# Patient Record
Sex: MALE | Race: OTHER | NOT HISPANIC OR LATINO | URBAN - METROPOLITAN AREA
[De-identification: names, ages, dates, MRNs, and addresses within clinical notes are randomized per-mention and may not be internally consistent; named-entity substitution may affect disease eponyms.]

---

## 2017-06-26 ENCOUNTER — EMERGENCY (EMERGENCY)
Facility: HOSPITAL | Age: 47
LOS: 1 days | Discharge: HOME | End: 2017-06-26
Attending: EMERGENCY MEDICINE
Payer: MEDICAID

## 2017-06-26 VITALS
HEART RATE: 68 BPM | DIASTOLIC BLOOD PRESSURE: 76 MMHG | RESPIRATION RATE: 18 BRPM | TEMPERATURE: 98 F | OXYGEN SATURATION: 100 % | SYSTOLIC BLOOD PRESSURE: 135 MMHG

## 2017-06-26 VITALS
RESPIRATION RATE: 20 BRPM | OXYGEN SATURATION: 100 % | HEIGHT: 66 IN | HEART RATE: 77 BPM | SYSTOLIC BLOOD PRESSURE: 136 MMHG | WEIGHT: 164.91 LBS | TEMPERATURE: 98 F | DIASTOLIC BLOOD PRESSURE: 73 MMHG

## 2017-06-26 DIAGNOSIS — F41.9 ANXIETY DISORDER, UNSPECIFIED: ICD-10-CM

## 2017-06-26 DIAGNOSIS — R19.7 DIARRHEA, UNSPECIFIED: ICD-10-CM

## 2017-06-26 DIAGNOSIS — E78.5 HYPERLIPIDEMIA, UNSPECIFIED: ICD-10-CM

## 2017-06-26 DIAGNOSIS — G20 PARKINSON'S DISEASE: ICD-10-CM

## 2017-06-26 LAB
ALBUMIN SERPL ELPH-MCNC: 3.9 G/DL — SIGNIFICANT CHANGE UP (ref 3.5–5)
ALP SERPL-CCNC: 78 U/L — SIGNIFICANT CHANGE UP (ref 40–120)
ALT FLD-CCNC: 12 U/L DA — SIGNIFICANT CHANGE UP (ref 10–60)
ANION GAP SERPL CALC-SCNC: 4 MMOL/L — LOW (ref 5–17)
AST SERPL-CCNC: 24 U/L — SIGNIFICANT CHANGE UP (ref 10–40)
BASOPHILS # BLD AUTO: 0.1 K/UL — SIGNIFICANT CHANGE UP (ref 0–0.2)
BASOPHILS NFR BLD AUTO: 1.5 % — SIGNIFICANT CHANGE UP (ref 0–2)
BILIRUB SERPL-MCNC: 0.3 MG/DL — SIGNIFICANT CHANGE UP (ref 0.2–1.2)
BUN SERPL-MCNC: 20 MG/DL — HIGH (ref 7–18)
CALCIUM SERPL-MCNC: 8.6 MG/DL — SIGNIFICANT CHANGE UP (ref 8.4–10.5)
CHLORIDE SERPL-SCNC: 108 MMOL/L — SIGNIFICANT CHANGE UP (ref 96–108)
CO2 SERPL-SCNC: 27 MMOL/L — SIGNIFICANT CHANGE UP (ref 22–31)
CREAT SERPL-MCNC: 1.07 MG/DL — SIGNIFICANT CHANGE UP (ref 0.5–1.3)
EOSINOPHIL # BLD AUTO: 0.1 K/UL — SIGNIFICANT CHANGE UP (ref 0–0.5)
EOSINOPHIL NFR BLD AUTO: 1.3 % — SIGNIFICANT CHANGE UP (ref 0–6)
GLUCOSE SERPL-MCNC: 100 MG/DL — HIGH (ref 70–99)
HCT VFR BLD CALC: 43.5 % — SIGNIFICANT CHANGE UP (ref 39–50)
HGB BLD-MCNC: 14.6 G/DL — SIGNIFICANT CHANGE UP (ref 13–17)
LACTATE SERPL-SCNC: 1.5 MMOL/L — SIGNIFICANT CHANGE UP (ref 0.7–2)
LIDOCAIN IGE QN: 236 U/L — SIGNIFICANT CHANGE UP (ref 73–393)
LYMPHOCYTES # BLD AUTO: 1.1 K/UL — SIGNIFICANT CHANGE UP (ref 1–3.3)
LYMPHOCYTES # BLD AUTO: 22.8 % — SIGNIFICANT CHANGE UP (ref 13–44)
MCHC RBC-ENTMCNC: 29.2 PG — SIGNIFICANT CHANGE UP (ref 27–34)
MCHC RBC-ENTMCNC: 33.4 GM/DL — SIGNIFICANT CHANGE UP (ref 32–36)
MCV RBC AUTO: 87.2 FL — SIGNIFICANT CHANGE UP (ref 80–100)
MONOCYTES # BLD AUTO: 0.4 K/UL — SIGNIFICANT CHANGE UP (ref 0–0.9)
MONOCYTES NFR BLD AUTO: 9 % — SIGNIFICANT CHANGE UP (ref 2–14)
NEUTROPHILS # BLD AUTO: 3.2 K/UL — SIGNIFICANT CHANGE UP (ref 1.8–7.4)
NEUTROPHILS NFR BLD AUTO: 65.4 % — SIGNIFICANT CHANGE UP (ref 43–77)
PLATELET # BLD AUTO: 203 K/UL — SIGNIFICANT CHANGE UP (ref 150–400)
POTASSIUM SERPL-MCNC: 4.4 MMOL/L — SIGNIFICANT CHANGE UP (ref 3.5–5.3)
POTASSIUM SERPL-SCNC: 4.4 MMOL/L — SIGNIFICANT CHANGE UP (ref 3.5–5.3)
PROT SERPL-MCNC: 7.4 G/DL — SIGNIFICANT CHANGE UP (ref 6–8.3)
RBC # BLD: 5 M/UL — SIGNIFICANT CHANGE UP (ref 4.2–5.8)
RBC # FLD: 11.4 % — SIGNIFICANT CHANGE UP (ref 10.3–14.5)
SODIUM SERPL-SCNC: 139 MMOL/L — SIGNIFICANT CHANGE UP (ref 135–145)
WBC # BLD: 4.9 K/UL — SIGNIFICANT CHANGE UP (ref 3.8–10.5)
WBC # FLD AUTO: 4.9 K/UL — SIGNIFICANT CHANGE UP (ref 3.8–10.5)

## 2017-06-26 PROCEDURE — 85027 COMPLETE CBC AUTOMATED: CPT

## 2017-06-26 PROCEDURE — 96375 TX/PRO/DX INJ NEW DRUG ADDON: CPT

## 2017-06-26 PROCEDURE — 83690 ASSAY OF LIPASE: CPT

## 2017-06-26 PROCEDURE — 36415 COLL VENOUS BLD VENIPUNCTURE: CPT

## 2017-06-26 PROCEDURE — 80053 COMPREHEN METABOLIC PANEL: CPT

## 2017-06-26 PROCEDURE — 99284 EMERGENCY DEPT VISIT MOD MDM: CPT | Mod: 25

## 2017-06-26 PROCEDURE — 99284 EMERGENCY DEPT VISIT MOD MDM: CPT

## 2017-06-26 PROCEDURE — 96374 THER/PROPH/DIAG INJ IV PUSH: CPT

## 2017-06-26 PROCEDURE — 83605 ASSAY OF LACTIC ACID: CPT

## 2017-06-26 RX ORDER — ONDANSETRON 8 MG/1
4 TABLET, FILM COATED ORAL ONCE
Qty: 0 | Refills: 0 | Status: COMPLETED | OUTPATIENT
Start: 2017-06-26 | End: 2017-06-26

## 2017-06-26 RX ORDER — FAMOTIDINE 10 MG/ML
20 INJECTION INTRAVENOUS ONCE
Qty: 0 | Refills: 0 | Status: COMPLETED | OUTPATIENT
Start: 2017-06-26 | End: 2017-06-26

## 2017-06-26 RX ORDER — SODIUM CHLORIDE 9 MG/ML
2000 INJECTION INTRAMUSCULAR; INTRAVENOUS; SUBCUTANEOUS ONCE
Qty: 0 | Refills: 0 | Status: COMPLETED | OUTPATIENT
Start: 2017-06-26 | End: 2017-06-26

## 2017-06-26 RX ADMIN — ONDANSETRON 4 MILLIGRAM(S): 8 TABLET, FILM COATED ORAL at 14:10

## 2017-06-26 RX ADMIN — SODIUM CHLORIDE 1000 MILLILITER(S): 9 INJECTION INTRAMUSCULAR; INTRAVENOUS; SUBCUTANEOUS at 14:10

## 2017-06-26 RX ADMIN — FAMOTIDINE 20 MILLIGRAM(S): 10 INJECTION INTRAVENOUS at 14:11

## 2017-06-26 NOTE — ED ADULT NURSE NOTE - OBJECTIVE STATEMENT
as per patient he ate bagel AND butter from [employer] this am and started abdominal pain and nausea vomiting and diarrhea

## 2017-07-20 ENCOUNTER — EMERGENCY (EMERGENCY)
Facility: HOSPITAL | Age: 47
LOS: 1 days | Discharge: HOME | End: 2017-07-20
Attending: EMERGENCY MEDICINE
Payer: MEDICAID

## 2017-07-20 VITALS
OXYGEN SATURATION: 98 % | HEART RATE: 78 BPM | TEMPERATURE: 98 F | SYSTOLIC BLOOD PRESSURE: 138 MMHG | DIASTOLIC BLOOD PRESSURE: 88 MMHG | WEIGHT: 160.06 LBS | RESPIRATION RATE: 18 BRPM | HEIGHT: 66 IN

## 2017-07-20 DIAGNOSIS — H57.12 OCULAR PAIN, LEFT EYE: ICD-10-CM

## 2017-07-20 DIAGNOSIS — E78.00 PURE HYPERCHOLESTEROLEMIA, UNSPECIFIED: ICD-10-CM

## 2017-07-20 PROCEDURE — 99283 EMERGENCY DEPT VISIT LOW MDM: CPT

## 2017-07-20 PROCEDURE — 99284 EMERGENCY DEPT VISIT MOD MDM: CPT | Mod: 25

## 2017-07-20 RX ORDER — IBUPROFEN 200 MG
600 TABLET ORAL ONCE
Qty: 0 | Refills: 0 | Status: COMPLETED | OUTPATIENT
Start: 2017-07-20 | End: 2017-07-20

## 2017-07-20 RX ORDER — KETOROLAC TROMETHAMINE 0.5 %
1 DROPS OPHTHALMIC (EYE) ONCE
Qty: 0 | Refills: 0 | Status: COMPLETED | OUTPATIENT
Start: 2017-07-20 | End: 2017-07-20

## 2017-07-20 RX ADMIN — Medication 1 DROP(S): at 02:19

## 2017-07-20 RX ADMIN — Medication 1 DROP(S): at 02:21

## 2017-07-20 RX ADMIN — Medication 600 MILLIGRAM(S): at 02:18

## 2017-07-20 NOTE — ED ADULT NURSE NOTE - OBJECTIVE STATEMENT
pt. is aox3 came to er c/o left eye pain swelling after putting his finger hin his eye accidentally. left eye noted with slight swelling. pt also stated that the eye itched. pt was seen by attending. medication ordered

## 2017-07-20 NOTE — ED PROVIDER NOTE - MEDICAL DECISION MAKING DETAILS
45 y/o 47 y/o with left eyelid discomfort after touching spicy food and touching eye. No foreign bodies. Unchanged vision. Discussed anticipatory guidance. prn visine. discussed anticipatory guidance.

## 2017-07-20 NOTE — ED PROVIDER NOTE - EYES, MLM
Clear bilaterally, pupils equal, round and reactive to light. Clear bilaterally, pupils equal, round and reactive to light. right eye abducted (chronic)

## 2017-07-20 NOTE — ED PROVIDER NOTE - OBJECTIVE STATEMENT
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 No blurry vision. Discomfort mostly resolved. No fever, chills or headache.

## 2017-09-24 NOTE — ED ADULT NURSE NOTE - BREATHING, MLM
Problem: Infection  Goal: Will remain free from infection  Outcome: PROGRESSING AS EXPECTED  Pt monitored for signs and symptoms of infection. Vitals and labs assessed and monitored for signs of infection. Proper hand hygiene performed prior to entering and exiting pt's room. Pt educated on proper hand hygiene. Pt receiving IV abx for infection.     Problem: Pain Management  Goal: Pain level will decrease to patient's comfort goal  Outcome: PROGRESSING AS EXPECTED  Pt assessed for pain throughout shift. Pt has no complaints of pain without movement. Pt does not like to move much due to pain/discomfort.        Spontaneous, unlabored and symmetrical

## 2017-09-28 ENCOUNTER — EMERGENCY (EMERGENCY)
Facility: HOSPITAL | Age: 47
LOS: 1 days | Discharge: HOME | End: 2017-09-28
Attending: EMERGENCY MEDICINE
Payer: MEDICAID

## 2017-09-28 VITALS — DIASTOLIC BLOOD PRESSURE: 94 MMHG | SYSTOLIC BLOOD PRESSURE: 144 MMHG

## 2017-09-28 VITALS
TEMPERATURE: 99 F | DIASTOLIC BLOOD PRESSURE: 84 MMHG | HEIGHT: 65 IN | WEIGHT: 164.91 LBS | OXYGEN SATURATION: 98 % | HEART RATE: 92 BPM | SYSTOLIC BLOOD PRESSURE: 145 MMHG | RESPIRATION RATE: 20 BRPM

## 2017-09-28 DIAGNOSIS — F41.9 ANXIETY DISORDER, UNSPECIFIED: ICD-10-CM

## 2017-09-28 DIAGNOSIS — Z91.013 ALLERGY TO SEAFOOD: ICD-10-CM

## 2017-09-28 DIAGNOSIS — G20 PARKINSON'S DISEASE: ICD-10-CM

## 2017-09-28 DIAGNOSIS — M54.2 CERVICALGIA: ICD-10-CM

## 2017-09-28 DIAGNOSIS — E78.00 PURE HYPERCHOLESTEROLEMIA, UNSPECIFIED: ICD-10-CM

## 2017-09-28 LAB
ANION GAP SERPL CALC-SCNC: 6 MMOL/L — SIGNIFICANT CHANGE UP (ref 5–17)
BASOPHILS # BLD AUTO: 0.1 K/UL — SIGNIFICANT CHANGE UP (ref 0–0.2)
BASOPHILS NFR BLD AUTO: 1.6 % — SIGNIFICANT CHANGE UP (ref 0–2)
BUN SERPL-MCNC: 15 MG/DL — SIGNIFICANT CHANGE UP (ref 7–18)
CALCIUM SERPL-MCNC: 9.1 MG/DL — SIGNIFICANT CHANGE UP (ref 8.4–10.5)
CHLORIDE SERPL-SCNC: 109 MMOL/L — HIGH (ref 96–108)
CO2 SERPL-SCNC: 25 MMOL/L — SIGNIFICANT CHANGE UP (ref 22–31)
CREAT SERPL-MCNC: 0.94 MG/DL — SIGNIFICANT CHANGE UP (ref 0.5–1.3)
EOSINOPHIL # BLD AUTO: 0.1 K/UL — SIGNIFICANT CHANGE UP (ref 0–0.5)
EOSINOPHIL NFR BLD AUTO: 1 % — SIGNIFICANT CHANGE UP (ref 0–6)
GLUCOSE SERPL-MCNC: 97 MG/DL — SIGNIFICANT CHANGE UP (ref 70–99)
HCT VFR BLD CALC: 43.8 % — SIGNIFICANT CHANGE UP (ref 39–50)
HGB BLD-MCNC: 15.1 G/DL — SIGNIFICANT CHANGE UP (ref 13–17)
LYMPHOCYTES # BLD AUTO: 1.3 K/UL — SIGNIFICANT CHANGE UP (ref 1–3.3)
LYMPHOCYTES # BLD AUTO: 20.8 % — SIGNIFICANT CHANGE UP (ref 13–44)
MCHC RBC-ENTMCNC: 29 PG — SIGNIFICANT CHANGE UP (ref 27–34)
MCHC RBC-ENTMCNC: 34.6 GM/DL — SIGNIFICANT CHANGE UP (ref 32–36)
MCV RBC AUTO: 84 FL — SIGNIFICANT CHANGE UP (ref 80–100)
MONOCYTES # BLD AUTO: 0.5 K/UL — SIGNIFICANT CHANGE UP (ref 0–0.9)
MONOCYTES NFR BLD AUTO: 7.5 % — SIGNIFICANT CHANGE UP (ref 2–14)
NEUTROPHILS # BLD AUTO: 4.2 K/UL — SIGNIFICANT CHANGE UP (ref 1.8–7.4)
NEUTROPHILS NFR BLD AUTO: 69 % — SIGNIFICANT CHANGE UP (ref 43–77)
PLATELET # BLD AUTO: 218 K/UL — SIGNIFICANT CHANGE UP (ref 150–400)
POTASSIUM SERPL-MCNC: 3.8 MMOL/L — SIGNIFICANT CHANGE UP (ref 3.5–5.3)
POTASSIUM SERPL-SCNC: 3.8 MMOL/L — SIGNIFICANT CHANGE UP (ref 3.5–5.3)
RBC # BLD: 5.22 M/UL — SIGNIFICANT CHANGE UP (ref 4.2–5.8)
RBC # FLD: 10.9 % — SIGNIFICANT CHANGE UP (ref 10.3–14.5)
SODIUM SERPL-SCNC: 140 MMOL/L — SIGNIFICANT CHANGE UP (ref 135–145)
WBC # BLD: 6 K/UL — SIGNIFICANT CHANGE UP (ref 3.8–10.5)
WBC # FLD AUTO: 6 K/UL — SIGNIFICANT CHANGE UP (ref 3.8–10.5)

## 2017-09-28 PROCEDURE — 85027 COMPLETE CBC AUTOMATED: CPT

## 2017-09-28 PROCEDURE — 80048 BASIC METABOLIC PNL TOTAL CA: CPT

## 2017-09-28 PROCEDURE — 70450 CT HEAD/BRAIN W/O DYE: CPT | Mod: 26

## 2017-09-28 PROCEDURE — 99284 EMERGENCY DEPT VISIT MOD MDM: CPT | Mod: 25

## 2017-09-28 PROCEDURE — 70450 CT HEAD/BRAIN W/O DYE: CPT

## 2017-09-28 RX ORDER — DIAZEPAM 5 MG
2 TABLET ORAL ONCE
Qty: 0 | Refills: 0 | Status: DISCONTINUED | OUTPATIENT
Start: 2017-09-28 | End: 2017-09-28

## 2017-09-28 RX ADMIN — Medication 2 MILLIGRAM(S): at 06:06

## 2017-09-28 NOTE — ED PROVIDER NOTE - CRANIAL NERVE AND PUPILLARY EXAM
cough reflex intact/corneal reflex intact/peripheral vision intact/central and peripheral vision intact/central vision intact/extra-ocular movements intact/cranial nerves 2-12 intact/gag reflex intact/tongue is midline

## 2017-09-28 NOTE — ED PROVIDER NOTE - EYES, MLM
Clear bilaterally, pupils equal, round and reactive to light. Clear bilaterally, pupils equal, round and reactive to light. strabismus is present.

## 2017-09-28 NOTE — ED PROVIDER NOTE - OBJECTIVE STATEMENT
46 y/o M pt with PMHx of Anxiety, Parkinson's Disease (on Carbidopa and Levodopa; compliant with medication), and Hypercholesterolemia (on Simvastatin) and no significant PSHx presents to ED c/o intermittent episodes of neck stiffness with associated pain as well as inability to focus/concentrate since 12:00pm yesterday. Pt reports he has generally not been feeling well since yesterday. Pt denies fever, chills, nausea, vomiting, HA, or any other complaints. Pt also denies having felt similar sx's in the past. NKDA.

## 2017-09-28 NOTE — ED PROVIDER NOTE - MEDICAL DECISION MAKING DETAILS
46 y/o M pt with Hx of Parkinson's disease presents with neck stiffness, neck pain, and inability to concentrate/focus. Will CT Head, send labs, and reassess.

## 2017-11-01 ENCOUNTER — EMERGENCY (EMERGENCY)
Facility: HOSPITAL | Age: 47
LOS: 1 days | Discharge: HOME | End: 2017-11-01
Attending: EMERGENCY MEDICINE
Payer: MEDICAID

## 2017-11-01 VITALS
HEIGHT: 66 IN | RESPIRATION RATE: 20 BRPM | DIASTOLIC BLOOD PRESSURE: 76 MMHG | HEART RATE: 88 BPM | OXYGEN SATURATION: 100 % | TEMPERATURE: 98 F | WEIGHT: 164.02 LBS | SYSTOLIC BLOOD PRESSURE: 127 MMHG

## 2017-11-01 PROCEDURE — 99285 EMERGENCY DEPT VISIT HI MDM: CPT | Mod: 25

## 2017-11-01 NOTE — ED ADULT TRIAGE NOTE - CHIEF COMPLAINT QUOTE
Pt c/o pain and rash to left lower leg, noticed a couple of weeks ago, noticed before he went on vacation.

## 2017-11-02 VITALS
DIASTOLIC BLOOD PRESSURE: 84 MMHG | OXYGEN SATURATION: 98 % | TEMPERATURE: 98 F | SYSTOLIC BLOOD PRESSURE: 139 MMHG | RESPIRATION RATE: 18 BRPM | HEART RATE: 70 BPM

## 2017-11-02 PROCEDURE — 99283 EMERGENCY DEPT VISIT LOW MDM: CPT | Mod: 25

## 2017-11-02 PROCEDURE — 73590 X-RAY EXAM OF LOWER LEG: CPT | Mod: 26,LT

## 2017-11-02 PROCEDURE — 73590 X-RAY EXAM OF LOWER LEG: CPT

## 2017-11-02 RX ORDER — MUPIROCIN 20 MG/G
1 OINTMENT TOPICAL
Qty: 1 | Refills: 0 | OUTPATIENT
Start: 2017-11-02 | End: 2017-11-09

## 2017-11-02 RX ADMIN — Medication 300 MILLIGRAM(S): at 02:35

## 2017-11-02 NOTE — ED PROVIDER NOTE - PHYSICAL EXAMINATION
+pruritic eczematous patch (2cm to left ant tib area. +mild erythema surrounding.  no bony deformities, no leg length discrepancy, femoral and pedal pulses intact, cap refill  < 2 secs.

## 2017-11-02 NOTE — ED PROVIDER NOTE - OBJECTIVE STATEMENT
Pt with pruritic lesion to left lower extremity x 1 month. Pt frequently scratching area and noted redness and swelling to area x 3 days.

## 2017-11-02 NOTE — ED PROVIDER NOTE - MEDICAL DECISION MAKING DETAILS
46 yo m hx of [facility]. pt with puruitc rash to left tib area x 1 month. Pt frequwenstly scratching area and noted  redness and swelling to area, no blweeding, no dischaerge.   +puritic eczematous patch (2cm to left ant tib area. +mild erythema surrounding. 46 yo m hx of [facility]. pt with pruritic rash to left tib area x 1 month. Pt frequently scratching area and noted  redness and swelling to area, no bleeding, no discharge.   Pt is well appearing walking with normal gait, stable for discharge and follow up with medical doctor. Pt educated on care and need for follow up. Discussed anticipatory guidance and return precautions. Questions answered. I had a detailed discussion with the patient and/or guardian regarding the historical points, exam findings, and any diagnostic results supporting the discharge diagnosis. Rx Clinda, Bactroban, Probiotics.   D/C instructions provided via care notes

## 2017-11-02 NOTE — ED ADULT NURSE NOTE - OBJECTIVE STATEMENT
Pt co rash on left shin, noticed it  a couple of weeks ago before he went on vacation. no sob, difficulty breathing, skin intact.

## 2018-01-24 ENCOUNTER — INPATIENT (INPATIENT)
Facility: HOSPITAL | Age: 48
LOS: 1 days | Discharge: ANOTHER HOSPITAL | DRG: 282 | End: 2018-01-26
Attending: INTERNAL MEDICINE | Admitting: INTERNAL MEDICINE
Payer: MEDICAID

## 2018-01-24 VITALS
TEMPERATURE: 98 F | DIASTOLIC BLOOD PRESSURE: 84 MMHG | RESPIRATION RATE: 18 BRPM | WEIGHT: 160.06 LBS | HEIGHT: 66 IN | SYSTOLIC BLOOD PRESSURE: 151 MMHG | OXYGEN SATURATION: 98 % | HEART RATE: 113 BPM

## 2018-01-24 DIAGNOSIS — G20 PARKINSON'S DISEASE: ICD-10-CM

## 2018-01-24 DIAGNOSIS — R74.8 ABNORMAL LEVELS OF OTHER SERUM ENZYMES: ICD-10-CM

## 2018-01-24 DIAGNOSIS — F41.9 ANXIETY DISORDER, UNSPECIFIED: ICD-10-CM

## 2018-01-24 DIAGNOSIS — Z29.9 ENCOUNTER FOR PROPHYLACTIC MEASURES, UNSPECIFIED: ICD-10-CM

## 2018-01-24 DIAGNOSIS — E78.5 HYPERLIPIDEMIA, UNSPECIFIED: ICD-10-CM

## 2018-01-24 LAB
ALBUMIN SERPL ELPH-MCNC: 4 G/DL — SIGNIFICANT CHANGE UP (ref 3.5–5)
ALP SERPL-CCNC: 74 U/L — SIGNIFICANT CHANGE UP (ref 40–120)
ALT FLD-CCNC: 20 U/L DA — SIGNIFICANT CHANGE UP (ref 10–60)
ANION GAP SERPL CALC-SCNC: 8 MMOL/L — SIGNIFICANT CHANGE UP (ref 5–17)
AST SERPL-CCNC: 18 U/L — SIGNIFICANT CHANGE UP (ref 10–40)
BASOPHILS # BLD AUTO: 0.1 K/UL — SIGNIFICANT CHANGE UP (ref 0–0.2)
BASOPHILS NFR BLD AUTO: 1.1 % — SIGNIFICANT CHANGE UP (ref 0–2)
BILIRUB SERPL-MCNC: 0.4 MG/DL — SIGNIFICANT CHANGE UP (ref 0.2–1.2)
BUN SERPL-MCNC: 16 MG/DL — SIGNIFICANT CHANGE UP (ref 7–18)
CALCIUM SERPL-MCNC: 9.5 MG/DL — SIGNIFICANT CHANGE UP (ref 8.4–10.5)
CHLORIDE SERPL-SCNC: 106 MMOL/L — SIGNIFICANT CHANGE UP (ref 96–108)
CK MB BLD-MCNC: 1.5 % — SIGNIFICANT CHANGE UP (ref 0–3.5)
CK MB BLD-MCNC: 1.7 % — SIGNIFICANT CHANGE UP (ref 0–3.5)
CK MB CFR SERPL CALC: 1.8 NG/ML — SIGNIFICANT CHANGE UP (ref 0–3.6)
CK MB CFR SERPL CALC: 2.1 NG/ML — SIGNIFICANT CHANGE UP (ref 0–3.6)
CK SERPL-CCNC: 122 U/L — SIGNIFICANT CHANGE UP (ref 35–232)
CK SERPL-CCNC: 123 U/L — SIGNIFICANT CHANGE UP (ref 35–232)
CO2 SERPL-SCNC: 25 MMOL/L — SIGNIFICANT CHANGE UP (ref 22–31)
CREAT SERPL-MCNC: 0.94 MG/DL — SIGNIFICANT CHANGE UP (ref 0.5–1.3)
D DIMER BLD IA.RAPID-MCNC: <150 NG/ML DDU — SIGNIFICANT CHANGE UP
EOSINOPHIL # BLD AUTO: 0 K/UL — SIGNIFICANT CHANGE UP (ref 0–0.5)
EOSINOPHIL NFR BLD AUTO: 0.6 % — SIGNIFICANT CHANGE UP (ref 0–6)
GLUCOSE SERPL-MCNC: 94 MG/DL — SIGNIFICANT CHANGE UP (ref 70–99)
HCT VFR BLD CALC: 41.7 % — SIGNIFICANT CHANGE UP (ref 39–50)
HGB BLD-MCNC: 14 G/DL — SIGNIFICANT CHANGE UP (ref 13–17)
LYMPHOCYTES # BLD AUTO: 0.8 K/UL — LOW (ref 1–3.3)
LYMPHOCYTES # BLD AUTO: 18.2 % — SIGNIFICANT CHANGE UP (ref 13–44)
MCHC RBC-ENTMCNC: 29.2 PG — SIGNIFICANT CHANGE UP (ref 27–34)
MCHC RBC-ENTMCNC: 33.6 GM/DL — SIGNIFICANT CHANGE UP (ref 32–36)
MCV RBC AUTO: 87 FL — SIGNIFICANT CHANGE UP (ref 80–100)
MONOCYTES # BLD AUTO: 0.3 K/UL — SIGNIFICANT CHANGE UP (ref 0–0.9)
MONOCYTES NFR BLD AUTO: 7.4 % — SIGNIFICANT CHANGE UP (ref 2–14)
NEUTROPHILS # BLD AUTO: 3.3 K/UL — SIGNIFICANT CHANGE UP (ref 1.8–7.4)
NEUTROPHILS NFR BLD AUTO: 72.7 % — SIGNIFICANT CHANGE UP (ref 43–77)
PLATELET # BLD AUTO: 217 K/UL — SIGNIFICANT CHANGE UP (ref 150–400)
POTASSIUM SERPL-MCNC: 3.8 MMOL/L — SIGNIFICANT CHANGE UP (ref 3.5–5.3)
POTASSIUM SERPL-SCNC: 3.8 MMOL/L — SIGNIFICANT CHANGE UP (ref 3.5–5.3)
PROT SERPL-MCNC: 7.3 G/DL — SIGNIFICANT CHANGE UP (ref 6–8.3)
RBC # BLD: 4.8 M/UL — SIGNIFICANT CHANGE UP (ref 4.2–5.8)
RBC # FLD: 11.5 % — SIGNIFICANT CHANGE UP (ref 10.3–14.5)
SODIUM SERPL-SCNC: 139 MMOL/L — SIGNIFICANT CHANGE UP (ref 135–145)
TROPONIN I SERPL-MCNC: 0.05 NG/ML — HIGH (ref 0–0.04)
TROPONIN I SERPL-MCNC: 0.06 NG/ML — HIGH (ref 0–0.04)
TROPONIN I SERPL-MCNC: 0.06 NG/ML — HIGH (ref 0–0.04)
WBC # BLD: 4.6 K/UL — SIGNIFICANT CHANGE UP (ref 3.8–10.5)
WBC # FLD AUTO: 4.6 K/UL — SIGNIFICANT CHANGE UP (ref 3.8–10.5)

## 2018-01-24 PROCEDURE — 93010 ELECTROCARDIOGRAM REPORT: CPT

## 2018-01-24 PROCEDURE — 99285 EMERGENCY DEPT VISIT HI MDM: CPT | Mod: 25

## 2018-01-24 PROCEDURE — 99223 1ST HOSP IP/OBS HIGH 75: CPT

## 2018-01-24 RX ORDER — ACETAMINOPHEN 500 MG
650 TABLET ORAL EVERY 6 HOURS
Qty: 0 | Refills: 0 | Status: DISCONTINUED | OUTPATIENT
Start: 2018-01-24 | End: 2018-01-26

## 2018-01-24 RX ORDER — HEPARIN SODIUM 5000 [USP'U]/ML
5000 INJECTION INTRAVENOUS; SUBCUTANEOUS EVERY 8 HOURS
Qty: 0 | Refills: 0 | Status: DISCONTINUED | OUTPATIENT
Start: 2018-01-24 | End: 2018-01-26

## 2018-01-24 RX ORDER — HYDROCHLOROTHIAZIDE 25 MG
12.5 TABLET ORAL DAILY
Qty: 0 | Refills: 0 | Status: DISCONTINUED | OUTPATIENT
Start: 2018-01-24 | End: 2018-01-25

## 2018-01-24 RX ORDER — CYCLOBENZAPRINE HYDROCHLORIDE 10 MG/1
5 TABLET, FILM COATED ORAL THREE TIMES A DAY
Qty: 0 | Refills: 0 | Status: DISCONTINUED | OUTPATIENT
Start: 2018-01-24 | End: 2018-01-24

## 2018-01-24 RX ORDER — CARBIDOPA AND LEVODOPA 25; 100 MG/1; MG/1
1 TABLET ORAL
Qty: 0 | Refills: 0 | Status: DISCONTINUED | OUTPATIENT
Start: 2018-01-24 | End: 2018-01-24

## 2018-01-24 RX ORDER — SIMVASTATIN 20 MG/1
40 TABLET, FILM COATED ORAL AT BEDTIME
Qty: 0 | Refills: 0 | Status: DISCONTINUED | OUTPATIENT
Start: 2018-01-24 | End: 2018-01-26

## 2018-01-24 RX ORDER — BUPROPION HYDROCHLORIDE 150 MG/1
150 TABLET, EXTENDED RELEASE ORAL DAILY
Qty: 0 | Refills: 0 | Status: DISCONTINUED | OUTPATIENT
Start: 2018-01-24 | End: 2018-01-26

## 2018-01-24 RX ORDER — METOPROLOL TARTRATE 50 MG
12.5 TABLET ORAL
Qty: 0 | Refills: 0 | Status: DISCONTINUED | OUTPATIENT
Start: 2018-01-24 | End: 2018-01-26

## 2018-01-24 RX ORDER — ASPIRIN/CALCIUM CARB/MAGNESIUM 324 MG
81 TABLET ORAL DAILY
Qty: 0 | Refills: 0 | Status: DISCONTINUED | OUTPATIENT
Start: 2018-01-25 | End: 2018-01-26

## 2018-01-24 RX ORDER — CARBIDOPA AND LEVODOPA 25; 100 MG/1; MG/1
1 TABLET ORAL
Qty: 0 | Refills: 0 | Status: DISCONTINUED | OUTPATIENT
Start: 2018-01-24 | End: 2018-01-26

## 2018-01-24 RX ORDER — ASPIRIN/CALCIUM CARB/MAGNESIUM 324 MG
325 TABLET ORAL ONCE
Qty: 0 | Refills: 0 | Status: COMPLETED | OUTPATIENT
Start: 2018-01-24 | End: 2018-01-24

## 2018-01-24 RX ORDER — CARBIDOPA AND LEVODOPA 25; 100 MG/1; MG/1
1.5 TABLET ORAL
Qty: 0 | Refills: 0 | Status: DISCONTINUED | OUTPATIENT
Start: 2018-01-24 | End: 2018-01-26

## 2018-01-24 RX ORDER — SODIUM CHLORIDE 9 MG/ML
1000 INJECTION INTRAMUSCULAR; INTRAVENOUS; SUBCUTANEOUS ONCE
Qty: 0 | Refills: 0 | Status: COMPLETED | OUTPATIENT
Start: 2018-01-24 | End: 2018-01-24

## 2018-01-24 RX ORDER — ASPIRIN/CALCIUM CARB/MAGNESIUM 324 MG
81 TABLET ORAL DAILY
Qty: 0 | Refills: 0 | Status: DISCONTINUED | OUTPATIENT
Start: 2018-01-24 | End: 2018-01-24

## 2018-01-24 RX ADMIN — CARBIDOPA AND LEVODOPA 1 TABLET(S): 25; 100 TABLET ORAL at 17:44

## 2018-01-24 RX ADMIN — SIMVASTATIN 40 MILLIGRAM(S): 20 TABLET, FILM COATED ORAL at 22:16

## 2018-01-24 RX ADMIN — SODIUM CHLORIDE 2000 MILLILITER(S): 9 INJECTION INTRAMUSCULAR; INTRAVENOUS; SUBCUTANEOUS at 11:15

## 2018-01-24 RX ADMIN — Medication 650 MILLIGRAM(S): at 23:07

## 2018-01-24 RX ADMIN — HEPARIN SODIUM 5000 UNIT(S): 5000 INJECTION INTRAVENOUS; SUBCUTANEOUS at 22:15

## 2018-01-24 RX ADMIN — BUPROPION HYDROCHLORIDE 150 MILLIGRAM(S): 150 TABLET, EXTENDED RELEASE ORAL at 17:44

## 2018-01-24 RX ADMIN — Medication 325 MILLIGRAM(S): at 11:16

## 2018-01-24 RX ADMIN — CARBIDOPA AND LEVODOPA 1.5 TABLET(S): 25; 100 TABLET ORAL at 23:06

## 2018-01-24 RX ADMIN — Medication 12.5 MILLIGRAM(S): at 18:00

## 2018-01-24 NOTE — H&P ADULT - NSHPPHYSICALEXAM_GEN_ALL_CORE
Vital Signs Last 24 Hrs  T(C): 36.7 (24 Jan 2018 06:57), Max: 36.7 (24 Jan 2018 06:57)  T(F): 98 (24 Jan 2018 06:57), Max: 98 (24 Jan 2018 06:57)  HR: 113 (24 Jan 2018 06:57) (113 - 113)  BP: 151/84 (24 Jan 2018 06:57) (151/84 - 151/84)  BP(mean): --  RR: 18 (24 Jan 2018 06:57) (18 - 18)  SpO2: 98% (24 Jan 2018 06:57) (98% - 98%)    GENERAL: NAD  HEAD:  Atraumatic, Normocephalic  EYES: EOMI, PERRLA, conjunctiva and sclera clear, squint in both eyes  ENMT: Moist mucous membranes  NECK: Supple  NERVOUS SYSTEM:  Alert & Oriented X3  CHEST/LUNG: Clear to auscultation bilaterally; No rales, rhonchi, wheezing, or rubs  HEART: Regular rate and rhythm; No murmurs, rubs, or gallops  ABDOMEN: Soft, Nontender, Nondistended; Bowel sounds present  EXTREMITIES:  2+ Peripheral Pulses, b/l LE edema

## 2018-01-24 NOTE — H&P ADULT - HISTORY OF PRESENT ILLNESS
46 yo M with pmhx of Parkinson's disease, and HLD came in with c/o neck stiffness that started this morning. He also c/o worsening parkison's disease. He said he has worsening tremors on his R extremity compared to left. Also c/o worsening rigidity. He said he often has neck stiffness and pain. It was difficult to move his neck this morning, but now it has slightly improved. The dosage of antiparkinson medication was last increased 1 year ago. He is able to do his daily activities. He denies chest pain, SOB, cough, nausea, vomiting, diarrhea, or constipation. No other complaints at this time.    Allergies: Seafood  SH: Lives with his sister. Denies smoking, alcohol, or illicit drug use. Walks with cane.

## 2018-01-24 NOTE — ED ADULT TRIAGE NOTE - CHIEF COMPLAINT QUOTE
neck pain started at 3pm,also pain and shaking on both arms started at 3am,h/o parkinsons neck pain started at 3am,also pain and shaking on both arms started at 3am,h/o parkinsons

## 2018-01-24 NOTE — PROGRESS NOTE ADULT - SUBJECTIVE AND OBJECTIVE BOX
Admission note   Cheif complaint   HPI   PMH   Medx   Tirso   SH   ROS   VS   PE   Labs   Current medx:   Imaging   Imp/plan Admission note   Chief complaint  worsening of rigidity of neck muscles  tremor L side of the body  elevated tropinin   HPI   48 yo M with pmhx of Parkinson's disease, and HLD came in with c/o neck stiffness that started this morning. He also c/o worsening parkison's disease. He said he has worsening tremors on his R extremity compared to left. Also c/o worsening rigidity. He said he often has neck stiffness and pain. It was difficult to move his neck this morning, but now it has slightly improved. The dosage of antiparkinson medication was last increased 1 year ago. He is able to do his daily activities. He denies chest pain, SOB, cough, nausea, vomiting, diarrhea, or constipation. No other complaints at this time.    PMH   Parkinson's disease  Hypercholesterolemia  Anxiety disorder  Medx   · 	buPROPion 150 mg/12 hours oral tablet, extended release: 1 tab(s) orally 2 times a day  · 	simvastatin 40 mg oral tablet: 1 tab(s) orally once a day (at bedtime)  · 	carbidopa-levodopa 25 mg-250 mg oral tablet:   in AM  · 	carbidopa-levodopa 25 mg-100 mg oral tablet: 1 tab(s) orally 3 times a day        Allergies: Seafood  SH: Lives with his sister. Denies smoking, alcohol, or illicit drug use. Walks with cane.     Tobacco Screening:  · Core Measure Site	Yes	  · Has the patient used tobacco in the past 30 days?	No	      ROS   REVIEW OF SYSTEMS:  	CONSTITUTIONAL: Fatigue. No fever, or weight loss  	EYES: No eye pain, visual disturbances, or discharge  	ENMT:  No difficulty hearing, tinnitus, vertigo; No sinus or throat pain  	NECK: Neck pain and stiffness  	RESPIRATORY: No cough, wheezing, chills or hemoptysis; No shortness of breath  	CARDIOVASCULAR: episode of chest pain last night, palpitations, dizziness, or leg swelling  	GASTROINTESTINAL: No abdominal or epigastric pain. No nausea, vomiting, or hematemesis; No diarrhea or constipation. No melena or hematochezia.  	GENITOURINARY: No dysuria, frequency, hematuria, or incontinence  	NEUROLOGICAL: Tremors. No headaches, or memory loss  MUSCULOSKELETAL: No joint pain or swelling  VS  Vital Signs Last 24 Hrs  T(C): 36.7 (24 Jan 2018 20:01), Max: 36.7 (24 Jan 2018 06:57)  T(F): 98.1 (24 Jan 2018 20:01), Max: 98.1 (24 Jan 2018 20:01)  HR: 78 (24 Jan 2018 20:01) (78 - 113)  BP: 141/92 (24 Jan 2018 20:01) (141/92 - 151/84)  BP(mean): --  RR: 17 (24 Jan 2018 20:01) (17 - 18)  SpO2: 99% (24 Jan 2018 20:01) (98% - 99%)   PE   Card: Murmur not appreciated.  Extra heart sounds-none. S1 normal, S2 (physiologic split).  PMI normal.  VenoVasc: JVP upper normal, legs with with edema.  Leg distal pulses normal, capillary perfusion normal.  Resp: Breathing unlabored.  Auscultation air entry normal. Inspiration normal. Expiration normal. Chest wall- nontender; normal shape.     UResp: Sinuses nontender; pharynx normal.  Nutrition: Nourishment normal; no significant obesity; average height.   Oral: Mouth w/o new lesions.   Abd-Pelv: Liver size normal. Abdomen nontender. Abdomen palpation without masses.  Bowel sounds normal. Pelvis within normal.    MentalH-Cogni: Calm. A & O x3.    Neur-Xiang-Sens: NL coordination.  tremors noted.  Motor exam nonfocal.  Muscle mass normal. increased muscle rigidity  OphthOtol: Orbits normal, EOMI, external ears normal. deviation of R eye to the R - chronic  Verteb-Skel: Kyphosis non-significant.  No CVA tenderness. Joints normal passive/active ROM & appear w/o acute pathology.  Derm: Face without lesions. Skin w/o new lesions.   HN-Subcutan: Conjunctiva & lips normal pink.  Neck without abnormality. Lymphadenopathy not appreciated.    Gen Appearance: No apparent health distress.  Chronic, well appearance (appropriate for stated age; average neatness).    Access via body: IV site clean    Labs   Labs, Radiology, Cardiology, and Other Results: 14.0   	4.6   )-----------( 217      ( 24 Jan 2018 09:12 )  	           41.7     	01-24    	139  |  106  |  16  	----------------------------<  94  	3.8   |  25  |  0.94    	Ca    9.5      24 Jan 2018 09:12    	TPro  7.3  /  Alb  4.0  /  TBili  0.4  /  DBili  x   /  AST  18  /  ALT  20  /  AlkPhos  74  01-24    	Troponin I, Serum (01.24.18 @ 09:12)    Troponin I, Serum: 0.048         Imp/plan  episode of chest pain with elevated troponin - r/o ACS  telemonitoring  serial cardiac enzymes and EKG, cardiology consult with Dr Tran, ASA  Echo  consider stress test  cont statins  cont B bl    Progression of Parkinson's disease  neurology consult with Dr Ovalle  cont present treatment    Mild elevation of BP - start HCTZ 12.5 mg daily,  cont B bl   f/u with BUN/Cr/K    B/l LE edema  Doppler to r/o DVT  Echo to check LVEF    OOB  PT evaluation and treatment      GI and DVT prophylaxis

## 2018-01-24 NOTE — H&P ADULT - PROBLEM SELECTOR PLAN 1
On carbidopa-levodopa-enta 25/100 [id number]  On carbidopa-levodopa  7am  - increased stiffness and pain likely from worsening parkinsons disease  - Will consult Dr. Ovalle- neurology to assess  - Will start flexeril prn

## 2018-01-24 NOTE — ED PROVIDER NOTE - PHYSICAL EXAMINATION
Afebrile, hemodynamically stable  NAD, well appearing  Head NCAT  Congenital esotropia, anicteric  MMM  No JVD  Tachy, reg rhythm, nml S1/S2, no m/r/g  Lungs CTAB, no w/r/r  Abd soft, NT, ND, nml BS, no rebound or guarding  AAO, CN's 3-12 otherwise grossly intact  MEDEIROS spontaneously, no leg cyanosis or edema, 2+ b/l radial pulses  Skin warm, dry

## 2018-01-24 NOTE — H&P ADULT - NSHPREVIEWOFSYSTEMS_GEN_ALL_CORE
REVIEW OF SYSTEMS:  CONSTITUTIONAL: Fatigue. No fever, or weight loss  EYES: No eye pain, visual disturbances, or discharge  ENMT:  No difficulty hearing, tinnitus, vertigo; No sinus or throat pain  NECK: Neck pain and stiffness  RESPIRATORY: No cough, wheezing, chills or hemoptysis; No shortness of breath  CARDIOVASCULAR: No chest pain, palpitations, dizziness, or leg swelling  GASTROINTESTINAL: No abdominal or epigastric pain. No nausea, vomiting, or hematemesis; No diarrhea or constipation. No melena or hematochezia.  GENITOURINARY: No dysuria, frequency, hematuria, or incontinence  NEUROLOGICAL: Tremors. No headaches, or memory loss  MUSCULOSKELETAL: No joint pain or swelling

## 2018-01-24 NOTE — H&P ADULT - NSHPLABSRESULTS_GEN_ALL_CORE
14.0   4.6   )-----------( 217      ( 24 Jan 2018 09:12 )             41.7     01-24    139  |  106  |  16  ----------------------------<  94  3.8   |  25  |  0.94    Ca    9.5      24 Jan 2018 09:12    TPro  7.3  /  Alb  4.0  /  TBili  0.4  /  DBili  x   /  AST  18  /  ALT  20  /  AlkPhos  74  01-24    Troponin I, Serum (01.24.18 @ 09:12)    Troponin I, Serum: 0.048

## 2018-01-24 NOTE — ED PROVIDER NOTE - MEDICAL DECISION MAKING DETAILS
Whole body stiffening. All symptoms he confirms are the usual Parkinson's except on the c/l part of body, also total body including chest burning. Noted non-SAW ST-T changes on ECG and mild trop elevation. Patient denies CP at this time. Admitted to internal medicine for further monitoring, w/u, and care for concern for ACS.

## 2018-01-24 NOTE — CONSULT NOTE ADULT - SUBJECTIVE AND OBJECTIVE BOX
History of Present Illness:    HPI:  46 yo M with pmhx of Parkinson's disease, and HLD came in with c/o neck stiffness that started this morning. He also c/o worsening parkison's disease. He said he has worsening tremors on his R extremity compared to left. Also c/o worsening rigidity. He said he often has neck stiffness and pain. It was difficult to move his neck this morning, but now it has slightly improved. The dosage of antiparkinson medication was last increased 1 year ago. He is able to do his daily activities. He denies chest pain, SOB, cough, nausea, vomiting, diarrhea, or constipation. No other complaints at this time.    Later in ED: Pt has been diagnosed with Parkinson's disease for last four years. He sees his neurologist regularly. Today he came in with symptoms more stiffness neck and body rigidity. He also reported left hand shaking. He denied any fall or hallucinations. He is sinemet 25/250 in the morning and then 25/100 three times a day. He denied missing the dose of it. He denied any side effects from his medications.    Allergies    No Known Drug Allergies  Seafood (Blisters)    Intolerances    PAST MEDICAL & SURGICAL HISTORY:  Parkinsons disease  Anxiety  Right lazy eye(baseline)  Hypercholesterolemia  No significant past surgical history    Social History: [ ] Tobacco use, [ ] Alcohol use.  none    MEDICATIONS  (STANDING):  buPROPion XL . 150 milliGRAM(s) Oral daily  carbidopa/levodopa  25/100 1 Tablet(s) Oral <User Schedule>  carbidopa/levodopa  25/250 1 Tablet(s) Oral <User Schedule>  metoprolol     tartrate 12.5 milliGRAM(s) Oral two times a day  simvastatin 40 milliGRAM(s) Oral at bedtime    Family:  FAMILY HISTORY:    Review of Systems:  General: [ ] None, [ ] chills,[ ] fatigue,[ ] fevers  Skin: [ ] None, [ ] rash present  HEENT: [ ] None, [ ] head injury,[ ] blurred vision, [ ] double vision, [ ] eye pain, [ ] visual loss, [ ] hearing loss, [ ] deafness, [ ] ear pain, [ ] ringing in the ears, [ ] vertigo, [ ] sinus pain, [ ] voice changes  Neck: [ ] None, [ ] Neck stiffness  Respiratory: [ ]  None, [ ] cough, [ ] difficulty breathing  Cardiovascular: [ ] None,  [ ] calf cramps, [ ] chest pain, [ ] leg pain, [ ] swelling, [ ] rapid heart rate, [ ] shortness of breath  Gastrointestinal: [ ] None, [ ] abdominal pain, [ ] nausea, [ ] vomiting  Musculoskeletal: [ ] None, [ ] back pain, [ ] joint pain, [ ] joint stiffness, [ ] leg cramps, [ ] muscle atrophy, [ ] muscle cramps, [ ] muscle weakness, [ ] swelling of extremities  Neurological: [ ] None,  [ ] Dizziness, [ ] decreased memory, [ ] fainting, [ ] focal neurological symptoms, [ ] headaches, [ ] incontinence of stool, [ ] incontinence of urine, [ ] loss of consciousness, [ ] numbness, [ ] seizures, [ ] spinning sensation, [ ] stroke, [ ] trouble walking, [ ] unsteadiness, [ ] visual changes,  [ ] weakness, [x ] tremors, [ x] rigidity, [x ] slowness  Psychiatric: [ ] None,  [ ] depression, [ ] anxiety, [ ] hallucinations, [ ] inability to concentrate,[ ] mood changes, [ ] panic attacks  Hematology: [ ] None, [ ] blood clots, [ ] spontaneous bleeding    [x ]  None except marked above      Vital Signs:    T(C): 36.7 (01-24-18 @ 06:57), Max: 36.7 (01-24-18 @ 06:57)  HR: 113 (01-24-18 @ 06:57) (113 - 113)  BP: 151/84 (01-24-18 @ 06:57) (151/84 - 151/84)  RR: 18 (01-24-18 @ 06:57) (18 - 18)  SpO2: 98% (01-24-18 @ 06:57) (98% - 98%)    Physical Exam:  General:  General Appearance - Well groomed, Not sickly   Build and nutrition - Well nourished and Well developed  Posture - stoop posture    Head and Neck:  Head - normocephalic, atraumatic with no lesions or palpable masses    Chest and Lung exam:  Quiet, even and easy respiratory effort with no use of accessory muscles and on ausculation, normal breath sounds, no adventitious sounds and normal vocal resonance    Cardiovascular:  Auscultation: Normal heart sounds  Murmurs & Other heart sounds: Auscultation of the heart reveals- no murmurs  Carotid arteris: No Carotid bruits    Abdomen:  Palpation/Percussion: Non Tender, No Rebound tenderness, No hepatosplenomegaly, and No palpable abdominal masses    Peripheral Vascular:  Lower extremity: Inspection - Bilateral - No varicose veins  Palpation: Tenderness - bilateral - Non tender  Dorsalis pedis pulse - bilateral - normal  Edema - bilateral - no edema    Neurologic Exam:  Mental Status -  Alert, Awake  Fund of Knowledge – Normal  Affect- appropriate  Recent Memory – Normal  Remote Memory – Normal  Attention Span – Normal  Concentration –  Normal  Cognitive function – Normal  Speech – Normal  Thought content/perception – Normal    Cranial Nerves:  II Optic: Visual acuity – Bilateral - Normal ; Visual fields – normal ; Fundi – Bilateral – no optic atrophy or Papilledema  III Oculomotor – right eye-lazy eye since birth  IV Trochlear – Bilateral – Normal  V Trigeminal: Ophthalmic – Bilateral – Normal;  Maxillary – Bilateral- Normal; Mandibular – Bilateral - Normal  VI Abducens – bilateral normal  VII Facial: Normal bilaterally  VIII Acoustic - Bilateral – hearing normal and (hearing tested by finger rub)  IX Glossopharyngeal / X Vagus: Uvula – Normal  XI Accessory: Normal Shoulder Shrug  XII Hypoglossal – Bilaterally Normal  Eye Movements: Gaze – Bilateral - Normal    Nystagmus – Bilateral – None  Motor:  Bulk and Contour: Normal  Tone: Normal  Strength:                                                                Delt              Bicep           Tricep                                 Upper Extremities:          Right              5/5               5/5               5/5                5/5                                                          Left                5/5               5/5               5/5                5/5                                                                                 HF                 KE                KF                 DF                     Lower Extremities:           Right             5/5               5/5              5/5                  5/5                                                          Left               5/5               5/5              5/5                  5/5  General Assessment of Reflexes: Right Wrist - 2+ ;  Left Wrist - 2+ ; Right Elbow - 2+ ;  Left Elbow - 2+ ;  Right Knee - 2+ ;  Left Knee - 2+ ;   Right Ankle – 2+ ; Left Ankle – 2+  Plantar Reflexes(Babinski) (L4-S2) – Bilateral – Flexion  Sensory:  Light Touch: Intact – Globally  Pain: Intact – Globally  Temperature: Intact – Globally  Coordination – No Impairment of heel-to-shin, Impairment of finger-to-nose or Impairment of rapid alternating movement  Gait – Normal tandem walking, Normal heel walking and Normal toes walking  Romberg’s sign: - Normal    Cogwheel Rigidity - 2+  Resting tremors - 1+ left arm  Bradykinesia - 2-  Retropulsion - absent

## 2018-01-24 NOTE — H&P ADULT - PROBLEM SELECTOR PLAN 2
No chest pain, or SOB  EKG0- TWI in V4, V5  T1- 0.048  - Will start ASA, small dose BB, and statin  - Trend CE  - admit to tele  - Dr Ayala- Cardiology

## 2018-01-24 NOTE — H&P ADULT - ASSESSMENT
48 yo M with pmhx of Parkinson's disease, and HLD came in with c/o neck stiffness that started this morning. He also c/o worsening parkison's disease. He said he has worsening tremors on his R extremity compared to left. Also c/o worsening rigidity. He said he often has neck stiffness and pain. It was difficult to move his neck this morning, but now it has slightly improved. The dosage of antiparkinson medication was last increased 1 year ago. He is able to do his daily activities. He denies chest pain, SOB, cough, nausea, vomiting, diarrhea, or constipation. No other complaints at this time.

## 2018-01-24 NOTE — ED PROVIDER NOTE - CARE PLAN
Principal Discharge DX:	Troponin level elevated  Secondary Diagnosis:	ECG abnormal  Secondary Diagnosis:	Rigidity

## 2018-01-24 NOTE — ED ADULT NURSE NOTE - OBJECTIVE STATEMENT
Pt states has a Hx of Parkinson Disease, takes meds every day, woke up last night with neck pain and shaking to both arms, no other complaints

## 2018-01-24 NOTE — CONSULT NOTE ADULT - PROBLEM SELECTOR RECOMMENDATION 9
Keep Sinemet 25/250 in am  then increase sinemet 25/100 to   1 1/2 tab(three times) at 11 am, 3 pm and 7pm  Pt eval  May d/c home and follow up with own neurologist

## 2018-01-24 NOTE — ED PROVIDER NOTE - OBJECTIVE STATEMENT
47yoM with Parkinson's dz p/w body stiffness. States he usually has R-sided shaking and stiffness with his Parkinson's, and recently has been having L sided shaking and stiffness as well. Worsened onset at 3AM. Also feels episodes of his entire body, including chest, burning. States these symptoms all resolve and then return intermittently, and are all c/w his Parkinson's symptoms but on the c/l part of the body. Denies specific CP or SOB, fever, leg pain or swelling.  Meds: Sinemet, buprioprion, statin

## 2018-01-24 NOTE — ED ADULT NURSE NOTE - CHPI ED SYMPTOMS NEG
no loss of consciousness/no blurred vision/no fever/no confusion/no change in level of consciousness

## 2018-01-24 NOTE — CONSULT NOTE ADULT - ATTENDING COMMENTS
47-year-old gentleman presented with worsening symptoms of his Parkinson's disease. He is c/o neck stiffness and whole body stiffness, slowness and left arm shaking.  So, he has progressive Parkinson's disease and now it has got worse. His PD is only 20% under control with his current medications.  He needs very slow titration up in order to achieve more improvement. He should talk to his neurologist if he can prescribe him Rytary which is the brand new medications with longer duration of action.

## 2018-01-25 ENCOUNTER — TRANSCRIPTION ENCOUNTER (OUTPATIENT)
Age: 48
End: 2018-01-25

## 2018-01-25 DIAGNOSIS — I10 ESSENTIAL (PRIMARY) HYPERTENSION: ICD-10-CM

## 2018-01-25 LAB
ANION GAP SERPL CALC-SCNC: 6 MMOL/L — SIGNIFICANT CHANGE UP (ref 5–17)
BASOPHILS # BLD AUTO: 0 K/UL — SIGNIFICANT CHANGE UP (ref 0–0.2)
BASOPHILS NFR BLD AUTO: 1.1 % — SIGNIFICANT CHANGE UP (ref 0–2)
BUN SERPL-MCNC: 11 MG/DL — SIGNIFICANT CHANGE UP (ref 7–18)
CALCIUM SERPL-MCNC: 8.7 MG/DL — SIGNIFICANT CHANGE UP (ref 8.4–10.5)
CHLORIDE SERPL-SCNC: 107 MMOL/L — SIGNIFICANT CHANGE UP (ref 96–108)
CHOLEST SERPL-MCNC: 178 MG/DL — SIGNIFICANT CHANGE UP (ref 10–199)
CK MB BLD-MCNC: 1.7 % — SIGNIFICANT CHANGE UP (ref 0–3.5)
CK MB CFR SERPL CALC: 2.1 NG/ML — SIGNIFICANT CHANGE UP (ref 0–3.6)
CK SERPL-CCNC: 125 U/L — SIGNIFICANT CHANGE UP (ref 35–232)
CO2 SERPL-SCNC: 27 MMOL/L — SIGNIFICANT CHANGE UP (ref 22–31)
CREAT SERPL-MCNC: 0.95 MG/DL — SIGNIFICANT CHANGE UP (ref 0.5–1.3)
EOSINOPHIL # BLD AUTO: 0.1 K/UL — SIGNIFICANT CHANGE UP (ref 0–0.5)
EOSINOPHIL NFR BLD AUTO: 1.3 % — SIGNIFICANT CHANGE UP (ref 0–6)
GLUCOSE SERPL-MCNC: 89 MG/DL — SIGNIFICANT CHANGE UP (ref 70–99)
HCT VFR BLD CALC: 41.3 % — SIGNIFICANT CHANGE UP (ref 39–50)
HDLC SERPL-MCNC: 75 MG/DL — SIGNIFICANT CHANGE UP (ref 40–125)
HGB BLD-MCNC: 13.8 G/DL — SIGNIFICANT CHANGE UP (ref 13–17)
LIPID PNL WITH DIRECT LDL SERPL: 93 MG/DL — SIGNIFICANT CHANGE UP
LYMPHOCYTES # BLD AUTO: 1.1 K/UL — SIGNIFICANT CHANGE UP (ref 1–3.3)
LYMPHOCYTES # BLD AUTO: 26.5 % — SIGNIFICANT CHANGE UP (ref 13–44)
MCHC RBC-ENTMCNC: 29.3 PG — SIGNIFICANT CHANGE UP (ref 27–34)
MCHC RBC-ENTMCNC: 33.4 GM/DL — SIGNIFICANT CHANGE UP (ref 32–36)
MCV RBC AUTO: 87.6 FL — SIGNIFICANT CHANGE UP (ref 80–100)
MONOCYTES # BLD AUTO: 0.5 K/UL — SIGNIFICANT CHANGE UP (ref 0–0.9)
MONOCYTES NFR BLD AUTO: 11.9 % — SIGNIFICANT CHANGE UP (ref 2–14)
NEUTROPHILS # BLD AUTO: 2.5 K/UL — SIGNIFICANT CHANGE UP (ref 1.8–7.4)
NEUTROPHILS NFR BLD AUTO: 59.2 % — SIGNIFICANT CHANGE UP (ref 43–77)
PLATELET # BLD AUTO: 179 K/UL — SIGNIFICANT CHANGE UP (ref 150–400)
POTASSIUM SERPL-MCNC: 3.9 MMOL/L — SIGNIFICANT CHANGE UP (ref 3.5–5.3)
POTASSIUM SERPL-SCNC: 3.9 MMOL/L — SIGNIFICANT CHANGE UP (ref 3.5–5.3)
RBC # BLD: 4.71 M/UL — SIGNIFICANT CHANGE UP (ref 4.2–5.8)
RBC # FLD: 11.3 % — SIGNIFICANT CHANGE UP (ref 10.3–14.5)
SODIUM SERPL-SCNC: 140 MMOL/L — SIGNIFICANT CHANGE UP (ref 135–145)
TOTAL CHOLESTEROL/HDL RATIO MEASUREMENT: 2.4 RATIO — LOW (ref 3.4–9.6)
TRIGL SERPL-MCNC: 51 MG/DL — SIGNIFICANT CHANGE UP (ref 10–149)
TROPONIN I SERPL-MCNC: 0.07 NG/ML — HIGH (ref 0–0.04)
TSH SERPL-MCNC: 0.36 UU/ML — SIGNIFICANT CHANGE UP (ref 0.34–4.82)
WBC # BLD: 4.2 K/UL — SIGNIFICANT CHANGE UP (ref 3.8–10.5)
WBC # FLD AUTO: 4.2 K/UL — SIGNIFICANT CHANGE UP (ref 3.8–10.5)

## 2018-01-25 PROCEDURE — 93306 TTE W/DOPPLER COMPLETE: CPT | Mod: 26

## 2018-01-25 PROCEDURE — 93970 EXTREMITY STUDY: CPT | Mod: 26

## 2018-01-25 RX ORDER — CARBIDOPA AND LEVODOPA 25; 100 MG/1; MG/1
1.5 TABLET ORAL
Qty: 135 | Refills: 0 | OUTPATIENT
Start: 2018-01-25 | End: 2018-02-23

## 2018-01-25 RX ORDER — ASPIRIN/CALCIUM CARB/MAGNESIUM 324 MG
1 TABLET ORAL
Qty: 30 | Refills: 0 | OUTPATIENT
Start: 2018-01-25 | End: 2018-02-23

## 2018-01-25 RX ORDER — LISINOPRIL 2.5 MG/1
1 TABLET ORAL
Qty: 30 | Refills: 0 | OUTPATIENT
Start: 2018-01-25 | End: 2018-02-23

## 2018-01-25 RX ORDER — METOPROLOL TARTRATE 50 MG
0.5 TABLET ORAL
Qty: 30 | Refills: 0 | OUTPATIENT
Start: 2018-01-25 | End: 2018-02-23

## 2018-01-25 RX ORDER — LISINOPRIL 2.5 MG/1
2.5 TABLET ORAL DAILY
Qty: 0 | Refills: 0 | Status: DISCONTINUED | OUTPATIENT
Start: 2018-01-25 | End: 2018-01-26

## 2018-01-25 RX ORDER — CARBIDOPA AND LEVODOPA 25; 100 MG/1; MG/1
1 TABLET ORAL
Qty: 0 | Refills: 0 | COMMUNITY
Start: 2018-01-25 | End: 2018-02-23

## 2018-01-25 RX ADMIN — CARBIDOPA AND LEVODOPA 1 TABLET(S): 25; 100 TABLET ORAL at 05:59

## 2018-01-25 RX ADMIN — SIMVASTATIN 40 MILLIGRAM(S): 20 TABLET, FILM COATED ORAL at 22:48

## 2018-01-25 RX ADMIN — BUPROPION HYDROCHLORIDE 150 MILLIGRAM(S): 150 TABLET, EXTENDED RELEASE ORAL at 14:01

## 2018-01-25 RX ADMIN — HEPARIN SODIUM 5000 UNIT(S): 5000 INJECTION INTRAVENOUS; SUBCUTANEOUS at 14:05

## 2018-01-25 RX ADMIN — Medication 650 MILLIGRAM(S): at 00:46

## 2018-01-25 RX ADMIN — Medication 81 MILLIGRAM(S): at 12:55

## 2018-01-25 RX ADMIN — CARBIDOPA AND LEVODOPA 1.5 TABLET(S): 25; 100 TABLET ORAL at 14:02

## 2018-01-25 RX ADMIN — HEPARIN SODIUM 5000 UNIT(S): 5000 INJECTION INTRAVENOUS; SUBCUTANEOUS at 22:48

## 2018-01-25 RX ADMIN — CARBIDOPA AND LEVODOPA 1.5 TABLET(S): 25; 100 TABLET ORAL at 18:03

## 2018-01-25 RX ADMIN — Medication 12.5 MILLIGRAM(S): at 18:03

## 2018-01-25 RX ADMIN — HEPARIN SODIUM 5000 UNIT(S): 5000 INJECTION INTRAVENOUS; SUBCUTANEOUS at 05:59

## 2018-01-25 NOTE — PROGRESS NOTE ADULT - SUBJECTIVE AND OBJECTIVE BOX
note will follow.... Patient was examined at bedside  No complaints  no chest pain or SOB    Vital Signs Last 24 Hrs  T(C): 36.6 (25 Jan 2018 15:55), Max: 37.1 (25 Jan 2018 11:07)  T(F): 97.8 (25 Jan 2018 15:55), Max: 98.7 (25 Jan 2018 11:07)  HR: 87 (25 Jan 2018 15:55) (70 - 95)  BP: 123/66 (25 Jan 2018 15:55) (100/44 - 146/88)  BP(mean): --  RR: 18 (25 Jan 2018 15:55) (17 - 18)  SpO2: 99% (25 Jan 2018 15:55) (99% - 100%)    Gen Appearance: No apparent health distress.    Card: S1  S2   VenoVasc: JVP upper normal, legs with now with trace edema.    Resp: Breathing unlabored.  Auscultation air entry normal.   Abd-Pelv: Liver size normal. Abdomen nontender. Abdomen palpation without masses.  Bowel sounds normal.     Mental status: Calm. A & O x3.    Neur-Xiang-Sens:  Motor exam nonfocal.     Conjunctiva & lips normal pink.    IV site clean                          13.8   4.2   )-----------( 179      ( 25 Jan 2018 06:33 )             41.3   01-25    140  |  107  |  11  ----------------------------<  89  3.9   |  27  |  0.95    Ca    8.7      25 Jan 2018 06:33    TPro  7.3  /  Alb  4.0  /  TBili  0.4  /  DBili  x   /  AST  18  /  ALT  20  /  AlkPhos  74  01-24          < from: US Duplex Venous Lower Ext Complete, Bilateral (01.25.18 @ 12:34) >    EXAM:  US DPLX LWR EXT VEINS COMPL BI                            PROCEDURE DATE:  01/25/2018          INTERPRETATION:  EXAM: US DPLX LWR EXT VEINS COMPL BI   INDICATION: Lower extremities edema.  COMPARISON: None.    TECHNIQUE: Multiple static images from duplex sonography of the deep   veins of the bilateral lower extremities utilizing color and spectral   Doppler interrogation, with and without compression.    FINDINGS:  The bilateral common femoral, superficial femoral, popliteal and   visualized calf veins are normally compressible and demonstrate normal   spontaneous and phasic flow and/or augmentation. There is no evidence of   deep vein thrombosis.        IMPRESSION:  No evidence of femoropopliteal deep vein thrombosis in either lower   extremity.                TARA HYDE M.D., ATTENDING RADIOLOGIST  This document has been electronically signed. Jan 25 2018 12:52PM    < end of copied text >         Imp/plan    episode of chest pain with elevated troponin - CAD. Abnormal stress test, possible MI  case d/w Dr Tran over the phone  pt will be transferred to [facility] lab for cardiac cath  telemonitoring  cont ASA, B bl and statins, LDL goal below 70  Echo - showed low EF.       Progression of Parkinson's disease  neurology consult with Dr Ovalle is appreciated, cont Sinemet increased dose      HTN - improved BP, cont present treatment with B bl and HCTZ, monitor BP    B/l LE edema - improved with diuresis, monitor BUN/Cr  Doppler is negative  DVT      OOB  PT evaluation and treatment      GI and DVT prophylaxis Patient was examined at bedside  No complaints  no chest pain or SOB at present  Pt is poor historian, but admits chest pains of unknown duration that he thought was gas pain    Vital Signs Last 24 Hrs  T(C): 36.6 (2018 15:55), Max: 37.1 (2018 11:07)  T(F): 97.8 (2018 15:55), Max: 98.7 (2018 11:07)  HR: 87 (2018 15:55) (70 - 95)  BP: 123/66 (2018 15:55) (100/44 - 146/88)  BP(mean): --  RR: 18 (2018 15:55) (17 - 18)  SpO2: 99% (2018 15:55) (99% - 100%)    Gen Appearance: No apparent health distress.    Card: S1  S2   VenoVasc: JVP upper normal, legs with now with trace edema.    Resp: Breathing unlabored.  Auscultation air entry normal.   Abd-Pelv: Liver size normal. Abdomen nontender. Abdomen palpation without masses.  Bowel sounds normal.     Mental status: Calm. A & O x3.    Neur-Xiang-Sens:  Motor exam nonfocal.     Conjunctiva & lips normal pink.    IV site clean                          13.8   4.2   )-----------( 179      ( 2018 06:33 )             41.3       140  |  107  |  11  ----------------------------<  89  3.9   |  27  |  0.95    Ca    8.7      2018 06:33    TPro  7.3  /  Alb  4.0  /  TBili  0.4  /  DBili  x   /  AST  18  /  ALT  20  /  AlkPhos  74            < from: US Duplex Venous Lower Ext Complete, Bilateral (18 @ 12:34) >    EXAM:  US DPLX LWR EXT VEINS COMPL BI                            PROCEDURE DATE:  2018          INTERPRETATION:  EXAM: US DPLX LWR EXT VEINS COMPL BI   INDICATION: Lower extremities edema.  COMPARISON: None.    TECHNIQUE: Multiple static images from duplex sonography of the deep   veins of the bilateral lower extremities utilizing color and spectral   Doppler interrogation, with and without compression.    FINDINGS:  The bilateral common femoral, superficial femoral, popliteal and   visualized calf veins are normally compressible and demonstrate normal   spontaneous and phasic flow and/or augmentation. There is no evidence of   deep vein thrombosis.        IMPRESSION:  No evidence of femoropopliteal deep vein thrombosis in either lower   extremity.                TARA HYDE M.D., ATTENDING RADIOLOGIST  This document has been electronically signed. 2018 12:52PM    < end of copied text >  PATIENT: ISAIAH FRAGOSO  : 1970   AGE: 47 (M)   MR#: [id number]  STUDY DATE: 2018  LOCATION: [facility]. PHYSICIAN(S):  LIYAH RAMÍREZ MD  FELLOW:  ------------------------------------------------------------------------    TYPE OF TEST: Rest/Stress Pharmacologic  INDICATION: Other chest pain (R07.89)  ------------------------------------------------------------------------  HISTORY:  CARDIAC HISTORY: Pt is a 47 yr old male with parkinson's  RISK FACTORS: Elevated Cholesterol, Hypertension  MEDICATIONS:  asa,heparin,wellbutrin,sinemet,hctz,lopressor,zocor  ------------------------------------------------------------------------    BASELINE ELECTROCARDIOGRAM:  Rhythm: Normal Sinus Rhythm with Q wave anterior and  inferior leads    ------------------------------------------------------------------------    HEMODYNAMIC PARAMETERS:                                      HR      BP  Baseline  Pre-Injection             72  111/75  00:30     Inject Regadenoson       112  113/69  02:00     Post Injection           110  120/67  01:00     Post Injection             0  01:00     Recovery                 102  124/79  02:00     Recovery                  90  125/70  03:00     Recovery                  90  113/64  04:00     Recovery                  86  108/71  ------------------------------------------------------------------------    Agent: Regadenoson 0.4 mg/5 ml NS. injected over 10 sec.  Aminophylline 75 mg injected over 30 secs.  HR: Baseline HR: 72 bpm   Peak HR: 112 bpm (65% of MPHR)  MPHR: 173 bpm   85% of MPHR: 147 bpm  BP: Baseline BP: 111/75 mmHg   Peak BP: 120/67 mmHg   Peak  RPP: 69540 (Rate Pressure Product)  HR Isotope Injected: 72 bpm (Tc 99m Tetrofosmin)  112 bpm (Tc 99m Tetrofosmin)  Last Caffeine intake: 12 hrs  Terminated: Completion of protocol  ------------------------------------------------------------------------    SYMPTOMS/FINDINGS:  Symptoms: Flushed  Chest pain: No chest pain with administration of  Regadenoson  ------------------------------------------------------------------------    ECG ABNORMALITIES DURING/AFTER STRESS:   Abnormalities: None  ------------------------------------------------------------------------    NEW ARRHYTHMIAS DEVELOPED DURING/AFTER STRESS:  None  ------------------------------------------------------------------------    STRESS TEST IMPRESSIONS:  Negative ECG evidence of ischemia after IV of Lexiscan.  ------------------------------------------------------------------------    PROCEDURE:  9.5 mCi of Tc 99m Tetrofosmin were injected intravenously  at rest. Approximately 45 minutes later, tomographic  images were obtained in a 180 degree arc from right  anterior oblique to left anterior oblique with 64 stops.  At a separate time, 29.7 mCi of Tc 99m Tetrofosmin were  injected intravenously during stress protocol.  Approximately 45 minute(s) later, tomographic images were  obtained in a 180 degree arc from right anterior oblique  to left anterior oblique with 64 stops. The tomographic  slices were reconstructed in 3 orthogonal planes (short  axis, horizontal long axis and vertical long axis).  Interpretation was performed both by visual and  quantitative analysis.    ------------------------------------------------------------------------    NUCLEAR FINDINGS:  Review of raw data shows: The study is of good technical  quality.  The left ventricle was mildly dilated LV at baseline.  There is a medium sized, severe defect in anterior wall  that is reversible consistent with ischemia.There is a  small, severe defect in apical wall that is fixed  consistent with myocardial infarction.There is a small,  mild to moderate defect in basal inferior wall that is  reversible consistent with  ischemia.  ------------------------------------------------------------------------      GATED ANALYSIS:  Dilated LV with global hypokinesis and akinetic apex,  EF=30%.  ------------------------------------------------------------------------    IMPRESSIONS:Abnormal Study  * Negative ECG evidence of ischemia after IV of Lexiscan.  * Review of raw data shows: The study is of good technical  quality.  * The left ventricle was mildly dilated LV at baseline.  There is a medium sized, severe defect in anterior wall  that is reversible consistent with ischemia.There is a  small, severe defect in apical wall that is fixed  consistent with myocardial infarction.There is a small,  mild to moderate defect in basal inferior wall that is  reversible consistent with  ischemia.  * Dilated LV with global hypokinesis and akinetic apex,  EF=30%.    ------------------------------------------------------------------------      ------------------------------------------------------------------------    Confirmed on  2018 - 13:11:08 at [place] by  Gladis Ayala MD         Imp/plan    ACS. Apical wall  MI. CAD. Low EF.  Abnormal stress test  case d/w Dr Tran over the phone  pt will be transferred to [facility] lab for cardiac cath  case d/w the medical resident on duty  case d/w pt sister over the phone. She  is a HCP of the pt ( sister is out of state at this time)  telemonitoring  cont ASA, B bl and statins, LDL goal below 70  Echo - showed low EF.       Progression of Parkinson's disease  neurology consult with Dr Ovalle is appreciated, cont Sinemet increased dose      HTN - improved BP, cont present treatment with B bl and HCTZ, monitor BP    B/l LE edema - improved with diuresis, monitor BUN/Cr  Doppler is negative  DVT      OOB  PT evaluation and treatment      GI and DVT prophylaxis

## 2018-01-25 NOTE — DISCHARGE NOTE ADULT - MEDICATION SUMMARY - MEDICATIONS TO TAKE
I will START or STAY ON the medications listed below when I get home from the hospital:    carbidopa-levodopa 25 mg-250 mg oral tablet  --   in AM  -- Indication: For Parkinson disease    aspirin 81 mg oral tablet, chewable  -- 1 tab(s) by mouth once a day  -- Indication: For Troponin level elevated    lisinopril 2.5 mg oral tablet  -- 1 tab(s) by mouth once a day  -- Indication: For HTN (hypertension)    simvastatin 40 mg oral tablet  -- 1 tab(s) by mouth once a day (at bedtime)  -- Indication: For HLD    carbidopa-levodopa 25 mg-100 mg oral tablet  -- at 11am , 3pm , 7pm, three times a day  -- Indication: For Parkinson disease    metoprolol tartrate 25 mg oral tablet  -- 0.5 tab(s) by mouth 2 times a day   -- It is very important that you take or use this exactly as directed.  Do not skip doses or discontinue unless directed by your doctor.  May cause drowsiness.  Alcohol may intensify this effect.  Use care when operating dangerous machinery.  Some non-prescription drugs may aggravate your condition.  Read all labels carefully.  If a warning appears, check with your doctor before taking.  Take with food or milk.  This drug may impair the ability to drive or operate machinery.  Use care until you become familiar with its effects.    -- Indication: For HTN (hypertension)    buPROPion 150 mg/12 hours oral tablet, extended release  -- 1 tab(s) by mouth 2 times a day  -- Indication: For depression

## 2018-01-25 NOTE — DISCHARGE NOTE ADULT - PATIENT PORTAL LINK FT
“You can access the [facility] Patient Portal, offered by [facility], by registering with the following website: [email or web address]”

## 2018-01-25 NOTE — DISCHARGE NOTE ADULT - PLAN OF CARE
prevent reoccur Patient will be transferred to [facility] on 01/26/18 for cardiac cath. Please continue aspirin , lopressor and statin. please continue sinemet 25/250 in am and increase sinemet 25/100 to 1.5 tab at 11am , 3pm, and 7 pm. Please follow up with own neurologist within one week. please continue with home meds for HLD. please continue with hydrochlorothiazide and metoprolol as above.

## 2018-01-25 NOTE — PROGRESS NOTE ADULT - SUBJECTIVE AND OBJECTIVE BOX
PGY1 Note discussed with supervising resident and primary attending.    Patient is a 47y old  Male who presents with a chief complaint of Neck stiffness (24 Jan 2018 13:07)      INTERVAL HPI/OVERNIGHT EVENTS:    MEDICATIONS  (STANDING):  aspirin  chewable 81 milliGRAM(s) Oral daily  buPROPion XL . 150 milliGRAM(s) Oral daily  carbidopa/levodopa  25/100 1.5 Tablet(s) Oral <User Schedule>  carbidopa/levodopa  25/250 1 Tablet(s) Oral <User Schedule>  heparin  Injectable 5000 Unit(s) SubCutaneous every 8 hours  hydrochlorothiazide 12.5 milliGRAM(s) Oral daily  metoprolol     tartrate 12.5 milliGRAM(s) Oral two times a day  simvastatin 40 milliGRAM(s) Oral at bedtime    MEDICATIONS  (PRN):  acetaminophen   Tablet. 650 milliGRAM(s) Oral every 6 hours PRN Mild Pain (1 - 3)      Allergies    No Known Drug Allergies  Seafood (Blisters)    Intolerances        REVIEW OF SYSTEMS:  CONSTITUTIONAL: No fever, weight loss, or fatigue  RESPIRATORY: No cough, wheezing, chills or hemoptysis; No shortness of breath  CARDIOVASCULAR: No chest pain, palpitations, dizziness, or leg swelling  GASTROINTESTINAL: No abdominal or epigastric pain. No nausea, vomiting, or hematemesis; No diarrhea or constipation. No melena or hematochezia.  NEUROLOGICAL: No headaches, memory loss, loss of strength, numbness, or tremors  SKIN: No itching, burning, rashes, or lesions     Vital Signs Last 24 Hrs  T(C): 37.1 (25 Jan 2018 11:07), Max: 37.1 (25 Jan 2018 11:07)  T(F): 98.7 (25 Jan 2018 11:07), Max: 98.7 (25 Jan 2018 11:07)  HR: 91 (25 Jan 2018 11:07) (70 - 95)  BP: 117/65 (25 Jan 2018 11:07) (100/44 - 146/88)  BP(mean): --  RR: 18 (25 Jan 2018 11:07) (17 - 18)  SpO2: 100% (25 Jan 2018 11:07) (99% - 100%)    PHYSICAL EXAM:  GENERAL: NAD, well-groomed, well-developed  HEAD:  Atraumatic, Normocephalic  EYES: EOMI, PERRLA, conjunctiva and sclera clear  NECK: Supple, No JVD, Normal thyroid  CHEST/LUNG: Clear to percussion bilaterally; No rales, rhonchi, wheezing, or rubs  HEART: Regular rate and rhythm; No murmurs, rubs, or gallops  ABDOMEN: Soft, Nontender, Nondistended; Bowel sounds present  NERVOUS SYSTEM:  Alert & Oriented X3, Good concentration; Motor Strength 5/5 B/L   EXTREMITIES:  2+ Peripheral Pulses, No clubbing, cyanosis, or edema  SKIN;    LABS:                        13.8   4.2   )-----------( 179      ( 25 Jan 2018 06:33 )             41.3     01-25    140  |  107  |  11  ----------------------------<  89  3.9   |  27  |  0.95    Ca    8.7      25 Jan 2018 06:33    TPro  7.3  /  Alb  4.0  /  TBili  0.4  /  DBili  x   /  AST  18  /  ALT  20  /  AlkPhos  74  01-24        CAPILLARY BLOOD GLUCOSE          RADIOLOGY & ADDITIONAL TESTS:    Imaging Personally Reviewed:  [ ] YES  [ ] NO    Consultant(s) Notes Reviewed:  [ ] YES  [ ] NO PGY1 Note discussed with supervising resident and primary attending.    Patient is a 47y old  Male who presents with a chief complaint of Neck stiffness (24 Jan 2018 13:07)      INTERVAL HPI/OVERNIGHT EVENTS: no new overnight events.    MEDICATIONS  (STANDING):  aspirin  chewable 81 milliGRAM(s) Oral daily  buPROPion XL . 150 milliGRAM(s) Oral daily  carbidopa/levodopa  25/100 1.5 Tablet(s) Oral <User Schedule>  carbidopa/levodopa  25/250 1 Tablet(s) Oral <User Schedule>  heparin  Injectable 5000 Unit(s) SubCutaneous every 8 hours  hydrochlorothiazide 12.5 milliGRAM(s) Oral daily  metoprolol     tartrate 12.5 milliGRAM(s) Oral two times a day  simvastatin 40 milliGRAM(s) Oral at bedtime    MEDICATIONS  (PRN):  acetaminophen   Tablet. 650 milliGRAM(s) Oral every 6 hours PRN Mild Pain (1 - 3)      Allergies    No Known Drug Allergies  Seafood (Blisters)    Intolerances      Vital Signs Last 24 Hrs  T(C): 37.1 (25 Jan 2018 11:07), Max: 37.1 (25 Jan 2018 11:07)  T(F): 98.7 (25 Jan 2018 11:07), Max: 98.7 (25 Jan 2018 11:07)  HR: 91 (25 Jan 2018 11:07) (70 - 95)  BP: 117/65 (25 Jan 2018 11:07) (100/44 - 146/88)  BP(mean): --  RR: 18 (25 Jan 2018 11:07) (17 - 18)  SpO2: 100% (25 Jan 2018 11:07) (99% - 100%)    PHYSICAL EXAM:  GENERAL: NAD, well-groomed  CHEST/LUNG: Clear to percussion bilaterally; No rales, rhonchi, wheezing, or rubs  HEART: Regular rate and rhythm; No murmurs, rubs, or gallops  ABDOMEN: Soft, Nontender, Nondistended; Bowel sounds present  NERVOUS SYSTEM:  Alert & Oriented X3, Good concentration; Motor Strength 5/5 B/L   EXTREMITIES:  2+ Peripheral Pulses, B/l LE edema    LABS:                        13.8   4.2   )-----------( 179      ( 25 Jan 2018 06:33 )             41.3     01-25    140  |  107  |  11  ----------------------------<  89  3.9   |  27  |  0.95    Ca    8.7      25 Jan 2018 06:33    TPro  7.3  /  Alb  4.0  /  TBili  0.4  /  DBili  x   /  AST  18  /  ALT  20  /  AlkPhos  74  01-24        CAPILLARY BLOOD GLUCOSE          RADIOLOGY & ADDITIONAL TESTS:    Imaging Personally Reviewed:  [ x] YES  [ ] NO    Consultant(s) Notes Reviewed:  [x ] YES  [ ] NO

## 2018-01-25 NOTE — CONSULT NOTE ADULT - ASSESSMENT
47 yr old Male with pmhx of Parkinson's disease, HTN, and HLD came in with c/o neck stiffness that started this morning,abnormal EKG and borderline troponins.  1.Tele monitoring.  2.Echocardiogram.  3.Stress test-R/O ischemia.  4.HTN-cont bp medication.  5.Lipid D/O-statin.  6.Parkinson's-medication as per neurology.  7.GI and DVT prophylaxis.

## 2018-01-25 NOTE — CONSULT NOTE ADULT - SUBJECTIVE AND OBJECTIVE BOX
CHIEF COMPLAINT:Patient is a 47y old  Male who presents with a chief complaint of Neck stiffness.      HPI:47 yr old Male with pmhx of Parkinson's disease, HTN, and HLD came in with c/o neck stiffness that started this morning. He also c/o worsening parkison's disease. He said he has worsening tremors on his R extremity compared to left. Also c/o worsening rigidity. He said he often has neck stiffness and pain. It was difficult to move his neck this morning, but now it has slightly improved. The dosage of antiparkinson medication was last increased 1 year ago. He is able to do his daily activities. He denies chest pain, SOB, cough, nausea, vomiting, diarrhea, or constipation. No other complaints at this time.        PAST MEDICAL & SURGICAL HISTORY:  Parkinsons disease  Anxiety  Hypercholesterolemia  HTN      MEDICATIONS  (STANDING):  aspirin  chewable 81 milliGRAM(s) Oral daily  buPROPion XL . 150 milliGRAM(s) Oral daily  carbidopa/levodopa  25/100 1.5 Tablet(s) Oral <User Schedule>  carbidopa/levodopa  25/250 1 Tablet(s) Oral <User Schedule>  heparin  Injectable 5000 Unit(s) SubCutaneous every 8 hours  hydrochlorothiazide 12.5 milliGRAM(s) Oral daily  metoprolol     tartrate 12.5 milliGRAM(s) Oral two times a day  simvastatin 40 milliGRAM(s) Oral at bedtime    MEDICATIONS  (PRN):  acetaminophen   Tablet. 650 milliGRAM(s) Oral every 6 hours PRN Mild Pain (1 - 3)      FAMILY HISTORY:No hx of CAD      SOCIAL HISTORY:    [X ] Non-smoker    [X ] Alcohol-social    Allergies    No Known Drug Allergies  Seafood (Blisters)    Intolerances    	    REVIEW OF SYSTEMS:  CONSTITUTIONAL: No fever, weight loss, or fatigue  EYES: No eye pain, visual disturbances, or discharge  ENT:  No difficulty hearing, tinnitus, vertigo; No sinus or throat pain  NECK: No pain or stiffness  RESPIRATORY: No cough, wheezing, chills or hemoptysis; No Shortness of Breath  CARDIOVASCULAR: No chest pain, palpitations, passing out, dizziness, or leg swelling  GASTROINTESTINAL: No abdominal or epigastric pain. No nausea, vomiting, or hematemesis; No diarrhea or constipation. No melena or hematochezia.  GENITOURINARY: No dysuria, frequency, hematuria, or incontinence  NEUROLOGICAL: No headaches, memory loss, loss of strength, numbness, or tremors  SKIN: No itching, burning, rashes, or lesions   LYMPH Nodes: No enlarged glands  ENDOCRINE: No heat or cold intolerance; No hair loss  MUSCULOSKELETAL: No joint pain or swelling; No muscle, back, or extremity pain  PSYCHIATRIC: No depression, anxiety, mood swings, or difficulty sleeping  HEME/LYMPH: No easy bruising, or bleeding gums  ALLERGY AND IMMUNOLOGIC: No hives or eczema	      PHYSICAL EXAM:  T(C): 36.5 (01-25-18 @ 07:27), Max: 36.7 (01-24-18 @ 20:01)  HR: 72 (01-25-18 @ 07:27) (70 - 95)  BP: 100/44 (01-25-18 @ 07:27) (100/44 - 146/88)  RR: 18 (01-25-18 @ 07:27) (17 - 18)  SpO2: 100% (01-25-18 @ 07:27) (99% - 100%)    I&O's Summary    24 Jan 2018 07:01  -  25 Jan 2018 07:00  --------------------------------------------------------  IN: 0 mL / OUT: 200 mL / NET: -200 mL        Appearance: Normal	  HEENT:   Normal oral mucosa, PERRL, EOMI	  Lymphatic: No lymphadenopathy  Cardiovascular: Normal S1 S2, No JVD, No murmurs, No edema  Respiratory: Lungs clear to auscultation	  Psychiatry: A & O x 3, Mood & affect appropriate  Gastrointestinal:  Soft, Non-tender, + BS	  Skin: No rashes, No ecchymoses, No cyanosis	  Neurologic: Non-focal  Extremities: Normal range of motion, No clubbing, cyanosis or edema  Vascular: Peripheral pulses palpable 2+ bilaterally    	    ECG:  	NSR,LVH q anterior and inferior leads.    	  LABS:	 	    CARDIAC MARKERS:  CARDIAC MARKERS ( 25 Jan 2018 06:33 )  0.067 ng/mL / x     / 125 U/L / x     / 2.1 ng/mL  CARDIAC MARKERS ( 24 Jan 2018 22:12 )  0.064 ng/mL / x     / 122 U/L / x     / 2.1 ng/mL  CARDIAC MARKERS ( 24 Jan 2018 14:54 )  0.057 ng/mL / x     / 123 U/L / x     / 1.8 ng/mL  CARDIAC MARKERS ( 24 Jan 2018 09:12 )  0.048 ng/mL / x     / x     / x     / x                                  13.8   4.2   )-----------( 179      ( 25 Jan 2018 06:33 )             41.3     01-25    140  |  107  |  11  ----------------------------<  89  3.9   |  27  |  0.95    Ca    8.7      25 Jan 2018 06:33    TPro  7.3  /  Alb  4.0  /  TBili  0.4  /  DBili  x   /  AST  18  /  ALT  20  /  AlkPhos  74  01-24      Lipid Profile: Cholesterol 178  LDL 93  HDL 75  TG 51      TSH: Thyroid Stimulating Hormone, Serum: 0.36 uU/mL (01-25 @ 06:33)

## 2018-01-25 NOTE — DISCHARGE NOTE ADULT - HOSPITAL COURSE
46 yo M with pmhx of Parkinson's disease, and HLD came in with c/o neck stiffness that started this morning. He also c/o worsening parkison's disease. He said he has worsening tremors on his R extremity compared to left. Also c/o worsening rigidity. He said he often has neck stiffness and pain. pt had elevated troponin, pt was admitted for r/o ACS  and worsening Parkinson disease, increased carbidopa-levodopa-enta 25/100 [id number] to 1.5 tabs ,  on carbidopa-levodopa  7am, pt will follow up with own neurologist as per Dr. Ovalle. For NSTEMI , EKG shows TWI in V4, V5, Troponin elevated  0.048-->0.05-->0.06, given ASA, small dose BB, and statin,  stress test abnormal, severe defect in anterior wall that is reversible consistent with ischemia,  US venous duplex for LE edema negative for DVT. Pt will be transferred to [facility] for cardiac cath as per cardiologist Dr Ayala.  For hyperlipidemia,  c/w simvastatin 40 qday.  For HTN (hypertension). c/w  HCTZ 12.5 mg daily and metoprolol 12.5mg bid. Discussed transfer plan with Dr. Jaime and she agreed.

## 2018-01-25 NOTE — PROGRESS NOTE ADULT - PROBLEM SELECTOR PLAN 2
No chest pain, or SOB  EKG0- TWI in V4, V5  T1- 0.048-->0.05-->0.06  c/w  ASA, small dose BB, and statin  f/u stress test report  f/u Echo  f/u US venous duplex for LE edema  c/w tele  -Dr Ayala- Cardiology

## 2018-01-25 NOTE — DISCHARGE NOTE ADULT - CARE PLAN
Principal Discharge DX:	NSTEMI (non-ST elevated myocardial infarction)  Secondary Diagnosis:	Parkinson disease  Secondary Diagnosis:	Hyperlipidemia  Secondary Diagnosis:	HTN (hypertension) Principal Discharge DX:	NSTEMI (non-ST elevated myocardial infarction)  Goal:	prevent reoccur  Assessment and plan of treatment:	Patient will be transferred to [facility] on 01/26/18 for cardiac cath. Please continue aspirin , lopressor and statin.  Secondary Diagnosis:	Parkinson disease  Assessment and plan of treatment:	please continue sinemet 25/250 in am and increase sinemet 25/100 to 1.5 tab at 11am , 3pm, and 7 pm. Please follow up with own neurologist within one week.  Secondary Diagnosis:	Hyperlipidemia  Assessment and plan of treatment:	please continue with home meds for HLD.  Secondary Diagnosis:	HTN (hypertension)  Assessment and plan of treatment:	please continue with hydrochlorothiazide and metoprolol as above.

## 2018-01-25 NOTE — PROGRESS NOTE ADULT - PROBLEM SELECTOR PLAN 1
increased carbidopa-levodopa-enta 25/100 [id number] to 1.5 tabs   c/w On carbidopa-levodopa  7am  May d/c home and follow up with own neurologist as per Dr. Ovalle  f/u PT eval  consult Dr. Ovalle

## 2018-01-26 ENCOUNTER — INPATIENT (INPATIENT)
Facility: HOSPITAL | Age: 48
LOS: 2 days | Discharge: HOME | DRG: 287 | End: 2018-01-29
Attending: INTERNAL MEDICINE | Admitting: INTERNAL MEDICINE
Payer: MEDICAID

## 2018-01-26 VITALS
TEMPERATURE: 98 F | HEART RATE: 100 BPM | HEIGHT: 66 IN | DIASTOLIC BLOOD PRESSURE: 94 MMHG | SYSTOLIC BLOOD PRESSURE: 148 MMHG | WEIGHT: 166.01 LBS | RESPIRATION RATE: 18 BRPM | OXYGEN SATURATION: 100 %

## 2018-01-26 VITALS
TEMPERATURE: 98 F | HEART RATE: 88 BPM | DIASTOLIC BLOOD PRESSURE: 84 MMHG | RESPIRATION RATE: 18 BRPM | SYSTOLIC BLOOD PRESSURE: 136 MMHG | OXYGEN SATURATION: 99 %

## 2018-01-26 DIAGNOSIS — I25.10 ATHEROSCLEROTIC HEART DISEASE OF NATIVE CORONARY ARTERY WITHOUT ANGINA PECTORIS: ICD-10-CM

## 2018-01-26 DIAGNOSIS — R94.39 ABNORMAL RESULT OF OTHER CARDIOVASCULAR FUNCTION STUDY: ICD-10-CM

## 2018-01-26 LAB
ANION GAP SERPL CALC-SCNC: 9 MMOL/L — SIGNIFICANT CHANGE UP (ref 5–17)
BLD GP AB SCN SERPL QL: NEGATIVE — SIGNIFICANT CHANGE UP
BUN SERPL-MCNC: 10 MG/DL — SIGNIFICANT CHANGE UP (ref 7–18)
CALCIUM SERPL-MCNC: 8.6 MG/DL — SIGNIFICANT CHANGE UP (ref 8.4–10.5)
CHLORIDE SERPL-SCNC: 106 MMOL/L — SIGNIFICANT CHANGE UP (ref 96–108)
CO2 SERPL-SCNC: 25 MMOL/L — SIGNIFICANT CHANGE UP (ref 22–31)
CREAT SERPL-MCNC: 0.92 MG/DL — SIGNIFICANT CHANGE UP (ref 0.5–1.3)
GLUCOSE SERPL-MCNC: 85 MG/DL — SIGNIFICANT CHANGE UP (ref 70–99)
HBA1C BLD-MCNC: 5.6 % — SIGNIFICANT CHANGE UP (ref 4–5.6)
HCT VFR BLD CALC: 39.6 % — SIGNIFICANT CHANGE UP (ref 39–50)
HGB BLD-MCNC: 13.4 G/DL — SIGNIFICANT CHANGE UP (ref 13–17)
MCHC RBC-ENTMCNC: 29.3 PG — SIGNIFICANT CHANGE UP (ref 27–34)
MCHC RBC-ENTMCNC: 33.7 GM/DL — SIGNIFICANT CHANGE UP (ref 32–36)
MCV RBC AUTO: 86.8 FL — SIGNIFICANT CHANGE UP (ref 80–100)
NT-PROBNP SERPL-SCNC: 975 PG/ML — HIGH (ref 0–300)
PLATELET # BLD AUTO: 187 K/UL — SIGNIFICANT CHANGE UP (ref 150–400)
POTASSIUM SERPL-MCNC: 3.6 MMOL/L — SIGNIFICANT CHANGE UP (ref 3.5–5.3)
POTASSIUM SERPL-SCNC: 3.6 MMOL/L — SIGNIFICANT CHANGE UP (ref 3.5–5.3)
RBC # BLD: 4.56 M/UL — SIGNIFICANT CHANGE UP (ref 4.2–5.8)
RBC # FLD: 11.1 % — SIGNIFICANT CHANGE UP (ref 10.3–14.5)
RH IG SCN BLD-IMP: POSITIVE — SIGNIFICANT CHANGE UP
SODIUM SERPL-SCNC: 140 MMOL/L — SIGNIFICANT CHANGE UP (ref 135–145)
WBC # BLD: 5.4 K/UL — SIGNIFICANT CHANGE UP (ref 3.8–10.5)
WBC # FLD AUTO: 5.4 K/UL — SIGNIFICANT CHANGE UP (ref 3.8–10.5)

## 2018-01-26 PROCEDURE — 78452 HT MUSCLE IMAGE SPECT MULT: CPT

## 2018-01-26 PROCEDURE — 84484 ASSAY OF TROPONIN QUANT: CPT

## 2018-01-26 PROCEDURE — 93306 TTE W/DOPPLER COMPLETE: CPT

## 2018-01-26 PROCEDURE — 80061 LIPID PANEL: CPT

## 2018-01-26 PROCEDURE — 82550 ASSAY OF CK (CPK): CPT

## 2018-01-26 PROCEDURE — 84443 ASSAY THYROID STIM HORMONE: CPT

## 2018-01-26 PROCEDURE — 80053 COMPREHEN METABOLIC PANEL: CPT

## 2018-01-26 PROCEDURE — 93010 ELECTROCARDIOGRAM REPORT: CPT

## 2018-01-26 PROCEDURE — 93005 ELECTROCARDIOGRAM TRACING: CPT

## 2018-01-26 PROCEDURE — A9502: CPT

## 2018-01-26 PROCEDURE — 93970 EXTREMITY STUDY: CPT

## 2018-01-26 PROCEDURE — 80048 BASIC METABOLIC PNL TOTAL CA: CPT

## 2018-01-26 PROCEDURE — 82553 CREATINE MB FRACTION: CPT

## 2018-01-26 PROCEDURE — 93017 CV STRESS TEST TRACING ONLY: CPT

## 2018-01-26 PROCEDURE — 85027 COMPLETE CBC AUTOMATED: CPT

## 2018-01-26 PROCEDURE — 99285 EMERGENCY DEPT VISIT HI MDM: CPT | Mod: 25

## 2018-01-26 PROCEDURE — 85379 FIBRIN DEGRADATION QUANT: CPT

## 2018-01-26 RX ORDER — BUPROPION HYDROCHLORIDE 150 MG/1
150 TABLET, EXTENDED RELEASE ORAL DAILY
Qty: 0 | Refills: 0 | Status: DISCONTINUED | OUTPATIENT
Start: 2018-01-26 | End: 2018-01-29

## 2018-01-26 RX ORDER — ASPIRIN/CALCIUM CARB/MAGNESIUM 324 MG
81 TABLET ORAL DAILY
Qty: 0 | Refills: 0 | Status: DISCONTINUED | OUTPATIENT
Start: 2018-01-27 | End: 2018-01-29

## 2018-01-26 RX ORDER — METOPROLOL TARTRATE 50 MG
25 TABLET ORAL
Qty: 0 | Refills: 0 | Status: DISCONTINUED | OUTPATIENT
Start: 2018-01-26 | End: 2018-01-26

## 2018-01-26 RX ORDER — HYDROCORTISONE 20 MG
100 TABLET ORAL ONCE
Qty: 0 | Refills: 0 | Status: COMPLETED | OUTPATIENT
Start: 2018-01-26 | End: 2018-01-26

## 2018-01-26 RX ORDER — LISINOPRIL 2.5 MG/1
5 TABLET ORAL DAILY
Qty: 0 | Refills: 0 | Status: DISCONTINUED | OUTPATIENT
Start: 2018-01-26 | End: 2018-01-26

## 2018-01-26 RX ORDER — METOPROLOL TARTRATE 50 MG
12.5 TABLET ORAL
Qty: 0 | Refills: 0 | Status: DISCONTINUED | OUTPATIENT
Start: 2018-01-26 | End: 2018-01-29

## 2018-01-26 RX ORDER — CARBIDOPA, LEVODOPA, AND ENTACAPONE 50; 200; 200 MG/1; MG/1; MG/1
1 TABLET, FILM COATED ORAL THREE TIMES A DAY
Qty: 0 | Refills: 0 | Status: DISCONTINUED | OUTPATIENT
Start: 2018-01-26 | End: 2018-01-29

## 2018-01-26 RX ORDER — SIMVASTATIN 20 MG/1
40 TABLET, FILM COATED ORAL AT BEDTIME
Qty: 0 | Refills: 0 | Status: DISCONTINUED | OUTPATIENT
Start: 2018-01-26 | End: 2018-01-29

## 2018-01-26 RX ORDER — DIPHENHYDRAMINE HCL 50 MG
50 CAPSULE ORAL ONCE
Qty: 0 | Refills: 0 | Status: COMPLETED | OUTPATIENT
Start: 2018-01-26 | End: 2018-01-26

## 2018-01-26 RX ADMIN — LISINOPRIL 2.5 MILLIGRAM(S): 2.5 TABLET ORAL at 06:48

## 2018-01-26 RX ADMIN — HEPARIN SODIUM 5000 UNIT(S): 5000 INJECTION INTRAVENOUS; SUBCUTANEOUS at 06:47

## 2018-01-26 RX ADMIN — Medication 12.5 MILLIGRAM(S): at 17:20

## 2018-01-26 RX ADMIN — CARBIDOPA AND LEVODOPA 1 TABLET(S): 25; 100 TABLET ORAL at 07:14

## 2018-01-26 RX ADMIN — Medication 50 MILLIGRAM(S): at 14:10

## 2018-01-26 RX ADMIN — BUPROPION HYDROCHLORIDE 150 MILLIGRAM(S): 150 TABLET, EXTENDED RELEASE ORAL at 22:12

## 2018-01-26 RX ADMIN — CARBIDOPA, LEVODOPA, AND ENTACAPONE 1 TABLET(S): 50; 200; 200 TABLET, FILM COATED ORAL at 22:12

## 2018-01-26 RX ADMIN — Medication 100 MILLIGRAM(S): at 14:10

## 2018-01-26 RX ADMIN — Medication 12.5 MILLIGRAM(S): at 06:48

## 2018-01-26 RX ADMIN — SIMVASTATIN 40 MILLIGRAM(S): 20 TABLET, FILM COATED ORAL at 22:11

## 2018-01-26 NOTE — H&P CARDIOLOGY - NS MD HP PULSE RADIAL
left normal/right normal/Sandip test negative in right wrist, good collateral flow to the right hand

## 2018-01-26 NOTE — PROGRESS NOTE ADULT - ASSESSMENT
47 yr old Male with pmhx of Parkinson's disease, HTN, and HLD came in with c/o neck stiffness that started this morning,abnormal EKG and borderline troponins,now wirh NST,severe LV dysfunction and positive stress test..  1.Tele monitoring.  2.Stress test-+ ischemia, EF 30%,pt agree for cardiac cath.  3.D/C HCTZ  4.HTN-increase lopressor 25mg bid and lisinopril 5mg qd  5.Lipid D/O-statin.  6.Parkinson's-medication as per neurology.  7.GI and DVT prophylaxis.
48 yo M with pmhx of Parkinson's disease, and HLD came in with c/o neck stiffness that started this morning. He also c/o worsening parkison's disease. He said he has worsening tremors on his R extremity compared to left. Also c/o worsening rigidity. He said he often has neck stiffness and pain. pt had elevated troponin, pt was admitted for r/o ACS  and worsening Parkinson disease .

## 2018-01-26 NOTE — PHYSICAL THERAPY INITIAL EVALUATION ADULT - PERTINENT HX OF CURRENT PROBLEM, REHAB EVAL
Pt admitted to hospital with neck stiffness PMH HLD and PD Pt admitted to hospital with neck stiffness. PMH of  HLD and PD

## 2018-01-26 NOTE — CONSULT NOTE ADULT - SUBJECTIVE AND OBJECTIVE BOX
History of Present Illness:  47y Male old male with PMHx: of Parkinson's disease and HLD with presented to [facility] on 1/24/18 with c/o neck stiffness and reporting associated symptoms of right foot swelling and chest pain with resolution of symptoms on its own.  EKG revealed q waves in the anterior and inferior leads with + troponin (0.057>0.064>0.067).  On 1/25 patient underwent nuclear stress test finding revealed left ventricle was mildly dilated LV at baseline. There is a medium sized, severe defect in anterior wall that is reversible consistent with ischemia. There is a small, severe defect in apical wall that is fixed consistent with myocardial infarction. There is a small, mild to moderate defect in basal inferior wall that is reversible consistent with ischemia. Dilated LV with global hypokinesis and akinetic apex, EF=30%. Patient transferred to [facility] for a Cardiac cath finding revealed: Multivessel CAD. EF estimated was 35 %. CORONARY VESSELS: The coronary circulation is co-dominant.  LM: Normal. LAD:There was a 100 % stenosis. There is some distal filling by collaterals. Circumflex: Angiography showed minor luminal irregularities with no flow limiting lesions.  RCA: Angiography showed diffuse moderate to severe atherosclerosis. RPL1: There was a 100 % stenosis. CTS consul called to evaluate patient for cardiac surgery,     Past Medical History  Parkinson disease  Anxiety  Hypercholesterolemia      Past Surgical History  No significant past surgical history      MEDICATIONS  (STANDING):  buPROPion XL . 150 milliGRAM(s) Oral daily  carbidopa/levodopa/entacapone 25/100/200 1 Tablet(s) Oral three times a day  metoprolol tartrate 12.5 milliGRAM(s) Oral two times a day  simvastatin 40 milliGRAM(s) Oral at bedtime    MEDICATIONS  (PRN):    Antiplatelet therapy:                           Last dose/amt:    Allergies: No Known Drug Allergies  Seafood (Blisters)      SOCIAL HISTORY:  Smoker: [ ] Yes  [ ] No        PACK YEARS:                         WHEN QUIT?  ETOH use: [ ] Yes  [ ] No              FREQUENCY / QUANTITY:  Ilicit Drug use:  [ ] Yes  [ ] No  Occupation:  Live with:  Assist device use:    Relevant Family History  FAMILY HISTORY:      Review of Systems  GENERAL:  Fevers[] chills[] sweats[] fatigue[] weight loss[] weight gain []                                        NEURO:  parathesias[] seizures []  syncope []  confusion []                                                                                  EYES: glasses[]  blurry vision[]  discharge[] pain[] glaucoma []                                                                            ENMT:  difficulty hearing []  vertigo[]  dysphagia[] epistaxis[] recent dental work []                                      CV:  chest pain[] palpitations[] CHATTERJEE [] diaphoresis [] edema[]                                                                                             RESPIRATORY:  wheezing[] SOB[] cough [] sputum[] hemoptysis[]                                                                    GI:  nausea[]  vomiting []  diarrhea[] constipation [] melena []                                                                        : hematuria[ ]  dysuria[ ] urgency[] incontinence[]                                                                                              MUSCULOSKELETAL  arthritis[ ]  joint swelling [ ] muscle weakness [ ]                                                                  SKIN/BREAST:  rash[ ] itching [ ]  hair loss[ ] masses[ ]                                                                                                PSYCH:  dementia [ ] depression [ ] anxiety[ ]                                                                                                                  HEME/LYMPH:  bruises easily[ ] enlarged lymph nodes[ ] tender lymph nodes[ ]                                                 ENDOCRINE:  cold intolerance[ ] heat intolerance[ ] polydipsia[ ]                                                                              PHYSICAL EXAM  Vital Signs Last 24 Hrs  T(C): 36.9 (26 Jan 2018 12:14), Max: 36.9 (26 Jan 2018 11:30)  T(F): 98.4 (26 Jan 2018 12:14), Max: 98.4 (26 Jan 2018 11:30)  HR: 85 (26 Jan 2018 16:30) (68 - 106)  BP: 119/76 (26 Jan 2018 16:30) (119/76 - 148/94)  BP(mean): 112 (26 Jan 2018 12:14) (112 - 112)  RR: 18 (26 Jan 2018 16:30) (16 - 18)  SpO2: 96% (26 Jan 2018 16:30) (96% - 100%)    General: Well nourished, well developed, no acute distress.                                                         Neuro: Normal exam oriented to person/place & time with no focal motor or sensory  deficits.                    Eyes: Normal exam of conjunctiva & lids, pupils equally reactive.   ENT: Normal exam of nasal/oral mucosa with absence of cyanosis.   Neck: Normal exam of jugular veins, trachea & thyroid.   Chest: Normal lung exam with good air movement absence of wheezes, rales, or rhonchi:                                                                          CV:  Auscultation: normal [ ] S3[ ] S4[ ] Irregular [ ] Rub[ ] Clicks[ ]  Murmurs none:[ ]systolic [ ]  diastolic [ ] holosystolic [ ]  Carotids: No Bruits[ ] Other____________ Abdominal Aorta: normal [ ] nonpalpable[ ]                                                                         GI: Normal exam of abdomen, liver & spleen with no noted masses or tenderness.  (+) BS X 4 Quadrants, Nontender / Non Distended.                                                                                             Extremities: Normal no evidence of cyanosis or deformity Edema: none[ ]trace[ ]1+[ ]2+[ ]3+[ ]4+[ ]  Lower Extremity Pulses: Right[ ] Left[ ]Varicosities[ ]  SKIN : Normal exam to inspection & palpation.                                                           LABS:                        13.4   5.4   )-----------( 187      ( 26 Jan 2018 06:11 )             39.6     01-26    140  |  106  |  10  ----------------------------<  85  3.6   |  25  |  0.92    Ca    8.6      26 Jan 2018 06:11          CARDIAC MARKERS ( 25 Jan 2018 06:33 )  0.067 ng/mL / x     / 125 U/L / x     / 2.1 ng/mL  CARDIAC MARKERS ( 24 Jan 2018 22:12 )  0.064 ng/mL / x     / 122 U/L / x     / 2.1 ng/mL      Cardiac Cath: VENTRICLES: EF estimated was 35 %. CORONARY VESSELS: The coronary circulation is co-dominant.  LM: Normal.  LAD: There was a 100 % stenosis. There is some distal filling by collaterals. Circumflex: Angiography showed minor luminal irregularities with no flow limiting lesions. RCA: Angiography showed diffuse moderate to severe atherosclerosis.  RPL1: There was a 100 % stenosis.      TTE / DREA: Normal mitral valve. Trace mitral regurgitation. Probably trileaflet aortic valve is not well seen.  No aortic valve regurgitation seen. Normal aortic root. Normal left atrium. Moderate left ventricular enlargement. Fibrous and muscular LV bands are noted passing the LV cavity in transverse direction. This is a normal variant.  Moderate segmental left ventricular systolic dysfunction (EF 37% by biplane). Apical hypokinesis and mid anteroseptal and inferoseptal wall hypokinesis. Grade II diastolic dysfunction. Normal right atrium. Normal right ventricular size and systolic function (TAPSE 1.7 cm). RA Pressure is 8 mm Hg. RV systolic pressure is normal at  21 mm Hg. Normal tricuspid valve. Trace tricuspid regurgitation. Pulmonic valve not well seen. No pulmonic insufficiency is noted. No pericardial effusion. History of Present Illness:  47y Male old male with PMHx: of Parkinson's disease and HLD with presented to [facility] on 1/24/18 with c/o cierra pain and neck stiffness and reporting associated symptoms of right foot swelling.  EKG revealed q waves in the anterior and inferior leads with + troponin (0.057>0.064>0.067).  On 1/25 patient underwent nuclear stress test finding revealed left ventricle was mildly dilated LV at baseline. There is a medium sized, severe defect in anterior wall that is reversible consistent with ischemia. There is a small, severe defect in apical wall that is fixed consistent with myocardial infarction. There is a small, mild to moderate defect in basal inferior wall that is reversible consistent with ischemia. Dilated LV with global hypokinesis and akinetic apex, EF=30%. Patient transferred to [facility] for a Cardiac cath finding revealed: Multivessel CAD. EF estimated was 35 %. CORONARY VESSELS: The coronary circulation is co-dominant.  LM: Normal. LAD:There was a 100 % stenosis. There is some distal filling by collaterals. Circumflex: Angiography showed minor luminal irregularities with no flow limiting lesions.  RCA: Angiography showed diffuse moderate to severe atherosclerosis. RPL1: There was a 100 % stenosis. CTS consul called to evaluate patient for cardiac surgery,     Past Medical History  Parkinson disease  Anxiety  Hypercholesterolemia      Past Surgical History  No significant past surgical history      MEDICATIONS  (STANDING):  buPROPion XL . 150 milliGRAM(s) Oral daily  carbidopa/levodopa/entacapone 25/100/200 1 Tablet(s) Oral three times a day  metoprolol tartrate 12.5 milliGRAM(s) Oral two times a day  simvastatin 40 milliGRAM(s) Oral at bedtime    MEDICATIONS  (PRN):    Antiplatelet therapy:                           Last dose/amt:    Allergies: No Known Drug Allergies  Seafood (Blisters)      SOCIAL HISTORY:  Smoker: [X ] Yes  [ ] No        PACK YEARS:  15 Pk / years                       WHEN QUIT? 7 years ago   ETOH use: [ ] Yes  [ x] No              FREQUENCY / QUANTITY:  Ilicit Drug use:  [ ] Yes  [X] No  Occupation: Disabled   Live with: Sister /   from spouse   Assist device use:    Relevant Family History  FAMILY HISTORY: Mother / Father and Brother CAD / MI       Review of Systems  GENERAL:  Fevers[] chills[] sweats[] fatigue[] weight loss[] weight gain []                                        NEURO:  parathesias[] seizures []  syncope []  confusion []                                                                                  EYES: glasses[]  blurry vision[]  discharge[] pain[] glaucoma []                                                                            ENMT:  difficulty hearing []  vertigo[]  dysphagia[] epistaxis[] recent dental work []                                      CV:  chest pain[X] palpitations[X] CHATTERJEE [] diaphoresis [] edema[X]                                                                                             RESPIRATORY:  wheezing[] SOB[] cough [] sputum[] hemoptysis[]                                                                    GI:  nausea[]  vomiting []  diarrhea[] constipation [] melena []                                                                        : hematuria[ ]  dysuria[ ] urgency[] incontinence[]                                                                                              MUSCULOSKELETAL  arthritis[ ]  joint swelling [ ] muscle weakness [ ]                                                                  SKIN/BREAST:  rash[ ] itching [ ]  hair loss[ ] masses[ ]                                                                                                PSYCH:  dementia [ ] depression [ ] anxiety[ ]                                                                                                                  HEME/LYMPH:  bruises easily[ ] enlarged lymph nodes [ ] tender lymph nodes[ ]                                                 ENDOCRINE:  cold intolerance[ ] heat intolerance[ ] polydipsia[ ]                                                                              PHYSICAL EXAM  Vital Signs Last 24 Hrs  T(C): 36.9 (26 Jan 2018 12:14), Max: 36.9 (26 Jan 2018 11:30)  T(F): 98.4 (26 Jan 2018 12:14), Max: 98.4 (26 Jan 2018 11:30)  HR: 85 (26 Jan 2018 16:30) (68 - 106)  BP: 119/76 (26 Jan 2018 16:30) (119/76 - 148/94)  BP(mean): 112 (26 Jan 2018 12:14) (112 - 112)  RR: 18 (26 Jan 2018 16:30) (16 - 18)  SpO2: 96% (26 Jan 2018 16:30) (96% - 100%)    General: Well nourished, well developed, no acute distress.                                                         Neuro: Normal exam oriented to person/place & time with no focal motor or sensory  deficits.                    Eyes: Normal exam of conjunctiva & lids, pupils equally reactive.   ENT: Normal exam of nasal/oral mucosa with absence of cyanosis; Upper ands lower partials    Neck: Normal exam of jugular veins, trachea & thyroid.   Chest: Normal lung exam with good air movement absence of wheezes, rales, or rhonchi:                                                                          CV:  Auscultation: normal [X ] S3[ ] S4[ ] Irregular [ ] Rub[ ] Clicks[ ]  Murmurs none:[ ]systolic [ ]  diastolic [ ] holosystolic [ ]  Carotids: No Bruits[X ] Other____________ Abdominal Aorta: normal [x] nonpalpable[x ]                                                                         GI: Normal exam of abdomen, liver & spleen with no noted masses or tenderness.  (+) BS X 4 Quadrants, Nontender / Non Distended.                                                                                             Extremities: Normal no evidence of cyanosis or deformity Edema: none[X ]trace[ ]1+[ ]2+[ ]3+[ ]4+[ ]  Lower Extremity Pulses: Right[+1 ] Left[+1 ]Varicosities[- ]  SKIN : Normal exam to inspection & palpation.                                                           LABS:                        13.4   5.4   )-----------( 187      ( 26 Jan 2018 06:11 )             39.6     01-26    140  |  106  |  10  ----------------------------<  85  3.6   |  25  |  0.92    Ca    8.6      26 Jan 2018 06:11          CARDIAC MARKERS ( 25 Jan 2018 06:33 )  0.067 ng/mL / x     / 125 U/L / x     / 2.1 ng/mL  CARDIAC MARKERS ( 24 Jan 2018 22:12 )  0.064 ng/mL / x     / 122 U/L / x     / 2.1 ng/mL      Cardiac Cath: VENTRICLES: EF estimated was 35 %. CORONARY VESSELS: The coronary circulation is co-dominant.  LM: Normal.  LAD: There was a 100 % stenosis. There is some distal filling by collaterals. Circumflex: Angiography showed minor luminal irregularities with no flow limiting lesions. RCA: Angiography showed diffuse moderate to severe atherosclerosis.  RPL1: There was a 100 % stenosis.      TTE / DREA: Normal mitral valve. Trace mitral regurgitation. Probably trileaflet aortic valve is not well seen.  No aortic valve regurgitation seen. Normal aortic root. Normal left atrium. Moderate left ventricular enlargement. Fibrous and muscular LV bands are noted passing the LV cavity in transverse direction. This is a normal variant.  Moderate segmental left ventricular systolic dysfunction (EF 37% by biplane). Apical hypokinesis and mid anteroseptal and inferoseptal wall hypokinesis. Grade II diastolic dysfunction. Normal right atrium. Normal right ventricular size and systolic function (TAPSE 1.7 cm). RA Pressure is 8 mm Hg. RV systolic pressure is normal at  21 mm Hg. Normal tricuspid valve. Trace tricuspid regurgitation. Pulmonic valve not well seen. No pulmonic insufficiency is noted. No pericardial effusion.

## 2018-01-26 NOTE — PROGRESS NOTE ADULT - SUBJECTIVE AND OBJECTIVE BOX
CHIEF COMPLAINT:Patient is a 47y old  Male who presents with a chief complaint of Neck stiffness .Pt appears comfortable.    	  REVIEW OF SYSTEMS:  CONSTITUTIONAL: No fever, weight loss, or fatigue  EYES: No eye pain, visual disturbances, or discharge  ENT:  No difficulty hearing, tinnitus, vertigo; No sinus or throat pain  NECK: No pain or stiffness  RESPIRATORY: No cough, wheezing, chills or hemoptysis; No Shortness of Breath  CARDIOVASCULAR: No chest pain, palpitations, passing out, dizziness, or leg swelling  GASTROINTESTINAL: No abdominal or epigastric pain. No nausea, vomiting, or hematemesis; No diarrhea or constipation. No melena or hematochezia.  GENITOURINARY: No dysuria, frequency, hematuria, or incontinence  NEUROLOGICAL: No headaches, memory loss, loss of strength, numbness, or tremors  SKIN: No itching, burning, rashes, or lesions   LYMPH Nodes: No enlarged glands  ENDOCRINE: No heat or cold intolerance; No hair loss  MUSCULOSKELETAL: No joint pain or swelling; No muscle, back, or extremity pain  PSYCHIATRIC: No depression, anxiety, mood swings, or difficulty sleeping  HEME/LYMPH: No easy bruising, or bleeding gums  ALLERGY AND IMMUNOLOGIC: No hives or eczema	      PHYSICAL EXAM:  T(C): 36 (01-26-18 @ 04:46), Max: 37.1 (01-25-18 @ 11:07)  HR: 87 (01-26-18 @ 04:46) (68 - 91)  BP: 130/82 (01-26-18 @ 04:46) (117/65 - 132/88)  RR: 16 (01-26-18 @ 04:46) (16 - 18)  SpO2: 100% (01-26-18 @ 04:46) (99% - 100%)      25 Jan 2018 07:01  -  26 Jan 2018 07:00  --------------------------------------------------------  IN: 430 mL / OUT: 0 mL / NET: 430 mL        Appearance: Normal	  HEENT:   Normal oral mucosa, PERRL, EOMI	  Lymphatic: No lymphadenopathy  Cardiovascular: Normal S1 S2, No JVD, No murmurs, No edema  Respiratory: Lungs clear to auscultation	  Psychiatry: A & O x 3, Mood & affect appropriate  Gastrointestinal:  Soft, Non-tender, + BS	  Skin: No rashes, No ecchymoses, No cyanosis	  Neurologic: Non-focal  Extremities: Normal range of motion, No clubbing, cyanosis or edema  Vascular: Peripheral pulses palpable 2+ bilaterally    MEDICATIONS  (STANDING):  aspirin  chewable 81 milliGRAM(s) Oral daily  buPROPion XL . 150 milliGRAM(s) Oral daily  carbidopa/levodopa  25/100 1.5 Tablet(s) Oral <User Schedule>  carbidopa/levodopa  25/250 1 Tablet(s) Oral <User Schedule>  heparin  Injectable 5000 Unit(s) SubCutaneous every 8 hours  lisinopril 2.5 milliGRAM(s) Oral daily  metoprolol     tartrate 12.5 milliGRAM(s) Oral two times a day  simvastatin 40 milliGRAM(s) Oral at bedtime      TELEMETRY: 	 NSR,NSVT,pvc's     	  LABS:	 	    CARDIAC MARKERS:  CARDIAC MARKERS ( 25 Jan 2018 06:33 )  0.067 ng/mL / x     / 125 U/L / x     / 2.1 ng/mL  CARDIAC MARKERS ( 24 Jan 2018 22:12 )  0.064 ng/mL / x     / 122 U/L / x     / 2.1 ng/mL  CARDIAC MARKERS ( 24 Jan 2018 14:54 )  0.057 ng/mL / x     / 123 U/L / x     / 1.8 ng/mL  CARDIAC MARKERS ( 24 Jan 2018 09:12 )  0.048 ng/mL / x     / x     / x     / x                            13.4   5.4   )-----------( 187      ( 26 Jan 2018 06:11 )             39.6     01-26    140  |  106  |  10  ----------------------------<  85  3.6   |  25  |  0.92    Ca    8.6      26 Jan 2018 06:11    TPro  7.3  /  Alb  4.0  /  TBili  0.4  /  DBili  x   /  AST  18  /  ALT  20  /  AlkPhos  74  01-24      Lipid Profile: Cholesterol 178  LDL 93  HDL 75  TG 51      TSH: Thyroid Stimulating Hormone, Serum: 0.36 uU/mL (01-25 @ 06:33)      	  IMPRESSIONS:Abnormal Study  * Negative ECG evidence of ischemia after IV of Lexiscan.  * Review of raw data shows: The study is of good technical  quality.  * The left ventricle was mildly dilated LV at baseline.  There is a medium sized, severe defect in anterior wall  that is reversible consistent with ischemia.There is a  small, severe defect in apical wall that is fixed  consistent with myocardial infarction.There is a small,  mild to moderate defect in basal inferior wall that is  reversible consistent with  ischemia.  * Dilated LV with global hypokinesis and akinetic apex,  EF=30%.

## 2018-01-26 NOTE — H&P CARDIOLOGY - HISTORY OF PRESENT ILLNESS
47 year old [place]  male  pmhx of Parkinson's disease and HLD presented to [facility] on 1/24/18  neck stiffness. The patient states he woke up at night with dizziness and neck stiffness. He also had associated right foot swelling.  He also c/o worsening parkison's disease. He said he has worsening tremors on his R extremity compared to left. Also c/o worsening rigidity. He said he often has neck stiffness and pain. He further complains of intermittent chest pain that has been present for the last 2 months. He states he has not followed up on the chest pain because it resolved on its own. On EKG it is revealed that he has   q waves in anterior and inferior leads. He further had slight leak in his troponin which increased 0.057>0.064>0.067. The patient had a positive nuclear stress at [facility] which resulted as follows:  < from: Nuclear Stress Test-Pharmacologic (01.25.18 @ 03:11) >   Negative ECG evidence of ischemia after IV of Lexiscan.  * Review of raw data shows: The study is of good technical  quality.  * The left ventricle was mildly dilated LV at baseline.  There is a medium sized, severe defect in anterior wall  that is reversible consistent with ischemia.There is a  small, severe defect in apical wall that is fixed  consistent with myocardial infarction.There is a small,  mild to moderate defect in basal inferior wall that is  reversible consistent with  ischemia.  * Dilated LV with global hypokinesis and akinetic apex,  EF=30%.    < end of copied text >    Allergies: Seafood  SH: Lives with his sister. Denies smoking, alcohol, or illicit drug use. Walks with cane. 47 year old [facility]  male  pmhx of Parkinson's disease and HLD presented to [facility] on 1/24/18  neck stiffness. The patient states he woke up at night with dizziness and neck stiffness. He also had associated right foot swelling.  He also c/o worsening parkison's disease. He said he has worsening tremors on his R extremity compared to left. Also c/o worsening rigidity. He said he often has neck stiffness and pain. He further complains of intermittent chest pain that has been present for the last 2 months. He states he has not followed up on the chest pain because it resolved on its own. On EKG it is revealed that he has   q waves in anterior and inferior leads. He further had slight leak in his troponin which increased 0.057>0.064>0.067. The patient had a positive nuclear stress at [facility] which resulted as follows:  < from: Nuclear Stress Test-Pharmacologic (01.25.18 @ 03:11) >   Negative ECG evidence of ischemia after IV of Lexiscan.  * Review of raw data shows: The study is of good technical  quality.  * The left ventricle was mildly dilated LV at baseline.  There is a medium sized, severe defect in anterior wall  that is reversible consistent with ischemia.There is a  small, severe defect in apical wall that is fixed  consistent with myocardial infarction.There is a small,  mild to moderate defect in basal inferior wall that is  reversible consistent with  ischemia.  * Dilated LV with global hypokinesis and akinetic apex,  EF=30%.    < end of copied text >    Allergies: Seafood  SH: Lives with his sister. Denies smoking, alcohol, or illicit drug use. Walks with cane.

## 2018-01-26 NOTE — CONSULT NOTE ADULT - PROBLEM SELECTOR RECOMMENDATION 9
Preop Cardiac Surgery work up   PFT's   Start statin   Continue Lopressor 12.5 mg PO BID   Continue ASA 81 mg PO daily   D/C ACE / ARBs to optimize renal function   Activity as tolerated   Carotid duplex study   MRSA /MSSA nasal swab   CXR   Plan for cardiac surgery with Dr. Thompson next Tuesday 1/30

## 2018-01-26 NOTE — PHYSICAL THERAPY INITIAL EVALUATION ADULT - LIVES WITH, PROFILE
other relative/sister; Pt lives in apartment with 4 SAW with R HR, sister is home at night other relative/sister; Pt lives in apartment with 4 stairs with R HR, sister is home at night

## 2018-01-27 LAB
ANION GAP SERPL CALC-SCNC: 14 MMOL/L — SIGNIFICANT CHANGE UP (ref 5–17)
APPEARANCE UR: CLEAR — SIGNIFICANT CHANGE UP
BILIRUB UR-MCNC: NEGATIVE — SIGNIFICANT CHANGE UP
BUN SERPL-MCNC: 14 MG/DL — SIGNIFICANT CHANGE UP (ref 7–23)
CALCIUM SERPL-MCNC: 9.5 MG/DL — SIGNIFICANT CHANGE UP (ref 8.4–10.5)
CHLORIDE SERPL-SCNC: 100 MMOL/L — SIGNIFICANT CHANGE UP (ref 96–108)
CO2 SERPL-SCNC: 24 MMOL/L — SIGNIFICANT CHANGE UP (ref 22–31)
COLOR SPEC: YELLOW — SIGNIFICANT CHANGE UP
CREAT SERPL-MCNC: 1.16 MG/DL — SIGNIFICANT CHANGE UP (ref 0.5–1.3)
DIFF PNL FLD: NEGATIVE — SIGNIFICANT CHANGE UP
GLUCOSE SERPL-MCNC: 88 MG/DL — SIGNIFICANT CHANGE UP (ref 70–99)
GLUCOSE UR QL: NEGATIVE — SIGNIFICANT CHANGE UP
HCT VFR BLD CALC: 42.5 % — SIGNIFICANT CHANGE UP (ref 39–50)
HGB BLD-MCNC: 15.3 G/DL — SIGNIFICANT CHANGE UP (ref 13–17)
KETONES UR-MCNC: ABNORMAL
LEUKOCYTE ESTERASE UR-ACNC: NEGATIVE — SIGNIFICANT CHANGE UP
MCHC RBC-ENTMCNC: 31.1 PG — SIGNIFICANT CHANGE UP (ref 27–34)
MCHC RBC-ENTMCNC: 35.9 GM/DL — SIGNIFICANT CHANGE UP (ref 32–36)
MCV RBC AUTO: 86.5 FL — SIGNIFICANT CHANGE UP (ref 80–100)
NITRITE UR-MCNC: NEGATIVE — SIGNIFICANT CHANGE UP
PH UR: 6 — SIGNIFICANT CHANGE UP (ref 5–8)
PLATELET # BLD AUTO: 198 K/UL — SIGNIFICANT CHANGE UP (ref 150–400)
POTASSIUM SERPL-MCNC: 3.9 MMOL/L — SIGNIFICANT CHANGE UP (ref 3.5–5.3)
POTASSIUM SERPL-SCNC: 3.9 MMOL/L — SIGNIFICANT CHANGE UP (ref 3.5–5.3)
PROT UR-MCNC: 30 MG/DL
RBC # BLD: 4.92 M/UL — SIGNIFICANT CHANGE UP (ref 4.2–5.8)
RBC # FLD: 11 % — SIGNIFICANT CHANGE UP (ref 10.3–14.5)
SODIUM SERPL-SCNC: 138 MMOL/L — SIGNIFICANT CHANGE UP (ref 135–145)
SP GR SPEC: >1.03 — HIGH (ref 1.01–1.02)
T3 SERPL-MCNC: 118 NG/DL — SIGNIFICANT CHANGE UP (ref 80–200)
T4 AB SER-ACNC: 8 UG/DL — SIGNIFICANT CHANGE UP (ref 4.6–12)
TSH SERPL-MCNC: 0.35 UIU/ML — SIGNIFICANT CHANGE UP (ref 0.27–4.2)
UROBILINOGEN FLD QL: NEGATIVE — SIGNIFICANT CHANGE UP
WBC # BLD: 8.1 K/UL — SIGNIFICANT CHANGE UP (ref 3.8–10.5)
WBC # FLD AUTO: 8.1 K/UL — SIGNIFICANT CHANGE UP (ref 3.8–10.5)

## 2018-01-27 PROCEDURE — 93880 EXTRACRANIAL BILAT STUDY: CPT | Mod: 26

## 2018-01-27 RX ADMIN — CARBIDOPA, LEVODOPA, AND ENTACAPONE 1 TABLET(S): 50; 200; 200 TABLET, FILM COATED ORAL at 05:40

## 2018-01-27 RX ADMIN — Medication 12.5 MILLIGRAM(S): at 17:58

## 2018-01-27 RX ADMIN — Medication 81 MILLIGRAM(S): at 14:31

## 2018-01-27 RX ADMIN — CARBIDOPA, LEVODOPA, AND ENTACAPONE 1 TABLET(S): 50; 200; 200 TABLET, FILM COATED ORAL at 14:31

## 2018-01-27 RX ADMIN — Medication 12.5 MILLIGRAM(S): at 05:43

## 2018-01-27 RX ADMIN — CARBIDOPA, LEVODOPA, AND ENTACAPONE 1 TABLET(S): 50; 200; 200 TABLET, FILM COATED ORAL at 22:31

## 2018-01-27 RX ADMIN — BUPROPION HYDROCHLORIDE 150 MILLIGRAM(S): 150 TABLET, EXTENDED RELEASE ORAL at 14:31

## 2018-01-27 RX ADMIN — SIMVASTATIN 40 MILLIGRAM(S): 20 TABLET, FILM COATED ORAL at 22:31

## 2018-01-28 LAB
ANION GAP SERPL CALC-SCNC: 14 MMOL/L — SIGNIFICANT CHANGE UP (ref 5–17)
BLD GP AB SCN SERPL QL: NEGATIVE — SIGNIFICANT CHANGE UP
BUN SERPL-MCNC: 19 MG/DL — SIGNIFICANT CHANGE UP (ref 7–23)
CALCIUM SERPL-MCNC: 9.4 MG/DL — SIGNIFICANT CHANGE UP (ref 8.4–10.5)
CHLORIDE SERPL-SCNC: 104 MMOL/L — SIGNIFICANT CHANGE UP (ref 96–108)
CO2 SERPL-SCNC: 23 MMOL/L — SIGNIFICANT CHANGE UP (ref 22–31)
CREAT SERPL-MCNC: 1.1 MG/DL — SIGNIFICANT CHANGE UP (ref 0.5–1.3)
GLUCOSE SERPL-MCNC: 82 MG/DL — SIGNIFICANT CHANGE UP (ref 70–99)
HCT VFR BLD CALC: 39.3 % — SIGNIFICANT CHANGE UP (ref 39–50)
HGB BLD-MCNC: 14.1 G/DL — SIGNIFICANT CHANGE UP (ref 13–17)
MCHC RBC-ENTMCNC: 30.9 PG — SIGNIFICANT CHANGE UP (ref 27–34)
MCHC RBC-ENTMCNC: 35.8 GM/DL — SIGNIFICANT CHANGE UP (ref 32–36)
MCV RBC AUTO: 86.2 FL — SIGNIFICANT CHANGE UP (ref 80–100)
MRSA PCR RESULT.: SIGNIFICANT CHANGE UP
PLATELET # BLD AUTO: 201 K/UL — SIGNIFICANT CHANGE UP (ref 150–400)
POTASSIUM SERPL-MCNC: 3.8 MMOL/L — SIGNIFICANT CHANGE UP (ref 3.5–5.3)
POTASSIUM SERPL-SCNC: 3.8 MMOL/L — SIGNIFICANT CHANGE UP (ref 3.5–5.3)
RBC # BLD: 4.56 M/UL — SIGNIFICANT CHANGE UP (ref 4.2–5.8)
RBC # FLD: 11.1 % — SIGNIFICANT CHANGE UP (ref 10.3–14.5)
RH IG SCN BLD-IMP: POSITIVE — SIGNIFICANT CHANGE UP
S AUREUS DNA NOSE QL NAA+PROBE: SIGNIFICANT CHANGE UP
SODIUM SERPL-SCNC: 141 MMOL/L — SIGNIFICANT CHANGE UP (ref 135–145)
WBC # BLD: 5.8 K/UL — SIGNIFICANT CHANGE UP (ref 3.8–10.5)
WBC # FLD AUTO: 5.8 K/UL — SIGNIFICANT CHANGE UP (ref 3.8–10.5)

## 2018-01-28 RX ORDER — HEPARIN SODIUM 5000 [USP'U]/ML
5000 INJECTION INTRAVENOUS; SUBCUTANEOUS EVERY 12 HOURS
Qty: 0 | Refills: 0 | Status: DISCONTINUED | OUTPATIENT
Start: 2018-01-28 | End: 2018-01-29

## 2018-01-28 RX ADMIN — CARBIDOPA, LEVODOPA, AND ENTACAPONE 1 TABLET(S): 50; 200; 200 TABLET, FILM COATED ORAL at 05:37

## 2018-01-28 RX ADMIN — CARBIDOPA, LEVODOPA, AND ENTACAPONE 1 TABLET(S): 50; 200; 200 TABLET, FILM COATED ORAL at 20:56

## 2018-01-28 RX ADMIN — BUPROPION HYDROCHLORIDE 150 MILLIGRAM(S): 150 TABLET, EXTENDED RELEASE ORAL at 12:14

## 2018-01-28 RX ADMIN — Medication 12.5 MILLIGRAM(S): at 05:37

## 2018-01-28 RX ADMIN — HEPARIN SODIUM 5000 UNIT(S): 5000 INJECTION INTRAVENOUS; SUBCUTANEOUS at 17:15

## 2018-01-28 RX ADMIN — SIMVASTATIN 40 MILLIGRAM(S): 20 TABLET, FILM COATED ORAL at 20:56

## 2018-01-28 RX ADMIN — CARBIDOPA, LEVODOPA, AND ENTACAPONE 1 TABLET(S): 50; 200; 200 TABLET, FILM COATED ORAL at 14:37

## 2018-01-28 RX ADMIN — Medication 12.5 MILLIGRAM(S): at 17:14

## 2018-01-28 RX ADMIN — Medication 81 MILLIGRAM(S): at 12:14

## 2018-01-29 ENCOUNTER — TRANSCRIPTION ENCOUNTER (OUTPATIENT)
Age: 48
End: 2018-01-29

## 2018-01-29 VITALS
HEART RATE: 88 BPM | DIASTOLIC BLOOD PRESSURE: 81 MMHG | OXYGEN SATURATION: 98 % | TEMPERATURE: 98 F | RESPIRATION RATE: 18 BRPM | SYSTOLIC BLOOD PRESSURE: 128 MMHG

## 2018-01-29 LAB
BUN SERPL-MCNC: 16 MG/DL — SIGNIFICANT CHANGE UP (ref 7–23)
CALCIUM SERPL-MCNC: 9.5 MG/DL — SIGNIFICANT CHANGE UP (ref 8.4–10.5)
CHLORIDE SERPL-SCNC: 102 MMOL/L — SIGNIFICANT CHANGE UP (ref 96–108)
CO2 SERPL-SCNC: 25 MMOL/L — SIGNIFICANT CHANGE UP (ref 22–31)
CREAT SERPL-MCNC: 1.03 MG/DL — SIGNIFICANT CHANGE UP (ref 0.5–1.3)
GLUCOSE SERPL-MCNC: 89 MG/DL — SIGNIFICANT CHANGE UP (ref 70–99)
HCT VFR BLD CALC: 38.3 % — LOW (ref 39–50)
HGB BLD-MCNC: 14 G/DL — SIGNIFICANT CHANGE UP (ref 13–17)
MAGNESIUM SERPL-MCNC: 1.9 MG/DL — SIGNIFICANT CHANGE UP (ref 1.6–2.6)
MCHC RBC-ENTMCNC: 31.4 PG — SIGNIFICANT CHANGE UP (ref 27–34)
MCHC RBC-ENTMCNC: 36.5 GM/DL — HIGH (ref 32–36)
MCV RBC AUTO: 86.1 FL — SIGNIFICANT CHANGE UP (ref 80–100)
PLATELET # BLD AUTO: 203 K/UL — SIGNIFICANT CHANGE UP (ref 150–400)
POTASSIUM SERPL-MCNC: 4.3 MMOL/L — SIGNIFICANT CHANGE UP (ref 3.5–5.3)
POTASSIUM SERPL-SCNC: 4.3 MMOL/L — SIGNIFICANT CHANGE UP (ref 3.5–5.3)
RBC # BLD: 4.45 M/UL — SIGNIFICANT CHANGE UP (ref 4.2–5.8)
RBC # FLD: 11 % — SIGNIFICANT CHANGE UP (ref 10.3–14.5)
SODIUM SERPL-SCNC: 139 MMOL/L — SIGNIFICANT CHANGE UP (ref 135–145)
WBC # BLD: 6.6 K/UL — SIGNIFICANT CHANGE UP (ref 3.8–10.5)
WBC # FLD AUTO: 6.6 K/UL — SIGNIFICANT CHANGE UP (ref 3.8–10.5)

## 2018-01-29 PROCEDURE — 86901 BLOOD TYPING SEROLOGIC RH(D): CPT

## 2018-01-29 PROCEDURE — C1894: CPT

## 2018-01-29 PROCEDURE — 84436 ASSAY OF TOTAL THYROXINE: CPT

## 2018-01-29 PROCEDURE — 83036 HEMOGLOBIN GLYCOSYLATED A1C: CPT

## 2018-01-29 PROCEDURE — 83880 ASSAY OF NATRIURETIC PEPTIDE: CPT

## 2018-01-29 PROCEDURE — 81001 URINALYSIS AUTO W/SCOPE: CPT

## 2018-01-29 PROCEDURE — 80048 BASIC METABOLIC PNL TOTAL CA: CPT

## 2018-01-29 PROCEDURE — 93458 L HRT ARTERY/VENTRICLE ANGIO: CPT

## 2018-01-29 PROCEDURE — C1887: CPT

## 2018-01-29 PROCEDURE — 84480 ASSAY TRIIODOTHYRONINE (T3): CPT

## 2018-01-29 PROCEDURE — 93005 ELECTROCARDIOGRAM TRACING: CPT

## 2018-01-29 PROCEDURE — 85027 COMPLETE CBC AUTOMATED: CPT

## 2018-01-29 PROCEDURE — C1769: CPT

## 2018-01-29 PROCEDURE — 94010 BREATHING CAPACITY TEST: CPT

## 2018-01-29 PROCEDURE — 94010 BREATHING CAPACITY TEST: CPT | Mod: 26

## 2018-01-29 PROCEDURE — 86850 RBC ANTIBODY SCREEN: CPT

## 2018-01-29 PROCEDURE — 87641 MR-STAPH DNA AMP PROBE: CPT

## 2018-01-29 PROCEDURE — 86900 BLOOD TYPING SEROLOGIC ABO: CPT

## 2018-01-29 PROCEDURE — 83735 ASSAY OF MAGNESIUM: CPT

## 2018-01-29 PROCEDURE — 84443 ASSAY THYROID STIM HORMONE: CPT

## 2018-01-29 PROCEDURE — 93880 EXTRACRANIAL BILAT STUDY: CPT

## 2018-01-29 PROCEDURE — 87640 STAPH A DNA AMP PROBE: CPT

## 2018-01-29 RX ORDER — MAGNESIUM SULFATE 500 MG/ML
1 VIAL (ML) INJECTION ONCE
Qty: 0 | Refills: 0 | Status: COMPLETED | OUTPATIENT
Start: 2018-01-29 | End: 2018-01-29

## 2018-01-29 RX ORDER — LISINOPRIL 2.5 MG/1
1 TABLET ORAL
Qty: 30 | Refills: 0 | OUTPATIENT
Start: 2018-01-29 | End: 2018-02-27

## 2018-01-29 RX ORDER — LISINOPRIL 2.5 MG/1
1 TABLET ORAL
Qty: 0 | Refills: 0 | COMMUNITY
Start: 2018-01-29

## 2018-01-29 RX ORDER — METOPROLOL TARTRATE 50 MG
1 TABLET ORAL
Qty: 60 | Refills: 0 | OUTPATIENT
Start: 2018-01-29 | End: 2018-02-27

## 2018-01-29 RX ORDER — LISINOPRIL 2.5 MG/1
2.5 TABLET ORAL DAILY
Qty: 0 | Refills: 0 | Status: DISCONTINUED | OUTPATIENT
Start: 2018-01-29 | End: 2018-01-29

## 2018-01-29 RX ORDER — ASPIRIN/CALCIUM CARB/MAGNESIUM 324 MG
1 TABLET ORAL
Qty: 0 | Refills: 0 | COMMUNITY
Start: 2018-01-29

## 2018-01-29 RX ADMIN — BUPROPION HYDROCHLORIDE 150 MILLIGRAM(S): 150 TABLET, EXTENDED RELEASE ORAL at 16:23

## 2018-01-29 RX ADMIN — LISINOPRIL 2.5 MILLIGRAM(S): 2.5 TABLET ORAL at 17:34

## 2018-01-29 RX ADMIN — Medication 81 MILLIGRAM(S): at 14:19

## 2018-01-29 RX ADMIN — CARBIDOPA, LEVODOPA, AND ENTACAPONE 1 TABLET(S): 50; 200; 200 TABLET, FILM COATED ORAL at 14:19

## 2018-01-29 RX ADMIN — Medication 100 GRAM(S): at 03:37

## 2018-01-29 RX ADMIN — Medication 12.5 MILLIGRAM(S): at 06:36

## 2018-01-29 RX ADMIN — Medication 12.5 MILLIGRAM(S): at 17:34

## 2018-01-29 RX ADMIN — CARBIDOPA, LEVODOPA, AND ENTACAPONE 1 TABLET(S): 50; 200; 200 TABLET, FILM COATED ORAL at 06:36

## 2018-01-29 RX ADMIN — HEPARIN SODIUM 5000 UNIT(S): 5000 INJECTION INTRAVENOUS; SUBCUTANEOUS at 17:34

## 2018-01-29 RX ADMIN — HEPARIN SODIUM 5000 UNIT(S): 5000 INJECTION INTRAVENOUS; SUBCUTANEOUS at 06:36

## 2018-01-29 NOTE — DISCHARGE NOTE ADULT - ADDITIONAL INSTRUCTIONS
Follow up with Dr. Ayala within 1 week after your discharge from hospital.   Call to schedule appaointments.

## 2018-01-29 NOTE — DISCHARGE NOTE ADULT - PLAN OF CARE
Decrease in chest pain and optimal functioning. Coronary artery disease is a condition where the arteries the supply the heart muscle get clogged with fatty deposits & puts you at risk for a heart attack  Call your doctor if you have any new pain, pressure, or discomfort in the center of your chest, pain, tingling or discomfort in arms, back, neck, jaw, or stomach, shortness of breath, nausea, vomiting, burping or heartburn, sweating, cold and clammy skin, racing or abnormal heartbeat for more than 10 minutes or if they keep coming & going.  Call 911 and do not tr to get to hospital by care  You can help yourself with lefestyle changes (quitting smoking if you smoke), eat lots of fruits & vegetables & low fat dairy products, not a lot of meat & fatty foods, walk or some form of physical activity most days of the week, lose weight if you are overweight  Take your cardiac medication as prescribed to lower cholesterol, to lower blood pressure, aspirin to prevent blood clots, and diabetes control  Make sure to keep appointments with doctor for cardiac follow up care Take your medication as prescribed.   Follow up with your medical doctor for routine blood  work monitoring, and to establish long term treatment goals.

## 2018-01-29 NOTE — DISCHARGE NOTE ADULT - PATIENT PORTAL LINK FT
“You can access the FollowHealth Patient Portal, offered by [facility], by registering with the following website: [email or web address]”

## 2018-01-29 NOTE — DISCHARGE NOTE ADULT - REASON FOR ADMISSION
Intermittent chest pain, Dizziness and neck stiffness, right foot swelling, worsening tremors and rigidity.

## 2018-01-29 NOTE — DISCHARGE NOTE ADULT - MEDICATION SUMMARY - MEDICATIONS TO TAKE
I will START or STAY ON the medications listed below when I get home from the hospital:    aspirin 81 mg oral delayed release tablet  -- 1 tab(s) by mouth once a day  -- Indication: For CAD (coronary artery disease)    lisinopril 2.5 mg oral tablet  -- 1 tab(s) by mouth once a day  -- Indication: For CAD (coronary artery disease)    simvastatin 40 mg oral tablet  -- 1 tab(s) by mouth once a day (at bedtime)  home/hospital  -- Indication: For Cholesterol    carbidopa-levodopa 25 mg-100 mg oral tablet  -- at 11am , 3pm , 7pm, three times a day  hospital  -- Indication: For Parkinsons Disease    Stalevo 100 oral tablet  -- home  -- Indication: For Parkinsons Disease    metoprolol tartrate 25 mg oral tablet  -- 1 tab(s) by mouth 2 times a day  hospital   -- It is very important that you take or use this exactly as directed.  Do not skip doses or discontinue unless directed by your doctor.  May cause drowsiness.  Alcohol may intensify this effect.  Use care when operating dangerous machinery.  Some non-prescription drugs may aggravate your condition.  Read all labels carefully.  If a warning appears, check with your doctor before taking.  Take with food or milk.  This drug may impair the ability to drive or operate machinery.  Use care until you become familiar with its effects.    -- Indication: For CAD (coronary artery disease)    buPROPion 150 mg/12 hours oral tablet, extended release  -- 1 tab(s) by mouth 2 times a day  home/hospital  -- Indication: For Parkinsons Disease

## 2018-01-29 NOTE — DISCHARGE NOTE ADULT - MEDICATION SUMMARY - MEDICATIONS TO CHANGE
I will SWITCH the dose or number of times a day I take the medications listed below when I get home from the hospital:    metoprolol tartrate 25 mg oral tablet  -- 0.5 tab(s) by mouth 2 times a day   hospital  -- It is very important that you take or use this exactly as directed.  Do not skip doses or discontinue unless directed by your doctor.  May cause drowsiness.  Alcohol may intensify this effect.  Use care when operating dangerous machinery.  Some non-prescription drugs may aggravate your condition.  Read all labels carefully.  If a warning appears, check with your doctor before taking.  Take with food or milk.  This drug may impair the ability to drive or operate machinery.  Use care until you become familiar with its effects.

## 2018-01-29 NOTE — DISCHARGE NOTE ADULT - CARE PROVIDER_API CALL
Gladis Ayala), Cardiology; Internal Medicine  [address] [place] [id number]  Phone: [phone]  Fax: [phone]

## 2018-01-29 NOTE — DISCHARGE NOTE ADULT - CARE PLAN
Principal Discharge DX:	CAD (coronary artery disease)  Goal:	Decrease in chest pain and optimal functioning.  Assessment and plan of treatment:	Coronary artery disease is a condition where the arteries the supply the heart muscle get clogged with fatty deposits & puts you at risk for a heart attack  Call your doctor if you have any new pain, pressure, or discomfort in the center of your chest, pain, tingling or discomfort in arms, back, neck, jaw, or stomach, shortness of breath, nausea, vomiting, burping or heartburn, sweating, cold and clammy skin, racing or abnormal heartbeat for more than 10 minutes or if they keep coming & going.  Call 911 and do not tr to get to hospital by care  You can help yourself with lefestyle changes (quitting smoking if you smoke), eat lots of fruits & vegetables & low fat dairy products, not a lot of meat & fatty foods, walk or some form of physical activity most days of the week, lose weight if you are overweight  Take your cardiac medication as prescribed to lower cholesterol, to lower blood pressure, aspirin to prevent blood clots, and diabetes control  Make sure to keep appointments with doctor for cardiac follow up care  Secondary Diagnosis:	Parkinsons disease  Assessment and plan of treatment:	Take your medication as prescribed.   Follow up with your medical doctor for routine blood  work monitoring, and to establish long term treatment goals.  Secondary Diagnosis:	Hypercholesterolemia  Assessment and plan of treatment:	Take your medication as prescribed.   Follow up with your medical doctor for routine blood  work monitoring, and to establish long term treatment goals.

## 2018-01-29 NOTE — PROGRESS NOTE ADULT - SUBJECTIVE AND OBJECTIVE BOX
- Patient seen and examined.  - In summary, patient is a 47y year old man who presented with CP.   - Today, patient is without complaints.         *****MEDICATIONS:    MEDICATIONS  (STANDING):  aspirin enteric coated 81 milliGRAM(s) Oral daily  buPROPion XL . 150 milliGRAM(s) Oral daily  carbidopa/levodopa/entacapone 25/100/200 1 Tablet(s) Oral three times a day  heparin  Injectable 5000 Unit(s) SubCutaneous every 12 hours  metoprolol     tartrate 12.5 milliGRAM(s) Oral two times a day  simvastatin 40 milliGRAM(s) Oral at bedtime    MEDICATIONS  (PRN):             ***** REVIEW OF SYSTEM:  GEN: no fever, no chills, no pain  RESP: no SOB, no cough, no sputum  CVS: no chest pain, no palpitations, no edema  GI: no abdominal pain, no nausea, no vomiting, no constipation, no diarrhea  : no dysuria, no frequency  NEURO: no headache, no dizziness  PSYCH: no depression, not anxious  Derm : no itching, no rash         ***** VITAL SIGNS:    T(F): 98 (18 @ 04:10), Max: 98 (18 @ 14:29)  HR: 74 (18 @ 04:10) (71 - 83)  BP: 111/71 (18 @ 04:10) (111/71 - 143/86)  RR: 18 (18 @ 04:10) (18 - 18)  SpO2: 96% (18 @ 04:10) (96% - 97%)  Wt(kg): --  ,   I&O's Summary    2018 07:  -  2018 07:00  --------------------------------------------------------  IN: 220 mL / OUT: 350 mL / NET: -130 mL             *****PHYSICAL EXAM:  GEN: A&O X 3 , NAD , comfortable  HEENT: NCAT, EOMI, MMM, no icterus  NECK: Supple, No JVD  CVS: S1S2 , regular , No M/R/G appreciated  PULM: CTA B/L,  no W/R/R appreciated  ABD.: soft. non tender, non distended,  bowel sounds present  Extrem: intact pulses , no edema noted  Derm: No rash or ecchymosis noted  PSYCH: normal mood, no depression, not anxious         *****LAB AND IMAGIN.0   6.6   )-----------( 203      ( 2018 01:33 )             38.3                   139  |  102  |  16  ----------------------------<  89  4.3   |  25  |  1.03    Ca    9.5      2018 01:33  Mg     1.9         [All pertinent recent Imaging/Reports reviewed]         *****A S S E S S M E N T   A N D   P L A N :    47M with Parkinson's disease, HTN, and HLD with 3 vessel CAD and severe LV dysfunction.  cath results discussed with CTS, unclear if LAD target is sufficient for grafting  planned to repeat echo; will consider MR-viability  results of tests will be reviewed with CTS  very limited options for PCI  cont lopressor  ACE will be needed long term but hold for now for possible cabg  statin.  cont Parkinson's meds  cont tele  remains pain free, if no plan for cabg would likely DC with med tx for CAD    __________________________  ZOE Escamilla D.O.
- Patient seen and examined.  - In summary, patient is a 47y year old man who presented with CP.   - Today, patient is without complaints.         *****MEDICATIONS:    MEDICATIONS  (STANDING):  aspirin enteric coated 81 milliGRAM(s) Oral daily  buPROPion XL . 150 milliGRAM(s) Oral daily  carbidopa/levodopa/entacapone 25/100/200 1 Tablet(s) Oral three times a day  heparin  Injectable 5000 Unit(s) SubCutaneous every 12 hours  metoprolol     tartrate 12.5 milliGRAM(s) Oral two times a day  simvastatin 40 milliGRAM(s) Oral at bedtime    MEDICATIONS  (PRN):             ***** REVIEW OF SYSTEM:  GEN: no fever, no chills, no pain  RESP: no SOB, no cough, no sputum  CVS: no chest pain, no palpitations, no edema  GI: no abdominal pain, no nausea, no vomiting, no constipation, no diarrhea  : no dysuria, no frequency  NEURO: no headache, no dizziness  PSYCH: no depression, not anxious  Derm : no itching, no rash         ***** VITAL SIGNS:    T(F): 98 (18 @ 04:35), Max: 98.6 (18 @ 20:44)  HR: 76 (18 @ 04:35) (75 - 82)  BP: 126/85 (18 @ 04:35) (126/85 - 137/86)  RR: 17 (18 @ 04:35) (17 - 18)  SpO2: 97% (18 @ 04:35) (97% - 100%)  Wt(kg): --  ,   I&O's Summary    2018 07:01  -  2018 07:00  --------------------------------------------------------  IN: 580 mL / OUT: 1000 mL / NET: -420 mL                 *****PHYSICAL EXAM:  GEN: A&O X 3 , NAD , comfortable  HEENT: NCAT, EOMI, MMM, no icterus  NECK: Supple, No JVD  CVS: S1S2 , regular , No M/R/G appreciated  PULM: CTA B/L,  no W/R/R appreciated  ABD.: soft. non tender, non distended,  bowel sounds present  Extrem: intact pulses , no edema noted  Derm: No rash or ecchymosis noted  PSYCH: normal mood, no depression, not anxious         *****LAB AND IMAGIN.1   5.8   )-----------( 201      ( 2018 06:37 )             39.3               -    141  |  104  |  19  ----------------------------<  82  3.8   |  23  |  1.10    Ca    9.4      2018 06:37                         Urinalysis Basic - ( 2018 04:56 )    Color: Yellow / Appearance: Clear / SG: >1.030 / pH: x  Gluc: x / Ketone: Moderate  / Bili: Negative / Urobili: Negative   Blood: x / Protein: 30 mg/dL / Nitrite: Negative   Leuk Esterase: Negative / RBC: 0-2 /HPF / WBC 0-2 /HPF   Sq Epi: x / Non Sq Epi: Occasional /HPF / Bacteria: x        [All pertinent recent Imaging/Reports reviewed]         *****A S S E S S M E N T   A N D   P L A N :  47M with Parkinson's disease, HTN, and HLD with 3 vessel CAD and severe LV dysfunction.  cath results discussed with CTS, unclear if LAD target is sufficient for grafting  planned to repeat echo; will consider MR-viability  results of tests will be reviewed with CTS  very limited options for PCI  cont lopressor  ACE will be needed long term but hold for now for possible cabg  statin.  cont Parkinson's meds  cont tele      __________________________  ZOE Escamilla D.O.
- Patient seen and examined.  - In summary, patient is a 47y year old man who presented with CP.   - Today, patient is without complaints.         *****MEDICATIONS:    MEDICATIONS  (STANDING):  aspirin enteric coated 81 milliGRAM(s) Oral daily  buPROPion XL . 150 milliGRAM(s) Oral daily  carbidopa/levodopa/entacapone 25/100/200 1 Tablet(s) Oral three times a day  metoprolol     tartrate 12.5 milliGRAM(s) Oral two times a day  simvastatin 40 milliGRAM(s) Oral at bedtime    MEDICATIONS  (PRN):           ***** REVIEW OF SYSTEM:  GEN: no fever, no chills, no pain  RESP: no SOB, no cough, no sputum  CVS: no chest pain, no palpitations, no edema  GI: no abdominal pain, no nausea, no vomiting, no constipation, no diarrhea  : no dysuria, no frequency  NEURO: no headache, no dizziness  PSYCH: no depression, not anxious  Derm : no itching, no rash         ***** VITAL SIGNS:  T(F): 98 (01-27-18 @ 05:00), Max: 98.4 (01-26-18 @ 11:30)  HR: 74 (01-27-18 @ 05:00) (74 - 106)  BP: 115/79 (01-27-18 @ 05:00) (114/72 - 148/94)  RR: 18 (01-27-18 @ 05:00) (18 - 18)  SpO2: 100% (01-27-18 @ 05:00) (96% - 100%)  Wt(kg): --  ,   I&O's Summary    26 Jan 2018 07:01  -  27 Jan 2018 07:00  --------------------------------------------------------  IN: 290 mL / OUT: 600 mL / NET: -310 mL             *****PHYSICAL EXAM:  GEN: A&O X 3 , NAD , comfortable  HEENT: NCAT, EOMI, MMM, no icterus  NECK: Supple, No JVD  CVS: S1S2 , regular , No M/R/G appreciated  PULM: CTA B/L,  no W/R/R appreciated  ABD.: soft. non tender, non distended,  bowel sounds present  Extrem: intact pulses , no edema noted  Derm: No rash or ecchymosis noted  PSYCH: normal mood, no depression, not anxious         *****LAB AND IMAGING:                        15.3   8.1   )-----------( 198      ( 27 Jan 2018 04:56 )             42.5               01-27    138  |  100  |  14  ----------------------------<  88  3.9   |  24  |  1.16    Ca    9.5      27 Jan 2018 04:56                         Urinalysis Basic - ( 27 Jan 2018 04:56 )    Color: Yellow / Appearance: Clear / SG: >1.030 / pH: x  Gluc: x / Ketone: Moderate  / Bili: Negative / Urobili: Negative   Blood: x / Protein: 30 mg/dL / Nitrite: Negative   Leuk Esterase: Negative / RBC: 0-2 /HPF / WBC 0-2 /HPF   Sq Epi: x / Non Sq Epi: Occasional /HPF / Bacteria: x      [All pertinent recent Imaging/Reports reviewed]         *****A S S E S S M E N T   A N D   P L A N :  47M with Parkinson's disease, HTN, and HLD with 3 vessel CAD and severe LV dysfunction.  cath discussed with CTS, unclear if LAD target is sufficient to put pt through CABG  planned to repeat echo, possible MR-viability  very limited options for PCI  cont lopressor  ACE will be needed long term but hold for now for possible cabg  statin.  cont Parkinson's meds  tele      __________________________  ZOE Escamilla D.O.
Cath films reviewed by multiple CT surgeons.  Assessment is that targets are not sufficient for cabg.  Risk of surgery likely to outweigh benefits.  Films reviewed and vessels are not amenable for PCI.  Pt with no CP since transfer to [facility].  LVEF 35-37% on echo, cath.  Only remaining option is for medical management.  Pt can be DC home to f/u Dr Ayala next week.

## 2018-01-29 NOTE — DISCHARGE NOTE ADULT - HOSPITAL COURSE
47 year old  male with  pmhx of Parkinson's disease and HLD who presented with Chest Pain. Pt was evaluated for surgical intervention.   Assessment is that targets are not sufficient for cabg.  Risk of surgery likely to outweigh benefits. Vessels are not amenable for PCI. Pt with no CP since admission  to [facility].  LVEF 35-37% on echo. Only remaining option is for medical management.  Pt therefore  be DC home for medial management. Pt instructed  to f/u Dr Ayala early next week. 47 year old  male with  pmhx of Parkinson's disease and HLD who presented with Chest Pain. Pt was evaluated for surgical intervention.   Assessment is that targets are not sufficient for cabg.  Risk of surgery likely to outweigh benefits. Vessels are not amenable for PCI. Pt with no CP since admission  to [facility].  LVEF 35-37% on echo/cath	. Only remaining option is for medical management.  Pt therefore  be DC home for medial management. Pt instructed  to f/u Dr Ayala early next week.

## 2018-01-30 ENCOUNTER — INPATIENT (INPATIENT)
Facility: HOSPITAL | Age: 48
LOS: 0 days | Discharge: LEFT BEFORE DISCHARGE | DRG: 281 | End: 2018-01-30
Attending: INTERNAL MEDICINE | Admitting: INTERNAL MEDICINE
Payer: MEDICAID

## 2018-01-30 VITALS
SYSTOLIC BLOOD PRESSURE: 136 MMHG | RESPIRATION RATE: 15 BRPM | OXYGEN SATURATION: 99 % | WEIGHT: 160.06 LBS | HEIGHT: 66 IN | DIASTOLIC BLOOD PRESSURE: 92 MMHG | HEART RATE: 85 BPM | TEMPERATURE: 98 F

## 2018-01-30 DIAGNOSIS — I24.9 ACUTE ISCHEMIC HEART DISEASE, UNSPECIFIED: ICD-10-CM

## 2018-01-30 DIAGNOSIS — E78.00 PURE HYPERCHOLESTEROLEMIA, UNSPECIFIED: ICD-10-CM

## 2018-01-30 DIAGNOSIS — G20 PARKINSON'S DISEASE: ICD-10-CM

## 2018-01-30 DIAGNOSIS — Z29.9 ENCOUNTER FOR PROPHYLACTIC MEASURES, UNSPECIFIED: ICD-10-CM

## 2018-01-30 LAB
ALBUMIN SERPL ELPH-MCNC: 4 G/DL — SIGNIFICANT CHANGE UP (ref 3.5–5)
ALP SERPL-CCNC: 69 U/L — SIGNIFICANT CHANGE UP (ref 40–120)
ALT FLD-CCNC: 72 U/L DA — HIGH (ref 10–60)
ANION GAP SERPL CALC-SCNC: 6 MMOL/L — SIGNIFICANT CHANGE UP (ref 5–17)
ANION GAP SERPL CALC-SCNC: 6 MMOL/L — SIGNIFICANT CHANGE UP (ref 5–17)
APTT BLD: 31.1 SEC — SIGNIFICANT CHANGE UP (ref 27.5–37.4)
AST SERPL-CCNC: 61 U/L — HIGH (ref 10–40)
BASOPHILS # BLD AUTO: 0.1 K/UL — SIGNIFICANT CHANGE UP (ref 0–0.2)
BASOPHILS NFR BLD AUTO: 1.7 % — SIGNIFICANT CHANGE UP (ref 0–2)
BILIRUB SERPL-MCNC: 0.6 MG/DL — SIGNIFICANT CHANGE UP (ref 0.2–1.2)
BUN SERPL-MCNC: 13 MG/DL — SIGNIFICANT CHANGE UP (ref 7–18)
BUN SERPL-MCNC: 14 MG/DL — SIGNIFICANT CHANGE UP (ref 7–18)
CALCIUM SERPL-MCNC: 8.7 MG/DL — SIGNIFICANT CHANGE UP (ref 8.4–10.5)
CALCIUM SERPL-MCNC: 9 MG/DL — SIGNIFICANT CHANGE UP (ref 8.4–10.5)
CHLORIDE SERPL-SCNC: 102 MMOL/L — SIGNIFICANT CHANGE UP (ref 96–108)
CHLORIDE SERPL-SCNC: 103 MMOL/L — SIGNIFICANT CHANGE UP (ref 96–108)
CK MB BLD-MCNC: 2.3 % — SIGNIFICANT CHANGE UP (ref 0–3.5)
CK MB BLD-MCNC: 2.3 % — SIGNIFICANT CHANGE UP (ref 0–3.5)
CK MB CFR SERPL CALC: 1.6 NG/ML — SIGNIFICANT CHANGE UP (ref 0–3.6)
CK MB CFR SERPL CALC: 1.9 NG/ML — SIGNIFICANT CHANGE UP (ref 0–3.6)
CK SERPL-CCNC: 143 U/L — SIGNIFICANT CHANGE UP (ref 35–232)
CK SERPL-CCNC: 70 U/L — SIGNIFICANT CHANGE UP (ref 35–232)
CK SERPL-CCNC: 81 U/L — SIGNIFICANT CHANGE UP (ref 35–232)
CO2 SERPL-SCNC: 25 MMOL/L — SIGNIFICANT CHANGE UP (ref 22–31)
CO2 SERPL-SCNC: 30 MMOL/L — SIGNIFICANT CHANGE UP (ref 22–31)
CREAT SERPL-MCNC: 1.01 MG/DL — SIGNIFICANT CHANGE UP (ref 0.5–1.3)
CREAT SERPL-MCNC: 1.05 MG/DL — SIGNIFICANT CHANGE UP (ref 0.5–1.3)
EOSINOPHIL # BLD AUTO: 0.1 K/UL — SIGNIFICANT CHANGE UP (ref 0–0.5)
EOSINOPHIL NFR BLD AUTO: 1.7 % — SIGNIFICANT CHANGE UP (ref 0–6)
GLUCOSE SERPL-MCNC: 104 MG/DL — HIGH (ref 70–99)
GLUCOSE SERPL-MCNC: 88 MG/DL — SIGNIFICANT CHANGE UP (ref 70–99)
HCT VFR BLD CALC: 41.7 % — SIGNIFICANT CHANGE UP (ref 39–50)
HGB BLD-MCNC: 14 G/DL — SIGNIFICANT CHANGE UP (ref 13–17)
INR BLD: 1.11 RATIO — SIGNIFICANT CHANGE UP (ref 0.88–1.16)
LYMPHOCYTES # BLD AUTO: 1.1 K/UL — SIGNIFICANT CHANGE UP (ref 1–3.3)
LYMPHOCYTES # BLD AUTO: 24.6 % — SIGNIFICANT CHANGE UP (ref 13–44)
MAGNESIUM SERPL-MCNC: 2.2 MG/DL — SIGNIFICANT CHANGE UP (ref 1.6–2.6)
MCHC RBC-ENTMCNC: 29.3 PG — SIGNIFICANT CHANGE UP (ref 27–34)
MCHC RBC-ENTMCNC: 33.6 GM/DL — SIGNIFICANT CHANGE UP (ref 32–36)
MCV RBC AUTO: 87.2 FL — SIGNIFICANT CHANGE UP (ref 80–100)
MONOCYTES # BLD AUTO: 0.5 K/UL — SIGNIFICANT CHANGE UP (ref 0–0.9)
MONOCYTES NFR BLD AUTO: 11.1 % — SIGNIFICANT CHANGE UP (ref 2–14)
NEUTROPHILS # BLD AUTO: 2.7 K/UL — SIGNIFICANT CHANGE UP (ref 1.8–7.4)
NEUTROPHILS NFR BLD AUTO: 60.9 % — SIGNIFICANT CHANGE UP (ref 43–77)
NT-PROBNP SERPL-SCNC: 654 PG/ML — HIGH (ref 0–125)
PHOSPHATE SERPL-MCNC: 3.3 MG/DL — SIGNIFICANT CHANGE UP (ref 2.5–4.5)
PLATELET # BLD AUTO: 208 K/UL — SIGNIFICANT CHANGE UP (ref 150–400)
POTASSIUM SERPL-MCNC: 4.1 MMOL/L — SIGNIFICANT CHANGE UP (ref 3.5–5.3)
POTASSIUM SERPL-MCNC: 5.8 MMOL/L — HIGH (ref 3.5–5.3)
POTASSIUM SERPL-SCNC: 4.1 MMOL/L — SIGNIFICANT CHANGE UP (ref 3.5–5.3)
POTASSIUM SERPL-SCNC: 5.8 MMOL/L — HIGH (ref 3.5–5.3)
PROT SERPL-MCNC: 7.8 G/DL — SIGNIFICANT CHANGE UP (ref 6–8.3)
PROTHROM AB SERPL-ACNC: 12.1 SEC — SIGNIFICANT CHANGE UP (ref 9.8–12.7)
RBC # BLD: 4.78 M/UL — SIGNIFICANT CHANGE UP (ref 4.2–5.8)
RBC # FLD: 11.4 % — SIGNIFICANT CHANGE UP (ref 10.3–14.5)
SODIUM SERPL-SCNC: 134 MMOL/L — LOW (ref 135–145)
SODIUM SERPL-SCNC: 138 MMOL/L — SIGNIFICANT CHANGE UP (ref 135–145)
TROPONIN I SERPL-MCNC: 0.07 NG/ML — HIGH (ref 0–0.04)
TSH SERPL-MCNC: 0.64 UU/ML — SIGNIFICANT CHANGE UP (ref 0.34–4.82)
WBC # BLD: 4.5 K/UL — SIGNIFICANT CHANGE UP (ref 3.8–10.5)
WBC # FLD AUTO: 4.5 K/UL — SIGNIFICANT CHANGE UP (ref 3.8–10.5)

## 2018-01-30 PROCEDURE — 99285 EMERGENCY DEPT VISIT HI MDM: CPT | Mod: 25

## 2018-01-30 PROCEDURE — 71045 X-RAY EXAM CHEST 1 VIEW: CPT | Mod: 26

## 2018-01-30 RX ORDER — SIMVASTATIN 20 MG/1
40 TABLET, FILM COATED ORAL AT BEDTIME
Qty: 0 | Refills: 0 | Status: DISCONTINUED | OUTPATIENT
Start: 2018-01-30 | End: 2018-01-30

## 2018-01-30 RX ORDER — CARBIDOPA, LEVODOPA, AND ENTACAPONE 50; 200; 200 MG/1; MG/1; MG/1
1 TABLET, FILM COATED ORAL
Qty: 0 | Refills: 0 | Status: DISCONTINUED | OUTPATIENT
Start: 2018-01-30 | End: 2018-01-30

## 2018-01-30 RX ORDER — ASPIRIN/CALCIUM CARB/MAGNESIUM 324 MG
81 TABLET ORAL DAILY
Qty: 0 | Refills: 0 | Status: DISCONTINUED | OUTPATIENT
Start: 2018-01-31 | End: 2018-01-30

## 2018-01-30 RX ORDER — ACETAMINOPHEN 500 MG
650 TABLET ORAL ONCE
Qty: 0 | Refills: 0 | Status: COMPLETED | OUTPATIENT
Start: 2018-01-30 | End: 2018-01-30

## 2018-01-30 RX ORDER — CARBIDOPA, LEVODOPA, AND ENTACAPONE 50; 200; 200 MG/1; MG/1; MG/1
0 TABLET, FILM COATED ORAL
Qty: 0 | Refills: 0 | COMMUNITY

## 2018-01-30 RX ORDER — BUPROPION HYDROCHLORIDE 150 MG/1
150 TABLET, EXTENDED RELEASE ORAL DAILY
Qty: 0 | Refills: 0 | Status: DISCONTINUED | OUTPATIENT
Start: 2018-01-30 | End: 2018-01-30

## 2018-01-30 RX ORDER — ASPIRIN/CALCIUM CARB/MAGNESIUM 324 MG
81 TABLET ORAL ONCE
Qty: 0 | Refills: 0 | Status: COMPLETED | OUTPATIENT
Start: 2018-01-30 | End: 2018-01-30

## 2018-01-30 RX ORDER — CARBIDOPA AND LEVODOPA 25; 100 MG/1; MG/1
1 TABLET ORAL
Qty: 0 | Refills: 0 | Status: DISCONTINUED | OUTPATIENT
Start: 2018-01-30 | End: 2018-01-30

## 2018-01-30 RX ORDER — METOPROLOL TARTRATE 50 MG
25 TABLET ORAL
Qty: 0 | Refills: 0 | Status: DISCONTINUED | OUTPATIENT
Start: 2018-01-30 | End: 2018-01-30

## 2018-01-30 RX ORDER — CARBIDOPA AND LEVODOPA 25; 100 MG/1; MG/1
1 TABLET ORAL DAILY
Qty: 0 | Refills: 0 | Status: DISCONTINUED | OUTPATIENT
Start: 2018-01-30 | End: 2018-01-30

## 2018-01-30 RX ORDER — LISINOPRIL 2.5 MG/1
2.5 TABLET ORAL DAILY
Qty: 0 | Refills: 0 | Status: DISCONTINUED | OUTPATIENT
Start: 2018-01-30 | End: 2018-01-30

## 2018-01-30 RX ADMIN — CARBIDOPA AND LEVODOPA 1 TABLET(S): 25; 100 TABLET ORAL at 15:26

## 2018-01-30 RX ADMIN — CARBIDOPA, LEVODOPA, AND ENTACAPONE 1 TABLET(S): 50; 200; 200 TABLET, FILM COATED ORAL at 22:38

## 2018-01-30 RX ADMIN — Medication 650 MILLIGRAM(S): at 06:46

## 2018-01-30 RX ADMIN — Medication 650 MILLIGRAM(S): at 05:39

## 2018-01-30 RX ADMIN — Medication 25 MILLIGRAM(S): at 22:37

## 2018-01-30 RX ADMIN — Medication 81 MILLIGRAM(S): at 15:30

## 2018-01-30 RX ADMIN — CARBIDOPA, LEVODOPA, AND ENTACAPONE 1 TABLET(S): 50; 200; 200 TABLET, FILM COATED ORAL at 15:26

## 2018-01-30 RX ADMIN — SIMVASTATIN 40 MILLIGRAM(S): 20 TABLET, FILM COATED ORAL at 22:40

## 2018-01-30 NOTE — ED ADULT NURSE NOTE - ED STAT RN HANDOFF DETAILS
Patient remains hemodynamically stable and in no acute distress, upgraded to TELE after 0700 am. Endorsed to CHRISTIAN Coreas. Patient remains hemodynamically stable and in no acute distress, upgraded to TELE after 0700 am. Endorsed to CHRISTIAN Coreas.  Patient remains in ER awaiting for a Tele bed, asymptomatic , verbalizes no complaints and in no acute distress. Endorsed to CHRISTIAN Urias.

## 2018-01-30 NOTE — H&P ADULT - HISTORY OF PRESENT ILLNESS
47 years old male from home lives with sister, walks with cane, with PMH of Parkinson's disease, HLD, HFrEF(EF 35-37%), and CAD(s/p cardiac cath in [place] 4 days ago, LAD and % stenosis, no stent placed) came in with c/o left sided chest pain radiating to the back of the neck started this morning. Pt was discharged from [facility] yesterday, and according to cardio Dr. Escamilla's note there patient is not a candidate of CABG, risk of surgery likely to outweigh benefits and coronary vessels are not amenable for PCI. Pt was discharged with aspirin, betablocker, and lisinopril with outpt f/u with Dr. Ayala. However the same nature of pain recurred this AM so he decided to come to ED. Pain is on and off, and last CP lasted about 1.5hrs.    In ED, VS stable, EKG NSR no significant change from last one, troponins x2 similar to the baseline(0.067, 0.071). Currently pt is asymptomatic. Cardio Dr. Sharma was consulted and will discuss with Dr. Escamilla in [place] for further management. Admitted to telemetry for NSTEMI requiring monitoring.

## 2018-01-30 NOTE — CONSULT NOTE ADULT - SUBJECTIVE AND OBJECTIVE BOX
CHIEF COMPLAINT:Patient is a 47y old  Male who presents with a chief complaint of chest pain    HPI:      PAST MEDICAL & SURGICAL HISTORY:  Parkinsons disease  Anxiety  Hypercholesterolemia  No significant past surgical history      MEDICATIONS  (STANDING):  aspirin  chewable 81 milliGRAM(s) Oral once  buPROPion XL . 150 milliGRAM(s) Oral daily  carbidopa/levodopa  25/100 1 Tablet(s) Oral daily  carbidopa/levodopa/entacapone 25/100/200 1 Tablet(s) Oral <User Schedule>  lisinopril 2.5 milliGRAM(s) Oral daily  metoprolol     tartrate 25 milliGRAM(s) Oral two times a day  simvastatin 40 milliGRAM(s) Oral at bedtime    MEDICATIONS  (PRN):      FAMILY HISTORY:      SOCIAL HISTORY:    [ ] Non-smoker  [ ] Smoker  [ ] Alcohol    Allergies    No Known Drug Allergies  Seafood (Blisters)    Intolerances    	    REVIEW OF SYSTEMS:  CONSTITUTIONAL: No fever, weight loss, or fatigue  EYES: No eye pain, visual disturbances, or discharge  ENT:  No difficulty hearing, tinnitus, vertigo; No sinus or throat pain  NECK: No pain or stiffness  RESPIRATORY: No cough, wheezing, chills or hemoptysis; No Shortness of Breath  CARDIOVASCULAR: No chest pain, palpitations, passing out, dizziness, or leg swelling  GASTROINTESTINAL: No abdominal or epigastric pain. No nausea, vomiting, or hematemesis; No diarrhea or constipation. No melena or hematochezia.  GENITOURINARY: No dysuria, frequency, hematuria, or incontinence  NEUROLOGICAL: No headaches, memory loss, loss of strength, numbness, or tremors  SKIN: No itching, burning, rashes, or lesions   LYMPH Nodes: No enlarged glands  ENDOCRINE: No heat or cold intolerance; No hair loss  MUSCULOSKELETAL: No joint pain or swelling; No muscle, back, or extremity pain  PSYCHIATRIC: No depression, anxiety, mood swings, or difficulty sleeping  HEME/LYMPH: No easy bruising, or bleeding gums  ALLERGY AND IMMUNOLOGIC: No hives or eczema	    [ ] All others negative	  [ ] Unable to obtain    PHYSICAL EXAM:  T(C): 36.7 (01-30-18 @ 11:12), Max: 36.9 (01-30-18 @ 06:12)  HR: 68 (01-30-18 @ 11:12) (68 - 88)  BP: 101/68 (01-30-18 @ 11:12) (101/68 - 139/90)  RR: 16 (01-30-18 @ 11:12) (15 - 18)  SpO2: 98% (01-30-18 @ 11:12) (98% - 99%)  Wt(kg): --  I&O's Summary      Appearance: Normal	  HEENT:   Normal oral mucosa, PERRL, EOMI	  Lymphatic: No lymphadenopathy  Cardiovascular: Normal S1 S2, No JVD, No murmurs, No edema  Respiratory: Lungs clear to auscultation	  Psychiatry: A & O x 3, Mood & affect appropriate  Gastrointestinal:  Soft, Non-tender, + BS	  Skin: No rashes, No ecchymoses, No cyanosis	  Neurologic: Non-focal  Extremities: Normal range of motion, No clubbing, cyanosis or edema  Vascular: Peripheral pulses palpable 2+ bilaterally    TELEMETRY: 	    ECG:  	  RADIOLOGY:  OTHER: 	  	  LABS:	 	    CARDIAC MARKERS:  CARDIAC MARKERS ( 30 Jan 2018 05:49 )  0.067 ng/mL / x     / 143 U/L / x     / x                                  14.0   4.5   )-----------( 208      ( 30 Jan 2018 05:49 )             41.7     01-30    134<L>  |  103  |  14  ----------------------------<  88  5.8<H>   |  25  |  1.01    Ca    9.0      30 Jan 2018 05:49  Mg     1.9     01-29    TPro  7.8  /  Alb  4.0  /  TBili  0.6  /  DBili  x   /  AST  61<H>  /  ALT  72<H>  /  AlkPhos  69  01-30    proBNP:   Lipid Profile:   HgA1c:   TSH:   PT/INR - ( 30 Jan 2018 05:49 )   PT: 12.1 sec;   INR: 1.11 ratio         PTT - ( 30 Jan 2018 05:49 )  PTT:31.1 sec    PREVIOUS DIAGNOSTIC TESTING:      < from: Transthoracic Echocardiogram (01.25.18 @ 07:25) >  1. Normal mitral valve. Trace mitral regurgitation.  2. Probably trileaflet aortic valve is not well seen.  No  aortic valve regurgitation seen.  3. Normal aortic root.  4. Normal left atrium.  5. Moderate left ventricular enlargement. Fibrous and  muscular LV bands are noted passing the LV cavity in  transverse direction. This is a normal variant.  6. Moderate segmental left ventricular systolic dysfunction  (EF 37% by biplane). Apical hypokinesis and mid  anteroseptal and inferoseptal wall hypokinesis  7. Grade II diastolic dysfunction.  8. Normal right atrium.  9. Normal rightventricular size and systolic function  (TAPSE 1.7 cm).  10. RA Pressure is 8 mm Hg.  11. RV systolic pressure is normal at  21 mm Hg.  12. Normal tricuspid valve. Trace tricuspid regurgitation.  13. Pulmonic valve not well seen. No pulmonic insufficiency  is noted.  14. No pericardial effusion.    < from: Nuclear Stress Test-Pharmacologic (01.25.18 @ 03:11) >  IMPRESSIONS:Abnormal Study  * Negative ECG evidence of ischemia after IV of Lexiscan.  * Review of raw data shows: The study is of good technical  quality.  * The left ventricle was mildly dilated LV at baseline.  There is a medium sized, severe defect in anterior wall  that is reversible consistent with ischemia.There is a  small, severe defect in apical wall that is fixed  consistent with myocardial infarction.There is a small,  mild to moderate defect in basal inferior wall that is  reversible consistent with  ischemia.  * Dilated LV with global hypokinesis and akinetic apex,  EF=30%.      < from: Cardiac Cath Lab - Adult (01.26.18 @ 14:51) >  LM:   --  LM: Normal.  LAD:   --  LAD:There was a 100 % stenosis. There is some distal filling by  collaterals.  CX:   --  Circumflex: Angiography showed minor luminal irregularities with  no flow limiting lesions.  RCA:   --  RCA: Angiography showed diffuse moderate to severe  atherosclerosis.  --  RPL1: There was a 100 % stenosis.  COMPLICATIONS: There were no complications.  DIAGNOSTIC IMPRESSIONS: There is significant double vessel coronary artery  disease.  DIAGNOSTIC RECOMMENDATIONS: Consultation with a cardiac surgeon will be  obtained for surgical opinion.    < end of copied text > CHIEF COMPLAINT:Patient is a 47y old  Male who presents with a chief complaint of chest pain    HPI:pt with known hx of cad s/p cath with 2 vessels disease, now with recurrent chest pain      PAST MEDICAL & SURGICAL HISTORY:  Parkinsons disease  Anxiety  Hypercholesterolemia  No significant past surgical history      MEDICATIONS  (STANDING):  aspirin  chewable 81 milliGRAM(s) Oral once  buPROPion XL . 150 milliGRAM(s) Oral daily  carbidopa/levodopa  25/100 1 Tablet(s) Oral daily  carbidopa/levodopa/entacapone 25/100/200 1 Tablet(s) Oral <User Schedule>  lisinopril 2.5 milliGRAM(s) Oral daily  metoprolol     tartrate 25 milliGRAM(s) Oral two times a day  simvastatin 40 milliGRAM(s) Oral at bedtime    MEDICATIONS  (PRN):      FAMILY HISTORY:      SOCIAL HISTORY:    [ ] Non-smoker  [ ] Smoker  [ ] Alcohol    Allergies    No Known Drug Allergies  Seafood (Blisters)    Intolerances    	    REVIEW OF SYSTEMS:  CONSTITUTIONAL: No fever, weight loss, or fatigue  EYES: No eye pain, visual disturbances, or discharge  ENT:  No difficulty hearing, tinnitus, vertigo; No sinus or throat pain  NECK: No pain or stiffness  RESPIRATORY: No cough, wheezing, chills or hemoptysis; No Shortness of Breath  CARDIOVASCULAR: No chest pain, palpitations, passing out, dizziness, or leg swelling  GASTROINTESTINAL: No abdominal or epigastric pain. No nausea, vomiting, or hematemesis; No diarrhea or constipation. No melena or hematochezia.  GENITOURINARY: No dysuria, frequency, hematuria, or incontinence  NEUROLOGICAL: No headaches, memory loss, loss of strength, numbness, or tremors  SKIN: No itching, burning, rashes, or lesions   LYMPH Nodes: No enlarged glands  ENDOCRINE: No heat or cold intolerance; No hair loss  MUSCULOSKELETAL: No joint pain or swelling; No muscle, back, or extremity pain  PSYCHIATRIC: No depression, anxiety, mood swings, or difficulty sleeping  HEME/LYMPH: No easy bruising, or bleeding gums  ALLERGY AND IMMUNOLOGIC: No hives or eczema	    [ ] All others negative	  [ ] Unable to obtain    PHYSICAL EXAM:  T(C): 36.7 (01-30-18 @ 11:12), Max: 36.9 (01-30-18 @ 06:12)  HR: 68 (01-30-18 @ 11:12) (68 - 88)  BP: 101/68 (01-30-18 @ 11:12) (101/68 - 139/90)  RR: 16 (01-30-18 @ 11:12) (15 - 18)  SpO2: 98% (01-30-18 @ 11:12) (98% - 99%)  Wt(kg): --  I&O's Summary      Appearance: Normal	  HEENT:   Normal oral mucosa, PERRL, EOMI	  Lymphatic: No lymphadenopathy  Cardiovascular: Normal S1 S2, No JVD, No murmurs, No edema  Respiratory: Lungs clear to auscultation	  Psychiatry: A & O x 3, Mood & affect appropriate  Gastrointestinal:  Soft, Non-tender, + BS	  Skin: No rashes, No ecchymoses, No cyanosis	  Neurologic: Non-focal  Extremities: Normal range of motion, No clubbing, cyanosis or edema  Vascular: Peripheral pulses palpable 2+ bilaterally    TELEMETRY: 	    ECG:  	  RADIOLOGY:  OTHER: 	  	  LABS:	 	    CARDIAC MARKERS:  CARDIAC MARKERS ( 30 Jan 2018 05:49 )  0.067 ng/mL / x     / 143 U/L / x     / x                                  14.0   4.5   )-----------( 208      ( 30 Jan 2018 05:49 )             41.7     01-30    134<L>  |  103  |  14  ----------------------------<  88  5.8<H>   |  25  |  1.01    Ca    9.0      30 Jan 2018 05:49  Mg     1.9     01-29    TPro  7.8  /  Alb  4.0  /  TBili  0.6  /  DBili  x   /  AST  61<H>  /  ALT  72<H>  /  AlkPhos  69  01-30    proBNP:   Lipid Profile:   HgA1c:   TSH:   PT/INR - ( 30 Jan 2018 05:49 )   PT: 12.1 sec;   INR: 1.11 ratio         PTT - ( 30 Jan 2018 05:49 )  PTT:31.1 sec    PREVIOUS DIAGNOSTIC TESTING:      < from: Transthoracic Echocardiogram (01.25.18 @ 07:25) >  1. Normal mitral valve. Trace mitral regurgitation.  2. Probably trileaflet aortic valve is not well seen.  No  aortic valve regurgitation seen.  3. Normal aortic root.  4. Normal left atrium.  5. Moderate left ventricular enlargement. Fibrous and  muscular LV bands are noted passing the LV cavity in  transverse direction. This is a normal variant.  6. Moderate segmental left ventricular systolic dysfunction  (EF 37% by biplane). Apical hypokinesis and mid  anteroseptal and inferoseptal wall hypokinesis  7. Grade II diastolic dysfunction.  8. Normal right atrium.  9. Normal rightventricular size and systolic function  (TAPSE 1.7 cm).  10. RA Pressure is 8 mm Hg.  11. RV systolic pressure is normal at  21 mm Hg.  12. Normal tricuspid valve. Trace tricuspid regurgitation.  13. Pulmonic valve not well seen. No pulmonic insufficiency  is noted.  14. No pericardial effusion.    < from: Nuclear Stress Test-Pharmacologic (01.25.18 @ 03:11) >  IMPRESSIONS:Abnormal Study  * Negative ECG evidence of ischemia after IV of Lexiscan.  * Review of raw data shows: The study is of good technical  quality.  * The left ventricle was mildly dilated LV at baseline.  There is a medium sized, severe defect in anterior wall  that is reversible consistent with ischemia.There is a  small, severe defect in apical wall that is fixed  consistent with myocardial infarction.There is a small,  mild to moderate defect in basal inferior wall that is  reversible consistent with  ischemia.  * Dilated LV with global hypokinesis and akinetic apex,  EF=30%.      < from: Cardiac Cath Lab - Adult (01.26.18 @ 14:51) >  LM:   --  LM: Normal.  LAD:   --  LAD:There was a 100 % stenosis. There is some distal filling by  collaterals.  CX:   --  Circumflex: Angiography showed minor luminal irregularities with  no flow limiting lesions.  RCA:   --  RCA: Angiography showed diffuse moderate to severe  atherosclerosis.  --  RPL1: There was a 100 % stenosis.  COMPLICATIONS: There were no complications.  DIAGNOSTIC IMPRESSIONS: There is significant double vessel coronary artery  disease.  DIAGNOSTIC RECOMMENDATIONS: Consultation with a cardiac surgeon will be  obtained for surgical opinion.    < end of copied text >

## 2018-01-30 NOTE — ED PROVIDER NOTE - CHPI ED SYMPTOMS NEG
no fever, no chills, no shortness of breath, no cough, no chest pain, no palpitations,  no nausea, no vomiting, no diarrhea, no abd pain , no numbness, no tingling, no weakness

## 2018-01-30 NOTE — ED ADULT NURSE NOTE - ED STAT RN HANDOFF DETAILS 2
report given to CHRISTIAN Fernandez in holding area in stable condition for continuation of care. pt a&ox3, on tele box H. 20G right hand. admitted for ACS.

## 2018-01-30 NOTE — PROGRESS NOTE ADULT - SUBJECTIVE AND OBJECTIVE BOX
Admission note   Cheif complaint  neck pain   HPI    Pt was discharged from  [facility] where he underwent cardiac cath for evaluation of ACS/ MI on Jan 26 2018  Cardiac cath showed stenosis of  % as well as one of the  branches of  % stenosis with  some collaterals were present. Stent was not placed at the time of evaluation  since vessels was not amenable. Pt was then seen by CT surgeon who suggested conservative management because risk of surgery  outweighed benefit. Pt came to ER [facility] today AM with c/o posterior neck pain and b/l posterior shoulders pain. Pain comes suddenly and goes and lasts for 30 min at a time. No chest pain or SOB. Pt describes this pain to be the same as at the day of admission to [facility] when he was diagnosed with MI and sent to [facility]. Pt is a poor historian. Case was discussed with the sister over the phone who confirmed this history.   PMH   Parkinsons disease  Anxiety  Hypercholesterolemia  No significant past surgical history  Medx   aspirin  chewable 81 milliGRAM(s) Oral once  buPROPion XL . 150 milliGRAM(s) Oral daily  carbidopa/levodopa  25/100 1 Tablet(s) Oral daily  carbidopa/levodopa/entacapone 25/100/200 1 Tablet(s) Oral <User Schedule>  lisinopril 2.5 milliGRAM(s) Oral daily  metoprolol     tartrate 25 milliGRAM(s) Oral two times a day  simvastatin 40 milliGRAM(s) Oral at bedtime  Stalevo 100 mg    Katjarcheriees  NKDA     SH   single  lives with his sister  on SSI    ROS   REVIEW OF SYSTEMS:  CONSTITUTIONAL: No fever, weight loss, or fatigue  EYES: No eye pain, visual disturbances, or discharge  ENT:  No difficulty hearing, tinnitus, vertigo; No sinus or throat pain  NECK: No pain or stiffness  RESPIRATORY: No cough, wheezing, chills or hemoptysis; No Shortness of Breath  CARDIOVASCULAR: No chest pain, palpitations, passing out, dizziness, or leg swelling  GASTROINTESTINAL: No abdominal or epigastric pain. No nausea, vomiting, or hematemesis; No diarrhea or constipation. No melena or hematochezia.  GENITOURINARY: No dysuria, frequency, hematuria, or incontinence  NEUROLOGICAL: No headaches, memory loss, loss of strength, numbness, or tremors  SKIN: No itching, burning, rashes, or lesions   LYMPH Nodes: No enlarged glands  ENDOCRINE: No heat or cold intolerance; No hair loss  MUSCULOSKELETAL: No joint pain or swelling; No muscle, back, or extremity pain  PSYCHIATRIC: No depression, anxiety, mood swings, or difficulty sleeping  HEME/LYMPH: No easy bruising, or bleeding gums  ALLERGY AND IMMUNOLOGIC: No hives or eczema	  VS  Vital Signs Last 24 Hrs  T(C): 36.7 (30 Jan 2018 11:12), Max: 36.9 (30 Jan 2018 06:12)  T(F): 98 (30 Jan 2018 11:12), Max: 98.4 (30 Jan 2018 06:12)  HR: 68 (30 Jan 2018 11:12) (68 - 85)  BP: 101/68 (30 Jan 2018 11:12) (101/68 - 139/90)  BP(mean): --  RR: 16 (30 Jan 2018 11:12) (15 - 16)  SpO2: 98% (30 Jan 2018 11:12) (98% - 99%)   PE   Labs   Current medx:   Imaging   Imp/plan Admission note   Cheif complaint  neck pain   HPI    Pt was discharged from  [facility] where he underwent cardiac cath for evaluation of ACS/ MI on Jan 26 2018  Cardiac cath showed stenosis of  % as well as one of the  branches of  % stenosis with  some collaterals were present. Stent was not placed at the time of evaluation  since vessels was not amenable. Pt was then seen by CT surgeon who suggested conservative management because risk of surgery  outweighed benefit. Pt came to ER [facility] today AM with c/o posterior neck pain and b/l posterior shoulders pain. Pain comes suddenly and goes and lasts for 30 min at a time. No chest pain or SOB. Pt describes this pain to be the same as at the day of admission to [facility] when he was diagnosed with MI and sent to [facility]. Pt is a poor historian. Case was discussed with the sister over the phone who confirmed this history.   PMH   Parkinsons disease  Anxiety  Hypercholesterolemia  No significant past surgical history  Medx   aspirin  chewable 81 milliGRAM(s) Oral once  buPROPion XL . 150 milliGRAM(s) Oral daily  carbidopa/levodopa  25/100 1 Tablet(s) Oral daily  carbidopa/levodopa/entacapone 25/100/200 1 Tablet(s) Oral <User Schedule>  lisinopril 2.5 milliGRAM(s) Oral daily  metoprolol     tartrate 25 milliGRAM(s) Oral two times a day  simvastatin 40 milliGRAM(s) Oral at bedtime  Stalevo 100 mg    Katjarcheriees  NKDA     SH   single  lives with his sister  on SSI    ROS   REVIEW OF SYSTEMS:  CONSTITUTIONAL: No fever, weight loss, or fatigue  EYES: No eye pain, visual disturbances, or discharge  ENT:  No difficulty hearing, tinnitus, vertigo; No sinus or throat pain  NECK: No pain or stiffness  RESPIRATORY: No cough, wheezing, chills or hemoptysis; No Shortness of Breath  CARDIOVASCULAR: No chest pain, palpitations, passing out, dizziness, or leg swelling  GASTROINTESTINAL: No abdominal or epigastric pain. No nausea, vomiting, or hematemesis; No diarrhea or constipation. No melena or hematochezia.  GENITOURINARY: No dysuria, frequency, hematuria, or incontinence  NEUROLOGICAL: No headaches, memory loss, loss of strength, numbness, or tremors  SKIN: No itching, burning, rashes, or lesions   LYMPH Nodes: No enlarged glands  ENDOCRINE: No heat or cold intolerance; No hair loss  MUSCULOSKELETAL: No joint pain or swelling; No muscle, back, or extremity pain  PSYCHIATRIC: No depression, anxiety, mood swings, or difficulty sleeping  HEME/LYMPH: No easy bruising, or bleeding gums  ALLERGY AND IMMUNOLOGIC: No hives or eczema	  VS  Vital Signs Last 24 Hrs  T(C): 36.7 (30 Jan 2018 11:12), Max: 36.9 (30 Jan 2018 06:12)  T(F): 98 (30 Jan 2018 11:12), Max: 98.4 (30 Jan 2018 06:12)  HR: 68 (30 Jan 2018 11:12) (68 - 85)  BP: 101/68 (30 Jan 2018 11:12) (101/68 - 139/90)  BP(mean): --  RR: 16 (30 Jan 2018 11:12) (15 - 16)  SpO2: 98% (30 Jan 2018 11:12) (98% - 99%)   PE   Card: Murmur not appreciated.  Extra heart sounds-none. S1 normal, S2 (physiologic split).  PMI normal.  VenoVasc: JVP upper normal, legs with no edema.  Leg distal pulses normal, capillary perfusion normal.  Resp: Breathing unlabored.  Auscultation air entry normal. Inspiration normal. Expiration normal. Chest wall- nontender; normal shape.     UResp: Sinuses nontender; pharynx normal.  Nutrition: Nourishment normal; no significant obesity; average height.   Oral: Mouth w/o new lesions.   Abd-Pelv: Liver size normal. Abdomen nontender. Abdomen palpation without masses.  Bowel sounds normal. Pelvis within normal.    MentalH-Cogni: Calm. A & O x3.    Neur-Xiang-Sens: axial rigidity,   OphthOtol: Orbits normal, EOMI, external ears normal.  Verteb-Skel: Kyphosis non-significant.  No CVA tenderness. Joints normal passive/active ROM & appear w/o acute pathology.  Derm: Face without lesions. Skin w/o new lesions.   HN-Subcutan: Conjunctiva & lips normal pink.  Neck without abnormality. Lymphadenopathy not appreciated.    Gen Appearance: No apparent health distress.  Chronic, well appearance (appropriate for stated age; average neatness).    Access via body: IV site clean    Labs   CARDIAC MARKERS:  CARDIAC MARKERS ( 30 Jan 2018 05:49 )  0.067 ng/mL / x     / 143 U/L / x     / x                                  14.0   4.5   )-----------( 208      ( 30 Jan 2018 05:49 )             41.7     01-30    134<L>  |  103  |  14  ----------------------------<  88  5.8<H>   |  25  |  1.01    Ca    9.0      30 Jan 2018 05:49  Mg     1.9     01-29    TPro  7.8  /  Alb  4.0  /  TBili  0.6  /  DBili  x   /  AST  61<H>  /  ALT  72<H>  /  AlkPhos  69  01-30    proBNP:   Lipid Profile:   HgA1c:   TSH:   PT/INR - ( 30 Jan 2018 05:49 )   PT: 12.1 sec;   INR: 1.11 ratio         PTT - ( 30 Jan 2018 05:49 )  PTT:31.1 sec    PREVIOUS DIAGNOSTIC TESTING:      < from: Transthoracic Echocardiogram (01.25.18 @ 07:25) >  1. Normal mitral valve. Trace mitral regurgitation.  2. Probably trileaflet aortic valve is not well seen.  No  aortic valve regurgitation seen.  3. Normal aortic root.  4. Normal left atrium.  5. Moderate left ventricular enlargement. Fibrous and  muscular LV bands are noted passing the LV cavity in  transverse direction. This is a normal variant.  6. Moderate segmental left ventricular systolic dysfunction  (EF 37% by biplane). Apical hypokinesis and mid  anteroseptal and inferoseptal wall hypokinesis  7. Grade II diastolic dysfunction.  8. Normal right atrium.  9. Normal rightventricular size and systolic function  (TAPSE 1.7 cm).  10. RA Pressure is 8 mm Hg.  11. RV systolic pressure is normal at  21 mm Hg.  12. Normal tricuspid valve. Trace tricuspid regurgitation.  13. Pulmonic valve not well seen. No pulmonic insufficiency  is noted.  14. No pericardial effusion.    < from: Nuclear Stress Test-Pharmacologic (01.25.18 @ 03:11) >  IMPRESSIONS:Abnormal Study  * Negative ECG evidence of ischemia after IV of Lexiscan.  * Review of raw data shows: The study is of good technical  quality.  * The left ventricle was mildly dilated LV at baseline.  There is a medium sized, severe defect in anterior wall  that is reversible consistent with ischemia.There is a  small, severe defect in apical wall that is fixed  consistent with myocardial infarction.There is a small,  mild to moderate defect in basal inferior wall that is  reversible consistent with  ischemia.  * Dilated LV with global hypokinesis and akinetic apex,  EF=30%.      < from: Cardiac Cath Lab - Adult (01.26.18 @ 14:51) >  LM:   --  LM: Normal.  LAD:   --  LAD:There was a 100 % stenosis. There is some distal filling by  collaterals.  CX:   --  Circumflex: Angiography showed minor luminal irregularities with  no flow limiting lesions.  RCA:   --  RCA: Angiography showed diffuse moderate to severe  atherosclerosis.  --  RPL1: There was a 100 % stenosis.  COMPLICATIONS: There were no complications.  DIAGNOSTIC IMPRESSIONS: There is significant double vessel coronary artery  disease.  DIAGNOSTIC RECOMMENDATIONS: Consultation with a cardiac surgeon will be  obtained for surgical opinion.    < end of copied text >      Imp/Plan  CAD - atypical presentation, ongoing neck pain on and off. R/o ongoing ischemia  Cardiology copnsult note reviewed  cont tele  b bl, ASA, ACEi, statins  waiting for cardiology to make decision re further care  GI and DVT prophylaxis    Parkinson's disease - cont present treatment

## 2018-01-30 NOTE — H&P ADULT - PROBLEM SELECTOR PLAN 1
-Known coronary artery disease with 100% stenosis on LAD and RCA, with decreased EF. Pt presented with same complaint, intermittent pressure-like chest pain. Known HF with EF of 35%. Had full evaluation in [facility] and was considered not to be a surgical candidate or PCI. Currently medical management is the only option remaining with symptomatic control.  -Monitor telemetry and trend cardiac enzymes  -Dr. Sharma is following  -Continue home med metoprolol, simvastatin, aspirin, and lisinopril

## 2018-01-30 NOTE — ED PROVIDER NOTE - OBJECTIVE STATEMENT
46 y/o M pt with PMHx of HLD, CAD, presents to ED c/o neck pain x this morning. Pt reports he was recently admitted 6 days ago for heart attack, and discharged this morning from [facility]. Pt visits today for  having similar sxs to his recent heart attack. Pt states he awoke in the middle of the night with neck and upper back pain that has been intermittent since onset. Pt denies fever, chills, shortness of breath, cough, chest pain, palpitations, nausea, vomiting, diarrhea, abd pain, numbness, tingling, weakness, or any other complaints. NKDA.  CURRENT MEDICATIONS:: carbidopa dopa, metoprolol, Simvastatin 48 y/o M pt with PMHx of HLD, CAD, presents to ED c/o neck pain x tonight around 0100AM. Pt reports he was recently admitted 6 days ago for heart attack, and discharged this morning from [facility]. Pt visits today for  having similar sxs to his recent heart attack. Pt states he awoke in the middle of the night with neck and upper back pain that has been intermittent since onset. Pt denies fever, chills, shortness of breath, cough, chest pain, palpitations, nausea, vomiting, diarrhea, abd pain, numbness, tingling, weakness, or any other complaints. NKDA.  CURRENT MEDICATIONS:: carbidopa dopa, metoprolol, Simvastatin

## 2018-01-30 NOTE — ED PROVIDER NOTE - PHYSICAL EXAMINATION
No cervical, thoracic or lumbosacral midline bony deformities,  +rotation and flexion-extension of neck and truncal area intact.

## 2018-01-30 NOTE — ED ADULT NURSE NOTE - OBJECTIVE STATEMENT
Pt was diagnosed as  MI in this hospital few days back. Pt developed neck pain since 1;30 am this morning. Pt has a history of Parkinsonism as per Pt's wife.   Pt not in any distress.

## 2018-01-30 NOTE — ED ADULT NURSE REASSESSMENT NOTE - NS ED NURSE REASSESS COMMENT FT1
pt received from night shift. vss. denies chest pain at this time. vss. attached to cardiac monitor. no acute distress noted.

## 2018-01-30 NOTE — H&P ADULT - NSHPPHYSICALEXAM_GEN_ALL_CORE
PHYSICAL EXAM:  GENERAL: NAD, well-groomed, well-developed  HEAD:  Atraumatic, Normocephalic  EYES: Strabysmus with right eye lateral gaze, PERRLA, conjunctiva and sclera clear  ENMT: No tonsillar erythema, exudates, or enlargement; Moist mucous membranes, Good dentition, No lesions  NECK: Supple, No JVD, Normal thyroid  NERVOUS SYSTEM:  Alert & Oriented X3, Good concentration; Motor Strength 5/5 B/L upper and lower extremities  CHEST/LUNG: Clear to percussion bilaterally; No rales, rhonchi, wheezing, or rubs  HEART: Regular rate and rhythm; systolic murmurs, no rubs, or gallops  ABDOMEN: Soft, Nontender, Nondistended; Bowel sounds present  EXTREMITIES:  2+ Peripheral Pulses bilaterally, No clubbing, cyanosis, or edema  LYMPH: No lymphadenopathy noted  SKIN: No rashes or lesions    T(C): 36.7 (01-30-18 @ 11:12), Max: 36.9 (01-30-18 @ 06:12)  HR: 68 (01-30-18 @ 11:12) (68 - 85)  BP: 101/68 (01-30-18 @ 11:12) (101/68 - 139/90)  RR: 16 (01-30-18 @ 11:12) (15 - 16)  SpO2: 98% (01-30-18 @ 11:12) (98% - 99%)  Wt(kg): --  I&O's Summary

## 2018-01-30 NOTE — H&P ADULT - ASSESSMENT
Patient is a 47y old  Male who presents with a chief complaint of Patient is a 47y old  Male who presents with a chief complaint of chest pain, is admitted to telemetry floor for NSTEMI requiring monitoring.

## 2018-01-30 NOTE — H&P ADULT - NSHPLABSRESULTS_GEN_ALL_CORE
LABS:                        14.0   4.5   )-----------( 208      ( 30 Jan 2018 05:49 )             41.7     01-30    138  |  102  |  13  ----------------------------<  104<H>  4.1   |  30  |  1.05    Ca    8.7      30 Jan 2018 12:43  Phos  3.3     01-30  Mg     2.2     01-30    TPro  7.8  /  Alb  4.0  /  TBili  0.6  /  DBili  x   /  AST  61<H>  /  ALT  72<H>  /  AlkPhos  69  01-30    PT/INR - ( 30 Jan 2018 05:49 )   PT: 12.1 sec;   INR: 1.11 ratio         PTT - ( 30 Jan 2018 05:49 )  PTT:31.1 sec      LIVER FUNCTIONS - ( 30 Jan 2018 05:49 )  Alb: 4.0 g/dL / Pro: 7.8 g/dL / ALK PHOS: 69 U/L / ALT: 72 U/L DA / AST: 61 U/L / GGT: x           CARDIAC MARKERS ( 30 Jan 2018 12:43 )  0.071 ng/mL / x     / 70 U/L / x     / 1.6 ng/mL  CARDIAC MARKERS ( 30 Jan 2018 05:49 )  0.067 ng/mL / x     / 143 U/L / x     / x    < from: Transthoracic Echocardiogram (01.25.18 @ 07:25) >    CONCLUSIONS:  1. Normal mitral valve. Trace mitral regurgitation.  2. Probably trileaflet aortic valve is not well seen.  No  aortic valve regurgitation seen.  3. Normal aortic root.  4. Normal left atrium.  5. Moderate left ventricular enlargement. Fibrous and  muscular LV bands are noted passing the LV cavity in  transverse direction. This is a normal variant.  6. Moderate segmental left ventricular systolic dysfunction  (EF 37% by biplane). Apical hypokinesis and mid  anteroseptal and inferoseptal wall hypokinesis  7. Grade II diastolic dysfunction.  8. Normal right atrium.  9. Normal rightventricular size and systolic function  (TAPSE 1.7 cm).  10. RA Pressure is 8 mm Hg.  11. RV systolic pressure is normal at  21 mm Hg.  12. Normal tricuspid valve. Trace tricuspid regurgitation.  13. Pulmonic valve not well seen. No pulmonic insufficiency  is noted.  14. No pericardial effusion.  *** Compared with echocardiogram report of 11/22/2011, no  significant changes noted.  ------------------------------------------------------------------------  Confirmed on  1/26/2018 - 09:16:45 by Everett Fong MD    < from: Nuclear Stress Test-Pharmacologic (01.25.18 @ 03:11) >    IMPRESSIONS:Abnormal Study  * Negative ECG evidence of ischemia after IV of Lexiscan.  * Review of raw data shows: The study is of good technical  quality.  * The left ventricle was mildly dilated LV at baseline.  There is a medium sized, severe defect in anterior wall  that is reversible consistent with ischemia.There is a  small, severe defect in apical wall that is fixed  consistent with myocardial infarction.There is a small,  mild to moderate defect in basal inferior wall that is  reversible consistent with  ischemia.  * Dilated LV with global hypokinesis and akinetic apex,  EF=30%.  < from: Cardiac Cath Lab - Adult (01.26.18 @ 14:51) >    CONTRAST GIVEN: Omnipaque 73 ml.  VENTRICLES: EF estimated was 35 %.  CORONARY VESSELS: The coronary circulation is co-dominant.  LM:   --  LM: Normal.  LAD:   --  LAD:There was a 100 % stenosis. There is some distal filling by  collaterals.  CX:   --  Circumflex: Angiography showed minor luminal irregularities with  no flow limiting lesions.  RCA:   --  RCA: Angiography showed diffuse moderate to severe  atherosclerosis.  --  RPL1: There was a 100 % stenosis.  COMPLICATIONS: There were no complications.  DIAGNOSTIC IMPRESSIONS: There is significant double vessel coronary artery  disease.  DIAGNOSTIC RECOMMENDATIONS: Consultation with a cardiac surgeon will be  obtained for surgical opinion.  Prepared and signed by  Dhaval Escamilla DO

## 2018-01-31 ENCOUNTER — TRANSCRIPTION ENCOUNTER (OUTPATIENT)
Age: 48
End: 2018-01-31

## 2018-01-31 VITALS
RESPIRATION RATE: 16 BRPM | SYSTOLIC BLOOD PRESSURE: 110 MMHG | DIASTOLIC BLOOD PRESSURE: 59 MMHG | HEART RATE: 70 BPM | OXYGEN SATURATION: 100 % | TEMPERATURE: 98 F

## 2018-01-31 LAB
ANION GAP SERPL CALC-SCNC: 7 MMOL/L — SIGNIFICANT CHANGE UP (ref 5–17)
BASOPHILS # BLD AUTO: 0.1 K/UL — SIGNIFICANT CHANGE UP (ref 0–0.2)
BASOPHILS NFR BLD AUTO: 1 % — SIGNIFICANT CHANGE UP (ref 0–2)
BUN SERPL-MCNC: 14 MG/DL — SIGNIFICANT CHANGE UP (ref 7–18)
CALCIUM SERPL-MCNC: 9.4 MG/DL — SIGNIFICANT CHANGE UP (ref 8.4–10.5)
CHLORIDE SERPL-SCNC: 102 MMOL/L — SIGNIFICANT CHANGE UP (ref 96–108)
CHOLEST SERPL-MCNC: 209 MG/DL — HIGH (ref 10–199)
CO2 SERPL-SCNC: 26 MMOL/L — SIGNIFICANT CHANGE UP (ref 22–31)
CREAT SERPL-MCNC: 0.99 MG/DL — SIGNIFICANT CHANGE UP (ref 0.5–1.3)
EOSINOPHIL # BLD AUTO: 0.1 K/UL — SIGNIFICANT CHANGE UP (ref 0–0.5)
EOSINOPHIL NFR BLD AUTO: 2.2 % — SIGNIFICANT CHANGE UP (ref 0–6)
GLUCOSE SERPL-MCNC: 75 MG/DL — SIGNIFICANT CHANGE UP (ref 70–99)
HCT VFR BLD CALC: 44.8 % — SIGNIFICANT CHANGE UP (ref 39–50)
HDLC SERPL-MCNC: 83 MG/DL — SIGNIFICANT CHANGE UP (ref 40–125)
HGB BLD-MCNC: 14.4 G/DL — SIGNIFICANT CHANGE UP (ref 13–17)
HIV 1+2 AB+HIV1 P24 AG SERPL QL IA: SIGNIFICANT CHANGE UP
LIPID PNL WITH DIRECT LDL SERPL: 112 MG/DL — SIGNIFICANT CHANGE UP
LYMPHOCYTES # BLD AUTO: 1.7 K/UL — SIGNIFICANT CHANGE UP (ref 1–3.3)
LYMPHOCYTES # BLD AUTO: 30.7 % — SIGNIFICANT CHANGE UP (ref 13–44)
MAGNESIUM SERPL-MCNC: 2.2 MG/DL — SIGNIFICANT CHANGE UP (ref 1.6–2.6)
MCHC RBC-ENTMCNC: 27.8 PG — SIGNIFICANT CHANGE UP (ref 27–34)
MCHC RBC-ENTMCNC: 32.2 GM/DL — SIGNIFICANT CHANGE UP (ref 32–36)
MCV RBC AUTO: 86.4 FL — SIGNIFICANT CHANGE UP (ref 80–100)
MONOCYTES # BLD AUTO: 0.6 K/UL — SIGNIFICANT CHANGE UP (ref 0–0.9)
MONOCYTES NFR BLD AUTO: 10.8 % — SIGNIFICANT CHANGE UP (ref 2–14)
NEUTROPHILS # BLD AUTO: 3.1 K/UL — SIGNIFICANT CHANGE UP (ref 1.8–7.4)
NEUTROPHILS NFR BLD AUTO: 55.4 % — SIGNIFICANT CHANGE UP (ref 43–77)
PHOSPHATE SERPL-MCNC: 3 MG/DL — SIGNIFICANT CHANGE UP (ref 2.5–4.5)
PLATELET # BLD AUTO: 226 K/UL — SIGNIFICANT CHANGE UP (ref 150–400)
POTASSIUM SERPL-MCNC: 4.2 MMOL/L — SIGNIFICANT CHANGE UP (ref 3.5–5.3)
POTASSIUM SERPL-SCNC: 4.2 MMOL/L — SIGNIFICANT CHANGE UP (ref 3.5–5.3)
RBC # BLD: 5.19 M/UL — SIGNIFICANT CHANGE UP (ref 4.2–5.8)
RBC # FLD: 11.5 % — SIGNIFICANT CHANGE UP (ref 10.3–14.5)
SODIUM SERPL-SCNC: 135 MMOL/L — SIGNIFICANT CHANGE UP (ref 135–145)
TOTAL CHOLESTEROL/HDL RATIO MEASUREMENT: 2.5 RATIO — LOW (ref 3.4–9.6)
TRIGL SERPL-MCNC: 68 MG/DL — SIGNIFICANT CHANGE UP (ref 10–149)
WBC # BLD: 5.5 K/UL — SIGNIFICANT CHANGE UP (ref 3.8–10.5)
WBC # FLD AUTO: 5.5 K/UL — SIGNIFICANT CHANGE UP (ref 3.8–10.5)

## 2018-01-31 PROCEDURE — 82550 ASSAY OF CK (CPK): CPT

## 2018-01-31 PROCEDURE — 82553 CREATINE MB FRACTION: CPT

## 2018-01-31 PROCEDURE — 80053 COMPREHEN METABOLIC PANEL: CPT

## 2018-01-31 PROCEDURE — 85610 PROTHROMBIN TIME: CPT

## 2018-01-31 PROCEDURE — 83735 ASSAY OF MAGNESIUM: CPT

## 2018-01-31 PROCEDURE — 83880 ASSAY OF NATRIURETIC PEPTIDE: CPT

## 2018-01-31 PROCEDURE — 85027 COMPLETE CBC AUTOMATED: CPT

## 2018-01-31 PROCEDURE — 85730 THROMBOPLASTIN TIME PARTIAL: CPT

## 2018-01-31 PROCEDURE — 84484 ASSAY OF TROPONIN QUANT: CPT

## 2018-01-31 PROCEDURE — 84100 ASSAY OF PHOSPHORUS: CPT

## 2018-01-31 PROCEDURE — 84443 ASSAY THYROID STIM HORMONE: CPT

## 2018-01-31 PROCEDURE — 71045 X-RAY EXAM CHEST 1 VIEW: CPT

## 2018-01-31 PROCEDURE — 80061 LIPID PANEL: CPT

## 2018-01-31 PROCEDURE — 93005 ELECTROCARDIOGRAM TRACING: CPT

## 2018-01-31 PROCEDURE — 87389 HIV-1 AG W/HIV-1&-2 AB AG IA: CPT

## 2018-01-31 PROCEDURE — 80048 BASIC METABOLIC PNL TOTAL CA: CPT

## 2018-01-31 PROCEDURE — 99285 EMERGENCY DEPT VISIT HI MDM: CPT | Mod: 25

## 2018-01-31 RX ORDER — RANOLAZINE 500 MG/1
500 TABLET, FILM COATED, EXTENDED RELEASE ORAL
Qty: 0 | Refills: 0 | Status: DISCONTINUED | OUTPATIENT
Start: 2018-01-31 | End: 2018-01-30

## 2018-01-31 RX ORDER — RANOLAZINE 500 MG/1
1 TABLET, FILM COATED, EXTENDED RELEASE ORAL
Qty: 60 | Refills: 0 | OUTPATIENT
Start: 2018-01-31 | End: 2018-03-01

## 2018-01-31 RX ORDER — ISOSORBIDE MONONITRATE 60 MG/1
30 TABLET, EXTENDED RELEASE ORAL DAILY
Qty: 0 | Refills: 0 | Status: DISCONTINUED | OUTPATIENT
Start: 2018-01-31 | End: 2018-01-30

## 2018-01-31 RX ORDER — METOPROLOL TARTRATE 50 MG
1 TABLET ORAL
Qty: 60 | Refills: 0
Start: 2018-01-31 | End: 2018-03-01

## 2018-01-31 RX ORDER — CLOPIDOGREL BISULFATE 75 MG/1
1 TABLET, FILM COATED ORAL
Qty: 30 | Refills: 0 | OUTPATIENT
Start: 2018-01-31 | End: 2018-03-01

## 2018-01-31 RX ORDER — CLOPIDOGREL BISULFATE 75 MG/1
75 TABLET, FILM COATED ORAL DAILY
Qty: 0 | Refills: 0 | Status: DISCONTINUED | OUTPATIENT
Start: 2018-01-31 | End: 2018-01-30

## 2018-01-31 RX ORDER — SIMVASTATIN 20 MG/1
1 TABLET, FILM COATED ORAL
Qty: 30 | Refills: 0 | OUTPATIENT
Start: 2018-01-31 | End: 2018-03-01

## 2018-01-31 RX ORDER — LISINOPRIL 2.5 MG/1
1 TABLET ORAL
Qty: 30 | Refills: 0 | OUTPATIENT
Start: 2018-01-31 | End: 2018-03-01

## 2018-01-31 RX ORDER — ISOSORBIDE MONONITRATE 60 MG/1
1 TABLET, EXTENDED RELEASE ORAL
Qty: 30 | Refills: 0 | OUTPATIENT
Start: 2018-01-31 | End: 2018-03-01

## 2018-01-31 RX ORDER — ASPIRIN/CALCIUM CARB/MAGNESIUM 324 MG
1 TABLET ORAL
Qty: 30 | Refills: 0
Start: 2018-01-31 | End: 2018-03-01

## 2018-01-31 RX ADMIN — SIMVASTATIN 40 MILLIGRAM(S): 20 TABLET, FILM COATED ORAL at 21:49

## 2018-01-31 RX ADMIN — LISINOPRIL 2.5 MILLIGRAM(S): 2.5 TABLET ORAL at 06:03

## 2018-01-31 RX ADMIN — Medication 25 MILLIGRAM(S): at 06:03

## 2018-01-31 RX ADMIN — CARBIDOPA, LEVODOPA, AND ENTACAPONE 1 TABLET(S): 50; 200; 200 TABLET, FILM COATED ORAL at 18:38

## 2018-01-31 RX ADMIN — CARBIDOPA, LEVODOPA, AND ENTACAPONE 1 TABLET(S): 50; 200; 200 TABLET, FILM COATED ORAL at 15:56

## 2018-01-31 RX ADMIN — Medication 81 MILLIGRAM(S): at 12:47

## 2018-01-31 RX ADMIN — CARBIDOPA AND LEVODOPA 1 TABLET(S): 25; 100 TABLET ORAL at 12:47

## 2018-01-31 RX ADMIN — Medication 25 MILLIGRAM(S): at 17:39

## 2018-01-31 RX ADMIN — BUPROPION HYDROCHLORIDE 150 MILLIGRAM(S): 150 TABLET, EXTENDED RELEASE ORAL at 12:47

## 2018-01-31 RX ADMIN — RANOLAZINE 500 MILLIGRAM(S): 500 TABLET, FILM COATED, EXTENDED RELEASE ORAL at 18:38

## 2018-01-31 RX ADMIN — CARBIDOPA, LEVODOPA, AND ENTACAPONE 1 TABLET(S): 50; 200; 200 TABLET, FILM COATED ORAL at 10:55

## 2018-01-31 NOTE — DISCHARGE NOTE ADULT - PLAN OF CARE
resolution continue with medications as mentioned in the list. patient left AMA. please follow up with primary care doctor after leaving facility. risks explained. continue with medications as mentioned in the note

## 2018-01-31 NOTE — DISCHARGE NOTE ADULT - CARE PLAN
Principal Discharge DX:	Acute coronary syndrome  Goal:	resolution  Assessment and plan of treatment:	continue with medications as mentioned in the list. patient left AMA. please follow up with primary care doctor after leaving facility. risks explained.  Secondary Diagnosis:	Parkinsons disease  Assessment and plan of treatment:	continue with medications as mentioned in the note

## 2018-01-31 NOTE — PROGRESS NOTE ADULT - SUBJECTIVE AND OBJECTIVE BOX
CARDIOLOGY     PROGRESS  NOTE   ________________________________________________    CHIEF COMPLAINT:Patient is a 47y old  Male who presents with a chief complaint of Chest pain onset 1:30 am this morning (30 Jan 2018 14:17)  doing better.  	  REVIEW OF SYSTEMS:  CONSTITUTIONAL: No fever, weight loss, or fatigue  EYES: No eye pain, visual disturbances, or discharge  ENT:  No difficulty hearing, tinnitus, vertigo; No sinus or throat pain  NECK: No pain or stiffness  RESPIRATORY: No cough, wheezing, chills or hemoptysis; No Shortness of Breath  CARDIOVASCULAR: + chest pain, palpitations, passing out, dizziness, or leg swelling  GASTROINTESTINAL: No abdominal or epigastric pain. No nausea, vomiting, or hematemesis; No diarrhea or constipation. No melena or hematochezia.  GENITOURINARY: No dysuria, frequency, hematuria, or incontinence  NEUROLOGICAL: No headaches, memory loss, loss of strength, numbness, or tremors  SKIN: No itching, burning, rashes, or lesions   LYMPH Nodes: No enlarged glands  ENDOCRINE: No heat or cold intolerance; No hair loss  MUSCULOSKELETAL: No joint pain or swelling; No muscle, back, or extremity pain  PSYCHIATRIC: No depression, anxiety, mood swings, or difficulty sleeping  HEME/LYMPH: No easy bruising, or bleeding gums  ALLERGY AND IMMUNOLOGIC: No hives or eczema	    [ ] All others negative	  [ ] Unable to obtain    PHYSICAL EXAM:  T(C): 36.4 (01-31-18 @ 15:31), Max: 36.7 (01-31-18 @ 05:07)  HR: 70 (01-31-18 @ 15:31) (68 - 72)  BP: 123/67 (01-31-18 @ 15:31) (105/71 - 132/81)  RR: 16 (01-31-18 @ 15:31) (16 - 18)  SpO2: 100% (01-31-18 @ 15:31) (99% - 100%)  Wt(kg): --  I&O's Summary      Appearance: Normal	  HEENT:   Normal oral mucosa, PERRL, EOMI	  Lymphatic: No lymphadenopathy  Cardiovascular: Normal S1 S2, No JVD, + murmurs, No edema  Respiratory: Lungs clear to auscultation	  Psychiatry: A & O x 3, Mood & affect appropriate  Gastrointestinal:  Soft, Non-tender, + BS	  Skin: No rashes, No ecchymoses, No cyanosis	  Neurologic: Non-focal  Extremities: Normal range of motion, No clubbing, cyanosis or edema  Vascular: Peripheral pulses palpable 2+ bilaterally    MEDICATIONS  (STANDING):  aspirin  chewable 81 milliGRAM(s) Oral daily  buPROPion XL . 150 milliGRAM(s) Oral daily  carbidopa/levodopa  25/100 1 Tablet(s) Oral daily  carbidopa/levodopa/entacapone 25/100/200 1 Tablet(s) Oral <User Schedule>  lisinopril 2.5 milliGRAM(s) Oral daily  metoprolol     tartrate 25 milliGRAM(s) Oral two times a day  simvastatin 40 milliGRAM(s) Oral at bedtime      TELEMETRY: 	    ECG:  	  RADIOLOGY:  OTHER: 	  	  LABS:	 	    CARDIAC MARKERS:  CARDIAC MARKERS ( 30 Jan 2018 19:32 )  0.070 ng/mL / x     / 81 U/L / x     / 1.9 ng/mL  CARDIAC MARKERS ( 30 Jan 2018 12:43 )  0.071 ng/mL / x     / 70 U/L / x     / 1.6 ng/mL  CARDIAC MARKERS ( 30 Jan 2018 05:49 )  0.067 ng/mL / x     / 143 U/L / x     / x                                    14.4   5.5   )-----------( 226      ( 31 Jan 2018 05:57 )             44.8     01-31    135  |  102  |  14  ----------------------------<  75  4.2   |  26  |  0.99    Ca    9.4      31 Jan 2018 05:57  Phos  3.0     01-31  Mg     2.2     01-31    TPro  7.8  /  Alb  4.0  /  TBili  0.6  /  DBili  x   /  AST  61<H>  /  ALT  72<H>  /  AlkPhos  69  01-30    proBNP: Serum Pro-Brain Natriuretic Peptide: 654 pg/mL (01-30 @ 12:43)  Serum Pro-Brain Natriuretic Peptide: 975 pg/mL (01-26 @ 18:23)    Lipid Profile: Cholesterol 209    HDL 83  TG 68  Cholesterol 178  LDL 93  HDL 75  TG 51    HgA1c: Hemoglobin A1C, Whole Blood: 5.6 % (01-26 @ 21:38)    TSH: Thyroid Stimulating Hormone, Serum: 0.64 uU/mL (01-30 @ 12:43)  Thyroid Stimulating Hormone, Serum: 0.35 uIU/mL (01-26 @ 21:35)  Thyroid Stimulating Hormone, Serum: 0.36 uU/mL (01-25 @ 06:33)    PT/INR - ( 30 Jan 2018 05:49 )   PT: 12.1 sec;   INR: 1.11 ratio         PTT - ( 30 Jan 2018 05:49 )  PTT:31.1 sec      Assessment and plan  ---------------------------  pt with known hx of cad.  cath reviewed and discussed with dr coburn in [place] is not a candidate for revascularization via cabg nor stent  will increase medical therapy

## 2018-01-31 NOTE — PROGRESS NOTE ADULT - SUBJECTIVE AND OBJECTIVE BOX
Vital Signs Last 24 Hrs  T(C): 36.4 (31 Jan 2018 07:47), Max: 36.7 (30 Jan 2018 11:12)  T(F): 97.6 (31 Jan 2018 07:47), Max: 98 (30 Jan 2018 11:12)  HR: 68 (31 Jan 2018 07:47) (68 - 71)  BP: 105/71 (31 Jan 2018 07:47) (101/68 - 132/81)  BP(mean): --  RR: 18 (31 Jan 2018 07:47) (12 - 18)  SpO2: 100% (31 Jan 2018 07:47) (98% - 100%)                          14.4   5.5   )-----------( 226      ( 31 Jan 2018 05:57 )             44.8   01-31    135  |  102  |  14  ----------------------------<  75  4.2   |  26  |  0.99    Ca    9.4      31 Jan 2018 05:57  Phos  3.0     01-31  Mg     2.2     01-31    TPro  7.8  /  Alb  4.0  /  TBili  0.6  /  DBili  x   /  AST  61<H>  /  ALT  72<H>  /  AlkPhos  69  01-30  CARDIAC MARKERS ( 30 Jan 2018 19:32 )  0.070 ng/mL / x     / 81 U/L / x     / 1.9 ng/mL  CARDIAC MARKERS ( 30 Jan 2018 12:43 )  0.071 ng/mL / x     / 70 U/L / x     / 1.6 ng/mL  CARDIAC MARKERS ( 30 Jan 2018 05:49 )  0.067 ng/mL / x     / 143 U/L / x     / x Patient was examined at bedside  No complaints    Vital Signs Last 24 Hrs  T(C): 36.4 (31 Jan 2018 07:47), Max: 36.7 (30 Jan 2018 11:12)  T(F): 97.6 (31 Jan 2018 07:47), Max: 98 (30 Jan 2018 11:12)  HR: 68 (31 Jan 2018 07:47) (68 - 71)  BP: 105/71 (31 Jan 2018 07:47) (101/68 - 132/81)  BP(mean): --  RR: 18 (31 Jan 2018 07:47) (12 - 18)  SpO2: 100% (31 Jan 2018 07:47) (98% - 100%)         Gen Appearance: No apparent health distress.    Card: S1  S2   VenoVasc: JVP upper normal, legs with no edema.    Resp: Breathing unlabored.  Auscultation air entry normal.   Abd-Pelv: Liver size normal. Abdomen nontender. Abdomen palpation without masses.  Bowel sounds normal.     Mental status: Calm.   Neur-Xiang-Sens:  Motor exam nonfocal.     Conjunctiva & lips normal pink.    IV site clean                   14.4   5.5   )-----------( 226      ( 31 Jan 2018 05:57 )             44.8   01-31    135  |  102  |  14  ----------------------------<  75  4.2   |  26  |  0.99    Ca    9.4      31 Jan 2018 05:57  Phos  3.0     01-31  Mg     2.2     01-31    TPro  7.8  /  Alb  4.0  /  TBili  0.6  /  DBili  x   /  AST  61<H>  /  ALT  72<H>  /  AlkPhos  69  01-30  CARDIAC MARKERS ( 30 Jan 2018 19:32 )  0.070 ng/mL / x     / 81 U/L / x     / 1.9 ng/mL  CARDIAC MARKERS ( 30 Jan 2018 12:43 )  0.071 ng/mL / x     / 70 U/L / x     / 1.6 ng/mL  CARDIAC MARKERS ( 30 Jan 2018 05:49 )  0.067 ng/mL / x     / 143 U/L / x     / x        MEDICATIONS  (STANDING):  aspirin  chewable 81 milliGRAM(s) Oral daily  buPROPion XL . 150 milliGRAM(s) Oral daily  carbidopa/levodopa  25/100 1 Tablet(s) Oral daily  carbidopa/levodopa/entacapone 25/100/200 1 Tablet(s) Oral <User Schedule>  clopidogrel Tablet 75 milliGRAM(s) Oral daily  isosorbide   mononitrate ER Tablet (IMDUR) 30 milliGRAM(s) Oral daily  lisinopril 2.5 milliGRAM(s) Oral daily  metoprolol     tartrate 25 milliGRAM(s) Oral two times a day  ranolazine 500 milliGRAM(s) Oral two times a day  simvastatin 40 milliGRAM(s) Oral at bedtime    Imp/Plan  CAD - not a candidate for CABG and stent placement as per cardiology consult  Added Plavix by cardiology, as well as Ranexa, Nitrates  telemonitoring  GI and DVT prophylaxis    Parkinson's disease - cont present treatment  d/c home when cleared by cardiology consult for discharge

## 2018-01-31 NOTE — DISCHARGE NOTE ADULT - HOSPITAL COURSE
47 years old male from home lives with sister, walks with cane, with PMH of Parkinson's disease, HLD, HFrEF(EF 35-37%), and CAD(s/p cardiac cath in [place] 4 days ago, LAD and % stenosis, no stent placed) came in with c/o left sided chest pain radiating to the back of the neck started this morning. Pt was discharged from [facility] yesterday, and according to cardio Dr. Escamilla's note there patient is not a candidate of CABG, risk of surgery likely to outweigh benefits and coronary vessels are not amenable for PCI. Pt was discharged with aspirin, betablocker, and lisinopril with outpt f/u with Dr. Ayala. However the same nature of pain recurred this AM so he decided to come to ED. Pain is on and off, and last CP lasted about 1.5hrs.    In ED, VS stable, EKG NSR no significant change from last one, troponins x2 similar to the baseline(0.067, 0.071). Currently pt is asymptomatic. Cardio Dr. Sharma was consulted and will discuss with Dr. Escamilla in [place] for further management. Admitted to telemetry for NSTEMI requiring monitoring.          Conditional discharge. patient left AMA. risks explained.  attending spoke to family. prescriptions sent electronically.

## 2018-01-31 NOTE — DISCHARGE NOTE ADULT - MEDICATION SUMMARY - MEDICATIONS TO TAKE
I will START or STAY ON the medications listed below when I get home from the hospital:    aspirin 81 mg oral delayed release tablet  -- 1 tab(s) by mouth once a day  -- Indication: For CAD    lisinopril 2.5 mg oral tablet  -- 1 tab(s) by mouth once a day  -- Indication: For CAD    isosorbide mononitrate 30 mg oral tablet, extended release  -- 1 tab(s) by mouth once a day  -- Indication: For CAD    ranolazine 500 mg oral tablet, extended release  -- 1 tab(s) by mouth 2 times a day  -- Indication: For CAD    simvastatin 40 mg oral tablet  -- 1 tab(s) by mouth once a day (at bedtime)  home/hospital  -- Indication: For CAD    Stalevo 100 oral tablet  -- orally 3 times a day  -- Indication: For Parkinsons disease    carbidopa-levodopa 25 mg-100 mg oral tablet  -- 1 tab(s) by mouth once a day 7am  -- Indication: For Parkinsons disease    clopidogrel 75 mg oral tablet  -- 1 tab(s) by mouth once a day  -- Indication: For cad    metoprolol tartrate 25 mg oral tablet  -- 1 tab(s) by mouth 2 times a day  hospital   -- It is very important that you take or use this exactly as directed.  Do not skip doses or discontinue unless directed by your doctor.  May cause drowsiness.  Alcohol may intensify this effect.  Use care when operating dangerous machinery.  Some non-prescription drugs may aggravate your condition.  Read all labels carefully.  If a warning appears, check with your doctor before taking.  Take with food or milk.  This drug may impair the ability to drive or operate machinery.  Use care until you become familiar with its effects.    -- Indication: For CAD    buPROPion 150 mg/12 hours oral tablet, extended release  -- 1 tab(s) by mouth 2 times a day  home/hospital  -- Indication: For ,

## 2018-10-14 ENCOUNTER — EMERGENCY (EMERGENCY)
Facility: HOSPITAL | Age: 48
LOS: 1 days | Discharge: HOME | End: 2018-10-14
Attending: EMERGENCY MEDICINE
Payer: MEDICARE

## 2018-10-14 VITALS — HEIGHT: 66 IN | WEIGHT: 134.92 LBS

## 2018-10-14 VITALS
DIASTOLIC BLOOD PRESSURE: 78 MMHG | HEART RATE: 87 BPM | OXYGEN SATURATION: 100 % | TEMPERATURE: 98 F | RESPIRATION RATE: 20 BRPM | SYSTOLIC BLOOD PRESSURE: 122 MMHG

## 2018-10-14 LAB
ALBUMIN SERPL ELPH-MCNC: 4 G/DL — SIGNIFICANT CHANGE UP (ref 3.5–5)
ALP SERPL-CCNC: 83 U/L — SIGNIFICANT CHANGE UP (ref 40–120)
ALT FLD-CCNC: 14 U/L DA — SIGNIFICANT CHANGE UP (ref 10–60)
ANION GAP SERPL CALC-SCNC: 6 MMOL/L — SIGNIFICANT CHANGE UP (ref 5–17)
APPEARANCE UR: CLEAR — SIGNIFICANT CHANGE UP
APTT BLD: 25.5 SEC — LOW (ref 27.5–37.4)
AST SERPL-CCNC: 19 U/L — SIGNIFICANT CHANGE UP (ref 10–40)
BILIRUB SERPL-MCNC: 0.6 MG/DL — SIGNIFICANT CHANGE UP (ref 0.2–1.2)
BILIRUB UR-MCNC: NEGATIVE — SIGNIFICANT CHANGE UP
BUN SERPL-MCNC: 21 MG/DL — HIGH (ref 7–18)
CALCIUM SERPL-MCNC: 9.2 MG/DL — SIGNIFICANT CHANGE UP (ref 8.4–10.5)
CHLORIDE SERPL-SCNC: 99 MMOL/L — SIGNIFICANT CHANGE UP (ref 96–108)
CO2 SERPL-SCNC: 28 MMOL/L — SIGNIFICANT CHANGE UP (ref 22–31)
COLOR SPEC: YELLOW — SIGNIFICANT CHANGE UP
CREAT SERPL-MCNC: 1.21 MG/DL — SIGNIFICANT CHANGE UP (ref 0.5–1.3)
DIFF PNL FLD: ABNORMAL
GLUCOSE SERPL-MCNC: 94 MG/DL — SIGNIFICANT CHANGE UP (ref 70–99)
GLUCOSE UR QL: NEGATIVE — SIGNIFICANT CHANGE UP
HCT VFR BLD CALC: 40.8 % — SIGNIFICANT CHANGE UP (ref 39–50)
HGB BLD-MCNC: 14.1 G/DL — SIGNIFICANT CHANGE UP (ref 13–17)
INR BLD: 1.1 RATIO — SIGNIFICANT CHANGE UP (ref 0.88–1.16)
KETONES UR-MCNC: NEGATIVE — SIGNIFICANT CHANGE UP
LEUKOCYTE ESTERASE UR-ACNC: NEGATIVE — SIGNIFICANT CHANGE UP
MCHC RBC-ENTMCNC: 29.3 PG — SIGNIFICANT CHANGE UP (ref 27–34)
MCHC RBC-ENTMCNC: 34.5 GM/DL — SIGNIFICANT CHANGE UP (ref 32–36)
MCV RBC AUTO: 84.9 FL — SIGNIFICANT CHANGE UP (ref 80–100)
NITRITE UR-MCNC: NEGATIVE — SIGNIFICANT CHANGE UP
NT-PROBNP SERPL-SCNC: 2030 PG/ML — HIGH (ref 0–125)
PH UR: 6 — SIGNIFICANT CHANGE UP (ref 5–8)
PLATELET # BLD AUTO: 220 K/UL — SIGNIFICANT CHANGE UP (ref 150–400)
POTASSIUM SERPL-MCNC: 4.7 MMOL/L — SIGNIFICANT CHANGE UP (ref 3.5–5.3)
POTASSIUM SERPL-SCNC: 4.7 MMOL/L — SIGNIFICANT CHANGE UP (ref 3.5–5.3)
PROT SERPL-MCNC: 7.7 G/DL — SIGNIFICANT CHANGE UP (ref 6–8.3)
PROT UR-MCNC: NEGATIVE — SIGNIFICANT CHANGE UP
PROTHROM AB SERPL-ACNC: 12 SEC — SIGNIFICANT CHANGE UP (ref 9.8–12.7)
RBC # BLD: 4.8 M/UL — SIGNIFICANT CHANGE UP (ref 4.2–5.8)
RBC # FLD: 10.4 % — SIGNIFICANT CHANGE UP (ref 10.3–14.5)
SODIUM SERPL-SCNC: 133 MMOL/L — LOW (ref 135–145)
SP GR SPEC: 1.01 — SIGNIFICANT CHANGE UP (ref 1.01–1.02)
UROBILINOGEN FLD QL: NEGATIVE — SIGNIFICANT CHANGE UP
WBC # BLD: 6.8 K/UL — SIGNIFICANT CHANGE UP (ref 3.8–10.5)
WBC # FLD AUTO: 6.8 K/UL — SIGNIFICANT CHANGE UP (ref 3.8–10.5)

## 2018-10-14 PROCEDURE — 71046 X-RAY EXAM CHEST 2 VIEWS: CPT | Mod: 26

## 2018-10-14 PROCEDURE — 93010 ELECTROCARDIOGRAM REPORT: CPT

## 2018-10-14 PROCEDURE — 99285 EMERGENCY DEPT VISIT HI MDM: CPT

## 2018-10-14 NOTE — ED ADULT NURSE NOTE - OBJECTIVE STATEMENT
RN NEGRITO SY COVERING NOTES: AOX3 ambulates with a cane complaining of lower leg swelling and sob. Patient hx of parkinsons no chest pains

## 2018-10-14 NOTE — ED PROVIDER NOTE - MEDICAL DECISION MAKING DETAILS
Pt w/ urination retention and subjective CHATTERJEE. Will get Labs, CXR, Elmore placement, and reassess. Pt w/ urination retention and subjective CHATTERJEE. Will get Labs, CXR, Rivas placement, and reassess.  rivas placed, drained initially 900cc of concentrated urine, then additional 900 cc of dilute ("lasix") urine. patient feeling his symptoms completely resolved, no more shortness of breath or abdominal pain. requesting discharge. home with urology followup. labs reveal elevated pro-bnp, admission offered for possible fluid overload (although no respiratory distress, clear xray and no crackles on lung exam) but patient requesting discharge.

## 2018-10-14 NOTE — ED PROVIDER NOTE - OBJECTIVE STATEMENT
49 y/o M w/ hx of CABG (on lasix for CHF) c/o several days of difficulty and burning w/ urination x days. Only able urinate small amts at a time cannot . Also noticed increasing leg swelling and CHATTERJEE. Denies CP and any other complaints. NKDA. 49 y/o M w/ hx of CABG (on lasix for CHF) c/o several days of difficulty and burning w/ urination x days. Only able urinate small amts at a time cannot . ronni (caretaker) reports patient standing for hours in bathroom over past few days attempting to urinate.  Also noticed increasing leg swelling and possibly some CHATTERJEE today. Denies CP and any other complaints. NKDA.

## 2018-10-14 NOTE — ED ADULT NURSE NOTE - NSIMPLEMENTINTERV_GEN_ALL_ED
Implemented All Universal Safety Interventions:  [place] to call system. Call bell, personal items and telephone within reach. Instruct patient to call for assistance. Room bathroom lighting operational. Non-slip footwear when patient is off stretcher. Physically safe environment: no spills, clutter or unnecessary equipment. Stretcher in lowest position, wheels locked, appropriate side rails in place.

## 2018-10-14 NOTE — ED STATDOCS - OBJECTIVE STATEMENT
Telemedicine assessment was conducted (using real time 2 way audio-video technology) by Dr. CARLOTA Santillan located at [address] [place] [address]  ++++++++++++++++++++++++  Pertinent patient history and initial plan: Patient experiencing shortness of breath and swelling in the b/l feet. Patient's sister states patient is also having abdominal pain and burning when urinating. Patient's sister says patient's shortness of breath began today and usually exacerbates when patient stands. Patient states burning urination began yesterday. Patient says he also has pain in the abdomen when he is trying to urinate along with slight back pain. Patient denies fever and chills. Patient has a misaligned c1-c2 and a pinched nerve, patient was supposed to undergo surgery in a couple of weeks which was postponed. Telemedicine assessment was conducted (using real time 2 way audio-video technology) by Dr. CARLOTA Santillan located at [address] [place] [id number]  ++++++++++++++++++++++++  Pertinent patient history and initial plan: Patient experiencing shortness of breath and swelling in the b/l feet. Patient's sister states patient is also having abdominal pain and decreased urination. Patient's sister says patient's shortness of breath began today and usually exacerbates when patient exerts. Patient states difficulty urination began Friday. Patient says he also has pain in the abdomen when he is trying to urinate along with slight back pain. Patient denies fever and chills. Patient has a misaligned c1-c2 and a pinched nerve, patient was supposed to undergo surgery in a couple of weeks which was postponed. Because of the decreased urination, family has been encouraging him to drink more fluids. (this may have exacerbated his fluid overload). Plan for labs, urine, ekg, bladder scan, and cxr. Patient seen by me in intake for initial assessment and ordering. Physician on site to follow results and further evaluate and treat patient.

## 2018-10-14 NOTE — ED PROVIDER NOTE - ENMT, MLM
Patient discharged to rooming in status in stable condition.     Airway patent, Nasal mucosa clear. Mouth with normal mucosa. Throat has no vesicles, no oropharyngeal exudates and uvula is midline.

## 2018-10-15 PROCEDURE — 51702 INSERT TEMP BLADDER CATH: CPT

## 2018-10-15 PROCEDURE — 84484 ASSAY OF TROPONIN QUANT: CPT

## 2018-10-15 PROCEDURE — 83880 ASSAY OF NATRIURETIC PEPTIDE: CPT

## 2018-10-15 PROCEDURE — 93005 ELECTROCARDIOGRAM TRACING: CPT

## 2018-10-15 PROCEDURE — 85027 COMPLETE CBC AUTOMATED: CPT

## 2018-10-15 PROCEDURE — 81001 URINALYSIS AUTO W/SCOPE: CPT

## 2018-10-15 PROCEDURE — 87186 SC STD MICRODIL/AGAR DIL: CPT

## 2018-10-15 PROCEDURE — 87086 URINE CULTURE/COLONY COUNT: CPT

## 2018-10-15 PROCEDURE — 99284 EMERGENCY DEPT VISIT MOD MDM: CPT | Mod: 25

## 2018-10-15 PROCEDURE — 80053 COMPREHEN METABOLIC PANEL: CPT

## 2018-10-15 PROCEDURE — 85730 THROMBOPLASTIN TIME PARTIAL: CPT

## 2018-10-15 PROCEDURE — 71046 X-RAY EXAM CHEST 2 VIEWS: CPT

## 2018-10-15 PROCEDURE — 82550 ASSAY OF CK (CPK): CPT

## 2018-10-15 PROCEDURE — 85610 PROTHROMBIN TIME: CPT

## 2018-10-28 ENCOUNTER — EMERGENCY (EMERGENCY)
Facility: HOSPITAL | Age: 48
LOS: 1 days | Discharge: HOME | End: 2018-10-28
Attending: EMERGENCY MEDICINE
Payer: MEDICARE

## 2018-10-28 VITALS
RESPIRATION RATE: 20 BRPM | OXYGEN SATURATION: 100 % | TEMPERATURE: 98 F | DIASTOLIC BLOOD PRESSURE: 73 MMHG | SYSTOLIC BLOOD PRESSURE: 118 MMHG | HEART RATE: 84 BPM

## 2018-10-28 VITALS — WEIGHT: 130.07 LBS | HEIGHT: 66 IN

## 2018-10-28 LAB
ANION GAP SERPL CALC-SCNC: 4 MMOL/L — LOW (ref 5–17)
APTT BLD: 29.2 SEC — SIGNIFICANT CHANGE UP (ref 27.5–37.4)
BASOPHILS # BLD AUTO: 0.1 K/UL — SIGNIFICANT CHANGE UP (ref 0–0.2)
BASOPHILS NFR BLD AUTO: 0.9 % — SIGNIFICANT CHANGE UP (ref 0–2)
BUN SERPL-MCNC: 20 MG/DL — HIGH (ref 7–18)
CALCIUM SERPL-MCNC: 8.7 MG/DL — SIGNIFICANT CHANGE UP (ref 8.4–10.5)
CHLORIDE SERPL-SCNC: 102 MMOL/L — SIGNIFICANT CHANGE UP (ref 96–108)
CO2 SERPL-SCNC: 30 MMOL/L — SIGNIFICANT CHANGE UP (ref 22–31)
CREAT SERPL-MCNC: 0.99 MG/DL — SIGNIFICANT CHANGE UP (ref 0.5–1.3)
EOSINOPHIL # BLD AUTO: 0.2 K/UL — SIGNIFICANT CHANGE UP (ref 0–0.5)
EOSINOPHIL NFR BLD AUTO: 1.8 % — SIGNIFICANT CHANGE UP (ref 0–6)
GLUCOSE SERPL-MCNC: 80 MG/DL — SIGNIFICANT CHANGE UP (ref 70–99)
HCT VFR BLD CALC: 39.3 % — SIGNIFICANT CHANGE UP (ref 39–50)
HGB BLD-MCNC: 13.8 G/DL — SIGNIFICANT CHANGE UP (ref 13–17)
INR BLD: 1.22 RATIO — HIGH (ref 0.88–1.16)
LYMPHOCYTES # BLD AUTO: 1.1 K/UL — SIGNIFICANT CHANGE UP (ref 1–3.3)
LYMPHOCYTES # BLD AUTO: 13.1 % — SIGNIFICANT CHANGE UP (ref 13–44)
MCHC RBC-ENTMCNC: 30.1 PG — SIGNIFICANT CHANGE UP (ref 27–34)
MCHC RBC-ENTMCNC: 35.2 GM/DL — SIGNIFICANT CHANGE UP (ref 32–36)
MCV RBC AUTO: 85.5 FL — SIGNIFICANT CHANGE UP (ref 80–100)
MONOCYTES # BLD AUTO: 0.5 K/UL — SIGNIFICANT CHANGE UP (ref 0–0.9)
MONOCYTES NFR BLD AUTO: 5.8 % — SIGNIFICANT CHANGE UP (ref 2–14)
NEUTROPHILS # BLD AUTO: 6.8 K/UL — SIGNIFICANT CHANGE UP (ref 1.8–7.4)
NEUTROPHILS NFR BLD AUTO: 78.4 % — HIGH (ref 43–77)
PLATELET # BLD AUTO: 235 K/UL — SIGNIFICANT CHANGE UP (ref 150–400)
POTASSIUM SERPL-MCNC: 4.1 MMOL/L — SIGNIFICANT CHANGE UP (ref 3.5–5.3)
POTASSIUM SERPL-SCNC: 4.1 MMOL/L — SIGNIFICANT CHANGE UP (ref 3.5–5.3)
PROTHROM AB SERPL-ACNC: 13.4 SEC — HIGH (ref 9.8–12.7)
RBC # BLD: 4.6 M/UL — SIGNIFICANT CHANGE UP (ref 4.2–5.8)
RBC # FLD: 10.5 % — SIGNIFICANT CHANGE UP (ref 10.3–14.5)
SODIUM SERPL-SCNC: 136 MMOL/L — SIGNIFICANT CHANGE UP (ref 135–145)
WBC # BLD: 8.7 K/UL — SIGNIFICANT CHANGE UP (ref 3.8–10.5)
WBC # FLD AUTO: 8.7 K/UL — SIGNIFICANT CHANGE UP (ref 3.8–10.5)

## 2018-10-28 PROCEDURE — 99284 EMERGENCY DEPT VISIT MOD MDM: CPT

## 2018-10-28 PROCEDURE — 85610 PROTHROMBIN TIME: CPT

## 2018-10-28 PROCEDURE — 85730 THROMBOPLASTIN TIME PARTIAL: CPT

## 2018-10-28 PROCEDURE — 80048 BASIC METABOLIC PNL TOTAL CA: CPT

## 2018-10-28 PROCEDURE — 85027 COMPLETE CBC AUTOMATED: CPT

## 2018-10-28 NOTE — ED PROVIDER NOTE - OBJECTIVE STATEMENT
49 y/o M pt w/ hx of anxiety, HLD, Parkinson's presents c/o blood in Elmore x this morning, 3 AM. Had Elmore inserted 2 weeks ago in this hospital. Pt is only on aspirin; not on Plavix. Pt states that he followed up w/ uro after Elmore placement who put him on Flomax and decided to keep catheter in until Wednesday. Pt and wife became concerned b/c this morning noted blood in Elmore bag and some clots. States that Elmore is flowing well currently. Denies any other complaints. NKDA. 49 y/o M pt w/ hx of anxiety, HLD, Parkinson's presents c/o blood in Elmore x this morning, 3 AM. Had Elmore inserted 2 weeks ago in this hospital. Pt is only on aspirin; not on Plavix. Pt states that he followed up w/ uro after Elmore placement who put him on Flomax and decided to keep catheter in until Wednesday 10/31. Pt and wife became concerned b/c this morning noted blood in Lemore bag and some clots. States that Elmore is flowing well currently. Denies any other complaints. NKDA.

## 2018-10-28 NOTE — ED ADULT NURSE NOTE - NSIMPLEMENTINTERV_GEN_ALL_ED
Implemented All Universal Safety Interventions:  [place] to call system. Call bell, personal items and telephone within reach. Instruct patient to call for assistance. Room bathroom lighting operational. Non-slip footwear when patient is off stretcher. Physically safe environment: no spills, clutter or unnecessary equipment. Stretcher in lowest position, wheels locked, appropriate side rails in place.

## 2018-10-28 NOTE — ED PROVIDER NOTE - MEDICAL DECISION MAKING DETAILS
Patient with transient hematuria in rivas catheter. Normal renal function, no anemia. Has appt with urology in 3 days. f/u with PMD in 1 week. Return to the ED immediately if getting worse, not improving, or if having any new or troubling symptoms.

## 2018-10-30 ENCOUNTER — EMERGENCY (EMERGENCY)
Facility: HOSPITAL | Age: 48
LOS: 1 days | Discharge: HOME | End: 2018-10-30
Attending: EMERGENCY MEDICINE
Payer: MEDICARE

## 2018-10-30 VITALS
RESPIRATION RATE: 16 BRPM | HEART RATE: 68 BPM | HEIGHT: 66 IN | TEMPERATURE: 98 F | DIASTOLIC BLOOD PRESSURE: 79 MMHG | WEIGHT: 130.07 LBS | SYSTOLIC BLOOD PRESSURE: 122 MMHG | OXYGEN SATURATION: 100 %

## 2018-10-30 PROCEDURE — 99283 EMERGENCY DEPT VISIT LOW MDM: CPT | Mod: 25

## 2018-10-30 PROCEDURE — 99283 EMERGENCY DEPT VISIT LOW MDM: CPT

## 2018-10-30 NOTE — ED ADULT TRIAGE NOTE - CHIEF COMPLAINT QUOTE
c/o hematuria with blood clots,patient has a rivas cath attached to urine bag,was here yesterday for same problem as per sister

## 2018-10-30 NOTE — ED PROVIDER NOTE - PROGRESS NOTE DETAILS
on reeval no recurrence of hematuria, rivas draining clear/yellow urine. Pt stable for discharge to f/u with urologist tomorrow.

## 2018-10-30 NOTE — ED ADULT NURSE NOTE - NSIMPLEMENTINTERV_GEN_ALL_ED
Implemented All Universal Safety Interventions:  [place] to call system. Call bell, personal items and telephone within reach. Instruct patient to call for assistance. Room bathroom lighting operational. Non-slip footwear when patient is off stretcher. Physically safe environment: no spills, clutter or unnecessary equipment. Stretcher in lowest position, wheels locked, appropriate side rails in place.

## 2018-10-30 NOTE — ED PROVIDER NOTE - MEDICAL DECISION MAKING DETAILS
47 y/o M, on aspirin, here w/ intermittent hematuria. This is pt's second visit in two days for these symptoms. Currently Elmore has clear urine in tubing. Will continue to observe for recurrence of hematuria.

## 2018-10-30 NOTE — ED PROVIDER NOTE - OBJECTIVE STATEMENT
49 y/o M w/ PMHx of CAD, CHF, Parkinson's, HLD, presents to ED c/o hematuria. Pt has indwelling Elmore for urinary retention. Pt's wife noted blood clots at 2130 today and gave pt a lot of water, but bleeding did not stop prompting ED evaluation. Now, bleeding has cleared. Pt denies any abd pain, chest pain, other source of bleeding today or any other complaints. Pt was here yesterday w/ same symptoms of intermittent hematuria; he had labs done which were unremarkable. Pt w/ f/u appt tomorrow, 10/31, w/ urology.

## 2018-10-31 ENCOUNTER — EMERGENCY (EMERGENCY)
Facility: HOSPITAL | Age: 48
LOS: 1 days | Discharge: HOME | End: 2018-10-31
Attending: EMERGENCY MEDICINE
Payer: MEDICARE

## 2018-10-31 VITALS
RESPIRATION RATE: 18 BRPM | TEMPERATURE: 98 F | DIASTOLIC BLOOD PRESSURE: 76 MMHG | OXYGEN SATURATION: 98 % | HEART RATE: 74 BPM | SYSTOLIC BLOOD PRESSURE: 117 MMHG

## 2018-10-31 VITALS
TEMPERATURE: 97 F | HEART RATE: 86 BPM | DIASTOLIC BLOOD PRESSURE: 89 MMHG | SYSTOLIC BLOOD PRESSURE: 136 MMHG | RESPIRATION RATE: 18 BRPM | OXYGEN SATURATION: 100 %

## 2018-10-31 PROCEDURE — 99283 EMERGENCY DEPT VISIT LOW MDM: CPT

## 2018-10-31 PROCEDURE — 51702 INSERT TEMP BLADDER CATH: CPT

## 2018-10-31 PROCEDURE — 99283 EMERGENCY DEPT VISIT LOW MDM: CPT | Mod: 25

## 2018-10-31 RX ORDER — ACETAMINOPHEN 500 MG
650 TABLET ORAL ONCE
Qty: 0 | Refills: 0 | Status: DISCONTINUED | OUTPATIENT
Start: 2018-10-31 | End: 2018-11-04

## 2018-10-31 NOTE — ED ADULT NURSE NOTE - NSIMPLEMENTINTERV_GEN_ALL_ED
Implemented All Fall with Harm Risk Interventions:  [place] to call system. Call bell, personal items and telephone within reach. Instruct patient to call for assistance. Room bathroom lighting operational. Non-slip footwear when patient is off stretcher. Physically safe environment: no spills, clutter or unnecessary equipment. Stretcher in lowest position, wheels locked, appropriate side rails in place. Provide visual cue, wrist band, yellow gown, etc. Monitor gait and stability. Monitor for mental status changes and reorient to person, place, and time. Review medications for side effects contributing to fall risk. Reinforce activity limits and safety measures with patient and family. Provide visual clues: red socks.

## 2018-10-31 NOTE — ED PROVIDER NOTE - MEDICAL DECISION MAKING DETAILS
Urinary retention unchanged, Elmore removed today. Appears well hydrated, has had no long amount of retention. Resumption of clear flow with Elmore placement. Well appearing. Discharged with need for uro f/u.

## 2018-10-31 NOTE — ED PROVIDER NOTE - PHYSICAL EXAMINATION
Afebrile, hemodynamically stable, saturating well  NAD, well appearing  Head NCAT  MMM  Breathing comfortably on RA  Abd soft, NT, ND, nml BS, no rebound or guarding  AAO  Skin warm, well perfused

## 2018-10-31 NOTE — ED PROVIDER NOTE - OBJECTIVE STATEMENT
48yoM with h/o Parkinson's, HLD, presents with urinary retention. Had [place] x 2 weeks 2/2 BPH retention, removed today in Dr. Rico's office following which had 200mL UOP in very small intermittent dribbles over 2 hours. Since then x a few hours, has had no UOP and sensation of pressure in abdomen/penis. No other symptoms.

## 2018-12-06 ENCOUNTER — EMERGENCY (EMERGENCY)
Facility: HOSPITAL | Age: 48
LOS: 1 days | Discharge: HOME | End: 2018-12-06
Attending: EMERGENCY MEDICINE
Payer: MEDICARE

## 2018-12-06 VITALS
SYSTOLIC BLOOD PRESSURE: 130 MMHG | RESPIRATION RATE: 19 BRPM | TEMPERATURE: 98 F | WEIGHT: 128.09 LBS | OXYGEN SATURATION: 99 % | HEART RATE: 74 BPM | HEIGHT: 66 IN | DIASTOLIC BLOOD PRESSURE: 85 MMHG

## 2018-12-06 LAB
APPEARANCE UR: CLEAR — SIGNIFICANT CHANGE UP
BACTERIA # UR AUTO: ABNORMAL /HPF
BILIRUB UR-MCNC: NEGATIVE — SIGNIFICANT CHANGE UP
COLOR SPEC: YELLOW — SIGNIFICANT CHANGE UP
DIFF PNL FLD: ABNORMAL
EPI CELLS # UR: SIGNIFICANT CHANGE UP /HPF
GLUCOSE UR QL: NEGATIVE — SIGNIFICANT CHANGE UP
KETONES UR-MCNC: NEGATIVE — SIGNIFICANT CHANGE UP
LEUKOCYTE ESTERASE UR-ACNC: NEGATIVE — SIGNIFICANT CHANGE UP
NITRITE UR-MCNC: NEGATIVE — SIGNIFICANT CHANGE UP
PH UR: 6.5 — SIGNIFICANT CHANGE UP (ref 5–8)
PROT UR-MCNC: NEGATIVE — SIGNIFICANT CHANGE UP
RBC CASTS # UR COMP ASSIST: ABNORMAL /HPF (ref 0–2)
SP GR SPEC: 1.01 — SIGNIFICANT CHANGE UP (ref 1.01–1.02)
UROBILINOGEN FLD QL: NEGATIVE — SIGNIFICANT CHANGE UP
WBC UR QL: SIGNIFICANT CHANGE UP /HPF (ref 0–5)

## 2018-12-06 PROCEDURE — 99284 EMERGENCY DEPT VISIT MOD MDM: CPT | Mod: 25

## 2018-12-06 PROCEDURE — 87086 URINE CULTURE/COLONY COUNT: CPT

## 2018-12-06 PROCEDURE — 99283 EMERGENCY DEPT VISIT LOW MDM: CPT | Mod: 25

## 2018-12-06 PROCEDURE — 51702 INSERT TEMP BLADDER CATH: CPT

## 2018-12-06 PROCEDURE — 81001 URINALYSIS AUTO W/SCOPE: CPT

## 2018-12-06 NOTE — ED PROVIDER NOTE - NS ED ROS FT
CONSTITUTIONAL: no fever, no chills   EYES: no visual changes, no eye pain   ENMT: no nasal congestion, no throat pain  CARDIOVASCULAR: no chest pain, no edema, no palpitations   RESPIRATORY: no shortness of breath, no cough   GASTROINTESTINAL: +abdominal pain, no nausea, no vomiting, no diarrhea, no constipation   GENITOURINARY: no dysuria, no frequency, +incontinence   MUSCULOSKELETAL: no joint pains, no myalgias, no back pain   SKIN: no rashes  NEUROLOGICAL: no weakness, no headache, no dizziness, no slurred speech, no syncope   PSYCHIATRIC: no known mental health illness   HEME/LYMPH: no lymphadenopathy      All other ROS negative except as per HPI

## 2018-12-06 NOTE — ED PROVIDER NOTE - PHYSICAL EXAMINATION
GENERAL: wells appearing, no acute distress   HEAD: atraumatic   EYES: EOMI, pink conjunctiva   ENT: moist oral mucosa   CARDIAC: RRR, no edema, distal pulses present   RESPIRATORY: lungs CTAB, no increased work of breathing   GASTROINTESTINAL: +lower abdominal tenderness, no rebound or guarding, bowel sounds presents  GENITOURINARY: no CVA tenderness   MUSCULOSKELETAL: no deformity   NEUROLOGICAL: AAOx3, spontaneous movement of extremities   SKIN: intact   PSYCHIATRIC: cooperative  HEME LYMPH: no lymphadenopathy

## 2018-12-06 NOTE — ED PROVIDER NOTE - MEDICAL DECISION MAKING DETAILS
49 y/o M with acute urinary retention. Will do bladder scan, place Elmore cath, send UA and likely d/c with urology f/u

## 2018-12-06 NOTE — ED PROVIDER NOTE - OBJECTIVE STATEMENT
47 y/o M pt with PMHx of Parkinson's and BPH is coming in with urinary retention since this afternoon. Pt had a Elmore cath in place for 1 month due to previous episodes of urinary retention. He had the Elmore cath removed at urology office yesterday and is now having associated lower abdominal discomfort. No other acute complaints at this time.

## 2018-12-06 NOTE — ED PROVIDER NOTE - PROGRESS NOTE DETAILS
Bladder scan showed 500+ cc post residual urine. UA - no UTI   Elmore cath placed with adequate drainage. Leg bag given to pt. Will f/u with his own urologist. Discussed indications for patient return to ED. Patient and family understood.

## 2018-12-06 NOTE — ED ADULT NURSE NOTE - NSIMPLEMENTINTERV_GEN_ALL_ED
Implemented All Fall Risk Interventions:  [place] to call system. Call bell, personal items and telephone within reach. Instruct patient to call for assistance. Room bathroom lighting operational. Non-slip footwear when patient is off stretcher. Physically safe environment: no spills, clutter or unnecessary equipment. Stretcher in lowest position, wheels locked, appropriate side rails in place. Provide visual cue, wrist band, yellow gown, etc. Monitor gait and stability. Monitor for mental status changes and reorient to person, place, and time. Review medications for side effects contributing to fall risk. Reinforce activity limits and safety measures with patient and family.

## 2018-12-07 LAB
CULTURE RESULTS: NO GROWTH — SIGNIFICANT CHANGE UP
SPECIMEN SOURCE: SIGNIFICANT CHANGE UP

## 2018-12-14 ENCOUNTER — EMERGENCY (EMERGENCY)
Facility: HOSPITAL | Age: 48
LOS: 1 days | Discharge: HOME | End: 2018-12-14
Attending: STUDENT IN AN ORGANIZED HEALTH CARE EDUCATION/TRAINING PROGRAM
Payer: MEDICARE

## 2018-12-14 VITALS
HEIGHT: 62 IN | RESPIRATION RATE: 16 BRPM | HEART RATE: 85 BPM | DIASTOLIC BLOOD PRESSURE: 72 MMHG | OXYGEN SATURATION: 100 % | WEIGHT: 130.07 LBS | SYSTOLIC BLOOD PRESSURE: 117 MMHG | TEMPERATURE: 98 F

## 2018-12-14 DIAGNOSIS — Z95.1 PRESENCE OF AORTOCORONARY BYPASS GRAFT: Chronic | ICD-10-CM

## 2018-12-14 PROCEDURE — 99284 EMERGENCY DEPT VISIT MOD MDM: CPT

## 2018-12-14 RX ORDER — DIPHENHYDRAMINE HCL 50 MG
25 CAPSULE ORAL ONCE
Qty: 0 | Refills: 0 | Status: COMPLETED | OUTPATIENT
Start: 2018-12-14 | End: 2018-12-14

## 2018-12-14 RX ORDER — FAMOTIDINE 10 MG/ML
40 INJECTION INTRAVENOUS ONCE
Qty: 0 | Refills: 0 | Status: COMPLETED | OUTPATIENT
Start: 2018-12-14 | End: 2018-12-14

## 2018-12-14 RX ORDER — FAMOTIDINE 10 MG/ML
1 INJECTION INTRAVENOUS
Qty: 4 | Refills: 0 | OUTPATIENT
Start: 2018-12-14 | End: 2018-12-17

## 2018-12-14 RX ORDER — DIPHENHYDRAMINE HCL 50 MG
1 CAPSULE ORAL
Qty: 12 | Refills: 0 | OUTPATIENT
Start: 2018-12-14 | End: 2018-12-17

## 2018-12-14 RX ADMIN — Medication 25 MILLIGRAM(S): at 19:03

## 2018-12-14 RX ADMIN — FAMOTIDINE 40 MILLIGRAM(S): 10 INJECTION INTRAVENOUS at 19:03

## 2018-12-14 RX ADMIN — Medication 60 MILLIGRAM(S): at 19:03

## 2018-12-14 NOTE — ED PROVIDER NOTE - PHYSICAL EXAMINATION
airways intact, lungs clear  urticaria noted diffusely to the chest back and b/l arm. no involvement of mouth hands or feet. no sloughing.

## 2018-12-14 NOTE — ED ADULT NURSE NOTE - OBJECTIVE STATEMENT
pt from home c/o of generalized body rash after eatting chicken sandwich yesterday pt is alert awake oriented x3 speaks clear full sentences no acute respiratory distress noted rash noted on chest, bilateral back of arms and legs with itchiness

## 2018-12-14 NOTE — ED PROVIDER NOTE - MEDICAL DECISION MAKING DETAILS
pt presenting with rash x 1 days after eating food. No signs of airway involvement, likely allergic reaction. Will treat symptomatically then reassess.

## 2018-12-14 NOTE — ED PROVIDER NOTE - OBJECTIVE STATEMENT
47 y/o M pt with PMHx of parkinson's disease, BPH, s/p CABG, presents to ED c/o rash to the chest, back arms and legs since yesterday. As per sister, pt ordered a grilled chicken sandwich from [employer] yesterday and 1 hour later he noticed redness on his chest and extremities. Pt had took benadryl this morning but with no relief so pt presented to ED for further evaluation. As per sister, pt is allergic to shellfish.

## 2018-12-14 NOTE — ED PROVIDER NOTE - PROGRESS NOTE DETAILS
rash improved. patient medication prescribe. patient derm f./u info given. patient endorsed to f.u pmd

## 2019-01-09 ENCOUNTER — INPATIENT (INPATIENT)
Facility: HOSPITAL | Age: 49
LOS: 0 days | Discharge: HOME | DRG: 683 | End: 2019-01-10
Attending: INTERNAL MEDICINE | Admitting: INTERNAL MEDICINE
Payer: COMMERCIAL

## 2019-01-09 VITALS
RESPIRATION RATE: 18 BRPM | HEIGHT: 66 IN | DIASTOLIC BLOOD PRESSURE: 82 MMHG | OXYGEN SATURATION: 96 % | WEIGHT: 128.09 LBS | SYSTOLIC BLOOD PRESSURE: 120 MMHG | HEART RATE: 98 BPM | TEMPERATURE: 97 F

## 2019-01-09 DIAGNOSIS — R55 SYNCOPE AND COLLAPSE: ICD-10-CM

## 2019-01-09 DIAGNOSIS — Z95.1 PRESENCE OF AORTOCORONARY BYPASS GRAFT: Chronic | ICD-10-CM

## 2019-01-09 DIAGNOSIS — I50.9 HEART FAILURE, UNSPECIFIED: ICD-10-CM

## 2019-01-09 DIAGNOSIS — G20 PARKINSON'S DISEASE: ICD-10-CM

## 2019-01-09 DIAGNOSIS — Z29.9 ENCOUNTER FOR PROPHYLACTIC MEASURES, UNSPECIFIED: ICD-10-CM

## 2019-01-09 DIAGNOSIS — N40.0 BENIGN PROSTATIC HYPERPLASIA WITHOUT LOWER URINARY TRACT SYMPTOMS: ICD-10-CM

## 2019-01-09 DIAGNOSIS — I25.10 ATHEROSCLEROTIC HEART DISEASE OF NATIVE CORONARY ARTERY WITHOUT ANGINA PECTORIS: ICD-10-CM

## 2019-01-09 LAB
ANION GAP SERPL CALC-SCNC: 6 MMOL/L — SIGNIFICANT CHANGE UP (ref 5–17)
BASOPHILS # BLD AUTO: 0.1 K/UL — SIGNIFICANT CHANGE UP (ref 0–0.2)
BASOPHILS NFR BLD AUTO: 0.9 % — SIGNIFICANT CHANGE UP (ref 0–2)
BUN SERPL-MCNC: 15 MG/DL — SIGNIFICANT CHANGE UP (ref 7–18)
CALCIUM SERPL-MCNC: 8 MG/DL — LOW (ref 8.4–10.5)
CHLORIDE SERPL-SCNC: 104 MMOL/L — SIGNIFICANT CHANGE UP (ref 96–108)
CK MB BLD-MCNC: 0.9 % — SIGNIFICANT CHANGE UP (ref 0–3.5)
CK MB BLD-MCNC: 1.1 % — SIGNIFICANT CHANGE UP (ref 0–3.5)
CK MB CFR SERPL CALC: 1.4 NG/ML — SIGNIFICANT CHANGE UP (ref 0–3.6)
CK MB CFR SERPL CALC: 1.4 NG/ML — SIGNIFICANT CHANGE UP (ref 0–3.6)
CK SERPL-CCNC: 123 U/L — SIGNIFICANT CHANGE UP (ref 35–232)
CK SERPL-CCNC: 160 U/L — SIGNIFICANT CHANGE UP (ref 35–232)
CO2 SERPL-SCNC: 27 MMOL/L — SIGNIFICANT CHANGE UP (ref 22–31)
CREAT SERPL-MCNC: 1.49 MG/DL — HIGH (ref 0.5–1.3)
EOSINOPHIL # BLD AUTO: 0.1 K/UL — SIGNIFICANT CHANGE UP (ref 0–0.5)
EOSINOPHIL NFR BLD AUTO: 0.5 % — SIGNIFICANT CHANGE UP (ref 0–6)
GLUCOSE SERPL-MCNC: 105 MG/DL — HIGH (ref 70–99)
HCT VFR BLD CALC: 35 % — LOW (ref 39–50)
HGB BLD-MCNC: 12 G/DL — LOW (ref 13–17)
LYMPHOCYTES # BLD AUTO: 0.5 K/UL — LOW (ref 1–3.3)
LYMPHOCYTES # BLD AUTO: 4.2 % — LOW (ref 13–44)
MCHC RBC-ENTMCNC: 29.4 PG — SIGNIFICANT CHANGE UP (ref 27–34)
MCHC RBC-ENTMCNC: 34.4 GM/DL — SIGNIFICANT CHANGE UP (ref 32–36)
MCV RBC AUTO: 85.6 FL — SIGNIFICANT CHANGE UP (ref 80–100)
MONOCYTES # BLD AUTO: 0.8 K/UL — SIGNIFICANT CHANGE UP (ref 0–0.9)
MONOCYTES NFR BLD AUTO: 7.3 % — SIGNIFICANT CHANGE UP (ref 2–14)
NEUTROPHILS # BLD AUTO: 9.5 K/UL — HIGH (ref 1.8–7.4)
NEUTROPHILS NFR BLD AUTO: 87 % — HIGH (ref 43–77)
PLATELET # BLD AUTO: 164 K/UL — SIGNIFICANT CHANGE UP (ref 150–400)
POTASSIUM SERPL-MCNC: 4.1 MMOL/L — SIGNIFICANT CHANGE UP (ref 3.5–5.3)
POTASSIUM SERPL-SCNC: 4.1 MMOL/L — SIGNIFICANT CHANGE UP (ref 3.5–5.3)
RBC # BLD: 4.1 M/UL — LOW (ref 4.2–5.8)
RBC # FLD: 11.1 % — SIGNIFICANT CHANGE UP (ref 10.3–14.5)
SODIUM SERPL-SCNC: 137 MMOL/L — SIGNIFICANT CHANGE UP (ref 135–145)
TROPONIN I SERPL-MCNC: 0.03 NG/ML — SIGNIFICANT CHANGE UP (ref 0–0.04)
TROPONIN I SERPL-MCNC: 0.03 NG/ML — SIGNIFICANT CHANGE UP (ref 0–0.04)
WBC # BLD: 10.9 K/UL — HIGH (ref 3.8–10.5)
WBC # FLD AUTO: 10.9 K/UL — HIGH (ref 3.8–10.5)

## 2019-01-09 PROCEDURE — 70450 CT HEAD/BRAIN W/O DYE: CPT | Mod: 26

## 2019-01-09 PROCEDURE — 99285 EMERGENCY DEPT VISIT HI MDM: CPT

## 2019-01-09 RX ORDER — FERROUS GLUCONATE 100 %
0 POWDER (GRAM) MISCELLANEOUS
Qty: 0 | Refills: 0 | COMMUNITY

## 2019-01-09 RX ORDER — ASPIRIN/CALCIUM CARB/MAGNESIUM 324 MG
81 TABLET ORAL DAILY
Qty: 0 | Refills: 0 | Status: DISCONTINUED | OUTPATIENT
Start: 2019-01-09 | End: 2019-01-10

## 2019-01-09 RX ORDER — CLOPIDOGREL BISULFATE 75 MG/1
75 TABLET, FILM COATED ORAL DAILY
Qty: 0 | Refills: 0 | Status: DISCONTINUED | OUTPATIENT
Start: 2019-01-09 | End: 2019-01-09

## 2019-01-09 RX ORDER — HEPARIN SODIUM 5000 [USP'U]/ML
5000 INJECTION INTRAVENOUS; SUBCUTANEOUS EVERY 8 HOURS
Qty: 0 | Refills: 0 | Status: DISCONTINUED | OUTPATIENT
Start: 2019-01-09 | End: 2019-01-10

## 2019-01-09 RX ORDER — BUPROPION HYDROCHLORIDE 150 MG/1
150 TABLET, EXTENDED RELEASE ORAL DAILY
Qty: 0 | Refills: 0 | Status: DISCONTINUED | OUTPATIENT
Start: 2019-01-09 | End: 2019-01-10

## 2019-01-09 RX ORDER — CARBIDOPA, LEVODOPA, AND ENTACAPONE 50; 200; 200 MG/1; MG/1; MG/1
1 TABLET, FILM COATED ORAL
Qty: 0 | Refills: 0 | Status: DISCONTINUED | OUTPATIENT
Start: 2019-01-09 | End: 2019-01-10

## 2019-01-09 RX ORDER — ATORVASTATIN CALCIUM 80 MG/1
40 TABLET, FILM COATED ORAL AT BEDTIME
Qty: 0 | Refills: 0 | Status: DISCONTINUED | OUTPATIENT
Start: 2019-01-09 | End: 2019-01-10

## 2019-01-09 RX ORDER — CARBIDOPA, LEVODOPA, AND ENTACAPONE 50; 200; 200 MG/1; MG/1; MG/1
0 TABLET, FILM COATED ORAL
Qty: 0 | Refills: 0 | COMMUNITY

## 2019-01-09 RX ORDER — DOCUSATE SODIUM 100 MG
100 CAPSULE ORAL THREE TIMES A DAY
Qty: 0 | Refills: 0 | Status: DISCONTINUED | OUTPATIENT
Start: 2019-01-09 | End: 2019-01-10

## 2019-01-09 RX ORDER — METOPROLOL TARTRATE 50 MG
25 TABLET ORAL
Qty: 0 | Refills: 0 | Status: DISCONTINUED | OUTPATIENT
Start: 2019-01-09 | End: 2019-01-10

## 2019-01-09 RX ADMIN — Medication 1 TABLET(S): at 23:20

## 2019-01-09 RX ADMIN — ATORVASTATIN CALCIUM 40 MILLIGRAM(S): 80 TABLET, FILM COATED ORAL at 23:20

## 2019-01-09 RX ADMIN — CARBIDOPA, LEVODOPA, AND ENTACAPONE 1 TABLET(S): 50; 200; 200 TABLET, FILM COATED ORAL at 23:20

## 2019-01-09 RX ADMIN — HEPARIN SODIUM 5000 UNIT(S): 5000 INJECTION INTRAVENOUS; SUBCUTANEOUS at 23:23

## 2019-01-09 RX ADMIN — Medication 100 MILLIGRAM(S): at 23:23

## 2019-01-09 NOTE — H&P ADULT - FAMILY HISTORY
Father  Still living? Unknown  Family history of acute myocardial infarction, Age at diagnosis: Age Unknown     Mother  Still living? Unknown  Family history of acute myocardial infarction, Age at diagnosis: Age Unknown     Sibling  Still living? Unknown  Family history of acute myocardial infarction, Age at diagnosis: Age Unknown

## 2019-01-09 NOTE — ED ADULT NURSE NOTE - CHIEF COMPLAINT QUOTE
biba s/p syncopal episode at home . pt c/o dizziness x 3 days . pt pale looking . sister  reports pt had TURP  3 days ago , rivas in place

## 2019-01-09 NOTE — ED ADULT NURSE NOTE - ED STAT RN HANDOFF DETAILS
pt.remained   stable.denies  pain.transfer  to rm 512 C report given to gato hughes.pt.not   in distress

## 2019-01-09 NOTE — H&P ADULT - PMH
Anxiety    Hypercholesterolemia    Parkinsons disease Anxiety    BPH (benign prostatic hyperplasia)    CAD (coronary artery disease)    CHF (congestive heart failure)    Hypercholesterolemia    Parkinsons disease

## 2019-01-09 NOTE — H&P ADULT - NSHPPHYSICALEXAM_GEN_ALL_CORE
PHYSICAL EXAM:  GENERAL: NAD, well-groomed, well-developed  HEAD:  Atraumatic, Normocephalic  EYES: Strabysmus with right eye lateral gaze, PERRLA, conjunctiva and sclera clear  ENMT: No tonsillar erythema, exudates, or enlargement; Moist mucous membranes, Good dentition, No lesions  NECK: Supple, No JVD, Normal thyroid  NERVOUS SYSTEM:  Alert & Oriented X3, Good concentration; Motor Strength 5/5 B/L upper and lower extremities  CHEST/LUNG: Clear to percussion bilaterally; No rales, rhonchi, wheezing, or rubs  HEART: Regular rate and rhythm; systolic murmurs, no rubs, or gallops  ABDOMEN: Soft, Nontender, Nondistended; Bowel sounds present  EXTREMITIES:  2+ Peripheral Pulses bilaterally, No clubbing, cyanosis, or edema  LYMPH: No lymphadenopathy noted  SKIN: No rashes or lesions    T(C): 36.7 (01-30-18 @ 11:12), Max: 36.9 (01-30-18 @ 06:12)  HR: 68 (01-30-18 @ 11:12) (68 - 85)  BP: 101/68 (01-30-18 @ 11:12) (101/68 - 139/90)  RR: 16 (01-30-18 @ 11:12) (15 - 16)  SpO2: 98% (01-30-18 @ 11:12) (98% - 99%)  Wt(kg): --  I&O's Summary T(C): 37.4 (09 Jan 2019 20:37), Max: 37.4 (09 Jan 2019 20:37)  T(F): 99.3 (09 Jan 2019 20:37), Max: 99.3 (09 Jan 2019 20:37)  HR: 87 (09 Jan 2019 20:37) (81 - 98)  BP: 108/60 (09 Jan 2019 20:37) (93/54 - 152/120)  RR: 20 (09 Jan 2019 20:37) (15 - 20)  SpO2: 100% (09 Jan 2019 20:37) (93% - 100%)

## 2019-01-09 NOTE — ED ADULT NURSE NOTE - DOES PATIENT HAVE ADVANCE DIRECTIVE
[facility] [place]   section Discharge Summary    Date of Admission: 2017  Date of Discharge:  2017      Patient: Chasity Chamorro      MR#:[id number]    Surgeon/OB: Hakeem Retana MD    Discharge Diagnosis:  section at 38w4d, uncomplicated recovery    Procedures:  , Low Transverse     2017    11:30 AM      Anesthesia:  Epidural     Presenting Problem/History of Present Illness  Labor without complication [O80]   Fetal intolerance to labor     Patient Active Problem List   Diagnosis   • Pregnancy   • Status post emergency  section       Hospital Course  Patient is a 22 y.o. female  at 38w4d status post  section with uneventful postoperative recovery.  Patient was advanced to regular diet on postoperative day#1.  On discharge, ambulating, tolerating a regular diet without any difficulties and her incision is dry, clean and intact. The staples were removed and replaced with Steri-Strips.  The patient will discuss birth control at her tube check. She is breast-feeding and this is going well.    Infant:   male  fetus 3005 g (6 lb 10 oz)  with Apgar scores of 8  , 9   at five minutes.    Condition on Discharge:  Stable    Vital Signs  Temp:  [97.7 °F (36.5 °C)-99 °F (37.2 °C)] 98 °F (36.7 °C)  Heart Rate:  [] 65  Resp:  [16-18] 16  BP: ()/(52-56) 110/56    Lab Results   Component Value Date    WBC 14.68 (H) 2017    HGB 6.8 (L) 2017    HCT 21.1 (L) 2017    MCV 75.1 (L) 2017     2017     The patient is A positive, rubella immune, and hepatitis B, C negative    Discharge Disposition  Home or Self Care    Discharge Medications   Chasity Chamorro   Home Medication Instructions RAY:[id number]    Printed on:17 0814   Medication Information                      docusate sodium (COLACE) 100 MG capsule  Take 1 capsule by mouth 2 (Two) Times a Day.              docusate sodium 100 MG capsule  Take 100 mg by mouth 2 (Two) Times a Day As Needed for Constipation.             ferrous sulfate 325 (65 FE) MG tablet  Take 1 tablet by mouth Daily With Breakfast.             fluocinonide-emollient (LIDEX-E) 0.05 % cream  Apply nightly             HYDROcodone-acetaminophen (NORCO) 5-325 MG per tablet  Take 1 tablet by mouth 4 (Four) Times a Day As Needed for Moderate Pain (4-6).             ibuprofen (ADVIL,MOTRIN) 600 MG tablet  Take 1 tablet by mouth Every 6 (Six) Hours As Needed for Mild Pain .             ondansetron ODT (ZOFRAN-ODT) 4 MG disintegrating tablet  Take 1 tablet by mouth Every 8 (Eight) Hours As Needed for Nausea or Vomiting.             oxyCODONE-acetaminophen (PERCOCET) 5-325 MG per tablet  Take 1 tablet by mouth Every 4 (Four) Hours As Needed for Severe Pain  for up to 7 days.             Prenatal Vit-Fe Fumarate-FA (PRENATAL VITAMIN 27-0.8) 27-0.8 MG tablet tablet  Take 1 tablet by mouth Daily.             sertraline (ZOLOFT) 50 MG tablet  Take 1 tablet by mouth Daily.             TERAZOL 7 0.4 % vaginal cream  Insert 1 applicator into the vagina Every Night.             valACYclovir (VALTREX) 500 MG tablet  Take 1 tablet by mouth 2 (Two) Times a Day.             vitamin B-6 (PYRIDOXINE) 25 MG tablet  take 1 tablet by mouth once daily as directed                 Discharge Diet:   Diet Instructions     Advance Diet As Tolerated                     Activity at Discharge:   Activity Instructions     Discharge Activity Restrictions       1) No driving for 2 weeks and no longer taking narcotics.   2) Return to school / work in 6 weeks.  3) May shower   4) Do not lift / push / pull more then 15 lbs.                 Follow-up Appointments  Future Appointments  Date Time Provider Department Center   12/21/2017 3:00 PM Hakeem Retana MD [facility] None     Additional Instructions for the Follow-ups that You Need to Schedule     Discharge Follow-up with Specified  Provider: Dr. Retana; 2 Weeks    As directed    To:  Dr. Retana    Follow Up:  2 Weeks                      Hakeem Retana MD   No

## 2019-01-09 NOTE — ED PROVIDER NOTE - MEDICAL DECISION MAKING DETAILS
Pt with s/p syncope. Order Labs, CT-head. Abd distended, Elmore blocked. Will flush, if unable, will change.

## 2019-01-09 NOTE — H&P ADULT - NSHPSOCIALHISTORY_GEN_ALL_CORE
From home lives with sister, walks with cane; Denies active smoking (prior Hx, 30 pack years, quit 2012), alcohol, or illicit drug use.

## 2019-01-09 NOTE — H&P ADULT - PROBLEM SELECTOR PLAN 1
Likely 2/2 vasovagal versus cardiogenic 2/2 multiple risk factors versus polypharmacy  - Afebrile, no WBC elevation, CE negative x 1, EKG NSR but poor baseline  - Holding Flexeril  Cardiology Dr Ayala; c/w telemetry and f/u TTE

## 2019-01-09 NOTE — H&P ADULT - HISTORY OF PRESENT ILLNESS
48 w/o M w/ PMH Parkinson's disease diagnosed 2010), CAD s/p CABG (2 vessel disease, 2/2018), HFrEF (EF 35-37%), HTN, BPH s/p TURP (1/7/2019, chronic FC x 10/2018) p/w syncopal episode - witnessed by sister at bedside who assists w/ HPI, patient is AAOx3 but poor historian. Overnight patient c/o urinary leaking associated w/ acute on chronic neck pain and stiffness - given Flexeril 5 mg at 2 AM and again at 5 AM - fell asleep at 6 AM. Later at 9:30 AM patient complained of dizziness w/ LOC x 1 minute and postictal confusion x 1 minute associated w/ urination. Pt recently s/p TURP which as 2 night stay at [place] and discharged the day prior - started Bactrim x 1/8 for planned 7 day prophylaxis and has held ASA and Plavix 5 days prior to procedure. In the ED patient seen in NAD - c/o dizziness, vitals WNLs, EKG NSR but poor baseline, and BMP showed mild PENNY. 48 w/o M w/ PMH Parkinson's disease diagnosed 2010), CAD s/p CABG (2 vessel disease, 2/2018), HFrEF (EF 35-37%) s/p AICD, HTN, BPH s/p TURP (1/7/2019, chronic FC x 10/2018) p/w syncopal episode - witnessed by sister at bedside who assists w/ HPI, patient is AAOx3 but poor historian. Overnight patient c/o urinary leaking associated w/ acute on chronic neck pain and stiffness - given Flexeril 5 mg at 2 AM and again at 5 AM - fell asleep at 6 AM. Later at 9:30 AM patient complained of dizziness w/ LOC x 1 minute and postictal confusion x 1 minute associated w/ urination. Pt recently s/p TURP which as 2 night stay at [place] and discharged the day prior - started Bactrim x 1/8 for planned 7 day prophylaxis and has held ASA and Plavix 5 days prior to procedure. In the ED patient seen in NAD - c/o dizziness, vitals WNLs, EKG NSR but poor baseline, and BMP showed mild PENNY.

## 2019-01-09 NOTE — ED ADULT TRIAGE NOTE - CHIEF COMPLAINT QUOTE
biba s/p syncopal episode at home . pt c/o dizziness x 3 days . pt pale looking . wife reports pt had TURP  3 days ago , rivas in place biba s/p syncopal episode at home . pt c/o dizziness x 3 days . pt pale looking . sister  reports pt had TURP  3 days ago , rivas in place

## 2019-01-09 NOTE — H&P ADULT - NEUROLOGICAL DETAILS
alert and oriented x 3/responds to verbal commands/sensation intact/normal strength/responds to pain/deep reflexes intact/cranial nerves intact

## 2019-01-09 NOTE — ED PROVIDER NOTE - OBJECTIVE STATEMENT
47 y/o M with a PMHx of Parkinson's, BPH, HLD and PSHx of CABG presents to ED s/p TURP x 3 days ago. Elmore was placed after procedure. Pt experienced syncopal episode with associated neck pain all night. Family gave 2 doses of Robaxin, after the second dose pt started to feel dizzy and fell to ground. Family endorses a LOC of 1 minute or so. Sister was unable to pick him up, EMS was called. Now in ED, pt feels dizzy and weak. NKDA.

## 2019-01-09 NOTE — H&P ADULT - ASSESSMENT
48 w/o M w/ PMH Parkinson's disease diagnosed 2010), CAD s/p CABG (2 vessel disease, 2/2018), HFrEF (EF 35-37%) s/p AICD, HTN, BPH s/p TURP (1/7/2019, chronic FC x 10/2018) p/w syncopal episode - admitting for further evaluation of syncope

## 2019-01-09 NOTE — PROGRESS NOTE ADULT - SUBJECTIVE AND OBJECTIVE BOX
Admission Note Admission Note           History of Present Illness:  Reason for Admission: Chest pain onset 1:30 am this morning	  History of Present Illness: 	  48 w/o M w/ PMH Parkinson's disease diagnosed 2010), CAD s/p CABG (2 vessel disease, 2/2018), HFrEF (EF 35-37%), HTN, BPH s/p TURP (1/7/2019, chronic FC x 10/2018) p/w syncopal episode - witnessed by sister at bedside who assists w/ HPI, patient is AAOx3 but poor historian. Overnight patient c/o urinary leaking associated w/ acute on chronic neck pain and stiffness - given Flexeril 5 mg at 2 AM and again at 5 AM - fell asleep at 6 AM. Later at 9:30 AM patient complained of dizziness w/ LOC x 1 minute and postictal confusion x 1 minute associated w/ urination. Pt recently s/p TURP which as 2 night stay at [facility] and discharged the day prior - started Bactrim x 1/8 for planned 7 day prophylaxis and has held ASA and Plavix 5 days prior to procedure. In the ED patient seen in NAD - c/o dizziness, vitals WNLs, EKG NSR but poor baseline, and BMP showed mild PENNY.      Review of Systems:  · General	negative	      Allergies and Intolerances:        Allergies:  	No Known Drug Allergies:   	Seafood: Food, Blisters    Home Medications:   * Patient Currently Takes Medications as of 09-Jan-2019 18:30 documented in Structured Notes  · 	metoprolol tartrate 25 mg oral tablet: 1 tab(s) orally 2 times a day  hospital , Last Dose Taken:    · 	aspirin 81 mg oral delayed release tablet: 1 tab(s) orally once a day, Last Dose Taken:    · 	buPROPion 150 mg/12 hours oral tablet, extended release: 1 tab(s) orally 2 times a day  	home/hospital, Last Dose Taken:    · 	Fergon 240 mg (27 mg elemental iron) oral tablet: 1 tab(s) orally once a day, Last Dose Taken:    · 	carbidopa/levodopa/entacapone 25 mg-100 mg-200 mg oral tablet: 1 tab(s) orally 3 times a day (after meals), Last Dose Taken:    · 	ezetimibe 10 mg oral tablet: 1 tab(s) orally once a day, Last Dose Taken:    · 	simvastatin 80 mg oral tablet: 1 tab(s) orally once a day (at bedtime), Last Dose Taken:    · 	Vitamin D3 50,000 intl units oral capsule: 1 cap(s) orally once a month, Last Dose Taken:    · 	docusate sodium 100 mg oral tablet: 1 tab(s) orally 3 times a day, Last Dose Taken:    · 	furosemide 40 mg oral tablet: 1 tab(s) orally once a day, Last Dose Taken:    · 	cyclobenzaprine 5 mg oral tablet: 1 tab(s) orally 3 times a day, As Needed, Last Dose Taken:    · 	Plavix 75 mg oral tablet: 1 tab(s) orally once a day, Last Dose Taken:    · 	Bactrim  mg-160 mg oral tablet: 1 tab(s) orally 2 times a day, Last Dose Taken:      .    Patient History:    Past Medical History:  Anxiety    BPH (benign prostatic hyperplasia)    CAD (coronary artery disease)    CHF (congestive heart failure)    Hypercholesterolemia    Parkinsons disease.     Past Surgical History:  S/P CABG (coronary artery bypass graft).     Family History:  Father  Still living? Unknown  Family history of acute myocardial infarction, Age at diagnosis: Age Unknown     Mother  Still living? Unknown  Family history of acute myocardial infarction, Age at diagnosis: Age Unknown     Sibling  Still living? Unknown  Family history of acute myocardial infarction, Age at diagnosis: Age Unknown.     Social History:  Social History (marital status, living situation, occupation, tobacco use, alcohol and drug use, and sexual history): From home lives with sister, walks with cane; Denies active smoking (prior Hx, 30 pack years, quit 2012), alcohol, or illicit drug use.	     Tobacco Screening:  · Core Measure Site	Yes	  · Has the patient used tobacco in the past 30 days?	No	    Risk Assessment:    Present on Admission:  Deep Venous Thrombosis	no	  Pulmonary Embolus	no	     Heart Failure:  Does this patient have a history of or has been diagnosed with heart failure? yes.     LV Function Assessment (LVS function was evaluated before arrival and/or during hospitalization) yes.     Is the Ejection Fraction >40% ? no.     Ejection Fraction 35 %.     Are there any contraindications to ACEI/ARB therapy? No.  ICU Vital Signs Last 24 Hrs  T(C): 37 (09 Jan 2019 15:36), Max: 37 (09 Jan 2019 15:36)  T(F): 98.6 (09 Jan 2019 15:36), Max: 98.6 (09 Jan 2019 15:36)  HR: 81 (09 Jan 2019 15:36) (81 - 98)  BP: 93/54 (09 Jan 2019 15:37) (93/54 - 152/120)  BP(mean): --  ABP: --  ABP(mean): --  RR: 15 (09 Jan 2019 15:36) (15 - 18)  SpO2: 93% (09 Jan 2019 15:36) (93% - 96%)      HIV Screen (per [facility] Department of Health, HIV screening must be offered to every individual between ages 13 and 64)	Offered and patient accepted	       Physical Exam:  Physical Exam: PHYSICAL EXAM:  GENERAL: NAD, well-groomed, well-developed  HEAD:  Atraumatic, Normocephalic  EYES: Strabysmus with right eye lateral gaze, PERRLA, conjunctiva and sclera clear  ENMT: No tonsillar erythema, exudates, or enlargement; Moist mucous membranes, Good dentition, No lesions  NECK: Supple, No JVD, Normal thyroid  NERVOUS SYSTEM:  Alert & Oriented X3, Good concentration; Motor Strength 5/5 B/L upper and lower extremities  CHEST/LUNG: Clear to percussion bilaterally; No rales, rhonchi, wheezing, or rubs  HEART: Regular rate and rhythm; systolic murmurs, no rubs, or gallops  ABDOMEN: Soft, Nontender, Nondistended; Bowel sounds present  EXTREMITIES:  2+ Peripheral Pulses bilaterally, No clubbing, cyanosis, or edema  LYMPH: No lymphadenopathy noted  SKIN: No rashes or lesions   T(C): 36.7 (01-30-18 @ 11:12), Max: 36.9 (01-30-18 @ 06:12)  HR: 68 (01-30-18 @ 11:12) (68 - 85)  BP: 101/68 (01-30-18 @ 11:12) (101/68 - 139/90)  RR: 16 (01-30-18 @ 11:12) (15 - 16)  SpO2: 98% (01-30-18 @ 11:12) (98% - 99%)  Wt(kg): -- I&O's Summary	                                12.0   10.9  )-----------( 164      ( 09 Jan 2019 12:34 )                 35.0     01-09    137  |  104  |  15  ----------------------------<  105<H>  4.1   |  27  |  1.49<H>    Ca    8.0<L>      09 Jan 2019 12:34            Ass/plan  Syncope is most likely related to orthostatic hypotension post op and / or side effect of muscle relaxant  Considering h/o CAD and h/o neck pain a/w MI will do serial EKG and enzymes  consult with Cardiology Dr Tran - called  Doubt  seizures  restart  ASA tonight   hold Plavix for now  check for orthostatic hypotension    BP is on the low side  hold Metoprolol if BP is below 100      Hb is stable - no post op bleeding  electrolytes are stable - no evidence of dehydration   CHF with low EF - careful with IV fluids, pt is able to eat and to drink, no need in IV hydration at present  Hold Lasix for today    BPH - post TURP, cont Elmore, Elmore care, no bleeding  f/u with urology as outpt    Parkinson's disease - cont present treatment  neuro consult as outpt    Neck pain      GI and DVT prophylaxis

## 2019-01-10 ENCOUNTER — TRANSCRIPTION ENCOUNTER (OUTPATIENT)
Age: 49
End: 2019-01-10

## 2019-01-10 VITALS
DIASTOLIC BLOOD PRESSURE: 56 MMHG | SYSTOLIC BLOOD PRESSURE: 118 MMHG | HEART RATE: 97 BPM | RESPIRATION RATE: 16 BRPM | TEMPERATURE: 98 F | OXYGEN SATURATION: 96 %

## 2019-01-10 LAB
ANION GAP SERPL CALC-SCNC: 8 MMOL/L — SIGNIFICANT CHANGE UP (ref 5–17)
APPEARANCE UR: ABNORMAL
BASOPHILS # BLD AUTO: 0 K/UL — SIGNIFICANT CHANGE UP (ref 0–0.2)
BASOPHILS NFR BLD AUTO: 0.5 % — SIGNIFICANT CHANGE UP (ref 0–2)
BILIRUB UR-MCNC: NEGATIVE — SIGNIFICANT CHANGE UP
BUN SERPL-MCNC: 10 MG/DL — SIGNIFICANT CHANGE UP (ref 7–18)
CALCIUM SERPL-MCNC: 8.1 MG/DL — LOW (ref 8.4–10.5)
CHLORIDE SERPL-SCNC: 106 MMOL/L — SIGNIFICANT CHANGE UP (ref 96–108)
CHOLEST SERPL-MCNC: 138 MG/DL — SIGNIFICANT CHANGE UP (ref 10–199)
CO2 SERPL-SCNC: 27 MMOL/L — SIGNIFICANT CHANGE UP (ref 22–31)
COLOR SPEC: SIGNIFICANT CHANGE UP
CREAT SERPL-MCNC: 1.02 MG/DL — SIGNIFICANT CHANGE UP (ref 0.5–1.3)
DIFF PNL FLD: ABNORMAL
EOSINOPHIL # BLD AUTO: 0.4 K/UL — SIGNIFICANT CHANGE UP (ref 0–0.5)
EOSINOPHIL NFR BLD AUTO: 7.1 % — HIGH (ref 0–6)
FOLATE SERPL-MCNC: 12.9 NG/ML — SIGNIFICANT CHANGE UP
GLUCOSE SERPL-MCNC: 82 MG/DL — SIGNIFICANT CHANGE UP (ref 70–99)
GLUCOSE UR QL: NEGATIVE — SIGNIFICANT CHANGE UP
HBA1C BLD-MCNC: 5.6 % — SIGNIFICANT CHANGE UP (ref 4–5.6)
HCT VFR BLD CALC: 30.2 % — LOW (ref 39–50)
HDLC SERPL-MCNC: 55 MG/DL — SIGNIFICANT CHANGE UP
HGB BLD-MCNC: 9.8 G/DL — LOW (ref 13–17)
KETONES UR-MCNC: NEGATIVE — SIGNIFICANT CHANGE UP
LEUKOCYTE ESTERASE UR-ACNC: NEGATIVE — SIGNIFICANT CHANGE UP
LIPID PNL WITH DIRECT LDL SERPL: 72 MG/DL — SIGNIFICANT CHANGE UP
LYMPHOCYTES # BLD AUTO: 1 K/UL — SIGNIFICANT CHANGE UP (ref 1–3.3)
LYMPHOCYTES # BLD AUTO: 17.9 % — SIGNIFICANT CHANGE UP (ref 13–44)
MAGNESIUM SERPL-MCNC: 2 MG/DL — SIGNIFICANT CHANGE UP (ref 1.6–2.6)
MCHC RBC-ENTMCNC: 28 PG — SIGNIFICANT CHANGE UP (ref 27–34)
MCHC RBC-ENTMCNC: 32.5 GM/DL — SIGNIFICANT CHANGE UP (ref 32–36)
MCV RBC AUTO: 86.4 FL — SIGNIFICANT CHANGE UP (ref 80–100)
MONOCYTES # BLD AUTO: 0.6 K/UL — SIGNIFICANT CHANGE UP (ref 0–0.9)
MONOCYTES NFR BLD AUTO: 11.1 % — SIGNIFICANT CHANGE UP (ref 2–14)
NEUTROPHILS # BLD AUTO: 3.6 K/UL — SIGNIFICANT CHANGE UP (ref 1.8–7.4)
NEUTROPHILS NFR BLD AUTO: 63.4 % — SIGNIFICANT CHANGE UP (ref 43–77)
NITRITE UR-MCNC: NEGATIVE — SIGNIFICANT CHANGE UP
PH UR: 6 — SIGNIFICANT CHANGE UP (ref 5–8)
PHOSPHATE SERPL-MCNC: 2.9 MG/DL — SIGNIFICANT CHANGE UP (ref 2.5–4.5)
PLATELET # BLD AUTO: 139 K/UL — LOW (ref 150–400)
POTASSIUM SERPL-MCNC: 3.5 MMOL/L — SIGNIFICANT CHANGE UP (ref 3.5–5.3)
POTASSIUM SERPL-SCNC: 3.5 MMOL/L — SIGNIFICANT CHANGE UP (ref 3.5–5.3)
PROT UR-MCNC: 15
RBC # BLD: 3.49 M/UL — LOW (ref 4.2–5.8)
RBC # FLD: 11.7 % — SIGNIFICANT CHANGE UP (ref 10.3–14.5)
SODIUM SERPL-SCNC: 141 MMOL/L — SIGNIFICANT CHANGE UP (ref 135–145)
SP GR SPEC: 1 — LOW (ref 1.01–1.02)
TOTAL CHOLESTEROL/HDL RATIO MEASUREMENT: 2.5 RATIO — LOW (ref 3.4–9.6)
TRIGL SERPL-MCNC: 55 MG/DL — SIGNIFICANT CHANGE UP (ref 10–149)
TSH SERPL-MCNC: 1.14 UU/ML — SIGNIFICANT CHANGE UP (ref 0.34–4.82)
UROBILINOGEN FLD QL: NEGATIVE — SIGNIFICANT CHANGE UP
VIT B12 SERPL-MCNC: 377 PG/ML — SIGNIFICANT CHANGE UP (ref 232–1245)
WBC # BLD: 5.7 K/UL — SIGNIFICANT CHANGE UP (ref 3.8–10.5)
WBC # FLD AUTO: 5.7 K/UL — SIGNIFICANT CHANGE UP (ref 3.8–10.5)

## 2019-01-10 PROCEDURE — 93306 TTE W/DOPPLER COMPLETE: CPT | Mod: 26

## 2019-01-10 RX ORDER — FUROSEMIDE 40 MG
1 TABLET ORAL
Qty: 0 | Refills: 0 | COMMUNITY

## 2019-01-10 RX ORDER — CYCLOBENZAPRINE HYDROCHLORIDE 10 MG/1
1 TABLET, FILM COATED ORAL
Qty: 0 | Refills: 0 | COMMUNITY

## 2019-01-10 RX ORDER — POLYETHYLENE GLYCOL 3350 17 G/17G
17 POWDER, FOR SOLUTION ORAL ONCE
Qty: 0 | Refills: 0 | Status: COMPLETED | OUTPATIENT
Start: 2019-01-10 | End: 2019-01-10

## 2019-01-10 RX ORDER — CLOPIDOGREL BISULFATE 75 MG/1
75 TABLET, FILM COATED ORAL DAILY
Qty: 0 | Refills: 0 | Status: DISCONTINUED | OUTPATIENT
Start: 2019-01-10 | End: 2019-01-10

## 2019-01-10 RX ORDER — AZTREONAM 2 G
1 VIAL (EA) INJECTION
Qty: 0 | Refills: 0 | COMMUNITY

## 2019-01-10 RX ORDER — AZTREONAM 2 G
1 VIAL (EA) INJECTION
Qty: 10 | Refills: 0
Start: 2019-01-10 | End: 2019-01-14

## 2019-01-10 RX ADMIN — Medication 1 TABLET(S): at 18:11

## 2019-01-10 RX ADMIN — Medication 100 MILLIGRAM(S): at 06:25

## 2019-01-10 RX ADMIN — CARBIDOPA, LEVODOPA, AND ENTACAPONE 1 TABLET(S): 50; 200; 200 TABLET, FILM COATED ORAL at 13:40

## 2019-01-10 RX ADMIN — POLYETHYLENE GLYCOL 3350 17 GRAM(S): 17 POWDER, FOR SOLUTION ORAL at 18:11

## 2019-01-10 RX ADMIN — Medication 1 TABLET(S): at 06:25

## 2019-01-10 RX ADMIN — Medication 25 MILLIGRAM(S): at 18:11

## 2019-01-10 RX ADMIN — HEPARIN SODIUM 5000 UNIT(S): 5000 INJECTION INTRAVENOUS; SUBCUTANEOUS at 13:40

## 2019-01-10 RX ADMIN — CARBIDOPA, LEVODOPA, AND ENTACAPONE 1 TABLET(S): 50; 200; 200 TABLET, FILM COATED ORAL at 09:13

## 2019-01-10 RX ADMIN — HEPARIN SODIUM 5000 UNIT(S): 5000 INJECTION INTRAVENOUS; SUBCUTANEOUS at 06:24

## 2019-01-10 RX ADMIN — Medication 100 MILLIGRAM(S): at 13:39

## 2019-01-10 RX ADMIN — Medication 81 MILLIGRAM(S): at 13:39

## 2019-01-10 RX ADMIN — BUPROPION HYDROCHLORIDE 150 MILLIGRAM(S): 150 TABLET, EXTENDED RELEASE ORAL at 13:40

## 2019-01-10 NOTE — DISCHARGE NOTE ADULT - CARE PLAN
Principal Discharge DX:	Vasovagal syncope  Goal:	Resolution of symptoms  Secondary Diagnosis:	CHF (congestive heart failure)  Assessment and plan of treatment:	Stop taking lasix for now. Continue with blood pressure medications and Beta Blocker medication as indicated in the medication reconciliation. Maintain healthy diet and exercise and lose weight.  If you eat too much salt or drink too much fluid, your body's water content may increase and make your heart work harder. This can worsen your CHF.  Follow up with your primary care physician in one week after discharge to inform of your hospitalization.  Secondary Diagnosis:	CAD (coronary artery disease)  Assessment and plan of treatment:	Continue with your aspirin, plavix, blood pressure medication and statin as instructed. Maintain a healthy diet and exercise frequently if possible. Lose weight. Follow up with your primary care physician in one week after discharge.  Secondary Diagnosis:	S/P TURP  Assessment and plan of treatment:	You had TURP done on 1/7. You presented with blocked rivas which was flushed and is working well. Please continue using rivas and antibiotic Bactrim for 5 more days. Follow up with our urologist as outpatient.  Secondary Diagnosis:	Hypercholesterolemia  Assessment and plan of treatment:	Continue with cholesterol medications. Maintain a healthy diet that consist of low sugar, low fat, low sodium diet. Exercise frequently if possible.  Follow up with primary care physician in one week after discharge.  Secondary Diagnosis:	Parkinsons disease  Assessment and plan of treatment:	Please continue taking your medication as prescribed. Follow up with your primary care physician in 1 week after discharge.  Secondary Diagnosis:	PENNY (acute kidney injury)  Assessment and plan of treatment:	You presented with elevated kidney test. It resolved but hold lasix for now as it can aggravate it. Principal Discharge DX:	Vasovagal syncope  Goal:	Resolution of symptoms  Assessment and plan of treatment:	You presented with loss of consciousness for a brief period of time. It is due to multifactors including flexeril medication and dehydration. Please avoid taking lot of sedative medication like muscle relaxant. Keep yourself hydrated. Follow up with your primary care physician in 1 week after discharge.  Secondary Diagnosis:	CHF (congestive heart failure)  Assessment and plan of treatment:	Stop taking lasix for now. Continue with blood pressure medications and Beta Blocker medication as indicated in the medication reconciliation. Maintain healthy diet and exercise and lose weight.  If you eat too much salt or drink too much fluid, your body's water content may increase and make your heart work harder. This can worsen your CHF.  Follow up with your primary care physician in one week after discharge to inform of your hospitalization.  Secondary Diagnosis:	CAD (coronary artery disease)  Assessment and plan of treatment:	Continue with your aspirin, plavix, blood pressure medication and statin as instructed. Maintain a healthy diet and exercise frequently if possible. Lose weight. Follow up with your primary care physician in one week after discharge.  Secondary Diagnosis:	S/P TURP  Assessment and plan of treatment:	You had TURP done on 1/7. You presented with blocked rivas which was flushed and is working well. Please continue using rivas and antibiotic Bactrim for 5 more days. Follow up with our urologist as outpatient.  Secondary Diagnosis:	Hypercholesterolemia  Assessment and plan of treatment:	Continue with cholesterol medications. Maintain a healthy diet that consist of low sugar, low fat, low sodium diet. Exercise frequently if possible.  Follow up with primary care physician in one week after discharge.  Secondary Diagnosis:	Parkinsons disease  Assessment and plan of treatment:	Please continue taking your medication as prescribed. Follow up with your primary care physician in 1 week after discharge.  Secondary Diagnosis:	PENNY (acute kidney injury)  Assessment and plan of treatment:	You presented with elevated kidney test. It resolved but hold lasix for now as it can aggravate it.

## 2019-01-10 NOTE — DISCHARGE NOTE ADULT - PLAN OF CARE
Resolution of symptoms Stop taking lasix for now. Continue with blood pressure medications and Beta Blocker medication as indicated in the medication reconciliation. Maintain healthy diet and exercise and lose weight.  If you eat too much salt or drink too much fluid, your body's water content may increase and make your heart work harder. This can worsen your CHF.  Follow up with your primary care physician in one week after discharge to inform of your hospitalization. Continue with your aspirin, plavix, blood pressure medication and statin as instructed. Maintain a healthy diet and exercise frequently if possible. Lose weight. Follow up with your primary care physician in one week after discharge. You had TURP done on 1/7. You presented with blocked rivsa which was flushed and is working well. Please continue using rivas and antibiotic Bactrim for 5 more days. Follow up with our urologist as outpatient. Continue with cholesterol medications. Maintain a healthy diet that consist of low sugar, low fat, low sodium diet. Exercise frequently if possible.  Follow up with primary care physician in one week after discharge. Please continue taking your medication as prescribed. Follow up with your primary care physician in 1 week after discharge. You presented with elevated kidney test. It resolved but hold lasix for now as it can aggravate it. You presented with loss of consciousness for a brief period of time. It is due to multifactors including flexeril medication and dehydration. Please avoid taking lot of sedative medication like muscle relaxant. Keep yourself hydrated. Follow up with your primary care physician in 1 week after discharge.

## 2019-01-10 NOTE — DISCHARGE NOTE ADULT - MEDICATION SUMMARY - MEDICATIONS TO TAKE
I will START or STAY ON the medications listed below when I get home from the hospital:    aspirin 81 mg oral delayed release tablet  -- 1 tab(s) by mouth once a day  -- Indication: For CAD (coronary artery disease)    ezetimibe 10 mg oral tablet  -- 1 tab(s) by mouth once a day  -- Indication: For Hypercholesterolemia    simvastatin 80 mg oral tablet  -- 1 tab(s) by mouth once a day (at bedtime)  -- Indication: For CAD (coronary artery disease)    carbidopa/levodopa/entacapone 25 mg-100 mg-200 mg oral tablet  -- 1 tab(s) by mouth 3 times a day (after meals)  -- Indication: For Parkinsons disease    Plavix 75 mg oral tablet  -- 1 tab(s) by mouth once a day  -- Indication: For CAD (coronary artery disease)    metoprolol tartrate 25 mg oral tablet  -- 1 tab(s) by mouth 2 times a day  hospital   -- It is very important that you take or use this exactly as directed.  Do not skip doses or discontinue unless directed by your doctor.  May cause drowsiness.  Alcohol may intensify this effect.  Use care when operating dangerous machinery.  Some non-prescription drugs may aggravate your condition.  Read all labels carefully.  If a warning appears, check with your doctor before taking.  Take with food or milk.  This drug may impair the ability to drive or operate machinery.  Use care until you become familiar with its effects.    -- Indication: For CHF (congestive heart failure)    Fergon 240 mg (27 mg elemental iron) oral tablet  -- 1 tab(s) by mouth once a day  -- Indication: For Prophylactic measure    docusate sodium 100 mg oral tablet  -- 1 tab(s) by mouth 3 times a day  -- Indication: For Constipation    buPROPion 150 mg/12 hours oral tablet, extended release  -- 1 tab(s) by mouth 2 times a day  home/hospital  -- Indication: For Smoking    Bactrim  mg-160 mg oral tablet  -- 1 tab(s) by mouth 2 times a day  -- Indication: For S/P TURP    Vitamin D3 50,000 intl units oral capsule  -- 1 cap(s) by mouth once a month  -- Indication: For Prophylactic measure

## 2019-01-10 NOTE — DISCHARGE NOTE ADULT - HOSPITAL COURSE
48 w/o M w/ PMH Parkinson's disease diagnosed 2010), CAD s/p CABG (2 vessel disease, 2/2018), HFrEF (EF 35-37%) s/p AICD, HTN, BPH s/p TURP (1/7/2019, chronic FC x 10/2018) p/w syncopal episode - admitting for further evaluation of syncope       Problem/Plan - 1:  ·  Problem: Syncope.  Plan: Likely 2/2 vasovagal versus cardiogenic 2/2 multiple risk factors versus polypharmacy  - Afebrile, no WBC elevation, CE negative x 1, EKG NSR but poor baseline  - Holding Flexeril  Cardiology Dr Ayala; c/w telemetry and f/u TTE.      Problem/Plan - 2:  ·  Problem: CAD (coronary artery disease).  Plan: Holding Plavix as per primary attending  - C/w ASA, Statin, and Lopressor.      Problem/Plan - 3:  ·  Problem: CHF (congestive heart failure).  Plan: Hold Lasix 2/2 mild PENNY; FC obstructed in ED and relieved  - F/u BMP in AM and consider further w/u if no improvement.      Problem/Plan - 4:  ·  Problem: BPH (benign prostatic hyperplasia).  Plan: C/w chronic FC and perioperative Bactrim (completed 1/7 days) for recent TURP  - F/u UA/UCx.      Problem/Plan - 5:  ·  Problem: Parkinsons disease.  Plan: Resumed home medications. 48 w/o M w/ PMH Parkinson's disease diagnosed 2010), CAD s/p CABG (2 vessel disease, 2/2018), HFrEF (EF 35-37%) s/p AICD, HTN, BPH s/p TURP (1/7/2019, chronic FC x 10/2018) p/w syncopal episode. His syncope was due to flexeril and dehydration. ECHO shows Moderate segmental left ventricular systolic dysfunction with EF 35-40%.  He is on ASA, Statin, and Lopressor for CAD (coronary artery disease).     Problem/Plan - 3:  ·  Problem: CHF (congestive heart failure).  Plan: Hold Lasix 2/2 mild PENNY; FC obstructed in ED and relieved  - F/u BMP in AM and consider further w/u if no improvement.      Problem/Plan - 4:  ·  Problem: BPH (benign prostatic hyperplasia).  Plan: C/w chronic FC and perioperative Bactrim (completed 1/7 days) for recent TURP  - F/u UA/UCx.      Problem/Plan - 5:  ·  Problem: Parkinsons disease.  Plan: Resumed home medications. 48 w/o M w/ PMH Parkinson's disease diagnosed 2010), CAD s/p CABG (2 vessel disease, 2/2018), HFrEF (EF 35-37%) s/p AICD, HTN, BPH s/p TURP (1/7/2019, chronic FC x 10/2018) p/w syncopal episode. His syncope was due to flexeril and dehydration. ECHO shows Moderate segmental left ventricular systolic dysfunction with EF 35-40%. He is advised to hold lasix as he developed PENNY on admission and could be potential cause of syncope.  He is on ASA, Statin, and Lopressor for CAD (coronary artery disease).  Parkinsons disease- Resumed home medications.   He was on his home meds for other comorbidities.  Patient is stable for discharge per attending and is advised to follow up with PCP as outpatient  Please refer to patient's complete medical chart with documents for a full hospital course, for this is only a brief summary.

## 2019-01-10 NOTE — DISCHARGE NOTE ADULT - CARE PROVIDER_API CALL
Erin Jaime), Geriatric Medicine; Internal Medicine  [address] [place] [address]  Phone: [phone]  Fax: [phone]

## 2019-01-10 NOTE — PROGRESS NOTE ADULT - SUBJECTIVE AND OBJECTIVE BOX
Progress Note Progress Note  Patient was examined at bedside  No complaints    ICU Vital Signs Last 24 Hrs  T(C): 36.7 (10 Aquiles 2019 14:24), Max: 37.4 (09 Jan 2019 20:37)  T(F): 98 (10 Aquiles 2019 14:24), Max: 99.3 (09 Jan 2019 20:37)  HR: 93 (10 [place]iles 2019 10:44) (77 - 93)  BP: 111/59 (10 [place]iles 2019 10:44) (93/54 - 152/120)  BP(mean): --  ABP: --  ABP(mean): --  RR: 18 (10 [place]iles 2019 14:24) (15 - 20)  SpO2: 100% (10 [place]iles 2019 14:24) (93% - 100%)                          9.8    5.7   )-----------( 139      ( 10 Aquiles 2019 06:20 )             30.2     01-10    141  |  106  |  10  ----------------------------<  82  3.5   |  27  |  1.02    Ca    8.1<L>      10 Aquiles 2019 06:20  Phos  2.9     01-10  Mg     2.0     01-10        Gen Appearance: No apparent health distress.    Card: S1  S2   VenoVasc: JVP upper normal, legs with no edema.    Resp: Breathing unlabored.  Auscultation air entry normal.   Abd-Pelv: Liver size normal. Abdomen nontender. Abdomen palpation without masses.  Bowel sounds normal.     Mental status: Calm.   Neur-Xiang-Sens:  Motor exam nonfocal.     Conjunctiva & lips normal pink.    IV site clean      Ass/plan  Syncope   no evidence of ACS  consult with Cardiology Dr Tran - appreciated  cont ASA  restart  Plavix   check for orthostatic hypotension  d/c pt home  f/u with cardiology in 2 weeks after discharge    Hold LAsix    BPH - post TURP, cont Elmore, Elmore care, no bleeding  f/u with urology as outpt    Parkinson's disease - cont present treatment  neuro consult as outpt    Neck pain      GI and DVT prophylaxis

## 2019-01-10 NOTE — DISCHARGE NOTE ADULT - MEDICATION SUMMARY - MEDICATIONS TO STOP TAKING
I will STOP taking the medications listed below when I get home from the hospital:    cyclobenzaprine 5 mg oral tablet  -- 1 tab(s) by mouth 3 times a day, As Needed

## 2019-01-10 NOTE — CONSULT NOTE ADULT - ASSESSMENT
48 w/o M w/ PMH Parkinson's disease diagnosed 2010), CAD s/p CABG (2 vessel disease, 2/2018), HFrEF (EF 35-37%) s/p AICD, HTN, BPH s/p TURP (1/7/2019, chronic FC x 10/2018) p/w syncopal episode.  1.Check orthostatic BP.  2.CAD-cont asa,resume plavix,b blocker,statin.  3.Agree with stopping lasix.  4.S/P TURP- f/u as outpatient.  5.Outpatient f/u with Dr. Sharma in two weeks.

## 2019-01-10 NOTE — DISCHARGE NOTE ADULT - PATIENT PORTAL LINK FT
You can access the [facility] Portal, offered by [facility], by registering with the following website: [email or web address]

## 2019-01-10 NOTE — DISCHARGE NOTE ADULT - SECONDARY DIAGNOSIS.
CHF (congestive heart failure) CAD (coronary artery disease) S/P TURP Hypercholesterolemia Parkinsons disease PENNY (acute kidney injury)

## 2019-01-10 NOTE — CONSULT NOTE ADULT - SUBJECTIVE AND OBJECTIVE BOX
CHIEF COMPLAINT:Patient is a 48y old  Male who presents with a chief complaint of Chest pain onset 1:30 am this morning.      HPI:  48 w/o M w/ PMH Parkinson's disease diagnosed 2010), CAD s/p CABG (2 vessel disease, 2/2018), HFrEF (EF 35-37%) s/p AICD, HTN, BPH s/p TURP (1/7/2019, chronic FC x 10/2018) p/w syncopal episode - witnessed by sister at bedside who assists w/ HPI, patient is AAOx3 but poor historian. Overnight patient c/o urinary leaking associated w/ acute on chronic neck pain and stiffness - given Flexeril 5 mg at 2 AM and again at 5 AM - fell asleep at 6 AM. Later at 9:30 AM patient complained of dizziness w/ LOC x 1 minute and postictal confusion x 1 minute associated w/ urination. Pt recently s/p TURP which as 2 night stay at [facility] and discharged the day prior - started Bactrim x 1/8 for planned 7 day prophylaxis and has held ASA and Plavix 5 days prior to procedure. In the ED patient seen in NAD - c/o dizziness, vitals WNLs, EKG NSR but poor baseline, and BMP showed mild PENNY. (09 Jan 2019 18:30)      PAST MEDICAL & SURGICAL HISTORY:  CAD (coronary artery disease)  CHF (congestive heart failure)  BPH (benign prostatic hyperplasia)  Parkinsons disease  Anxiety  Hypercholesterolemia  S/P CABG (coronary artery bypass graft)      MEDICATIONS  (STANDING):  aspirin enteric coated 81 milliGRAM(s) Oral daily  atorvastatin 40 milliGRAM(s) Oral at bedtime  buPROPion XL . 150 milliGRAM(s) Oral daily  carbidopa/levodopa/entacapone 25/100/200 1 Tablet(s) Oral <User Schedule>  docusate sodium 100 milliGRAM(s) Oral three times a day  heparin  Injectable 5000 Unit(s) SubCutaneous every 8 hours  metoprolol tartrate 25 milliGRAM(s) Oral two times a day  trimethoprim  160 mG/sulfamethoxazole 800 mG 1 Tablet(s) Oral two times a day    MEDICATIONS  (PRN):      FAMILY HISTORY:  Family history of acute myocardial infarction (Father, Mother, Sibling)      SOCIAL HISTORY:    x Non-smoker    x Alcohol-denies    Allergies    No Known Drug Allergies  Seafood (Blisters)    Intolerances    	    REVIEW OF SYSTEMS:  CONSTITUTIONAL: No fever, weight loss, or fatigue  EYES: No eye pain, visual disturbances, or discharge  ENT:  No difficulty hearing, tinnitus, vertigo; No sinus or throat pain  NECK: No pain or stiffness  RESPIRATORY: No cough, wheezing, chills or hemoptysis; No Shortness of Breath  CARDIOVASCULAR: No chest pain, palpitations,+almost passing out,+ dizziness  GASTROINTESTINAL: No abdominal or epigastric pain. No nausea, vomiting, or hematemesis; No diarrhea or constipation. No melena or hematochezia.  GENITOURINARY: No dysuria, frequency, hematuria, or incontinence  NEUROLOGICAL: No headaches, memory loss, loss of strength, numbness, or tremors  SKIN: No itching, burning, rashes, or lesions   LYMPH Nodes: No enlarged glands  ENDOCRINE: No heat or cold intolerance; No hair loss  MUSCULOSKELETAL: No joint pain or swelling; No muscle, back, or extremity pain  PSYCHIATRIC: No depression, anxiety, mood swings, or difficulty sleeping  HEME/LYMPH: No easy bruising, or bleeding gums  ALLERGY AND IMMUNOLOGIC: No hives or eczema	      PHYSICAL EXAM:  T(C): 36.8 (01-10-19 @ 10:44), Max: 37.4 (01-09-19 @ 20:37)  HR: 93 (01-10-19 @ 10:44) (77 - 93)  BP: 111/59 (01-10-19 @ 10:44) (93/54 - 152/120)  RR: 18 (01-10-19 @ 10:44) (15 - 20)  SpO2: 100% (01-10-19 @ 10:44) (93% - 100%)    I&O's Summary    09 Jan 2019 07:01  -  10 Aquiles 2019 07:00  --------------------------------------------------------  IN: 0 mL / OUT: 700 mL / NET: -700 mL        Appearance: Normal	  HEENT:   Normal oral mucosa, PERRL, EOMI	  Lymphatic: No lymphadenopathy  Cardiovascular: Normal S1 S2, No JVD, No murmurs, No edema  Respiratory: Lungs clear to auscultation	  Psychiatry: A & O x 3, Mood & affect appropriate  Gastrointestinal:  Soft, Non-tender, + BS	  Skin: No rashes, No ecchymoses, No cyanosis	  Neurologic: Non-focal  Extremities: Normal range of motion, No clubbing, cyanosis or edema  Vascular: Peripheral pulses palpable 2+ bilaterally    	  LABS:	 	    CARDIAC MARKERS:  CARDIAC MARKERS ( 09 Jan 2019 18:13 )  0.026 ng/mL / x     / 123 U/L / x     / 1.4 ng/mL  CARDIAC MARKERS ( 09 Jan 2019 12:34 )  0.030 ng/mL / x     / 160 U/L / x     / 1.4 ng/mL                         9.8    5.7   )-----------( 139      ( 10 Aquiles 2019 06:20 )             30.2     01-10    141  |  106  |  10  ----------------------------<  82  3.5   |  27  |  1.02    Ca    8.1<L>      10 Aquiles 2019 06:20  Phos  2.9     01-10  Mg     2.0     01-10        Lipid Profile: Cholesterol 138  LDL 72  HDL 55  TG 55    HgA1c: Hemoglobin A1C, Whole Blood: 5.6 % (01-10 @ 09:51)    TSH: Thyroid Stimulating Hormone, Serum: 1.14 uU/mL (01-10 @ 06:20)        EXAM:  CT BRAIN                            PROCEDURE DATE:  01/09/2019          INTERPRETATION:  CT HEAD WITHOUT CONTRAST    INDICATION: 48 years old. Male. syncope.    COMPARISON: None available.    TECHNIQUE: Noncontrast axial CT head was obtained from the skull base to   vertex.    FINDINGS:  No acute intracranial hemorrhage, mass effect or midline shift.  No CT evidence of acute large territory vascular infarct.  The ventricles and cortical sulci are within normal limits.    Tiny mucus retention cyst/polyp in the right maxillary sinus. The mastoid   air cells are well aerated.  No displaced calvarial fracture.    IMPRESSION:   No acute intracranial hemorrhage or mass effect.

## 2019-05-09 NOTE — ED ADULT NURSE NOTE - NS PRO PASSIVE SMOKE EXP
Result Notes for ECHO (Dobutamine) Pharmacological Stress Test     Notes recorded by DAVID Cooley on 5/9/2019 at 12:17 PM CDT  Normal Results. Contacted pt and informed of normal results above. Pt verbalized understanding.  PP, LPN
No

## 2019-05-28 ENCOUNTER — EMERGENCY (EMERGENCY)
Facility: HOSPITAL | Age: 49
LOS: 1 days | Discharge: HOME | End: 2019-05-28
Attending: EMERGENCY MEDICINE
Payer: MEDICARE

## 2019-05-28 VITALS
SYSTOLIC BLOOD PRESSURE: 119 MMHG | HEIGHT: 66 IN | OXYGEN SATURATION: 95 % | DIASTOLIC BLOOD PRESSURE: 76 MMHG | HEART RATE: 79 BPM | RESPIRATION RATE: 16 BRPM | TEMPERATURE: 98 F | WEIGHT: 145.95 LBS

## 2019-05-28 DIAGNOSIS — Z95.1 PRESENCE OF AORTOCORONARY BYPASS GRAFT: Chronic | ICD-10-CM

## 2019-05-28 PROCEDURE — 99285 EMERGENCY DEPT VISIT HI MDM: CPT

## 2019-05-28 RX ORDER — IOHEXOL 300 MG/ML
30 INJECTION, SOLUTION INTRAVENOUS ONCE
Refills: 0 | Status: COMPLETED | OUTPATIENT
Start: 2019-05-28 | End: 2019-05-29

## 2019-05-28 RX ORDER — MORPHINE SULFATE 50 MG/1
2 CAPSULE, EXTENDED RELEASE ORAL ONCE
Refills: 0 | Status: DISCONTINUED | OUTPATIENT
Start: 2019-05-28 | End: 2019-05-28

## 2019-05-28 RX ORDER — SODIUM CHLORIDE 9 MG/ML
1000 INJECTION INTRAMUSCULAR; INTRAVENOUS; SUBCUTANEOUS ONCE
Refills: 0 | Status: COMPLETED | OUTPATIENT
Start: 2019-05-28 | End: 2019-05-28

## 2019-05-28 NOTE — ED PROVIDER NOTE - PMH
Anxiety    BPH (benign prostatic hyperplasia)    CAD (coronary artery disease)    CHF (congestive heart failure)    Hypercholesterolemia    Parkinsons disease

## 2019-05-28 NOTE — ED PROVIDER NOTE - ABDOMINAL EXAM
Mild tenderness over R inguinal area without significant swelling or masses palpated/soft/nondistended

## 2019-05-28 NOTE — ED PROVIDER NOTE - CLINICAL SUMMARY MEDICAL DECISION MAKING FREE TEXT BOX
49 y/o M pt presents to ED with R inguinal pain and swelling; will obtain labs, UA, and CT to eval for inguinal hernia. Given morphine for pain, will reassess. 49 y/o M pt presents to ED with R inguinal pain and swelling; will obtain labs, UA, and CT to eval for inguinal hernia. Given morphine for pain, will reassess.    labs unremarkable, UA shows small blood and trace LE  CT A/P shows R inguinal hernia with fluid, no evidence of obstruction/incarceration noted.  Discussed above with Dr. Sears-surgical house officer over the phone and reports that w/o obstruction/incarceration recommends patient followup with surgery as outpatient.  Discussed above with patient and wife at bedside. patient well-appearing, stable for discharge.

## 2019-05-28 NOTE — ED PROVIDER NOTE - OBJECTIVE STATEMENT
47 y/o M pt with a PMHx of Parkinson's, CAD s/p CABG 2018, HTN, CHF, BPH, Hypercholesteremia and Anxiety presents to ED c/o a 3 day Hx of intermittent R severe groin pain radiating into his lower back. Pt states that earlier today he noticed a bulge in his R groin which prompted his wife to bring him to the ED for an evaluation. Pt endorses that currently the pain has improved. Pt denies any other acute complaints. NKDA.

## 2019-05-28 NOTE — ED PROVIDER NOTE - CARE PROVIDER_API CALL
Nash Rubi (MD)  Surgery  [address], [place] [id number]  Phone: [phone]  Fax: [phone]  Follow Up Time:

## 2019-05-28 NOTE — ED PROVIDER NOTE - NSFOLLOWUPINSTRUCTIONS_ED_ALL_ED_FT
for pain continue NSAIDs tylneol 650mg or ibuprofen 600mg every 6 hours.  Followup with surgeon above for reevaluation of hernia.    Return to ED if you develop severe inguinal pain, vomiting or fever >100.8F.

## 2019-05-29 VITALS
DIASTOLIC BLOOD PRESSURE: 75 MMHG | HEART RATE: 70 BPM | TEMPERATURE: 98 F | OXYGEN SATURATION: 96 % | RESPIRATION RATE: 18 BRPM | SYSTOLIC BLOOD PRESSURE: 135 MMHG

## 2019-05-29 LAB
ALBUMIN SERPL ELPH-MCNC: 3.8 G/DL — SIGNIFICANT CHANGE UP (ref 3.5–5)
ALP SERPL-CCNC: 108 U/L — SIGNIFICANT CHANGE UP (ref 40–120)
ALT FLD-CCNC: 14 U/L DA — SIGNIFICANT CHANGE UP (ref 10–60)
ANION GAP SERPL CALC-SCNC: 4 MMOL/L — LOW (ref 5–17)
APPEARANCE UR: CLEAR — SIGNIFICANT CHANGE UP
AST SERPL-CCNC: 22 U/L — SIGNIFICANT CHANGE UP (ref 10–40)
BASOPHILS # BLD AUTO: 0.01 K/UL — SIGNIFICANT CHANGE UP (ref 0–0.2)
BASOPHILS NFR BLD AUTO: 0.2 % — SIGNIFICANT CHANGE UP (ref 0–2)
BILIRUB SERPL-MCNC: 0.3 MG/DL — SIGNIFICANT CHANGE UP (ref 0.2–1.2)
BILIRUB UR-MCNC: NEGATIVE — SIGNIFICANT CHANGE UP
BUN SERPL-MCNC: 26 MG/DL — HIGH (ref 7–18)
CALCIUM SERPL-MCNC: 8.7 MG/DL — SIGNIFICANT CHANGE UP (ref 8.4–10.5)
CHLORIDE SERPL-SCNC: 108 MMOL/L — SIGNIFICANT CHANGE UP (ref 96–108)
CO2 SERPL-SCNC: 27 MMOL/L — SIGNIFICANT CHANGE UP (ref 22–31)
COLOR SPEC: YELLOW — SIGNIFICANT CHANGE UP
CREAT SERPL-MCNC: 1.18 MG/DL — SIGNIFICANT CHANGE UP (ref 0.5–1.3)
DIFF PNL FLD: ABNORMAL
EOSINOPHIL # BLD AUTO: 0.09 K/UL — SIGNIFICANT CHANGE UP (ref 0–0.5)
EOSINOPHIL NFR BLD AUTO: 1.7 % — SIGNIFICANT CHANGE UP (ref 0–6)
GLUCOSE SERPL-MCNC: 106 MG/DL — HIGH (ref 70–99)
GLUCOSE UR QL: NEGATIVE — SIGNIFICANT CHANGE UP
HCT VFR BLD CALC: 40.8 % — SIGNIFICANT CHANGE UP (ref 39–50)
HGB BLD-MCNC: 13.4 G/DL — SIGNIFICANT CHANGE UP (ref 13–17)
IMM GRANULOCYTES NFR BLD AUTO: 0.2 % — SIGNIFICANT CHANGE UP (ref 0–1.5)
KETONES UR-MCNC: ABNORMAL
LEUKOCYTE ESTERASE UR-ACNC: ABNORMAL
LIDOCAIN IGE QN: 248 U/L — SIGNIFICANT CHANGE UP (ref 73–393)
LYMPHOCYTES # BLD AUTO: 1.2 K/UL — SIGNIFICANT CHANGE UP (ref 1–3.3)
LYMPHOCYTES # BLD AUTO: 22.5 % — SIGNIFICANT CHANGE UP (ref 13–44)
MCHC RBC-ENTMCNC: 28 PG — SIGNIFICANT CHANGE UP (ref 27–34)
MCHC RBC-ENTMCNC: 32.8 GM/DL — SIGNIFICANT CHANGE UP (ref 32–36)
MCV RBC AUTO: 85.4 FL — SIGNIFICANT CHANGE UP (ref 80–100)
MONOCYTES # BLD AUTO: 0.62 K/UL — SIGNIFICANT CHANGE UP (ref 0–0.9)
MONOCYTES NFR BLD AUTO: 11.6 % — SIGNIFICANT CHANGE UP (ref 2–14)
NEUTROPHILS # BLD AUTO: 3.4 K/UL — SIGNIFICANT CHANGE UP (ref 1.8–7.4)
NEUTROPHILS NFR BLD AUTO: 63.8 % — SIGNIFICANT CHANGE UP (ref 43–77)
NITRITE UR-MCNC: NEGATIVE — SIGNIFICANT CHANGE UP
NRBC # BLD: 0 /100 WBCS — SIGNIFICANT CHANGE UP (ref 0–0)
PH UR: 6 — SIGNIFICANT CHANGE UP (ref 5–8)
PLATELET # BLD AUTO: 181 K/UL — SIGNIFICANT CHANGE UP (ref 150–400)
POTASSIUM SERPL-MCNC: 4.2 MMOL/L — SIGNIFICANT CHANGE UP (ref 3.5–5.3)
POTASSIUM SERPL-SCNC: 4.2 MMOL/L — SIGNIFICANT CHANGE UP (ref 3.5–5.3)
PROT SERPL-MCNC: 7 G/DL — SIGNIFICANT CHANGE UP (ref 6–8.3)
PROT UR-MCNC: 15
RBC # BLD: 4.78 M/UL — SIGNIFICANT CHANGE UP (ref 4.2–5.8)
RBC # FLD: 11.9 % — SIGNIFICANT CHANGE UP (ref 10.3–14.5)
SODIUM SERPL-SCNC: 139 MMOL/L — SIGNIFICANT CHANGE UP (ref 135–145)
SP GR SPEC: 1.02 — SIGNIFICANT CHANGE UP (ref 1.01–1.02)
UROBILINOGEN FLD QL: NEGATIVE — SIGNIFICANT CHANGE UP
WBC # BLD: 5.33 K/UL — SIGNIFICANT CHANGE UP (ref 3.8–10.5)
WBC # FLD AUTO: 5.33 K/UL — SIGNIFICANT CHANGE UP (ref 3.8–10.5)

## 2019-05-29 PROCEDURE — 99284 EMERGENCY DEPT VISIT MOD MDM: CPT | Mod: 25

## 2019-05-29 PROCEDURE — 87086 URINE CULTURE/COLONY COUNT: CPT

## 2019-05-29 PROCEDURE — 96374 THER/PROPH/DIAG INJ IV PUSH: CPT

## 2019-05-29 PROCEDURE — 74176 CT ABD & PELVIS W/O CONTRAST: CPT

## 2019-05-29 PROCEDURE — 83690 ASSAY OF LIPASE: CPT

## 2019-05-29 PROCEDURE — 74176 CT ABD & PELVIS W/O CONTRAST: CPT | Mod: 26

## 2019-05-29 PROCEDURE — 96375 TX/PRO/DX INJ NEW DRUG ADDON: CPT

## 2019-05-29 PROCEDURE — 81001 URINALYSIS AUTO W/SCOPE: CPT

## 2019-05-29 PROCEDURE — 80053 COMPREHEN METABOLIC PANEL: CPT

## 2019-05-29 PROCEDURE — 36415 COLL VENOUS BLD VENIPUNCTURE: CPT

## 2019-05-29 PROCEDURE — 85027 COMPLETE CBC AUTOMATED: CPT

## 2019-05-29 RX ORDER — KETOROLAC TROMETHAMINE 30 MG/ML
30 SYRINGE (ML) INJECTION ONCE
Refills: 0 | Status: DISCONTINUED | OUTPATIENT
Start: 2019-05-29 | End: 2019-05-29

## 2019-05-29 RX ADMIN — Medication 30 MILLIGRAM(S): at 04:11

## 2019-05-29 RX ADMIN — SODIUM CHLORIDE 1000 MILLILITER(S): 9 INJECTION INTRAMUSCULAR; INTRAVENOUS; SUBCUTANEOUS at 00:01

## 2019-05-29 RX ADMIN — IOHEXOL 30 MILLILITER(S): 300 INJECTION, SOLUTION INTRAVENOUS at 00:00

## 2019-05-29 RX ADMIN — MORPHINE SULFATE 2 MILLIGRAM(S): 50 CAPSULE, EXTENDED RELEASE ORAL at 01:00

## 2019-05-29 RX ADMIN — MORPHINE SULFATE 2 MILLIGRAM(S): 50 CAPSULE, EXTENDED RELEASE ORAL at 00:00

## 2019-05-30 LAB
CULTURE RESULTS: SIGNIFICANT CHANGE UP
SPECIMEN SOURCE: SIGNIFICANT CHANGE UP

## 2019-07-01 ENCOUNTER — APPOINTMENT (OUTPATIENT)
Dept: SURGERY | Facility: CLINIC | Age: 49
End: 2019-07-01
Payer: MEDICARE

## 2019-07-01 VITALS
DIASTOLIC BLOOD PRESSURE: 80 MMHG | OXYGEN SATURATION: 97 % | TEMPERATURE: 98 F | HEART RATE: 61 BPM | WEIGHT: 144 LBS | HEIGHT: 66 IN | BODY MASS INDEX: 23.14 KG/M2 | SYSTOLIC BLOOD PRESSURE: 122 MMHG

## 2019-07-01 PROCEDURE — 99203 OFFICE O/P NEW LOW 30 MIN: CPT

## 2019-07-16 ENCOUNTER — OUTPATIENT (OUTPATIENT)
Dept: OUTPATIENT SERVICES | Facility: HOSPITAL | Age: 49
LOS: 1 days | End: 2019-07-16
Payer: MEDICARE

## 2019-07-16 DIAGNOSIS — Z01.818 ENCOUNTER FOR OTHER PREPROCEDURAL EXAMINATION: ICD-10-CM

## 2019-07-16 DIAGNOSIS — Z95.1 PRESENCE OF AORTOCORONARY BYPASS GRAFT: Chronic | ICD-10-CM

## 2019-07-16 DIAGNOSIS — K40.90 UNILATERAL INGUINAL HERNIA, WITHOUT OBSTRUCTION OR GANGRENE, NOT SPECIFIED AS RECURRENT: ICD-10-CM

## 2019-07-16 PROCEDURE — 71046 X-RAY EXAM CHEST 2 VIEWS: CPT

## 2019-07-16 PROCEDURE — 71046 X-RAY EXAM CHEST 2 VIEWS: CPT | Mod: 26

## 2019-07-22 ENCOUNTER — OUTPATIENT (OUTPATIENT)
Dept: OUTPATIENT SERVICES | Facility: HOSPITAL | Age: 49
LOS: 1 days | End: 2019-07-22
Payer: MEDICARE

## 2019-07-22 VITALS
HEIGHT: 66 IN | SYSTOLIC BLOOD PRESSURE: 105 MMHG | WEIGHT: 151.9 LBS | HEART RATE: 65 BPM | OXYGEN SATURATION: 99 % | RESPIRATION RATE: 16 BRPM | TEMPERATURE: 97 F

## 2019-07-22 DIAGNOSIS — I25.10 ATHEROSCLEROTIC HEART DISEASE OF NATIVE CORONARY ARTERY WITHOUT ANGINA PECTORIS: ICD-10-CM

## 2019-07-22 DIAGNOSIS — M54.2 CERVICALGIA: Chronic | ICD-10-CM

## 2019-07-22 DIAGNOSIS — M54.12 RADICULOPATHY, CERVICAL REGION: ICD-10-CM

## 2019-07-22 DIAGNOSIS — Z01.818 ENCOUNTER FOR OTHER PREPROCEDURAL EXAMINATION: ICD-10-CM

## 2019-07-22 DIAGNOSIS — Z95.1 PRESENCE OF AORTOCORONARY BYPASS GRAFT: Chronic | ICD-10-CM

## 2019-07-22 DIAGNOSIS — K40.90 UNILATERAL INGUINAL HERNIA, WITHOUT OBSTRUCTION OR GANGRENE, NOT SPECIFIED AS RECURRENT: ICD-10-CM

## 2019-07-22 DIAGNOSIS — Z90.79 ACQUIRED ABSENCE OF OTHER GENITAL ORGAN(S): Chronic | ICD-10-CM

## 2019-07-22 PROCEDURE — G0463: CPT

## 2019-07-22 NOTE — H&P PST ADULT - NSICDXPROBLEM_GEN_ALL_CORE_FT
PROBLEM DIAGNOSES  Problem: CAD (coronary artery disease)  Assessment and Plan: Pt had CABG x 2 in 2/2018, is on Plavix and ASA 81 mg, pt stopped Plavix 7 days before sx, is continuing ASA 81 mg as per Cardiology, to notify Surgeon    Problem: Radiculopathy, cervical  Assessment and Plan: as per family unstable C1, C2 , Dr. Decker Anesthesia notified , limited ROM of neck, pt is difficult intubation risk, family to bring results of MRI of neck 2018      Problem: Potential difficult airway on pre-intubation assessment  Assessment and Plan:  Dr. Decker notified , limited ROM of neck, Mallapati III, pt has cervical radiculopathy , OR booking notified     Problem: Non-recurrent unilateral inguinal hernia without obstruction or gangrene  Assessment and Plan: Patient  is scheduled for right inguinal hernia repair on 7/24/19.

## 2019-07-22 NOTE — H&P PST ADULT - RS GEN PE MLT RESP DETAILS PC
good air movement/no wheezes/clear to auscultation bilaterally/no rhonchi/no rales breath sounds equal/no rales/good air movement/no rhonchi/no wheezes/clear to auscultation bilaterally

## 2019-07-22 NOTE — H&P PST ADULT - NEGATIVE ENMT SYMPTOMS
no tinnitus/no nasal obstruction/no abnormal taste sensation/no dry mouth/no hearing difficulty/no nose bleeds/no recurrent cold sores/no ear pain/no nasal congestion/no gum bleeding/no post-nasal discharge/no nasal discharge/no throat pain/no dysphagia

## 2019-07-22 NOTE — H&P PST ADULT - GASTROINTESTINAL DETAILS
no distention/nontender/soft bowel sounds normal/nontender/soft/no rebound tenderness/no distention/no masses palpable/no bruit

## 2019-07-22 NOTE — H&P PST ADULT - NSICDXPASTMEDICALHX_GEN_ALL_CORE_FT
PAST MEDICAL HISTORY:  Anxiety     BPH (benign prostatic hyperplasia) TURP- 1/2019    CAD (coronary artery disease) CABG x 2 - 2/2018    Cervical radiculopathy as per family unstable C1, C2 , Dr. Decker notified , limited ROM of neck, pt is difficult intubation risk, family to bring results of MRI of neck 2018    Expected difficult intubation as per family unstable C1, C2 , Dr. Decker notified , limited ROM of neck, pt is difficult intubation risk, family to bring results of MRI of neck 2018    H/O CHF     Hypercholesterolemia     Hypertension     MI (myocardial infarction) 1/2018- old    Non-recurrent unilateral inguinal hernia without obstruction or gangrene     Parkinsons disease onset 2011 PAST MEDICAL HISTORY:  Anxiety     BPH (benign prostatic hyperplasia) TURP- 1/2019    CAD (coronary artery disease) CABG x 2 - 2/2018    Cervical radiculopathy as per family unstable C1, C2 , Dr. Decker notified , limited ROM of neck, pt is difficult intubation risk, family to bring results of MRI of neck 2018    Chronic neck pain     Chronic pain of both shoulders     Constipation     Expected difficult intubation Dr. Decker notified , limited ROM of neck, Mallapati III    H/O CHF     Hypercholesterolemia     Hypertension     MI (myocardial infarction) 1/2018- old    Non-recurrent unilateral inguinal hernia without obstruction or gangrene     Parkinsons disease onset 2011    Scoliosis     Swelling of both ankles

## 2019-07-22 NOTE — H&P PST ADULT - NEGATIVE CARDIOVASCULAR SYMPTOMS
no dyspnea on exertion/no claudication/no chest pain/no orthopnea/no paroxysmal nocturnal dyspnea/no palpitations

## 2019-07-22 NOTE — H&P PST ADULT - MALLAMPATI CLASS
Class III - visualization of the soft palate and the base of the uvula/difficult intubation risk Class III - visualization of the soft palate and the base of the uvula/difficult intubation risk-Anesthesia, DR. Decker notified

## 2019-07-22 NOTE — H&P PST ADULT - ASSESSMENT
48 years old male PMH Parkinson's disease dx 2011, CAD s/p CABG (2 vessel disease, 2/2018) on Plavix and ASA, HTN, BPH s/p TURP (1/7/2019),  syncope 01/09/19, cervical radiculopathy C1 C2  diagnosed with unilateral inguinal hernia , without obstruction or gangrene, not specified as recurrent .

## 2019-07-22 NOTE — H&P PST ADULT - MUSCULOSKELETAL
negative detailed exam no calf tenderness details… diminished strength/no calf tenderness/decreased ROM due to pain/no joint swelling/no joint erythema/no joint warmth

## 2019-07-22 NOTE — H&P PST ADULT - HISTORY OF PRESENT ILLNESS
48 years old male present to [facility] with sister for evaluation before  surgery w/ PMH Parkinson's disease diagnosed 2011, CAD s/p CABG (2 vessel disease, 2/2018) on Plavix and ASA, HTN, BPH s/p TURP (1/7/2019),  syncope 01/09/19, cervical radiculopathy C1 C2 , pt was diagnosed with unilateral inguinal hernia , without obstruction or gangrene, not specified as recurrent and is scheduled for right inguinal hernia repair on 7/24/19. 48 years old male present to [facility] , assisted by sister,  for evaluation before  surgery , PMH Parkinson's disease dx 2011, CAD s/p CABG (2 vessel disease, 2/2018) on Plavix and ASA, HTN, BPH s/p TURP (1/7/2019),  syncope 01/09/19, cervical radiculopathy C1 C2 and patient was diagnosed with unilateral inguinal hernia , without obstruction or gangrene, not specified as recurrent and is scheduled for right inguinal hernia repair on 7/24/19.

## 2019-07-22 NOTE — H&P PST ADULT - NEGATIVE GENERAL GENITOURINARY SYMPTOMS
no hematuria/no gas in urine/no flank pain R/hx of BPH, hx of TURP/no incontinence/no dysuria/no renal colic/no flank pain L/normal urinary frequency/no urinary hesitancy/no urine discoloration/no bladder infections/no nocturia

## 2019-07-22 NOTE — H&P PST ADULT - ALLERGIC/IMMUNOLOGIC
Affect and characteristics of appearance, verbalizations, behaviors are appropriate negative details…

## 2019-07-22 NOTE — H&P PST ADULT - NEUROLOGICAL DETAILS
deep reflexes intact/alert and oriented x 3/cranial nerves intact/normal strength/responds to pain/responds to verbal commands/sensation intact sensation intact/deep reflexes intact/responds to verbal commands/cranial nerves intact/strength decreased/alert and oriented x 3/responds to pain

## 2019-07-22 NOTE — H&P PST ADULT - NSICDXFAMILYHX_GEN_ALL_CORE_FT
FAMILY HISTORY:  Father  Still living? Unknown  Family history of acute myocardial infarction, Age at diagnosis: Age Unknown    Mother  Still living? Unknown  Family history of acute myocardial infarction, Age at diagnosis: Age Unknown    Sibling  Still living? Unknown  Family history of acute myocardial infarction, Age at diagnosis: Age Unknown

## 2019-07-22 NOTE — H&P PST ADULT - NEGATIVE OPHTHALMOLOGIC SYMPTOMS
no lacrimation R/no blurred vision L/no pain R/no irritation R/no irritation L/no lacrimation L/no blurred vision R/no discharge R/no photophobia/no pain L/no diplopia

## 2019-07-22 NOTE — H&P PST ADULT - NEGATIVE NEUROLOGICAL SYMPTOMS
no generalized seizures/no loss of consciousness/no hemiparesis/no loss of sensation/no tremors/no paresthesias/no transient paralysis/no focal seizures/no syncope/no facial palsy/no headache/no confusion/no vertigo

## 2019-07-24 ENCOUNTER — APPOINTMENT (OUTPATIENT)
Dept: SURGERY | Facility: HOSPITAL | Age: 49
End: 2019-07-24

## 2019-09-05 NOTE — ED PROVIDER NOTE - NS HIV RISK FACTOR YES
Occupational Therapy Daily Treatment    Visit Count: 8  Plan of Care: 8/14/2019 Through: 9/25/2019  Insurance Information: 2019 Medicare  Co pay = NONE  24 visits  KX after 16th visit  $0  Used  Referred by: Laurie Kendall MD; Next provider visit (if known/scheduled): to be scheduled  Medical Diagnosis (from order):       Diagnosis Information             Diagnosis      726.12 (ICD-9-CM) - M75.21 (ICD-10-CM) - Biceps tendonitis on right                  Treatment Diagnosis: shoulder/ upper arm symptoms with increased pain/symptoms, impaired posture, impaired strength, impaired range of motion, impaired activity tolerance     Date of onset/injury: 7 months - gradual progression of anterior upper arm and has now progressed superiorly into upper shoulder as well.  Patient reports difficulty with \"just about everything\" including sleeping, lifting a cup of coffee, full body dressing, house chores.  Patient denies tingling or numbness.   Diagnosis Precautions: none  Chart reviewed at time of initial evaluation (relevant co-morbidities, allergies, tests and medications listed):  Co-morbidities: none per patient  Allergies: none per patient  Medication: meloxicam - patient reports good symptom relief, almost through with prescription    SUBJECTIVE   Pain, decreased shoulder AROM, strength. Shoulder is a little better since last session but still sore from playing with grandkids. Kinesiotape may have helped a little.   Previously. Patient saw MD. She states there are a 'couple tears and a bone spur'. Continue therapy.  Current Pain (0-10 scale): 4 with movement and use   Functional Change: painful reaching and difficulty sleeping      OBJECTIVE     Active shoulder flexion     Treatment     Therapeutic Exercise:   UBE level 1.0 x 4 minutes alternating  Pulleys- shoulder flexion x 10  Scapular circles (elevation, retraction, depression, protraction) x 10  Supine cane active assisted range of motion shoulder flexion x  8-improving motion through repetitions  Supine cane active assisted range of motion scapular protraction x 8 reps  Side lying shoulder ER x 8 reps   4-point alternating arms x 10  Yellow tband rows x 10  Yellow t-band shoulder extension x 10  Yellow T-band ER/IR x 10  Wall slides shoulder flexion x 10  Finger ladder shoulder flexion to 21,21,21 (eccentric lowering x 2 repetitions with painful arc reported)    Manual Therapy:   Passive range of motion to RIGHT shoulder, patient in supine, in all planes with gentle stretching at end ranges to patient tolerance  Passive IR stretches, therapist stabilizing anterior shoulder  Curved glides to glenohumeral joint with arm pre positioned into internal rotation (hand on abdomen) to facilitate decreased tightness over posterior superior shoulder x 40 seconds.      Therapeutic Activity:  Encouraged patient to continue with HEP as recommended for best out come  Kinesiotape to inhibit proximal biceps and to lateral shoulder for support/pain relief    Skilled input: verbal instruction/cues, tactile instruction/cues, posture correction    Home Program:   Exercise: Date issued Date DC Comments   Scapular retraction  8/14/19   10 reps; 3 times/day   Scapular circles (elevation, retraction, depression, protraction) 8/14/19   10 reps; 5-7 times/day   Table top slides  Added diagonals on 8/16/19 8/14/19   10 reps; 3 times/day   Isometric ER/IR  8/20/2019     10 times; 1-2 times daily    Pulleys shoulder flexion 8/20/2019    Patient will order on amaz[employer]; 10 repetitions; 2-3 times daily   Wall slides shoulder flexion 8/27/2019   5-10 reps, 1-2 times daily   Yellow t-band rows  ER/IR 8/27/2019 9/5/2019    10 times; 1-2 times daily   Supine on foam roll pec stretch 8/29/19  60 seconds; daily                       Writer verbally educated the patient and received verbal consent from the patient on hand placement, positioning of patient, and techniques to be performed today including  clothing adjustments for techniques, therapist position for techniques, hand placement and palpation for techniques, modality application as described above and how they are pertinent to the patient's plan of care.       Suggestions for next session as indicated: progress per plan of care  UBE  Pulleys-ordered on [employer]  PROM to RIGHT shoulder in supine, bursal massage  Supine cane active assisted range of motion   Wall slides  Finger ladder-try eccentric lowering again  T-band - progress  4 point/prone scapular exercise progress    ASSESSMENT   Increased active assisted range of motion today with less pain. Progressed t-band rotator cuff strengthening exercises today without pain increased. Some benefit reported from kinesiotape.    Pain after treatment (patient reported, 0-10 scale): not rated  Result of above outlined education: Needs reinforcement    THERAPY DAILY BILLING   Insurance: MEDICARE 2.     Evaluation Procedures:  No evaluation codes were used on this date of service    Timed Procedures:  Manual Therapy, 15 minutes  Therapeutic Exercise, 25 minutes    Untimed Procedures:  No untimed codes were used on this date of service (no charge)    Total Treatment Time: 40 minutes   Declined

## 2019-10-31 PROCEDURE — 82553 CREATINE MB FRACTION: CPT

## 2019-10-31 PROCEDURE — 93005 ELECTROCARDIOGRAM TRACING: CPT

## 2019-10-31 PROCEDURE — 85027 COMPLETE CBC AUTOMATED: CPT

## 2019-10-31 PROCEDURE — 93306 TTE W/DOPPLER COMPLETE: CPT

## 2019-10-31 PROCEDURE — 80048 BASIC METABOLIC PNL TOTAL CA: CPT

## 2019-10-31 PROCEDURE — 84100 ASSAY OF PHOSPHORUS: CPT

## 2019-10-31 PROCEDURE — 83735 ASSAY OF MAGNESIUM: CPT

## 2019-10-31 PROCEDURE — 82746 ASSAY OF FOLIC ACID SERUM: CPT

## 2019-10-31 PROCEDURE — 70450 CT HEAD/BRAIN W/O DYE: CPT

## 2019-10-31 PROCEDURE — 83036 HEMOGLOBIN GLYCOSYLATED A1C: CPT

## 2019-10-31 PROCEDURE — 84443 ASSAY THYROID STIM HORMONE: CPT

## 2019-10-31 PROCEDURE — 82550 ASSAY OF CK (CPK): CPT

## 2019-10-31 PROCEDURE — 36415 COLL VENOUS BLD VENIPUNCTURE: CPT

## 2019-10-31 PROCEDURE — 80061 LIPID PANEL: CPT

## 2019-10-31 PROCEDURE — 99285 EMERGENCY DEPT VISIT HI MDM: CPT | Mod: 25

## 2019-10-31 PROCEDURE — 82607 VITAMIN B-12: CPT

## 2019-10-31 PROCEDURE — 84484 ASSAY OF TROPONIN QUANT: CPT

## 2019-10-31 PROCEDURE — 81001 URINALYSIS AUTO W/SCOPE: CPT

## 2019-11-15 PROBLEM — M54.12 RADICULOPATHY, CERVICAL REGION: Chronic | Status: ACTIVE | Noted: 2019-07-22

## 2019-11-15 PROBLEM — I25.10 ATHEROSCLEROTIC HEART DISEASE OF NATIVE CORONARY ARTERY WITHOUT ANGINA PECTORIS: Chronic | Status: ACTIVE | Noted: 2019-01-09

## 2019-11-15 PROBLEM — N40.0 BENIGN PROSTATIC HYPERPLASIA WITHOUT LOWER URINARY TRACT SYMPTOMS: Chronic | Status: ACTIVE | Noted: 2018-12-06

## 2019-11-15 PROBLEM — M41.9 SCOLIOSIS, UNSPECIFIED: Chronic | Status: ACTIVE | Noted: 2019-07-22

## 2019-11-15 PROBLEM — Z86.79 PERSONAL HISTORY OF OTHER DISEASES OF THE CIRCULATORY SYSTEM: Chronic | Status: ACTIVE | Noted: 2019-07-22

## 2019-11-15 PROBLEM — M25.511 PAIN IN RIGHT SHOULDER: Chronic | Status: ACTIVE | Noted: 2019-07-22

## 2019-11-15 PROBLEM — I10 ESSENTIAL (PRIMARY) HYPERTENSION: Chronic | Status: ACTIVE | Noted: 2019-07-22

## 2019-11-15 PROBLEM — M54.2 CERVICALGIA: Chronic | Status: ACTIVE | Noted: 2019-07-22

## 2019-11-19 PROBLEM — Z82.49 FAMILY HISTORY OF CARDIOVASCULAR DISEASE: Status: ACTIVE | Noted: 2019-11-19

## 2019-11-19 PROBLEM — Z86.59 HISTORY OF DEPRESSION: Status: RESOLVED | Noted: 2019-11-19 | Resolved: 2019-11-19

## 2019-11-19 PROBLEM — Z86.69 HISTORY OF PARKINSON'S DISEASE: Status: RESOLVED | Noted: 2019-11-19 | Resolved: 2019-11-19

## 2019-11-19 PROBLEM — Z86.79 HISTORY OF ESSENTIAL HYPERTENSION: Status: RESOLVED | Noted: 2019-11-19 | Resolved: 2019-11-19

## 2019-11-19 PROBLEM — Z86.39 HISTORY OF HIGH CHOLESTEROL: Status: RESOLVED | Noted: 2019-11-19 | Resolved: 2019-11-19

## 2019-11-19 PROBLEM — Z86.79 HISTORY OF CORONARY ARTERY DISEASE: Status: RESOLVED | Noted: 2019-11-19 | Resolved: 2019-11-19

## 2019-11-19 PROBLEM — Z87.438 HISTORY OF BENIGN PROSTATIC HYPERPLASIA: Status: RESOLVED | Noted: 2019-11-19 | Resolved: 2019-11-19

## 2019-11-19 PROBLEM — Z83.3 FAMILY HISTORY OF TYPE 2 DIABETES MELLITUS: Status: ACTIVE | Noted: 2019-11-19

## 2019-11-19 PROBLEM — Z87.891 FORMER SMOKER: Status: ACTIVE | Noted: 2019-11-19

## 2019-11-21 ENCOUNTER — APPOINTMENT (OUTPATIENT)
Dept: SURGERY | Facility: CLINIC | Age: 49
End: 2019-11-21
Payer: MEDICARE

## 2019-11-21 VITALS
TEMPERATURE: 98.2 F | DIASTOLIC BLOOD PRESSURE: 88 MMHG | BODY MASS INDEX: 23.46 KG/M2 | HEIGHT: 66 IN | OXYGEN SATURATION: 98 % | HEART RATE: 84 BPM | SYSTOLIC BLOOD PRESSURE: 133 MMHG | WEIGHT: 146 LBS

## 2019-11-21 DIAGNOSIS — Z86.59 PERSONAL HISTORY OF OTHER MENTAL AND BEHAVIORAL DISORDERS: ICD-10-CM

## 2019-11-21 DIAGNOSIS — Z86.39 PERSONAL HISTORY OF OTHER ENDOCRINE, NUTRITIONAL AND METABOLIC DISEASE: ICD-10-CM

## 2019-11-21 DIAGNOSIS — Z86.79 PERSONAL HISTORY OF OTHER DISEASES OF THE CIRCULATORY SYSTEM: ICD-10-CM

## 2019-11-21 DIAGNOSIS — Z86.69 PERSONAL HISTORY OF OTHER DISEASES OF THE NERVOUS SYSTEM AND SENSE ORGANS: ICD-10-CM

## 2019-11-21 DIAGNOSIS — Z87.891 PERSONAL HISTORY OF NICOTINE DEPENDENCE: ICD-10-CM

## 2019-11-21 DIAGNOSIS — Z87.438 PERSONAL HISTORY OF OTHER DISEASES OF MALE GENITAL ORGANS: ICD-10-CM

## 2019-11-21 DIAGNOSIS — Z83.3 FAMILY HISTORY OF DIABETES MELLITUS: ICD-10-CM

## 2019-11-21 DIAGNOSIS — Z82.49 FAMILY HISTORY OF ISCHEMIC HEART DISEASE AND OTHER DISEASES OF THE CIRCULATORY SYSTEM: ICD-10-CM

## 2019-11-21 PROCEDURE — 99213 OFFICE O/P EST LOW 20 MIN: CPT

## 2019-11-21 NOTE — HISTORY OF PRESENT ILLNESS
[de-identified] : Mr. ISAIAH FRAGOSO is  a 49 year old male who  is returning to the office with the chief complaint of having pain and swelling in his Right  groin.  he was diagnosed with RIH but had to undergo  cervical spine fusion.  He is stating that the hernia is getting bigger and symptomatic. He denies any fever or  night sweats. Appetite is good and weight is stable. \par  \par  \par

## 2019-11-21 NOTE — REVIEW OF SYSTEMS
[As Noted in HPI] : as noted in HPI [Arthralgias] : arthralgias [Joint Stiffness] : joint stiffness [Negative] : Heme/Lymph

## 2019-11-21 NOTE — PLAN
[FreeTextEntry1] : Mr. FRAGOSO  was told significance of findings, options, risks and benefits were explained.  Informed consent for right inguinal hernia repair with mesh and potential risks, benefits and alternatives (surgical options were discussed including non-surgical options or the option of no surgery) to the planned surgery were discussed in depth.  All surgical options were discussed including non-surgical treatments.  He wishes to proceed with surgery.  We will plan for surgery on at the next available date, pending any required insurance pre-certification or pre-approval. He agrees to obtain any necessary pre-operative evaluations and testing prior to surgery.\par Patient advised to seek immediate medical attention with any acute change in symptoms or with the development of any new or worsening symptoms.  Patient's questions and concerns addressed to patient's satisfaction, and patient verbalized an understanding of the information discussed.\par \par

## 2019-12-12 ENCOUNTER — OUTPATIENT (OUTPATIENT)
Dept: OUTPATIENT SERVICES | Facility: HOSPITAL | Age: 49
LOS: 1 days | End: 2019-12-12
Payer: MEDICARE

## 2019-12-12 VITALS
DIASTOLIC BLOOD PRESSURE: 73 MMHG | WEIGHT: 156.09 LBS | RESPIRATION RATE: 17 BRPM | OXYGEN SATURATION: 99 % | SYSTOLIC BLOOD PRESSURE: 111 MMHG | HEIGHT: 66 IN | TEMPERATURE: 98 F | HEART RATE: 87 BPM

## 2019-12-12 DIAGNOSIS — Z98.1 ARTHRODESIS STATUS: ICD-10-CM

## 2019-12-12 DIAGNOSIS — K40.30 UNILATERAL INGUINAL HERNIA, WITH OBSTRUCTION, WITHOUT GANGRENE, NOT SPECIFIED AS RECURRENT: ICD-10-CM

## 2019-12-12 DIAGNOSIS — K40.90 UNILATERAL INGUINAL HERNIA, WITHOUT OBSTRUCTION OR GANGRENE, NOT SPECIFIED AS RECURRENT: ICD-10-CM

## 2019-12-12 DIAGNOSIS — I10 ESSENTIAL (PRIMARY) HYPERTENSION: ICD-10-CM

## 2019-12-12 DIAGNOSIS — Z90.79 ACQUIRED ABSENCE OF OTHER GENITAL ORGAN(S): Chronic | ICD-10-CM

## 2019-12-12 DIAGNOSIS — Z01.818 ENCOUNTER FOR OTHER PREPROCEDURAL EXAMINATION: ICD-10-CM

## 2019-12-12 DIAGNOSIS — Z95.1 PRESENCE OF AORTOCORONARY BYPASS GRAFT: Chronic | ICD-10-CM

## 2019-12-12 DIAGNOSIS — Z86.79 PERSONAL HISTORY OF OTHER DISEASES OF THE CIRCULATORY SYSTEM: ICD-10-CM

## 2019-12-12 DIAGNOSIS — Z98.1 ARTHRODESIS STATUS: Chronic | ICD-10-CM

## 2019-12-12 PROCEDURE — G0463: CPT

## 2019-12-12 RX ORDER — FUROSEMIDE 40 MG
1 TABLET ORAL
Qty: 0 | Refills: 0 | DISCHARGE

## 2019-12-12 RX ORDER — CHOLECALCIFEROL (VITAMIN D3) 125 MCG
1 CAPSULE ORAL
Qty: 0 | Refills: 0 | DISCHARGE

## 2019-12-12 RX ORDER — FERROUS GLUCONATE 100 %
1 POWDER (GRAM) MISCELLANEOUS
Qty: 0 | Refills: 0 | DISCHARGE

## 2019-12-12 NOTE — H&P PST ADULT - HISTORY OF PRESENT ILLNESS
49 year old male with history CAD CABG times 2 vessels) CHF, HTN, Parkinson's neck pain recently had C1 C2 cervical fusion with screws done in Sept 2019. Pt had the inguinal hernia but waited for repair due to the cervical neck pain. Pt has the right inguinal hernia and is schedule for repair with mesh 12/18/2019. Pt came with his sister who supplied medical history for her brother.

## 2019-12-12 NOTE — H&P PST ADULT - NSICDXPROBLEM_GEN_ALL_CORE_FT
PROBLEM DIAGNOSES  Problem: Unilateral inguinal hernia with obstruction and without gangrene, recurrence not specified  Assessment and Plan: Pt schedule for repair right inguinal hernia with mesh    Problem: S/P cervical spinal fusion  Assessment and Plan: Sep 2019 c1 and c2 fused with screw pt wears c collar for support     Problem: HTN (hypertension)  Assessment and Plan: Pt instructed to take bp medication am of surgery    Problem: History of CHF (congestive heart failure)  Assessment and Plan: as per Dr. Sharma limited IV fluids monitor vss per and post procedure

## 2019-12-12 NOTE — H&P PST ADULT - NSICDXPASTMEDICALHX_GEN_ALL_CORE_FT
PAST MEDICAL HISTORY:  Anxiety     BPH (benign prostatic hyperplasia) TURP- 1/2019    CAD (coronary artery disease) CABG x 2 - 2/2018    Cervical radiculopathy as per family unstable C1, C2 , Dr. Decker notified , limited ROM of neck, pt is difficult intubation risk, family to bring results of MRI of neck 2018    Chronic neck pain     Chronic pain of both shoulders     Constipation     Expected difficult intubation Dr. Decker notified , limited ROM of neck, Mallapati III    H/O CHF     Hypercholesterolemia     Hypertension     MI (myocardial infarction) 1/2018- old    Non-recurrent unilateral inguinal hernia without obstruction or gangrene     Parkinsons disease onset 2011    Scoliosis     Swelling of both ankles     Unilateral inguinal hernia, without obstruction or gangrene, not specified as recurrent

## 2019-12-12 NOTE — H&P PST ADULT - NSICDXPASTSURGICALHX_GEN_ALL_CORE_FT
PAST SURGICAL HISTORY:  S/P CABG (coronary artery bypass graft) 2/2018    S/P cervical spinal fusion c1 c2 screws    S/P TURP 1/2019

## 2019-12-18 ENCOUNTER — APPOINTMENT (OUTPATIENT)
Dept: SURGERY | Facility: HOSPITAL | Age: 49
End: 2019-12-18
Payer: MEDICARE

## 2019-12-18 ENCOUNTER — OUTPATIENT (OUTPATIENT)
Dept: OUTPATIENT SERVICES | Facility: HOSPITAL | Age: 49
LOS: 1 days | End: 2019-12-18
Payer: MEDICARE

## 2019-12-18 VITALS
HEIGHT: 66 IN | OXYGEN SATURATION: 97 % | DIASTOLIC BLOOD PRESSURE: 69 MMHG | WEIGHT: 156.09 LBS | TEMPERATURE: 98 F | SYSTOLIC BLOOD PRESSURE: 122 MMHG | RESPIRATION RATE: 17 BRPM | HEART RATE: 89 BPM

## 2019-12-18 VITALS
TEMPERATURE: 99 F | OXYGEN SATURATION: 98 % | SYSTOLIC BLOOD PRESSURE: 115 MMHG | RESPIRATION RATE: 15 BRPM | HEART RATE: 88 BPM | DIASTOLIC BLOOD PRESSURE: 70 MMHG

## 2019-12-18 DIAGNOSIS — Z95.1 PRESENCE OF AORTOCORONARY BYPASS GRAFT: Chronic | ICD-10-CM

## 2019-12-18 DIAGNOSIS — Z01.818 ENCOUNTER FOR OTHER PREPROCEDURAL EXAMINATION: ICD-10-CM

## 2019-12-18 DIAGNOSIS — Z90.79 ACQUIRED ABSENCE OF OTHER GENITAL ORGAN(S): Chronic | ICD-10-CM

## 2019-12-18 DIAGNOSIS — Z98.1 ARTHRODESIS STATUS: Chronic | ICD-10-CM

## 2019-12-18 DIAGNOSIS — K40.90 UNILATERAL INGUINAL HERNIA, WITHOUT OBSTRUCTION OR GANGRENE, NOT SPECIFIED AS RECURRENT: ICD-10-CM

## 2019-12-18 PROCEDURE — 49505 PRP I/HERN INIT REDUC >5 YR: CPT | Mod: AS,RT

## 2019-12-18 PROCEDURE — 49505 PRP I/HERN INIT REDUC >5 YR: CPT | Mod: RT

## 2019-12-18 PROCEDURE — C1781: CPT

## 2019-12-18 RX ORDER — ACETAMINOPHEN WITH CODEINE 300MG-30MG
1 TABLET ORAL EVERY 4 HOURS
Refills: 0 | Status: DISCONTINUED | OUTPATIENT
Start: 2019-12-18 | End: 2019-12-18

## 2019-12-18 RX ORDER — SODIUM CHLORIDE 9 MG/ML
3 INJECTION INTRAMUSCULAR; INTRAVENOUS; SUBCUTANEOUS EVERY 8 HOURS
Refills: 0 | Status: DISCONTINUED | OUTPATIENT
Start: 2019-12-18 | End: 2019-12-18

## 2019-12-18 RX ORDER — ACETAMINOPHEN WITH CODEINE 300MG-30MG
1 TABLET ORAL
Qty: 8 | Refills: 0
Start: 2019-12-18

## 2019-12-18 NOTE — ASU DISCHARGE PLAN (ADULT/PEDIATRIC) - CARE PROVIDER_API CALL
Jay Victoria)  Surgery  [address] [facility], [place] [address]  Phone: [phone]  Fax: [phone]  Follow Up Time:

## 2019-12-19 ENCOUNTER — EMERGENCY (EMERGENCY)
Facility: HOSPITAL | Age: 49
LOS: 1 days | Discharge: HOME | End: 2019-12-19
Attending: EMERGENCY MEDICINE
Payer: MEDICARE

## 2019-12-19 VITALS
HEART RATE: 112 BPM | HEIGHT: 66 IN | OXYGEN SATURATION: 98 % | TEMPERATURE: 99 F | DIASTOLIC BLOOD PRESSURE: 99 MMHG | WEIGHT: 139.99 LBS | SYSTOLIC BLOOD PRESSURE: 158 MMHG | RESPIRATION RATE: 22 BRPM

## 2019-12-19 VITALS
HEART RATE: 93 BPM | OXYGEN SATURATION: 100 % | TEMPERATURE: 99 F | SYSTOLIC BLOOD PRESSURE: 116 MMHG | RESPIRATION RATE: 20 BRPM | DIASTOLIC BLOOD PRESSURE: 67 MMHG

## 2019-12-19 DIAGNOSIS — Z98.1 ARTHRODESIS STATUS: Chronic | ICD-10-CM

## 2019-12-19 DIAGNOSIS — Z95.1 PRESENCE OF AORTOCORONARY BYPASS GRAFT: Chronic | ICD-10-CM

## 2019-12-19 DIAGNOSIS — Z90.79 ACQUIRED ABSENCE OF OTHER GENITAL ORGAN(S): Chronic | ICD-10-CM

## 2019-12-19 LAB
ALBUMIN SERPL ELPH-MCNC: 3.3 G/DL — LOW (ref 3.5–5)
ALP SERPL-CCNC: 98 U/L — SIGNIFICANT CHANGE UP (ref 40–120)
ALT FLD-CCNC: 10 U/L DA — SIGNIFICANT CHANGE UP (ref 10–60)
ANION GAP SERPL CALC-SCNC: 8 MMOL/L — SIGNIFICANT CHANGE UP (ref 5–17)
APPEARANCE UR: CLEAR — SIGNIFICANT CHANGE UP
APTT BLD: 25.8 SEC — LOW (ref 27.5–36.3)
AST SERPL-CCNC: 32 U/L — SIGNIFICANT CHANGE UP (ref 10–40)
BILIRUB SERPL-MCNC: 0.5 MG/DL — SIGNIFICANT CHANGE UP (ref 0.2–1.2)
BILIRUB UR-MCNC: NEGATIVE — SIGNIFICANT CHANGE UP
BUN SERPL-MCNC: 15 MG/DL — SIGNIFICANT CHANGE UP (ref 7–18)
CALCIUM SERPL-MCNC: 9.1 MG/DL — SIGNIFICANT CHANGE UP (ref 8.4–10.5)
CHLORIDE SERPL-SCNC: 104 MMOL/L — SIGNIFICANT CHANGE UP (ref 96–108)
CO2 SERPL-SCNC: 27 MMOL/L — SIGNIFICANT CHANGE UP (ref 22–31)
COLOR SPEC: YELLOW — SIGNIFICANT CHANGE UP
CREAT SERPL-MCNC: 1.13 MG/DL — SIGNIFICANT CHANGE UP (ref 0.5–1.3)
D DIMER BLD IA.RAPID-MCNC: 292 NG/ML DDU — HIGH
DIFF PNL FLD: ABNORMAL
EPI CELLS # UR: SIGNIFICANT CHANGE UP /HPF
GLUCOSE SERPL-MCNC: 111 MG/DL — HIGH (ref 70–99)
GLUCOSE UR QL: NEGATIVE — SIGNIFICANT CHANGE UP
HCT VFR BLD CALC: 40.4 % — SIGNIFICANT CHANGE UP (ref 39–50)
HGB BLD-MCNC: 13 G/DL — SIGNIFICANT CHANGE UP (ref 13–17)
INR BLD: 1.3 RATIO — HIGH (ref 0.88–1.16)
KETONES UR-MCNC: ABNORMAL
LEUKOCYTE ESTERASE UR-ACNC: ABNORMAL
LIDOCAIN IGE QN: 71 U/L — LOW (ref 73–393)
MCHC RBC-ENTMCNC: 28 PG — SIGNIFICANT CHANGE UP (ref 27–34)
MCHC RBC-ENTMCNC: 32.2 GM/DL — SIGNIFICANT CHANGE UP (ref 32–36)
MCV RBC AUTO: 86.9 FL — SIGNIFICANT CHANGE UP (ref 80–100)
NITRITE UR-MCNC: NEGATIVE — SIGNIFICANT CHANGE UP
NRBC # BLD: 0 /100 WBCS — SIGNIFICANT CHANGE UP (ref 0–0)
NT-PROBNP SERPL-SCNC: 1829 PG/ML — HIGH (ref 0–125)
PH UR: 7 — SIGNIFICANT CHANGE UP (ref 5–8)
PLATELET # BLD AUTO: 211 K/UL — SIGNIFICANT CHANGE UP (ref 150–400)
POTASSIUM SERPL-MCNC: 4.3 MMOL/L — SIGNIFICANT CHANGE UP (ref 3.5–5.3)
POTASSIUM SERPL-SCNC: 4.3 MMOL/L — SIGNIFICANT CHANGE UP (ref 3.5–5.3)
PROT SERPL-MCNC: 7.6 G/DL — SIGNIFICANT CHANGE UP (ref 6–8.3)
PROT UR-MCNC: 15
PROTHROM AB SERPL-ACNC: 14.6 SEC — HIGH (ref 10–12.9)
RBC # BLD: 4.65 M/UL — SIGNIFICANT CHANGE UP (ref 4.2–5.8)
RBC # FLD: 12 % — SIGNIFICANT CHANGE UP (ref 10.3–14.5)
RBC CASTS # UR COMP ASSIST: ABNORMAL /HPF (ref 0–2)
SODIUM SERPL-SCNC: 139 MMOL/L — SIGNIFICANT CHANGE UP (ref 135–145)
SP GR SPEC: 1.01 — SIGNIFICANT CHANGE UP (ref 1.01–1.02)
TROPONIN I SERPL-MCNC: <0.015 NG/ML — SIGNIFICANT CHANGE UP (ref 0–0.04)
UROBILINOGEN FLD QL: NEGATIVE — SIGNIFICANT CHANGE UP
WBC # BLD: 13.41 K/UL — HIGH (ref 3.8–10.5)
WBC # FLD AUTO: 13.41 K/UL — HIGH (ref 3.8–10.5)
WBC UR QL: ABNORMAL /HPF (ref 0–5)

## 2019-12-19 PROCEDURE — 83690 ASSAY OF LIPASE: CPT

## 2019-12-19 PROCEDURE — 84484 ASSAY OF TROPONIN QUANT: CPT

## 2019-12-19 PROCEDURE — 85027 COMPLETE CBC AUTOMATED: CPT

## 2019-12-19 PROCEDURE — 36415 COLL VENOUS BLD VENIPUNCTURE: CPT

## 2019-12-19 PROCEDURE — 85730 THROMBOPLASTIN TIME PARTIAL: CPT

## 2019-12-19 PROCEDURE — 85610 PROTHROMBIN TIME: CPT

## 2019-12-19 PROCEDURE — 93010 ELECTROCARDIOGRAM REPORT: CPT

## 2019-12-19 PROCEDURE — 93005 ELECTROCARDIOGRAM TRACING: CPT

## 2019-12-19 PROCEDURE — 99285 EMERGENCY DEPT VISIT HI MDM: CPT

## 2019-12-19 PROCEDURE — 71275 CT ANGIOGRAPHY CHEST: CPT | Mod: 26

## 2019-12-19 PROCEDURE — 83880 ASSAY OF NATRIURETIC PEPTIDE: CPT

## 2019-12-19 PROCEDURE — 71045 X-RAY EXAM CHEST 1 VIEW: CPT | Mod: 26

## 2019-12-19 PROCEDURE — 71275 CT ANGIOGRAPHY CHEST: CPT

## 2019-12-19 PROCEDURE — 74177 CT ABD & PELVIS W/CONTRAST: CPT | Mod: 26

## 2019-12-19 PROCEDURE — 85379 FIBRIN DEGRADATION QUANT: CPT

## 2019-12-19 PROCEDURE — 99285 EMERGENCY DEPT VISIT HI MDM: CPT | Mod: 25

## 2019-12-19 PROCEDURE — 80053 COMPREHEN METABOLIC PANEL: CPT

## 2019-12-19 PROCEDURE — 74177 CT ABD & PELVIS W/CONTRAST: CPT

## 2019-12-19 PROCEDURE — 81001 URINALYSIS AUTO W/SCOPE: CPT

## 2019-12-19 PROCEDURE — 71045 X-RAY EXAM CHEST 1 VIEW: CPT

## 2019-12-19 RX ORDER — AZITHROMYCIN 500 MG/1
1 TABLET, FILM COATED ORAL
Qty: 4 | Refills: 0
Start: 2019-12-19 | End: 2019-12-22

## 2019-12-19 RX ORDER — AZITHROMYCIN 500 MG/1
500 TABLET, FILM COATED ORAL ONCE
Refills: 0 | Status: COMPLETED | OUTPATIENT
Start: 2019-12-19 | End: 2019-12-19

## 2019-12-19 RX ADMIN — AZITHROMYCIN 500 MILLIGRAM(S): 500 TABLET, FILM COATED ORAL at 13:34

## 2019-12-19 NOTE — ED PROVIDER NOTE - CARE PROVIDER_API CALL
Jay Victoria)  Surgery  [address] [facility], [place] [address]  Phone: [phone]  Fax: [phone]  Follow Up Time:

## 2019-12-19 NOTE — ED PROVIDER NOTE - NSFOLLOWUPINSTRUCTIONS_ED_ALL_ED_FT
Take meds as prescribed.  Take pain meds as prescribed by the surgeon.  Drink plenty of fluids.  Follow up with your doctor/surgeon or in the Clinic as discussed within 2 days.  Return to the ER for any concerning symptoms.

## 2019-12-19 NOTE — ED PROVIDER NOTE - CLINICAL SUMMARY MEDICAL DECISION MAKING FREE TEXT BOX
Patient with right side abdominal tenderness with recent hernia surgery. Will Ct abdomen, cardiac enzyme, and D-Dimer. If d-dimer positive, will get CTA chest to r/o PE.

## 2019-12-19 NOTE — ED PROVIDER NOTE - PROGRESS NOTE DETAILS
UA w likely contamination, nonspecific inflammation. cx pending. Pt asymptomatic. Sx PA examined pt at bedside, no acute intervention. Will DC w pain meds as per Sx (Tylneol 3), will give Zpack. DC w PCP and Sx as needed/scheduled in the office. Recommended Miralax OTC for constipation

## 2019-12-19 NOTE — ED ADULT NURSE NOTE - PMH
Anxiety    BPH (benign prostatic hyperplasia)  TURP- 1/2019  CAD (coronary artery disease)  CABG x 2 - 2/2018  Cervical radiculopathy  as per family unstable C1, C2 , Dr. Decker notified , limited ROM of neck, pt is difficult intubation risk, family to bring results of MRI of neck 2018  Chronic neck pain    Chronic pain of both shoulders    Constipation    Expected difficult intubation  Dr. Decker notified , limited ROM of neck, Mallapati III  H/O CHF    Hypercholesterolemia    Hypertension    MI (myocardial infarction)  1/2018- old  Non-recurrent unilateral inguinal hernia without obstruction or gangrene    Parkinsons disease  onset 2011  Scoliosis    Swelling of both ankles    Unilateral inguinal hernia, without obstruction or gangrene, not specified as recurrent

## 2019-12-19 NOTE — ED PROVIDER NOTE - OBJECTIVE STATEMENT
50 y/o M patient with a significant PMHx of parkinson's disease  and significant PSHx of right inguinal hernia repair (with Dr. Hagan)  presents to the ED with c/o intermittent cough that is productive with light brown, shortness of breath since last night. Patient's sister reports having a increased HR with fever and chills. Patient states that the also having runny nose for the last couple of weeks. Patient denies any chest congestion, or any other complains.

## 2019-12-19 NOTE — ED PROVIDER NOTE - PATIENT PORTAL LINK FT
You can access the FollowMyHealth Patient Portal offered by [facility] by registering at the following website: [email or web address]. By joining [employer]’s FollowMyHealth portal, you will also be able to view your health information using other applications (apps) compatible with our system.

## 2019-12-19 NOTE — ED ADULT NURSE NOTE - NSIMPLEMENTINTERV_GEN_ALL_ED
Implemented All Fall Risk Interventions:  [place] to call system. Call bell, personal items and telephone within reach. Instruct patient to call for assistance. Room bathroom lighting operational. Non-slip footwear when patient is off stretcher. Physically safe environment: no spills, clutter or unnecessary equipment. Stretcher in lowest position, wheels locked, appropriate side rails in place. Provide visual cue, wrist band, yellow gown, etc. Monitor gait and stability. Monitor for mental status changes and reorient to person, place, and time. Review medications for side effects contributing to fall risk. Reinforce activity limits and safety measures with patient and family.

## 2019-12-19 NOTE — ED ADULT NURSE NOTE - OBJECTIVE STATEMENT
Pt AOx4, ambulatory, c/o right groin pain, fever, chills and cough since yesterday. PT had a right inguinal repair yesterday. Pt denies hematuria, dysuria, CP, SOB.

## 2019-12-19 NOTE — ED PROVIDER NOTE - PSH
S/P CABG (coronary artery bypass graft)  2/2018  S/P cervical spinal fusion  c1 c2 screws  S/P TURP  1/2019

## 2019-12-20 ENCOUNTER — INBOUND DOCUMENT (OUTPATIENT)
Age: 49
End: 2019-12-20

## 2020-01-06 ENCOUNTER — APPOINTMENT (OUTPATIENT)
Dept: SURGERY | Facility: CLINIC | Age: 50
End: 2020-01-06
Payer: MEDICARE

## 2020-01-06 PROCEDURE — 99024 POSTOP FOLLOW-UP VISIT: CPT

## 2020-01-06 NOTE — ASSESSMENT
[FreeTextEntry1] : Mr. FRAGOSO is doing well, with excellent post-operative recovery. All surgical incisions are healing well and as expected. There is no evidence of infection or complication, and he is progressing as expected. Post-operative wound care, activity, restrictions and precautions reinforced. Patient instructed to refrain from any heavy lifting greater than 10-15 pounds for at least 4-6 weeks post-operatively. Patient's questions and concerns addressed to patient's satisfaction.\par

## 2020-01-06 NOTE — PHYSICAL EXAM
[Calm] : calm [de-identified] : He  is alert, well-groomed, and cheerful.\par   [de-identified] : right groin Surgical wound is healing well.   no signs of  inflammation or infection.

## 2020-01-06 NOTE — HISTORY OF PRESENT ILLNESS
[de-identified] : Mr. FRAGOSO  is s/p right inguinal hernia repair on 12/18/2019.  Today  Mr. FRAGOSO offers no complaints. patient reports no fever, chills,  or  pain.  His surgical wound is healing well. No signs of inflammation, infection or exudate. Patient reports good bowel movements and appetite.

## 2020-02-13 NOTE — ED ADULT NURSE NOTE - CAS TRG GEN SKIN COLOR
BOOST SIMULATION NOTE      July  3, 2018    Patient Name:  VALENCIA DANG.  YOB: 1955                          MRN:  [id number]      A photon boost verification simulation was performed today on the treatment table.  Valencia is currently undergoing radiation treatment for her left breast cancer. She previously underwent a thin-sliced CT scan for treatment planning purposes. The boost volume was identified and contoured on serial slices and a 3-dimensional volume was generated. Custom fields using complex blocking were designed to conform to the target while decreasing dose to adjacent normal breast tissue and lung.    Patient positioning, SSDs, gantry, collimator and couch were set according to the treatment plan.   All images were reviewed, and the set-up was approved including the isocenter and field design for the boost fields.     I was present and participated in the simulation.      Authenticated by Tyrone Elizabeth M.D. on 7/05/2018 at 2:22 PM  TYRONE ELIZABETH M.D.     Normal for race

## 2020-04-06 ENCOUNTER — APPOINTMENT (OUTPATIENT)
Dept: SURGERY | Facility: CLINIC | Age: 50
End: 2020-04-06

## 2020-04-23 ENCOUNTER — APPOINTMENT (OUTPATIENT)
Dept: SURGERY | Facility: CLINIC | Age: 50
End: 2020-04-23

## 2020-04-23 DIAGNOSIS — K40.90 UNILATERAL INGUINAL HERNIA, W/OUT OBSTRUCTION OR GANGRENE, NOT SPECIFIED AS RECURRENT: ICD-10-CM

## 2020-07-28 ENCOUNTER — INPATIENT (INPATIENT)
Facility: HOSPITAL | Age: 50
LOS: 2 days | Discharge: HOME | DRG: 872 | End: 2020-07-31
Attending: STUDENT IN AN ORGANIZED HEALTH CARE EDUCATION/TRAINING PROGRAM | Admitting: STUDENT IN AN ORGANIZED HEALTH CARE EDUCATION/TRAINING PROGRAM
Payer: MEDICARE

## 2020-07-28 VITALS
DIASTOLIC BLOOD PRESSURE: 68 MMHG | OXYGEN SATURATION: 97 % | RESPIRATION RATE: 16 BRPM | HEART RATE: 78 BPM | TEMPERATURE: 98 F | SYSTOLIC BLOOD PRESSURE: 147 MMHG

## 2020-07-28 DIAGNOSIS — Z90.79 ACQUIRED ABSENCE OF OTHER GENITAL ORGAN(S): Chronic | ICD-10-CM

## 2020-07-28 DIAGNOSIS — Z95.1 PRESENCE OF AORTOCORONARY BYPASS GRAFT: Chronic | ICD-10-CM

## 2020-07-28 DIAGNOSIS — Z98.1 ARTHRODESIS STATUS: Chronic | ICD-10-CM

## 2020-07-28 DIAGNOSIS — Z98.890 OTHER SPECIFIED POSTPROCEDURAL STATES: Chronic | ICD-10-CM

## 2020-07-28 DIAGNOSIS — N39.0 URINARY TRACT INFECTION, SITE NOT SPECIFIED: ICD-10-CM

## 2020-07-28 LAB
ALBUMIN SERPL ELPH-MCNC: 3 G/DL — LOW (ref 3.5–5)
ALP SERPL-CCNC: 129 U/L — HIGH (ref 40–120)
ALT FLD-CCNC: 14 U/L DA — SIGNIFICANT CHANGE UP (ref 10–60)
ANION GAP SERPL CALC-SCNC: 8 MMOL/L — SIGNIFICANT CHANGE UP (ref 5–17)
APPEARANCE UR: SIGNIFICANT CHANGE UP
AST SERPL-CCNC: 18 U/L — SIGNIFICANT CHANGE UP (ref 10–40)
BASOPHILS # BLD AUTO: 0 K/UL — SIGNIFICANT CHANGE UP (ref 0–0.2)
BASOPHILS NFR BLD AUTO: 0 % — SIGNIFICANT CHANGE UP (ref 0–2)
BILIRUB SERPL-MCNC: 0.5 MG/DL — SIGNIFICANT CHANGE UP (ref 0.2–1.2)
BILIRUB UR-MCNC: NEGATIVE — SIGNIFICANT CHANGE UP
BUN SERPL-MCNC: 15 MG/DL — SIGNIFICANT CHANGE UP (ref 7–18)
CALCIUM SERPL-MCNC: 9.3 MG/DL — SIGNIFICANT CHANGE UP (ref 8.4–10.5)
CHLORIDE SERPL-SCNC: 98 MMOL/L — SIGNIFICANT CHANGE UP (ref 96–108)
CO2 SERPL-SCNC: 28 MMOL/L — SIGNIFICANT CHANGE UP (ref 22–31)
COLOR SPEC: YELLOW — SIGNIFICANT CHANGE UP
CREAT SERPL-MCNC: 1.19 MG/DL — SIGNIFICANT CHANGE UP (ref 0.5–1.3)
DIFF PNL FLD: ABNORMAL
EOSINOPHIL # BLD AUTO: 0 K/UL — SIGNIFICANT CHANGE UP (ref 0–0.5)
EOSINOPHIL NFR BLD AUTO: 0 % — SIGNIFICANT CHANGE UP (ref 0–6)
GLUCOSE SERPL-MCNC: 141 MG/DL — HIGH (ref 70–99)
GLUCOSE UR QL: NEGATIVE — SIGNIFICANT CHANGE UP
HCT VFR BLD CALC: 38.3 % — LOW (ref 39–50)
HGB BLD-MCNC: 12.2 G/DL — LOW (ref 13–17)
KETONES UR-MCNC: ABNORMAL
LACTATE SERPL-SCNC: 1.7 MMOL/L — SIGNIFICANT CHANGE UP (ref 0.7–2)
LEUKOCYTE ESTERASE UR-ACNC: ABNORMAL
LIDOCAIN IGE QN: 45 U/L — LOW (ref 73–393)
LYMPHOCYTES # BLD AUTO: 0.8 K/UL — LOW (ref 1–3.3)
LYMPHOCYTES # BLD AUTO: 3 % — LOW (ref 13–44)
MCHC RBC-ENTMCNC: 27.7 PG — SIGNIFICANT CHANGE UP (ref 27–34)
MCHC RBC-ENTMCNC: 31.9 GM/DL — LOW (ref 32–36)
MCV RBC AUTO: 86.8 FL — SIGNIFICANT CHANGE UP (ref 80–100)
MONOCYTES # BLD AUTO: 1.33 K/UL — HIGH (ref 0–0.9)
MONOCYTES NFR BLD AUTO: 5 % — SIGNIFICANT CHANGE UP (ref 2–14)
NEUTROPHILS # BLD AUTO: 24.51 K/UL — HIGH (ref 1.8–7.4)
NEUTROPHILS NFR BLD AUTO: 92 % — HIGH (ref 43–77)
NITRITE UR-MCNC: NEGATIVE — SIGNIFICANT CHANGE UP
PH UR: 8 — SIGNIFICANT CHANGE UP (ref 5–8)
PLATELET # BLD AUTO: 454 K/UL — HIGH (ref 150–400)
POTASSIUM SERPL-MCNC: 4.3 MMOL/L — SIGNIFICANT CHANGE UP (ref 3.5–5.3)
POTASSIUM SERPL-SCNC: 4.3 MMOL/L — SIGNIFICANT CHANGE UP (ref 3.5–5.3)
PROT SERPL-MCNC: 8.1 G/DL — SIGNIFICANT CHANGE UP (ref 6–8.3)
PROT UR-MCNC: 30 MG/DL
RBC # BLD: 4.41 M/UL — SIGNIFICANT CHANGE UP (ref 4.2–5.8)
RBC # FLD: 12.7 % — SIGNIFICANT CHANGE UP (ref 10.3–14.5)
SODIUM SERPL-SCNC: 134 MMOL/L — LOW (ref 135–145)
SP GR SPEC: 1.01 — SIGNIFICANT CHANGE UP (ref 1.01–1.02)
UROBILINOGEN FLD QL: NEGATIVE — SIGNIFICANT CHANGE UP
WBC # BLD: 26.64 K/UL — HIGH (ref 3.8–10.5)
WBC # FLD AUTO: 26.64 K/UL — HIGH (ref 3.8–10.5)

## 2020-07-28 PROCEDURE — 99223 1ST HOSP IP/OBS HIGH 75: CPT

## 2020-07-28 PROCEDURE — 74177 CT ABD & PELVIS W/CONTRAST: CPT | Mod: 26

## 2020-07-28 PROCEDURE — 99285 EMERGENCY DEPT VISIT HI MDM: CPT

## 2020-07-28 RX ORDER — SODIUM CHLORIDE 9 MG/ML
500 INJECTION INTRAMUSCULAR; INTRAVENOUS; SUBCUTANEOUS ONCE
Refills: 0 | Status: COMPLETED | OUTPATIENT
Start: 2020-07-28 | End: 2020-07-28

## 2020-07-28 RX ORDER — SIMVASTATIN 20 MG/1
1 TABLET, FILM COATED ORAL
Qty: 0 | Refills: 0 | DISCHARGE

## 2020-07-28 RX ORDER — KETOROLAC TROMETHAMINE 30 MG/ML
30 SYRINGE (ML) INJECTION ONCE
Refills: 0 | Status: DISCONTINUED | OUTPATIENT
Start: 2020-07-28 | End: 2020-07-28

## 2020-07-28 RX ORDER — CARBIDOPA, LEVODOPA, AND ENTACAPONE 50; 200; 200 MG/1; MG/1; MG/1
1 TABLET, FILM COATED ORAL
Qty: 0 | Refills: 0 | DISCHARGE

## 2020-07-28 RX ORDER — FUROSEMIDE 40 MG
0 TABLET ORAL
Qty: 0 | Refills: 0 | DISCHARGE

## 2020-07-28 RX ORDER — CEFTRIAXONE 500 MG/1
1000 INJECTION, POWDER, FOR SOLUTION INTRAMUSCULAR; INTRAVENOUS ONCE
Refills: 0 | Status: COMPLETED | OUTPATIENT
Start: 2020-07-28 | End: 2020-07-28

## 2020-07-28 RX ORDER — MORPHINE SULFATE 50 MG/1
4 CAPSULE, EXTENDED RELEASE ORAL ONCE
Refills: 0 | Status: DISCONTINUED | OUTPATIENT
Start: 2020-07-28 | End: 2020-07-28

## 2020-07-28 RX ORDER — DOCUSATE SODIUM 100 MG
1 CAPSULE ORAL
Qty: 0 | Refills: 0 | DISCHARGE

## 2020-07-28 RX ORDER — CYCLOBENZAPRINE HYDROCHLORIDE 10 MG/1
1 TABLET, FILM COATED ORAL
Qty: 0 | Refills: 0 | DISCHARGE

## 2020-07-28 RX ORDER — SODIUM CHLORIDE 9 MG/ML
1000 INJECTION INTRAMUSCULAR; INTRAVENOUS; SUBCUTANEOUS ONCE
Refills: 0 | Status: DISCONTINUED | OUTPATIENT
Start: 2020-07-28 | End: 2020-07-28

## 2020-07-28 RX ORDER — SODIUM CHLORIDE 9 MG/ML
1000 INJECTION INTRAMUSCULAR; INTRAVENOUS; SUBCUTANEOUS ONCE
Refills: 0 | Status: COMPLETED | OUTPATIENT
Start: 2020-07-28 | End: 2020-07-28

## 2020-07-28 RX ADMIN — CEFTRIAXONE 100 MILLIGRAM(S): 500 INJECTION, POWDER, FOR SOLUTION INTRAMUSCULAR; INTRAVENOUS at 21:25

## 2020-07-28 RX ADMIN — MORPHINE SULFATE 4 MILLIGRAM(S): 50 CAPSULE, EXTENDED RELEASE ORAL at 20:00

## 2020-07-28 RX ADMIN — SODIUM CHLORIDE 1000 MILLILITER(S): 9 INJECTION INTRAMUSCULAR; INTRAVENOUS; SUBCUTANEOUS at 20:00

## 2020-07-28 RX ADMIN — SODIUM CHLORIDE 2000 MILLILITER(S): 9 INJECTION INTRAMUSCULAR; INTRAVENOUS; SUBCUTANEOUS at 22:26

## 2020-07-28 RX ADMIN — Medication 30 MILLIGRAM(S): at 22:27

## 2020-07-28 RX ADMIN — SODIUM CHLORIDE 1000 MILLILITER(S): 9 INJECTION INTRAMUSCULAR; INTRAVENOUS; SUBCUTANEOUS at 19:42

## 2020-07-28 RX ADMIN — MORPHINE SULFATE 4 MILLIGRAM(S): 50 CAPSULE, EXTENDED RELEASE ORAL at 19:42

## 2020-07-28 RX ADMIN — CEFTRIAXONE 1000 MILLIGRAM(S): 500 INJECTION, POWDER, FOR SOLUTION INTRAMUSCULAR; INTRAVENOUS at 22:00

## 2020-07-28 RX ADMIN — Medication 30 MILLIGRAM(S): at 21:34

## 2020-07-28 RX ADMIN — SODIUM CHLORIDE 500 MILLILITER(S): 9 INJECTION INTRAMUSCULAR; INTRAVENOUS; SUBCUTANEOUS at 23:27

## 2020-07-28 NOTE — H&P ADULT - PROBLEM SELECTOR PLAN 8
Hx of CAD s/p CABG  Continue with home medications, high intensity atorvastatin, aspirin, and plavix

## 2020-07-28 NOTE — H&P ADULT - ATTENDING COMMENTS
Pt seen and examined.  Case discussed with housestaff.  49 year old man with PMH of Parkinson's dx, ischemic cardiomyopathy with CAD s/p 2V CABG and PCI, BPH s/p TURP here with 1 day of right groin pain and subjective fever. This was over the scar of his prior inguinal hernia repair many years ago. Recent hospitalization for neck surgery. Had a rivas catheter placed then.    Vital Signs Last 24 Hrs  T(C): 37.4 (2020 23:24), Max: 37.8 (2020 21:30)  T(F): 99.3 (2020 23:24), Max: 100 (2020 21:30)  HR: 114 (2020 23:24) (78 - 119)  BP: 91/49 (2020 23:24) (91/49 - 147/68)  RR: 17 (2020 23:24) (16 - 17)  SpO2: 96% (2020 23:24) (96% - 97%)    Middle aged man, very warm to touch, NAD, AAO X 3  CTA B/l RRR S1S2 only  Soft NT ND BS +- Area of right groin not tender to palpation  Right testis enlarged, warm, mildly tender and slightly erythematous compared to the left.  No pedal edema  NO focal deficits    labs                        12.2   26.64 )-----------( 454      ( 2020 19:29 )             38.3     07-28    134 |  98  |  15  ---------------------  141<H>  4.3   |  28  |  1.19    Ca    9.3      2020 19:29    TPro  8.1  /  Alb  3.0<L>  /  TBili  0.5  /  DBili  x   /  AST  18  /  ALT  14  /  AlkPhos  129<H>  07-28    Urinalysis Basic - ( 2020 19:53 )  Color: Yellow / Appearance: Hazy / S.015 / pH: x  Gluc: x / Ketone: Trace  / Bili: Negative / Urobili: Negative   Blood: x / Protein: 30 mg/dL / Nitrite: Negative   Leuk Esterase: Moderate / RBC: 5-10 /HPF / WBC >50 /HPF   Sq Epi: x / Non Sq Epi: Occasional /HPF / Bacteria: Few /HPF    Abdomen/pelvic CT  No bowel obstruction or gross bowel wall thickening. Normal appendix.  No significant right inguinal hernia or right inguinal fluid collections as clinically questioned.    Impression  49 year old man with hx as above presenting 2 days after hospital admission for an elective surgery during which he had a Rivas catheter placed with 1 day of right groin pain with right testicular swelling and tenderness and findings of +ve UA keeping with a right epididymo-orchitis and UTI with sepsis with hypotension and tachycardia    A/P  - Right epididymoorchitis  - UTI  - Sepsis  - hx of ischemic cardiomyopathy  - CAD with ST elevation in V1-V3 on EKG Pt seen and examined.  Case discussed with housestaff.  49 year old man with PMH of Parkinson's dx, ischemic cardiomyopathy with CAD s/p 2V CABG and PCI, BPH s/p TURP here with 1 day of right groin pain and subjective fever. This was over the scar of his prior inguinal hernia repair many years ago. Recent hospitalization for neck surgery. Had a Elmore catheter placed then.    Vital Signs Last 24 Hrs  T(C): 37.4 (2020 23:24), Max: 37.8 (2020 21:30)  T(F): 99.3 (2020 23:24), Max: 100 (2020 21:30)  HR: 114 (2020 23:24) (78 - 119)  BP: 91/49 (2020 23:24) (91/49 - 147/68)  RR: 17 (2020 23:24) (16 - 17)  SpO2: 96% (2020 23:24) (96% - 97%)    Middle aged man, very warm to touch, NAD, AAO X 3  CTA B/l RRR S1S2 only  Soft NT ND BS +- Area of right groin not tender to palpation  Right testis enlarged, warm, mildly tender and slightly erythematous compared to the left.  No pedal edema  NO focal deficits    labs                        12.2   26.64 )-----------( 454      ( 2020 19:29 )             38.3     07-28    134 |  98  |  15  ---------------------  141<H>  4.3   |  28  |  1.19    Ca    9.3      2020 19:29    TPro  8.1  /  Alb  3.0<L>  /  TBili  0.5  /  DBili  x   /  AST  18  /  ALT  14  /  AlkPhos  129<H>  07-28    Urinalysis Basic - ( 2020 19:53 )  Color: Yellow / Appearance: Hazy / S.015 / pH: x  Gluc: x / Ketone: Trace  / Bili: Negative / Urobili: Negative   Blood: x / Protein: 30 mg/dL / Nitrite: Negative   Leuk Esterase: Moderate / RBC: 5-10 /HPF / WBC >50 /HPF   Sq Epi: x / Non Sq Epi: Occasional /HPF / Bacteria: Few /HPF    Abdomen/pelvic CT  No bowel obstruction or gross bowel wall thickening. Normal appendix.  No significant right inguinal hernia or right inguinal fluid collections as clinically questioned.    Impression  49 year old man with hx as above presenting 2 days after hospital admission for an elective surgery during which he had a Elmore catheter placed with 1 day of right groin pain with right testicular swelling and tenderness and findings of +ve UA keeping with a right epididymo-orchitis and UTI with sepsis with hypotension and tachycardia    A/P  - Right epididymoorchitis  - UTI  - Sepsis  - Hx of ischemic cardiomyopathy  - CAD with ST elevation in V1-V3 on EKG  - Parkinson's dx    Plan  - Sepsis work up   - Empiric antibiotics with gram negative bug coverage  - Judicious IVF hydration for hypotension and tachycardia considering cardiac status. If no improvement with IVF, will consider ICU management with pressors  - U/S testis with doppler  - Repeat EKG and check troponin levels   - Resume home meds for CAD /parkinson's dx/ bph Pt seen and examined.  Case discussed with housestaff.  49 year old man with PMH of Parkinson's dx, ischemic cardiomyopathy with CAD s/p 2V CABG and PCI, BPH s/p TURP here with 1 day of right groin pain and subjective fever. This was over the scar of his prior inguinal hernia repair many years ago. Recent hospitalization for neck surgery. Had a Elmore catheter placed then.    Vital Signs Last 24 Hrs  T(C): 37.4 (2020 23:24), Max: 37.8 (2020 21:30)  T(F): 99.3 (2020 23:24), Max: 100 (2020 21:30)  HR: 114 (2020 23:24) (78 - 119)  BP: 91/49 (2020 23:24) (91/49 - 147/68)  RR: 17 (2020 23:24) (16 - 17)  SpO2: 96% (2020 23:24) (96% - 97%)    Middle aged man, very warm to touch, NAD, AAO X 3  CTA B/l RRR S1S2 only  Soft NT ND BS +- Area of right groin not tender to palpation  Right testis enlarged, warm, mildly tender and slightly erythematous compared to the left.  No pedal edema  NO focal deficits    labs                        12.2   26.64 )-----------( 454      ( 2020 19:29 )             38.3     07-28    134 |  98  |  15  ---------------------  141<H>  4.3   |  28  |  1.19    Ca    9.3      2020 19:29    TPro  8.1  /  Alb  3.0<L>  /  TBili  0.5  /  DBili  x   /  AST  18  /  ALT  14  /  AlkPhos  129<H>  07-28    Urinalysis Basic - ( 2020 19:53 )  Color: Yellow / Appearance: Hazy / S.015 / pH: x  Gluc: x / Ketone: Trace  / Bili: Negative / Urobili: Negative   Blood: x / Protein: 30 mg/dL / Nitrite: Negative   Leuk Esterase: Moderate / RBC: 5-10 /HPF / WBC >50 /HPF   Sq Epi: x / Non Sq Epi: Occasional /HPF / Bacteria: Few /HPF    Abdomen/pelvic CT  No bowel obstruction or gross bowel wall thickening. Normal appendix.  No significant right inguinal hernia or right inguinal fluid collections as clinically questioned.    Impression  49 year old man with hx as above including BPH s/p turp presenting 2 days after hospital admission for an elective surgery during which he had a Elmore catheter placed with 1 day of right groin pain with right testicular swelling and tenderness and findings of +ve UA keeping with a right epididymo-orchitis and UTI with sepsis with hypotension and tachycardia    A/P  - Right epididymoorchitis  - UTI  - Sepsis  - Hx of ischemic cardiomyopathy  - CAD with ST elevation in V1-V3 on EKG  - Parkinson's dx    Plan  - Sepsis work up   - Empiric antibiotics with gram negative bug coverage  - Judicious IVF hydration for hypotension and tachycardia considering cardiac status. If no improvement with IVF, will consider ICU management with pressors  - U/S testis with doppler  - Repeat EKG and check troponin levels   - Resume home meds for CAD /parkinson's dx/ bph    - Re-evaluation  Pt received a total of 3.5 L of isotonic fluids with BP remaining in the 90/ 50s with MAP > 65mmHg and has continued to maintain this BP throughout the night off IVF. No changes in his mental status and adequate urine output.  Will continue to monitor.  Will consult ICU if any of the above parameters changes.

## 2020-07-28 NOTE — H&P ADULT - PROBLEM SELECTOR PLAN 6
Hx of HTN on metoprolol and furosemide  - Hold all anti-hypertensives, BP lowering medications due to hypotension.  - Restarted as blood pressure tolerates

## 2020-07-28 NOTE — ED PROVIDER NOTE - PSH
H/O inguinal hernia repair  R side by Dr. Galeas  S/P CABG (coronary artery bypass graft)  2/2018  S/P cervical spinal fusion  c1 c2 screws  S/P TURP  1/2019

## 2020-07-28 NOTE — H&P ADULT - PROBLEM SELECTOR PLAN 9
IMPROVE VTE score: 1  Will manage with: Heparin S/C q12 hr    [ ] Previous VTE                                    3  [ ] Thrombophilia                                  2  [ ] Lower limb paralysis                        2  (unable to hold up >15 seconds)    [ ] Current Cancer (within 6 months)        2   [x] Immobilization > 24 hrs                    1  [ ] ICU/CCU stay > 24 hrs                      1  [] Age > 60                                         1

## 2020-07-28 NOTE — H&P ADULT - NSHPPHYSICALEXAM_GEN_ALL_CORE
General - NAD, laying on stretcher, comfortable, with cervical spine collar  ENT - Dry mucous membranes, c=spine collar on   Cardiovascular - +s2/s2, tachycardic, no murmurs appreciated  Lungs - Clear to ascultation, no use of accessory muscles, no crackles or wheezes.  Skin - No rashes, skin warm and dry, no erythematous areas  Abdomen - Normal bowel sounds, abdomen soft and nontender  Genitourinary – Enlargement of right testicle  Extremeties - No edema, cyanosis or clubbing

## 2020-07-28 NOTE — H&P ADULT - PROBLEM SELECTOR PLAN 2
UA positive, right groin pain, and enlarged and tender right testicle on physical exam.  - US testicle ordered  - Started on levofloxacin 500mg IV qdaily for 10 days.  - Follow up urine and blood cultures, however blood cultures not drawn prior to initiation of antibiotics

## 2020-07-28 NOTE — ED PROVIDER NOTE - OBJECTIVE STATEMENT
50 y/o M pt with a PMHx of Parkinson's, CHF, HTN, CAD, BPH and HLD and a PSHx of a R inguinal hernia surgery (by Dr. Galeas) and a more recent Cervical surgery within the last 2 weeks at [facility] presents to ED c/o worsening pain over his groin associated w/some hematuria since yesterday. Pt reports he receives physical therapy at home and after yesterday's session he began to have pain in his groin region over the hernia surgical site and noted it to be more swollen than usual. Pt states his last BM was yesterday and nml. Pt denies nausea, vomiting, fever, or any other acute complaints. NKDA.

## 2020-07-28 NOTE — H&P ADULT - ASSESSMENT
Patient is a 49 year old male with extensive PMHx admitted for sepsis secondary to suspected epididymitis, likely caused by traumatic Elmore insertion during surgical procedure 7 days prior to admission.

## 2020-07-28 NOTE — H&P ADULT - PROBLEM SELECTOR PLAN 1
Presented with elevated temperature, leukocytosis, sinus tachycardia, hypotension  Lactate 1.7  - Was given 1L bolus by ED, and another 1L by admitting team.  - Noted to be UA positive, in the setting of epididymitis. Was given a dose of ceftriaxone by ED.   - Start on levofloxacin 500mg IV qdaily for 10 days. May transition to oral upon discharge.  - Follow up urine cultures. Follow up blood cultures, however blood cultures not drawn prior to initiation of antibiotics.

## 2020-07-28 NOTE — H&P ADULT - PROBLEM SELECTOR PLAN 3
Hx of CHFrEF with EF 35-40% 1/2019  No concerns for acute CHF exacerbation at this time.  Patient on metoprolol, lasix at home. Not on any ACEi/ARB  - Will hold metoprolol and furosemide on admission due to hypotension  - Please restart as blood pressure tolerates

## 2020-07-28 NOTE — ED PROVIDER NOTE - PROGRESS NOTE DETAILS
WBC of 26.  CT negative for acute pathology.  UA positive.  Will admit for UTI and leukocytosis. pt has a list of home medications which I placed in the chart.

## 2020-07-28 NOTE — ED PROVIDER NOTE - CLINICAL SUMMARY MEDICAL DECISION MAKING FREE TEXT BOX
Groin pain associated w/hematuria. Stone versus surgical site pathology. Labs, CT scan, analgesia, reassess.

## 2020-07-29 DIAGNOSIS — M54.12 RADICULOPATHY, CERVICAL REGION: ICD-10-CM

## 2020-07-29 DIAGNOSIS — G20 PARKINSON'S DISEASE: ICD-10-CM

## 2020-07-29 DIAGNOSIS — Z29.9 ENCOUNTER FOR PROPHYLACTIC MEASURES, UNSPECIFIED: ICD-10-CM

## 2020-07-29 DIAGNOSIS — I10 ESSENTIAL (PRIMARY) HYPERTENSION: ICD-10-CM

## 2020-07-29 DIAGNOSIS — I25.10 ATHEROSCLEROTIC HEART DISEASE OF NATIVE CORONARY ARTERY WITHOUT ANGINA PECTORIS: ICD-10-CM

## 2020-07-29 DIAGNOSIS — I50.22 CHRONIC SYSTOLIC (CONGESTIVE) HEART FAILURE: ICD-10-CM

## 2020-07-29 DIAGNOSIS — A41.9 SEPSIS, UNSPECIFIED ORGANISM: ICD-10-CM

## 2020-07-29 DIAGNOSIS — N40.0 BENIGN PROSTATIC HYPERPLASIA WITHOUT LOWER URINARY TRACT SYMPTOMS: ICD-10-CM

## 2020-07-29 DIAGNOSIS — N45.1 EPIDIDYMITIS: ICD-10-CM

## 2020-07-29 LAB
24R-OH-CALCIDIOL SERPL-MCNC: 29.7 NG/ML — LOW (ref 30–80)
A1C WITH ESTIMATED AVERAGE GLUCOSE RESULT: 5.5 % — SIGNIFICANT CHANGE UP (ref 4–5.6)
ALBUMIN SERPL ELPH-MCNC: 2.3 G/DL — LOW (ref 3.5–5)
ALP SERPL-CCNC: 98 U/L — SIGNIFICANT CHANGE UP (ref 40–120)
ALT FLD-CCNC: 8 U/L DA — LOW (ref 10–60)
ANION GAP SERPL CALC-SCNC: 7 MMOL/L — SIGNIFICANT CHANGE UP (ref 5–17)
AST SERPL-CCNC: 16 U/L — SIGNIFICANT CHANGE UP (ref 10–40)
BASOPHILS # BLD AUTO: 0.06 K/UL — SIGNIFICANT CHANGE UP (ref 0–0.2)
BASOPHILS NFR BLD AUTO: 0.2 % — SIGNIFICANT CHANGE UP (ref 0–2)
BILIRUB SERPL-MCNC: 0.5 MG/DL — SIGNIFICANT CHANGE UP (ref 0.2–1.2)
BUN SERPL-MCNC: 15 MG/DL — SIGNIFICANT CHANGE UP (ref 7–18)
CALCIUM SERPL-MCNC: 8.3 MG/DL — LOW (ref 8.4–10.5)
CHLORIDE SERPL-SCNC: 104 MMOL/L — SIGNIFICANT CHANGE UP (ref 96–108)
CHOLEST SERPL-MCNC: 121 MG/DL — SIGNIFICANT CHANGE UP (ref 10–199)
CO2 SERPL-SCNC: 26 MMOL/L — SIGNIFICANT CHANGE UP (ref 22–31)
CREAT SERPL-MCNC: 1.1 MG/DL — SIGNIFICANT CHANGE UP (ref 0.5–1.3)
EOSINOPHIL # BLD AUTO: 0.17 K/UL — SIGNIFICANT CHANGE UP (ref 0–0.5)
EOSINOPHIL NFR BLD AUTO: 0.6 % — SIGNIFICANT CHANGE UP (ref 0–6)
ESTIMATED AVERAGE GLUCOSE: 111 MG/DL — SIGNIFICANT CHANGE UP (ref 68–114)
FOLATE SERPL-MCNC: 6.6 NG/ML — SIGNIFICANT CHANGE UP
GLUCOSE SERPL-MCNC: 104 MG/DL — HIGH (ref 70–99)
HCT VFR BLD CALC: 32.2 % — LOW (ref 39–50)
HDLC SERPL-MCNC: 46 MG/DL — SIGNIFICANT CHANGE UP
HGB BLD-MCNC: 10.2 G/DL — LOW (ref 13–17)
IMM GRANULOCYTES NFR BLD AUTO: 1.1 % — SIGNIFICANT CHANGE UP (ref 0–1.5)
LACTATE SERPL-SCNC: 1.5 MMOL/L — SIGNIFICANT CHANGE UP (ref 0.7–2)
LIPID PNL WITH DIRECT LDL SERPL: 64 MG/DL — SIGNIFICANT CHANGE UP
LYMPHOCYTES # BLD AUTO: 0.64 K/UL — LOW (ref 1–3.3)
LYMPHOCYTES # BLD AUTO: 2.2 % — LOW (ref 13–44)
MAGNESIUM SERPL-MCNC: 1.6 MG/DL — SIGNIFICANT CHANGE UP (ref 1.6–2.6)
MCHC RBC-ENTMCNC: 27.4 PG — SIGNIFICANT CHANGE UP (ref 27–34)
MCHC RBC-ENTMCNC: 31.7 GM/DL — LOW (ref 32–36)
MCV RBC AUTO: 86.6 FL — SIGNIFICANT CHANGE UP (ref 80–100)
MONOCYTES # BLD AUTO: 1.89 K/UL — HIGH (ref 0–0.9)
MONOCYTES NFR BLD AUTO: 6.4 % — SIGNIFICANT CHANGE UP (ref 2–14)
NEUTROPHILS # BLD AUTO: 26.28 K/UL — HIGH (ref 1.8–7.4)
NEUTROPHILS NFR BLD AUTO: 89.5 % — HIGH (ref 43–77)
NRBC # BLD: 0 /100 WBCS — SIGNIFICANT CHANGE UP (ref 0–0)
PHOSPHATE SERPL-MCNC: 2.9 MG/DL — SIGNIFICANT CHANGE UP (ref 2.5–4.5)
PLATELET # BLD AUTO: 332 K/UL — SIGNIFICANT CHANGE UP (ref 150–400)
POTASSIUM SERPL-MCNC: 3.9 MMOL/L — SIGNIFICANT CHANGE UP (ref 3.5–5.3)
POTASSIUM SERPL-SCNC: 3.9 MMOL/L — SIGNIFICANT CHANGE UP (ref 3.5–5.3)
PROT SERPL-MCNC: 6.7 G/DL — SIGNIFICANT CHANGE UP (ref 6–8.3)
RBC # BLD: 3.72 M/UL — LOW (ref 4.2–5.8)
RBC # FLD: 12.7 % — SIGNIFICANT CHANGE UP (ref 10.3–14.5)
SARS-COV-2 IGG SERPL QL IA: POSITIVE
SARS-COV-2 IGM SERPL IA-ACNC: 124 INDEX — HIGH
SARS-COV-2 RNA SPEC QL NAA+PROBE: SIGNIFICANT CHANGE UP
SODIUM SERPL-SCNC: 137 MMOL/L — SIGNIFICANT CHANGE UP (ref 135–145)
TOTAL CHOLESTEROL/HDL RATIO MEASUREMENT: 2.6 RATIO — LOW (ref 3.4–9.6)
TRIGL SERPL-MCNC: 53 MG/DL — SIGNIFICANT CHANGE UP (ref 10–149)
TROPONIN I SERPL-MCNC: <0.015 NG/ML — SIGNIFICANT CHANGE UP (ref 0–0.04)
TSH SERPL-MCNC: 0.35 UU/ML — SIGNIFICANT CHANGE UP (ref 0.34–4.82)
VIT B12 SERPL-MCNC: 303 PG/ML — SIGNIFICANT CHANGE UP (ref 232–1245)
WBC # BLD: 29.36 K/UL — HIGH (ref 3.8–10.5)
WBC # FLD AUTO: 29.36 K/UL — HIGH (ref 3.8–10.5)

## 2020-07-29 PROCEDURE — 99233 SBSQ HOSP IP/OBS HIGH 50: CPT | Mod: GC

## 2020-07-29 PROCEDURE — 76870 US EXAM SCROTUM: CPT | Mod: 26

## 2020-07-29 PROCEDURE — 71045 X-RAY EXAM CHEST 1 VIEW: CPT | Mod: 26

## 2020-07-29 RX ORDER — CARBIDOPA AND LEVODOPA 25; 100 MG/1; MG/1
1 TABLET ORAL THREE TIMES A DAY
Refills: 0 | Status: DISCONTINUED | OUTPATIENT
Start: 2020-07-29 | End: 2020-07-29

## 2020-07-29 RX ORDER — ACETAMINOPHEN 500 MG
650 TABLET ORAL EVERY 6 HOURS
Refills: 0 | Status: DISCONTINUED | OUTPATIENT
Start: 2020-07-29 | End: 2020-07-31

## 2020-07-29 RX ORDER — PANTOPRAZOLE SODIUM 20 MG/1
40 TABLET, DELAYED RELEASE ORAL
Refills: 0 | Status: DISCONTINUED | OUTPATIENT
Start: 2020-07-28 | End: 2020-07-31

## 2020-07-29 RX ORDER — SODIUM CHLORIDE 9 MG/ML
500 INJECTION INTRAMUSCULAR; INTRAVENOUS; SUBCUTANEOUS ONCE
Refills: 0 | Status: COMPLETED | OUTPATIENT
Start: 2020-07-29 | End: 2020-07-29

## 2020-07-29 RX ORDER — HEPARIN SODIUM 5000 [USP'U]/ML
5000 INJECTION INTRAVENOUS; SUBCUTANEOUS EVERY 12 HOURS
Refills: 0 | Status: DISCONTINUED | OUTPATIENT
Start: 2020-07-29 | End: 2020-07-29

## 2020-07-29 RX ORDER — TAMSULOSIN HYDROCHLORIDE 0.4 MG/1
0.4 CAPSULE ORAL AT BEDTIME
Refills: 0 | Status: DISCONTINUED | OUTPATIENT
Start: 2020-07-28 | End: 2020-07-31

## 2020-07-29 RX ORDER — ENOXAPARIN SODIUM 100 MG/ML
40 INJECTION SUBCUTANEOUS DAILY
Refills: 0 | Status: DISCONTINUED | OUTPATIENT
Start: 2020-07-29 | End: 2020-07-31

## 2020-07-29 RX ORDER — ENTACAPONE 200 MG/1
200 TABLET, FILM COATED ORAL THREE TIMES A DAY
Refills: 0 | Status: DISCONTINUED | OUTPATIENT
Start: 2020-07-29 | End: 2020-07-31

## 2020-07-29 RX ORDER — ASPIRIN/CALCIUM CARB/MAGNESIUM 324 MG
81 TABLET ORAL DAILY
Refills: 0 | Status: DISCONTINUED | OUTPATIENT
Start: 2020-07-28 | End: 2020-07-31

## 2020-07-29 RX ORDER — CLOPIDOGREL BISULFATE 75 MG/1
75 TABLET, FILM COATED ORAL DAILY
Refills: 0 | Status: DISCONTINUED | OUTPATIENT
Start: 2020-07-28 | End: 2020-07-31

## 2020-07-29 RX ORDER — BUPROPION HYDROCHLORIDE 150 MG/1
150 TABLET, EXTENDED RELEASE ORAL DAILY
Refills: 0 | Status: DISCONTINUED | OUTPATIENT
Start: 2020-07-28 | End: 2020-07-31

## 2020-07-29 RX ORDER — CHOLECALCIFEROL (VITAMIN D3) 125 MCG
1000 CAPSULE ORAL DAILY
Refills: 0 | Status: DISCONTINUED | OUTPATIENT
Start: 2020-07-28 | End: 2020-07-31

## 2020-07-29 RX ORDER — ATORVASTATIN CALCIUM 80 MG/1
80 TABLET, FILM COATED ORAL AT BEDTIME
Refills: 0 | Status: DISCONTINUED | OUTPATIENT
Start: 2020-07-28 | End: 2020-07-31

## 2020-07-29 RX ORDER — CARBIDOPA AND LEVODOPA 25; 100 MG/1; MG/1
1 TABLET ORAL
Refills: 0 | Status: DISCONTINUED | OUTPATIENT
Start: 2020-07-29 | End: 2020-07-31

## 2020-07-29 RX ADMIN — Medication 650 MILLIGRAM(S): at 03:00

## 2020-07-29 RX ADMIN — ENTACAPONE 200 MILLIGRAM(S): 200 TABLET, FILM COATED ORAL at 02:09

## 2020-07-29 RX ADMIN — CARBIDOPA AND LEVODOPA 1 TABLET(S): 25; 100 TABLET ORAL at 05:57

## 2020-07-29 RX ADMIN — CARBIDOPA AND LEVODOPA 1 TABLET(S): 25; 100 TABLET ORAL at 02:09

## 2020-07-29 RX ADMIN — ENTACAPONE 200 MILLIGRAM(S): 200 TABLET, FILM COATED ORAL at 05:53

## 2020-07-29 RX ADMIN — ENOXAPARIN SODIUM 40 MILLIGRAM(S): 100 INJECTION SUBCUTANEOUS at 11:16

## 2020-07-29 RX ADMIN — Medication 81 MILLIGRAM(S): at 11:17

## 2020-07-29 RX ADMIN — Medication 650 MILLIGRAM(S): at 02:09

## 2020-07-29 RX ADMIN — BUPROPION HYDROCHLORIDE 150 MILLIGRAM(S): 150 TABLET, EXTENDED RELEASE ORAL at 11:18

## 2020-07-29 RX ADMIN — TAMSULOSIN HYDROCHLORIDE 0.4 MILLIGRAM(S): 0.4 CAPSULE ORAL at 21:37

## 2020-07-29 RX ADMIN — ENTACAPONE 200 MILLIGRAM(S): 200 TABLET, FILM COATED ORAL at 21:36

## 2020-07-29 RX ADMIN — Medication 1000 UNIT(S): at 11:17

## 2020-07-29 RX ADMIN — PANTOPRAZOLE SODIUM 40 MILLIGRAM(S): 20 TABLET, DELAYED RELEASE ORAL at 06:01

## 2020-07-29 RX ADMIN — SODIUM CHLORIDE 500 MILLILITER(S): 9 INJECTION INTRAMUSCULAR; INTRAVENOUS; SUBCUTANEOUS at 08:54

## 2020-07-29 RX ADMIN — SODIUM CHLORIDE 1000 MILLILITER(S): 9 INJECTION INTRAMUSCULAR; INTRAVENOUS; SUBCUTANEOUS at 01:55

## 2020-07-29 RX ADMIN — CARBIDOPA AND LEVODOPA 1 TABLET(S): 25; 100 TABLET ORAL at 23:13

## 2020-07-29 RX ADMIN — ENTACAPONE 200 MILLIGRAM(S): 200 TABLET, FILM COATED ORAL at 13:24

## 2020-07-29 RX ADMIN — CARBIDOPA AND LEVODOPA 1 TABLET(S): 25; 100 TABLET ORAL at 13:24

## 2020-07-29 RX ADMIN — CLOPIDOGREL BISULFATE 75 MILLIGRAM(S): 75 TABLET, FILM COATED ORAL at 11:18

## 2020-07-29 RX ADMIN — ATORVASTATIN CALCIUM 80 MILLIGRAM(S): 80 TABLET, FILM COATED ORAL at 21:36

## 2020-07-29 NOTE — PROGRESS NOTE ADULT - SUBJECTIVE AND OBJECTIVE BOX
[facility]   Internal medicine Hospitalist     Patient is a 49y old  Male who presents with a chief complaint of Sepsis (2020 23:23)      INTERVAL HPI/OVERNIGHT EVENTS: reports improvement in pain, burning and swelling     MEDICATIONS  (STANDING):  aspirin enteric coated 81 milliGRAM(s) Oral daily  atorvastatin 80 milliGRAM(s) Oral at bedtime  buPROPion XL . 150 milliGRAM(s) Oral daily  carbidopa/levodopa  25/100 1 Tablet(s) Oral three times a day  cholecalciferol 1000 Unit(s) Oral daily  clopidogrel Tablet 75 milliGRAM(s) Oral daily  enoxaparin Injectable 40 milliGRAM(s) SubCutaneous daily  entacapone 200 milliGRAM(s) Oral three times a day  levoFLOXacin IVPB 500 milliGRAM(s) IV Intermittent every 24 hours  pantoprazole    Tablet 40 milliGRAM(s) Oral before breakfast  tamsulosin 0.4 milliGRAM(s) Oral at bedtime    MEDICATIONS  (PRN):  acetaminophen   Tablet .. 650 milliGRAM(s) Oral every 6 hours PRN Temp greater or equal to 38C (100.4F), Mild Pain (1 - 3)      __________________________________________________  REVIEW OF SYSTEMS:    CONSTITUTIONAL: No fever,   EYES: no acute visual disturbances  NECK: No pain or stiffness  RESPIRATORY: No cough; No shortness of breath  CARDIOVASCULAR: No chest pain, no palpitations  GASTROINTESTINAL: No pain. No nausea or vomiting; No diarrhea   NEUROLOGICAL: No headache or numbness, no tremors  MUSCULOSKELETAL: No joint pain, no muscle pain  GENITOURINARY: + dysuria,, no frequency, no hesitancy  PSYCHIATRY: no depression , no anxiety  ALL OTHER  ROS negative        Vital Signs Last 24 Hrs  T(C): 36.3 (2020 15:52), Max: 37.8 (2020 21:30)  T(F): 97.4 (2020 15:52), Max: 100 (2020 21:30)  HR: 100 (2020 15:52) (78 - 119)  BP: 98/47 (2020 15:52) (91/49 - 147/68)  BP(mean): --  RR: 18 (2020 15:52) (16 - 18)  SpO2: 100% (2020 15:52) (96% - 100%)    ________________________________________________  PHYSICAL EXAM:  GENERAL: NAD  HEENT: Normocephalic;  conjunctivae and sclerae clear; moist mucous membranes;   NECK : supple  CHEST/LUNG: Clear to auscultation bilaterally with good air entry   HEART: S1 S2  regular; no murmurs, gallops or rubs  ABDOMEN: Soft, Nontender, Nondistended; Bowel sounds present  EXTREMITIES: no cyanosis; no edema; no calf tenderness  SKIN: warm and dry; no rash  NERVOUS SYSTEM:  Awake and alert; Oriented  to place, person and time ; no new deficits, resting tremors noted   : right scrotal erythema and swelling     _________________________________________________  LABS:                        10.2   29.36 )-----------( 332      ( 2020 06:34 )             32.2     07-    137  |  104  |  15  ----------------------------<  104<H>  3.9   |  26  |  1.10    Ca    8.3<L>      2020 06:34  Phos  2.9     07-  Mg     1.6         TPro  6.7  /  Alb  2.3<L>  /  TBili  0.5  /  DBili  x   /  AST  16  /  ALT  8<L>  /  AlkPhos  98  07-29      Urinalysis Basic - ( 2020 19:53 )    Color: Yellow / Appearance: Hazy / S.015 / pH: x  Gluc: x / Ketone: Trace  / Bili: Negative / Urobili: Negative   Blood: x / Protein: 30 mg/dL / Nitrite: Negative   Leuk Esterase: Moderate / RBC: 5-10 /HPF / WBC >50 /HPF   Sq Epi: x / Non Sq Epi: Occasional /HPF / Bacteria: Few /HPF      CAPILLARY BLOOD GLUCOSE            RADIOLOGY & ADDITIONAL TESTS:    Imaging Personally Reviewed:  YES    Consultant(s) Notes Reviewed:   YES    Care Discussed with Consultants : YES     Plan of care was discussed with patient and /or primary care giver; all questions and concerns were addressed and care was aligned with patient's wishes.

## 2020-07-29 NOTE — PROGRESS NOTE ADULT - PROBLEM SELECTOR PLAN 9
IMPROVE VTE score: 1  Will manage with: Heparin S/C q12 hr    [ ] Previous VTE                                    3  [ ] Thrombophilia                                  2  [ ] Lower limb paralysis                        2  (unable to hold up >15 seconds)    [ ] Current Cancer (within 6 months)        2   [x] Immobilization > 24 hrs                    1  [ ] ICU/CCU stay > 24 hrs                      1  [] Age > 60                                         1 IMPROVE VTE score: 1  Will manage with: Lovenox    [ ] Previous VTE                                    3  [ ] Thrombophilia                                  2  [ ] Lower limb paralysis                        2  (unable to hold up >15 seconds)    [ ] Current Cancer (within 6 months)        2   [x] Immobilization > 24 hrs                    1  [ ] ICU/CCU stay > 24 hrs                      1  [] Age > 60                                         1

## 2020-07-30 LAB
ANION GAP SERPL CALC-SCNC: 7 MMOL/L — SIGNIFICANT CHANGE UP (ref 5–17)
BASOPHILS # BLD AUTO: 0.03 K/UL — SIGNIFICANT CHANGE UP (ref 0–0.2)
BASOPHILS NFR BLD AUTO: 0.2 % — SIGNIFICANT CHANGE UP (ref 0–2)
BUN SERPL-MCNC: 11 MG/DL — SIGNIFICANT CHANGE UP (ref 7–18)
CALCIUM SERPL-MCNC: 8.8 MG/DL — SIGNIFICANT CHANGE UP (ref 8.4–10.5)
CHLORIDE SERPL-SCNC: 107 MMOL/L — SIGNIFICANT CHANGE UP (ref 96–108)
CO2 SERPL-SCNC: 25 MMOL/L — SIGNIFICANT CHANGE UP (ref 22–31)
CREAT SERPL-MCNC: 0.87 MG/DL — SIGNIFICANT CHANGE UP (ref 0.5–1.3)
CULTURE RESULTS: SIGNIFICANT CHANGE UP
EOSINOPHIL # BLD AUTO: 0.01 K/UL — SIGNIFICANT CHANGE UP (ref 0–0.5)
EOSINOPHIL NFR BLD AUTO: 0.1 % — SIGNIFICANT CHANGE UP (ref 0–6)
GLUCOSE SERPL-MCNC: 95 MG/DL — SIGNIFICANT CHANGE UP (ref 70–99)
HCT VFR BLD CALC: 30.1 % — LOW (ref 39–50)
HGB BLD-MCNC: 9.8 G/DL — LOW (ref 13–17)
IMM GRANULOCYTES NFR BLD AUTO: 0.5 % — SIGNIFICANT CHANGE UP (ref 0–1.5)
LYMPHOCYTES # BLD AUTO: 0.55 K/UL — LOW (ref 1–3.3)
LYMPHOCYTES # BLD AUTO: 3 % — LOW (ref 13–44)
MCHC RBC-ENTMCNC: 28 PG — SIGNIFICANT CHANGE UP (ref 27–34)
MCHC RBC-ENTMCNC: 32.6 GM/DL — SIGNIFICANT CHANGE UP (ref 32–36)
MCV RBC AUTO: 86 FL — SIGNIFICANT CHANGE UP (ref 80–100)
MONOCYTES # BLD AUTO: 0.91 K/UL — HIGH (ref 0–0.9)
MONOCYTES NFR BLD AUTO: 4.9 % — SIGNIFICANT CHANGE UP (ref 2–14)
MRSA PCR RESULT.: SIGNIFICANT CHANGE UP
NEUTROPHILS # BLD AUTO: 16.86 K/UL — HIGH (ref 1.8–7.4)
NEUTROPHILS NFR BLD AUTO: 91.3 % — HIGH (ref 43–77)
NRBC # BLD: 0 /100 WBCS — SIGNIFICANT CHANGE UP (ref 0–0)
PLATELET # BLD AUTO: 316 K/UL — SIGNIFICANT CHANGE UP (ref 150–400)
POTASSIUM SERPL-MCNC: 3.7 MMOL/L — SIGNIFICANT CHANGE UP (ref 3.5–5.3)
POTASSIUM SERPL-SCNC: 3.7 MMOL/L — SIGNIFICANT CHANGE UP (ref 3.5–5.3)
RBC # BLD: 3.5 M/UL — LOW (ref 4.2–5.8)
RBC # FLD: 12.9 % — SIGNIFICANT CHANGE UP (ref 10.3–14.5)
S AUREUS DNA NOSE QL NAA+PROBE: SIGNIFICANT CHANGE UP
SODIUM SERPL-SCNC: 139 MMOL/L — SIGNIFICANT CHANGE UP (ref 135–145)
SPECIMEN SOURCE: SIGNIFICANT CHANGE UP
WBC # BLD: 18.45 K/UL — HIGH (ref 3.8–10.5)
WBC # FLD AUTO: 18.45 K/UL — HIGH (ref 3.8–10.5)

## 2020-07-30 PROCEDURE — 99233 SBSQ HOSP IP/OBS HIGH 50: CPT | Mod: GC

## 2020-07-30 RX ORDER — METOPROLOL TARTRATE 50 MG
25 TABLET ORAL
Refills: 0 | Status: DISCONTINUED | OUTPATIENT
Start: 2020-07-30 | End: 2020-07-31

## 2020-07-30 RX ADMIN — ENTACAPONE 200 MILLIGRAM(S): 200 TABLET, FILM COATED ORAL at 14:45

## 2020-07-30 RX ADMIN — CLOPIDOGREL BISULFATE 75 MILLIGRAM(S): 75 TABLET, FILM COATED ORAL at 11:29

## 2020-07-30 RX ADMIN — ATORVASTATIN CALCIUM 80 MILLIGRAM(S): 80 TABLET, FILM COATED ORAL at 23:27

## 2020-07-30 RX ADMIN — Medication 25 MILLIGRAM(S): at 23:26

## 2020-07-30 RX ADMIN — Medication 1000 UNIT(S): at 11:29

## 2020-07-30 RX ADMIN — PANTOPRAZOLE SODIUM 40 MILLIGRAM(S): 20 TABLET, DELAYED RELEASE ORAL at 06:45

## 2020-07-30 RX ADMIN — BUPROPION HYDROCHLORIDE 150 MILLIGRAM(S): 150 TABLET, EXTENDED RELEASE ORAL at 11:29

## 2020-07-30 RX ADMIN — CARBIDOPA AND LEVODOPA 1 TABLET(S): 25; 100 TABLET ORAL at 23:26

## 2020-07-30 RX ADMIN — CARBIDOPA AND LEVODOPA 1 TABLET(S): 25; 100 TABLET ORAL at 08:37

## 2020-07-30 RX ADMIN — Medication 81 MILLIGRAM(S): at 11:29

## 2020-07-30 RX ADMIN — ENTACAPONE 200 MILLIGRAM(S): 200 TABLET, FILM COATED ORAL at 05:39

## 2020-07-30 RX ADMIN — TAMSULOSIN HYDROCHLORIDE 0.4 MILLIGRAM(S): 0.4 CAPSULE ORAL at 23:27

## 2020-07-30 RX ADMIN — ENOXAPARIN SODIUM 40 MILLIGRAM(S): 100 INJECTION SUBCUTANEOUS at 11:29

## 2020-07-30 RX ADMIN — ENTACAPONE 200 MILLIGRAM(S): 200 TABLET, FILM COATED ORAL at 23:28

## 2020-07-30 RX ADMIN — CARBIDOPA AND LEVODOPA 1 TABLET(S): 25; 100 TABLET ORAL at 14:45

## 2020-07-30 NOTE — PHYSICAL THERAPY INITIAL EVALUATION ADULT - ADDITIONAL COMMENTS
Patient reports living in an apt bldg with 1 step outside of bldg and 2 steps to access 1st floor location of  apt. Patient lives with his sister as his full time caregiver. He requires cane device and assistance in ADL's.  He reports having extensive physical therapy prior to current admission for his recent cervical surgery (possible cervical vertebral fusion?).

## 2020-07-30 NOTE — PROGRESS NOTE ADULT - ATTENDING COMMENTS
MEDICAL ATTENDING NOTE    Patient is a 49y old  Male who presents with a chief complaint of Sepsis (2020 11:35)      INTERVAL HPI/OVERNIGHT EVENTS: reports improvement in pain and swelling     MEDICATIONS  (STANDING):  aspirin enteric coated 81 milliGRAM(s) Oral daily  atorvastatin 80 milliGRAM(s) Oral at bedtime  buPROPion XL . 150 milliGRAM(s) Oral daily  carbidopa/levodopa  25/100 1 Tablet(s) Oral <User Schedule>  cholecalciferol 1000 Unit(s) Oral daily  clopidogrel Tablet 75 milliGRAM(s) Oral daily  enoxaparin Injectable 40 milliGRAM(s) SubCutaneous daily  entacapone 200 milliGRAM(s) Oral three times a day  levoFLOXacin IVPB 500 milliGRAM(s) IV Intermittent every 24 hours  pantoprazole    Tablet 40 milliGRAM(s) Oral before breakfast  tamsulosin 0.4 milliGRAM(s) Oral at bedtime    MEDICATIONS  (PRN):  acetaminophen   Tablet .. 650 milliGRAM(s) Oral every 6 hours PRN Temp greater or equal to 38C (100.4F), Mild Pain (1 - 3)      ----------------------------------------------------------------------------------  REVIEW OF SYSTEMS: negative  Vital Signs Last 24 Hrs  T(C): 36.7 (2020 13:06), Max: 36.7 (2020 20:34)  T(F): 98 (2020 13:06), Max: 98.1 (2020 20:34)  HR: 104 (2020 15:56) (96 - 108)  BP: 132/87 (2020 15:56) (115/63 - 132/87)  BP(mean): --  RR: 18 (2020 13:06) (17 - 18)  SpO2: 100% (2020 15:56) (97% - 100%)    _________________  PHYSICAL EXAM:  ---------------------------   NAD; Normocephalic;   LUNGS - no wheezing  HEART: S1 S2+   ABDOMEN: Soft, Nontender, non distended  EXTREMITIES: no cyanosis; no edema  NERVOUS SYSTEM:  Awake and alert; no focal neuro deficits appreciated, resting tremors present, Neck collar present   : right scrotal swelling     _________________________________________________  LABS:                        9.8    18.45 )-----------( 316      ( 2020 06:28 )             30.1     07-30    139  |  107  |  11  ----------------------------<  95  3.7   |  25  |  0.87    Ca    8.8      2020 06:28  Phos  2.9     07-  Mg     1.6     -    TPro  6.7  /  Alb  2.3<L>  /  TBili  0.5  /  DBili  x   /  AST  16  /  ALT  8<L>  /  AlkPhos  98  07-      Urinalysis Basic - ( 2020 19:53 )    Color: Yellow / Appearance: Hazy / S.015 / pH: x  Gluc: x / Ketone: Trace  / Bili: Negative / Urobili: Negative   Blood: x / Protein: 30 mg/dL / Nitrite: Negative   Leuk Esterase: Moderate / RBC: 5-10 /HPF / WBC >50 /HPF   Sq Epi: x / Non Sq Epi: Occasional /HPF / Bacteria: Few /HPF      CAPILLARY BLOOD GLUCOSE          A/P:-  Pt is seen and evaluated by bedside.   Sepsis 2/2 epididymitis - sepsis resolved, started on levoflox, scrotal swelling improving, Uro consulted- scrotal elevation, compete total 10 days of antibiotics, urine culture - >3 organism likely contaminated  CHF- restarted metoprolol with parameters, euvolumic on exam - will hold lasix  CAD  cervical radiculopathy  BPH   HTN  DVT proph      Plan of care was discussed with patient ; all questions and concerns were addressed and care was aligned with patient's wishes.  Case discussed with team.

## 2020-07-30 NOTE — PHYSICAL THERAPY INITIAL EVALUATION ADULT - GENERAL OBSERVATIONS, REHAB EVAL
Patient was seen for PT evaluation today. EMR, laboratory and radiology results reviewed. Pt received supine in bed, NAD, [place] cervical collar in situ

## 2020-07-30 NOTE — CONSULT NOTE ADULT - ASSESSMENT
Patient is a 49 year old man presents with right groin and testicular pain, found to have epididymitis on US.    Recommendations:  In setting of recent rivas catheter placement, +UA, and US findings consistent with epididymitis, patient likely has bacterial infection of right testicle/epididymis and would benefit from continued antibiotic treatment. Would recommend FU urine cultures and narrowing antibiotics per culture results. No urologic intervention warranted at this time.    Patient may follow up outpatient with Dr. Martell:  [address]   1st [place] [address] [phone] Patient is a 49 year old man presents with right groin and testicular pain. In setting of recent rivas catheter placement, +UA, and US findings consistent with epididymitis, patient likely has bacterial infection of right testicle/epididymis and would benefit from continued antibiotic treatment.       Recommendations:  -C/w antibiotics, narrow per urine culture  -FU urine cultures  -NSAIDs  -Scrotal elevation with rolled towel (discussed with RN)  -No urologic intervention warranted at this time.  -Patient may follow up outpatient with his previous urologist or with Dr. Martell:  [address]  1st [place] [address]  [phone] Patient is a 49 year old man presents with right groin and testicular pain. In setting of recent rivas catheter placement, +UA, and US findings consistent with epididymitis, patient likely has bacterial infection of right testicle/epididymis and would benefit from continued antibiotic treatment. Pain resolved and swelling improving with antibiotic treatment.      Recommendations:  -C/w antibiotics, narrow per urine culture  -FU urine cultures  -NSAIDs  -Scrotal elevation with rolled towel (discussed with RN)  -No urologic intervention warranted at this time, patient may follow up outpatient with his previous urologist or with Dr. Martell:  [address]  1st [place] [address] [phone]

## 2020-07-30 NOTE — PHYSICAL THERAPY INITIAL EVALUATION ADULT - CRITERIA FOR SKILLED THERAPEUTIC INTERVENTIONS
risk reduction/prevention/functional limitations in following categories/anticipated equipment needs at discharge/predicted duration of therapy intervention/anticipated discharge recommendation/impairments found/therapy frequency/rehab potential

## 2020-07-30 NOTE — PHYSICAL THERAPY INITIAL EVALUATION ADULT - GAIT DEVIATIONS NOTED, PT EVAL
decreased weight-shifting ability/increased time in double stance/decreased step length/decreased stride length

## 2020-07-30 NOTE — PHYSICAL THERAPY INITIAL EVALUATION ADULT - PERTINENT HX OF CURRENT PROBLEM, REHAB EVAL
Patient is a 49 year old male with extensive PMHx admitted for sepsis secondary to suspected epididymitis, likely caused by traumatic Elmore insertion during surgical procedure 7 days prior to admission. Patient with h/x PD and Psx cervical vertebral fixation (?)

## 2020-07-30 NOTE — PHYSICAL THERAPY INITIAL EVALUATION ADULT - DIAGNOSIS, PT EVAL
Difficulty with bed mobility, transfers and ambulation due to generalized weakness, recent cervical surgery and recent UTI

## 2020-07-30 NOTE — PROGRESS NOTE ADULT - SUBJECTIVE AND OBJECTIVE BOX
NP Note discussed with  Primary Attending    Patient is a 49y old  Male who presents with a chief complaint of Sepsis (2020 15:59)    HPI    49 year old male with extensive PMHx admitted for sepsis secondary to suspected epididymitis, likely caused by traumatic Elmore insertion during surgical procedure 7 days prior to admission. IV Levaquin initiated, currently Day 2/10. US testicles shows bilateral hydroceles. F/U  consult.     INTERVAL HPI/OVERNIGHT EVENTS: Pt seen and examined this morning.     MEDICATIONS  (STANDING):  aspirin enteric coated 81 milliGRAM(s) Oral daily  atorvastatin 80 milliGRAM(s) Oral at bedtime  buPROPion XL . 150 milliGRAM(s) Oral daily  carbidopa/levodopa  25/100 1 Tablet(s) Oral <User Schedule>  cholecalciferol 1000 Unit(s) Oral daily  clopidogrel Tablet 75 milliGRAM(s) Oral daily  enoxaparin Injectable 40 milliGRAM(s) SubCutaneous daily  entacapone 200 milliGRAM(s) Oral three times a day  levoFLOXacin IVPB 500 milliGRAM(s) IV Intermittent every 24 hours  pantoprazole    Tablet 40 milliGRAM(s) Oral before breakfast  tamsulosin 0.4 milliGRAM(s) Oral at bedtime    MEDICATIONS  (PRN):  acetaminophen   Tablet .. 650 milliGRAM(s) Oral every 6 hours PRN Temp greater or equal to 38C (100.4F), Mild Pain (1 - 3)      __________________________________________________  REVIEW OF SYSTEMS:    CONSTITUTIONAL: No fever,   EYES: no acute visual disturbances  NECK: No pain or stiffness  RESPIRATORY: No cough; No shortness of breath  CARDIOVASCULAR: No chest pain, no palpitations  GASTROINTESTINAL: No pain. No nausea or vomiting; No diarrhea   NEUROLOGICAL: No headache or numbness, no tremors  MUSCULOSKELETAL: No joint pain, no muscle pain  GENITOURINARY: Sweno dysuria, no frequency, no hesitancy  PSYCHIATRY: no depression , no anxiety  ALL OTHER  ROS negative        Vital Signs Last 24 Hrs  T(C): 36.6 (2020 05:50), Max: 36.7 (2020 11:32)  T(F): 97.9 (2020 05:50), Max: 98.1 (2020 20:34)  HR: 108 (2020 05:50) (74 - 108)  BP: 124/65 (2020 05:50) (98/47 - 135/94)  BP(mean): --  RR: 18 (2020 05:50) (17 - 18)  SpO2: 100% (2020 05:50) (97% - 100%)    ________________________________________________  PHYSICAL EXAM:  GENERAL: NAD  HEENT: Normocephalic;  conjunctivae and sclerae clear; moist mucous membranes;   NECK : supple  CHEST/LUNG: Clear to auscultation bilaterally with good air entry   HEART: S1 S2  regular; no murmurs, gallops or rubs  ABDOMEN: Soft, Nontender, Nondistended; Bowel sounds present  EXTREMITIES: no cyanosis; no edema; no calf tenderness  SKIN: warm and dry; no rash  NERVOUS SYSTEM:  Awake and alert; Oriented  to place, person and time ; no new deficits    _________________________________________________  LABS:                        9.8    18.45 )-----------( 316      ( 2020 06:28 )             30.1     07-30    139  |  107  |  11  ----------------------------<  95  3.7   |  25  |  0.87    Ca    8.8      2020 06:28  Phos  2.9     07-29  Mg     1.6     07-29    TPro  6.7  /  Alb  2.3<L>  /  TBili  0.5  /  DBili  x   /  AST  16  /  ALT  8<L>  /  AlkPhos  98  07-29      Urinalysis Basic - ( 2020 19:53 )    Color: Yellow / Appearance: Hazy / S.015 / pH: x  Gluc: x / Ketone: Trace  / Bili: Negative / Urobili: Negative   Blood: x / Protein: 30 mg/dL / Nitrite: Negative   Leuk Esterase: Moderate / RBC: 5-10 /HPF / WBC >50 /HPF   Sq Epi: x / Non Sq Epi: Occasional /HPF / Bacteria: Few /HPF      CAPILLARY BLOOD GLUCOSE            RADIOLOGY & ADDITIONAL TESTS:    Imaging Personally Reviewed:  YES/NO    Consultant(s) Notes Reviewed:   YES/ No    Care Discussed with Consultants :     Plan of care was discussed with patient and /or primary care giver; all questions and concerns were addressed and care was aligned with patient's wishes. NP Note discussed with  Primary Attending    Patient is a 49y old  Male who presents with a chief complaint of Sepsis (2020 15:59)    HPI    49 year old male with extensive PMHx admitted for sepsis secondary to suspected epididymitis, likely caused by traumatic Elmore insertion during surgical procedure 7 days prior to admission. IV Rocephin initiated in ED. Currently on Levaquin  Day 2/10. US testicles shows bilateral hydroceles. F/U  consult.     INTERVAL HPI/OVERNIGHT EVENTS: Pt seen and examined this morning. Pt reports feeling ok, less pain in right groin, but testicle feels more swollen today.     MEDICATIONS  (STANDING):  aspirin enteric coated 81 milliGRAM(s) Oral daily  atorvastatin 80 milliGRAM(s) Oral at bedtime  buPROPion XL . 150 milliGRAM(s) Oral daily  carbidopa/levodopa  25/100 1 Tablet(s) Oral <User Schedule>  cholecalciferol 1000 Unit(s) Oral daily  clopidogrel Tablet 75 milliGRAM(s) Oral daily  enoxaparin Injectable 40 milliGRAM(s) SubCutaneous daily  entacapone 200 milliGRAM(s) Oral three times a day  levoFLOXacin IVPB 500 milliGRAM(s) IV Intermittent every 24 hours  pantoprazole    Tablet 40 milliGRAM(s) Oral before breakfast  tamsulosin 0.4 milliGRAM(s) Oral at bedtime    MEDICATIONS  (PRN):  acetaminophen   Tablet .. 650 milliGRAM(s) Oral every 6 hours PRN Temp greater or equal to 38C (100.4F), Mild Pain (1 - 3)      __________________________________________________  REVIEW OF SYSTEMS:    CONSTITUTIONAL: No fever,   EYES: no acute visual disturbances  NECK: No pain or stiffness  RESPIRATORY: No cough; No shortness of breath  CARDIOVASCULAR: No chest pain, no palpitations  GASTROINTESTINAL: No pain. No nausea or vomiting; No diarrhea   NEUROLOGICAL: No headache or numbness, no tremors  MUSCULOSKELETAL: No joint pain, no muscle pain  GENITOURINARY:Increased swell in testicles. no dysuria, no frequency, no hesitancy  PSYCHIATRY: no depression , no anxiety  ALL OTHER  ROS negative        Vital Signs Last 24 Hrs  T(C): 36.6 (2020 05:50), Max: 36.7 (2020 11:32)  T(F): 97.9 (2020 05:50), Max: 98.1 (2020 20:34)  HR: 108 (2020 05:50) (74 - 108)  BP: 124/65 (2020 05:50) (98/47 - 135/94)  BP(mean): --  RR: 18 (2020 05:50) (17 - 18)  SpO2: 100% (2020 05:50) (97% - 100%)    ________________________________________________  PHYSICAL EXAM:  GENERAL: NAD  HEENT: Normocephalic;  conjunctivae and sclerae clear; moist mucous membranes;   NECK : supple  CHEST/LUNG: Clear to auscultation bilaterally with good air entry   HEART: S1 S2  regular; no murmurs, gallops or rubs  ABDOMEN: Soft, Nontender, Nondistended; Bowel sounds present  : skin intact,, tender, no redness  EXTREMITIES: no cyanosis; no edema; no calf tenderness  SKIN: warm and dry; no rash  NERVOUS SYSTEM:  Awake and alert; Oriented  to place, person and time ; no new deficits    _________________________________________________  LABS:                        9.8    18.45 )-----------( 316      ( 2020 06:28 )             30.1     07-30    139  |  107  |  11  ----------------------------<  95  3.7   |  25  |  0.87    Ca    8.8      2020 06:28  Phos  2.9     07-29  Mg     1.6     07-29    TPro  6.7  /  Alb  2.3<L>  /  TBili  0.5  /  DBili  x   /  AST  16  /  ALT  8<L>  /  AlkPhos  98  07-29      Urinalysis Basic - ( 2020 19:53 )    Color: Yellow / Appearance: Hazy / S.015 / pH: x  Gluc: x / Ketone: Trace  / Bili: Negative / Urobili: Negative   Blood: x / Protein: 30 mg/dL / Nitrite: Negative   Leuk Esterase: Moderate / RBC: 5-10 /HPF / WBC >50 /HPF   Sq Epi: x / Non Sq Epi: Occasional /HPF / Bacteria: Few /HPF      CAPILLARY BLOOD GLUCOSE            RADIOLOGY & ADDITIONAL TESTS:    < from: US Testicles (20 @ 09:36) >  Impression: Increased color Doppler flow in the right testis and right epididymis, compatible with right epididymitis-orchitis.    Bilateral hydroceles.    < end of copied text >      Consultant(s) Notes Reviewed:   YES/ No    Care Discussed with Consultants :     Plan of care was discussed with patient and /or primary care giver; all questions and concerns were addressed and care was aligned with patient's wishes.

## 2020-07-30 NOTE — CONSULT NOTE ADULT - SUBJECTIVE AND OBJECTIVE BOX
HPI  Patient is a 49 year old male from home with PMHx of parkinson's disease, CHFrEF (EF35-40%), CAD, HTN, BPH s/p TURP (2019) presents with right groin and testicular pain. Patient had a cervical spine surgery 1 week prior and had rivas catheter placed at that time. For 2 days prior to admission, patient c/o testicular/groin pain, dysuria, and hematuria. Subjective fevers, chills, nausea, no vomiting. Denied chest pain, SOB, abdominal pain. Patient is not sexually active.    In the ED, patient noted to be UA positive, given ceftriaxone. Patient was septic, with vitals significant for hypotension and sinus tachycardia in which patient was given 2L bolus of NS. CBC significant for leukocytosis.    Patient currently on Levaquin. Urine culture pending. Blood cultures no growth to date.    Testicular US demonstrating hypervascularity on right testicle/epidiymis consistent with epididymitis, no torsion, bilateral hydroceles.          PAST MEDICAL & SURGICAL HISTORY:  Unilateral inguinal hernia, without obstruction or gangrene, not specified as recurrent  Chronic neck pain  Chronic pain of both shoulders  Scoliosis  Swelling of both ankles  Constipation  H/O CHF  Non-recurrent unilateral inguinal hernia without obstruction or gangrene  Expected difficult intubation: Dr. Decker notified , limited ROM of neck, Mallapati III  Cervical radiculopathy: as per family unstable C1, C2 , Dr. Decker notified , limited ROM of neck, pt is difficult intubation risk, family to bring results of MRI of neck   Hypertension  MI (myocardial infarction): 2018- old  CAD (coronary artery disease): CABG x 2 - 2018  BPH (benign prostatic hyperplasia): TURP- 2019  Parkinsons disease: onset   Anxiety  Hypercholesterolemia  H/O inguinal hernia repair: R side by Dr. Galeas  S/P cervical spinal fusion: c1 c2 screws  S/P TURP: 2019  S/P CABG (coronary artery bypass graft): 2018      MEDICATIONS  (STANDING):  aspirin enteric coated 81 milliGRAM(s) Oral daily  atorvastatin 80 milliGRAM(s) Oral at bedtime  buPROPion XL . 150 milliGRAM(s) Oral daily  carbidopa/levodopa  25/100 1 Tablet(s) Oral <User Schedule>  cholecalciferol 1000 Unit(s) Oral daily  clopidogrel Tablet 75 milliGRAM(s) Oral daily  enoxaparin Injectable 40 milliGRAM(s) SubCutaneous daily  entacapone 200 milliGRAM(s) Oral three times a day  levoFLOXacin IVPB 500 milliGRAM(s) IV Intermittent every 24 hours  pantoprazole    Tablet 40 milliGRAM(s) Oral before breakfast  tamsulosin 0.4 milliGRAM(s) Oral at bedtime    MEDICATIONS  (PRN):  acetaminophen   Tablet .. 650 milliGRAM(s) Oral every 6 hours PRN Temp greater or equal to 38C (100.4F), Mild Pain (1 - 3)      FAMILY HISTORY:  Family history of acute myocardial infarction (Sibling): 2 brothers- all  of MI      Allergies    No Known Drug Allergies  Seafood (Blisters; Swelling; Pruritus; Hives)    Intolerances        SOCIAL HISTORY:    REVIEW OF SYSTEMS: Otherwise negative as stated in HPI    Physical Exam  Vital signs  T(C): 36.6 (20 @ 05:50), Max: 36.7 (20 @ 20:34)  HR: 108 (20 @ 05:50)  BP: 124/65 (20 @ 05:50)  SpO2: 100% (20 @ 05:50)  Wt(kg): --    Output      Gen: NAD  Pulm: No respiratory distress	  CV: RRR  GI: S/ND/NT  : BL hydroceles, right testicular and epididymis TTP, left testicle and epididymis WNL  MSK: moves all 4 limbs spontaneously    LABS:       @ 06:28    WBC 18.45 / Hct 30.1  / SCr 0.87      @ 06:34    WBC 29.36 / Hct 32.2  / SCr 1.10         139  |  107  |  11  ----------------------------<  95  3.7   |  25  |  0.87    Ca    8.8      2020 06:28  Phos  2.9       Mg     1.6         TPro  6.7  /  Alb  2.3<L>  /  TBili  0.5  /  DBili  x   /  AST  16  /  ALT  8<L>  /  AlkPhos  98        Urinalysis Basic - ( 2020 19:53 )    Color: Yellow / Appearance: Hazy / S.015 / pH: x  Gluc: x / Ketone: Trace  / Bili: Negative / Urobili: Negative   Blood: x / Protein: 30 mg/dL / Nitrite: Negative   Leuk Esterase: Moderate / RBC: 5-10 /HPF / WBC >50 /HPF   Sq Epi: x / Non Sq Epi: Occasional /HPF / Bacteria: Few /HPF            Urine Cx: pending    Blood Cx: NGTD    RADIOLOGY:    EXAM:  CT ABDOMEN AND PELVIS IC                            PROCEDURE DATE:  2020          INTERPRETATION:  CLINICAL INFORMATION: Right groin pain, prior history of hernia repair    COMPARISON: 2019    PROCEDURE:  CT of the Abdomen and Pelvis was performed with intravenous contrast.  Intravenous contrast: 90 ml Omnipaque 350. 10 ml discarded.  Oral contrast: None.  Sagittal and coronal reformats were performed.    FINDINGS:  LOWER CHEST: Heart is mildly enlarged. Minimal left basilar atelectasis.    LIVER: Within normal limits.  BILE DUCTS: Normal caliber.  GALLBLADDER: Within normal limits.  SPLEEN: Within normal limits.  PANCREAS: Within normal limits.  ADRENALS: Within normal limits.  KIDNEYS/URETERS: Within normal limits.    BLADDER: Within normal limits.  REPRODUCTIVE ORGANS: No pelvic mass, pelvic adenopathy or pelvic free fluid.    BOWEL: No bowel obstruction. Appendix is within normal limits.  PERITONEUM: No ascites.  VESSELS: Atherosclerotic changes of the aorta without aneurysmal dilatation.  RETROPERITONEUM/LYMPH NODES: No lymphadenopathy.  ABDOMINAL WALL: Mildly prominent right inguinal/gonadal vessels. No right inguinal hernia or fluid collections.  BONES: Within normal limits.    IMPRESSION:  No bowel obstruction or gross bowel wall thickening. Normal appendix.    No significant right inguinal hernia or right inguinal fluid collections as clinically questioned.        SOSA OWEN M.D., ATTENDING RADIOLOGIST  This document has been electronically signed. 2020  8:43PM            EXAM:  US SCROTUM AND CONTENTS                            PROCEDURE DATE:  2020          INTERPRETATION:  Scrotal ultrasound    Indication: Right lower groin pain.    Scrotal ultrasound is performed without a previous examination for comparison. The right testis measures 4.4 x 2.9 x 3.6 cm and the left testis measures 4.1 x 1.8 x 3.3 cm. No evidence for an intratesticular mass. Duplex Doppler flow is demonstrated for both testes. There is increase color Doppler flow in the right testis.    The right epididymal head measures 1.1 x 0.6 x 0.7 cm and the left epididymal head measures 0.8 x 0.8 x 0.8 cm. There is increased color Doppler flow in the right epididymis. There is a 0.6 x 0.4 x 2.3 cm hyperechoic structure with posterior shadowing in the right epididymis, suggestive of a calcification.    Bilateral hydroceles, moderate on the right and mild on the left.    No evidence for a varicocele.    Impression: Increased color Doppler flow in the right testis and right epididymis, compatible with right epididymitis-orchitis.    Bilateral hydroceles.                JACK FIGUEROA M.D., ATTENDING RADIOLOGIST  This document has been electronically signed. 2020  2:29PM HPI  Patient is a 49 year old male from home with PMHx of parkinson's disease, CHFrEF (EF35-40%), CAD, HTN, BPH s/p TURP (2019, previous urologist unknown) presents with right groin and testicular pain. Patient had a cervical spine surgery 1 week prior and had rivas catheter placed at that time. For 2 days prior to admission, patient c/o testicular/groin pain, dysuria, and hematuria. Subjective fevers, chills, nausea, no vomiting. Denied chest pain, SOB, abdominal pain. Patient is not sexually active.    In the ED, patient noted to be UA positive, given ceftriaxone. Patient was septic, with vitals significant for hypotension and sinus tachycardia in which patient was given 2L bolus of NS. CBC significant for leukocytosis.    Patient currently on Levaquin. Urine culture pending. Blood cultures no growth to date.    Testicular US demonstrating hypervascularity on right testicle/epidiymis consistent with epididymitis, no torsion, bilateral hydroceles.    Of note, patient states he had TURP performed at [facility] in 2019, however patient does not know the name of his previous urologist and records of this procedure are unable to be found.          PAST MEDICAL & SURGICAL HISTORY:  Unilateral inguinal hernia, without obstruction or gangrene, not specified as recurrent  Chronic neck pain  Chronic pain of both shoulders  Scoliosis  Swelling of both ankles  Constipation  H/O CHF  Non-recurrent unilateral inguinal hernia without obstruction or gangrene  Expected difficult intubation: Dr. Decker notified , limited ROM of neck, Mallapati III  Cervical radiculopathy: as per family unstable C1, C2 , Dr. Decker notified , limited ROM of neck, pt is difficult intubation risk, family to bring results of MRI of neck   Hypertension  MI (myocardial infarction): 2018- old  CAD (coronary artery disease): CABG x 2 - 2018  BPH (benign prostatic hyperplasia): TURP- 2019  Parkinsons disease: onset   Anxiety  Hypercholesterolemia  H/O inguinal hernia repair: R side by Dr. Galeas  S/P cervical spinal fusion: c1 c2 screws  S/P TURP: 2019  S/P CABG (coronary artery bypass graft): 2018      MEDICATIONS  (STANDING):  aspirin enteric coated 81 milliGRAM(s) Oral daily  atorvastatin 80 milliGRAM(s) Oral at bedtime  buPROPion XL . 150 milliGRAM(s) Oral daily  carbidopa/levodopa  25/100 1 Tablet(s) Oral <User Schedule>  cholecalciferol 1000 Unit(s) Oral daily  clopidogrel Tablet 75 milliGRAM(s) Oral daily  enoxaparin Injectable 40 milliGRAM(s) SubCutaneous daily  entacapone 200 milliGRAM(s) Oral three times a day  levoFLOXacin IVPB 500 milliGRAM(s) IV Intermittent every 24 hours  pantoprazole    Tablet 40 milliGRAM(s) Oral before breakfast  tamsulosin 0.4 milliGRAM(s) Oral at bedtime    MEDICATIONS  (PRN):  acetaminophen   Tablet .. 650 milliGRAM(s) Oral every 6 hours PRN Temp greater or equal to 38C (100.4F), Mild Pain (1 - 3)      FAMILY HISTORY:  Family history of acute myocardial infarction (Sibling): 2 brothers- all  of MI      Allergies    No Known Drug Allergies  Seafood (Blisters; Swelling; Pruritus; Hives)    Intolerances        SOCIAL HISTORY:    REVIEW OF SYSTEMS: Otherwise negative as stated in HPI    Physical Exam  Vital signs  T(C): 36.6 (20 @ 05:50), Max: 36.7 (20 @ 20:34)  HR: 108 (20 @ 05:50)  BP: 124/65 (20 @ 05:50)  SpO2: 100% (20 @ 05:50)  Wt(kg): --    Output      Gen: NAD  Pulm: No respiratory distress	  CV: RRR  GI: S/ND/NT  : BL hydroceles, right testicular and epididymis TTP, left testicle and epididymis WNL  MSK: moves all 4 limbs spontaneously    LABS:       @ 06:28    WBC 18.45 / Hct 30.1  / SCr 0.87      @ 06:34    WBC 29.36 / Hct 32.2  / SCr 1.10         139  |  107  |  11  ----------------------------<  95  3.7   |  25  |  0.87    Ca    8.8      2020 06:28  Phos  2.9       Mg     1.6         TPro  6.7  /  Alb  2.3<L>  /  TBili  0.5  /  DBili  x   /  AST  16  /  ALT  8<L>  /  AlkPhos  98  -      Urinalysis Basic - ( 2020 19:53 )    Color: Yellow / Appearance: Hazy / S.015 / pH: x  Gluc: x / Ketone: Trace  / Bili: Negative / Urobili: Negative   Blood: x / Protein: 30 mg/dL / Nitrite: Negative   Leuk Esterase: Moderate / RBC: 5-10 /HPF / WBC >50 /HPF   Sq Epi: x / Non Sq Epi: Occasional /HPF / Bacteria: Few /HPF            Urine Cx: pending    Blood Cx: NGTD    RADIOLOGY:    EXAM:  CT ABDOMEN AND PELVIS IC                            PROCEDURE DATE:  2020          INTERPRETATION:  CLINICAL INFORMATION: Right groin pain, prior history of hernia repair    COMPARISON: 2019    PROCEDURE:  CT of the Abdomen and Pelvis was performed with intravenous contrast.  Intravenous contrast: 90 ml Omnipaque 350. 10 ml discarded.  Oral contrast: None.  Sagittal and coronal reformats were performed.    FINDINGS:  LOWER CHEST: Heart is mildly enlarged. Minimal left basilar atelectasis.    LIVER: Within normal limits.  BILE DUCTS: Normal caliber.  GALLBLADDER: Within normal limits.  SPLEEN: Within normal limits.  PANCREAS: Within normal limits.  ADRENALS: Within normal limits.  KIDNEYS/URETERS: Within normal limits.    BLADDER: Within normal limits.  REPRODUCTIVE ORGANS: No pelvic mass, pelvic adenopathy or pelvic free fluid.    BOWEL: No bowel obstruction. Appendix is within normal limits.  PERITONEUM: No ascites.  VESSELS: Atherosclerotic changes of the aorta without aneurysmal dilatation.  RETROPERITONEUM/LYMPH NODES: No lymphadenopathy.  ABDOMINAL WALL: Mildly prominent right inguinal/gonadal vessels. No right inguinal hernia or fluid collections.  BONES: Within normal limits.    IMPRESSION:  No bowel obstruction or gross bowel wall thickening. Normal appendix.    No significant right inguinal hernia or right inguinal fluid collections as clinically questioned.        SOSA OWEN M.D., ATTENDING RADIOLOGIST  This document has been electronically signed. 2020  8:43PM            EXAM:  US SCROTUM AND CONTENTS                            PROCEDURE DATE:  2020          INTERPRETATION:  Scrotal ultrasound    Indication: Right lower groin pain.    Scrotal ultrasound is performed without a previous examination for comparison. The right testis measures 4.4 x 2.9 x 3.6 cm and the left testis measures 4.1 x 1.8 x 3.3 cm. No evidence for an intratesticular mass. Duplex Doppler flow is demonstrated for both testes. There is increase color Doppler flow in the right testis.    The right epididymal head measures 1.1 x 0.6 x 0.7 cm and the left epididymal head measures 0.8 x 0.8 x 0.8 cm. There is increased color Doppler flow in the right epididymis. There is a 0.6 x 0.4 x 2.3 cm hyperechoic structure with posterior shadowing in the right epididymis, suggestive of a calcification.    Bilateral hydroceles, moderate on the right and mild on the left.    No evidence for a varicocele.    Impression: Increased color Doppler flow in the right testis and right epididymis, compatible with right epididymitis-orchitis.    Bilateral hydroceles.                JACK FIGUEROA M.D., ATTENDING RADIOLOGIST  This document has been electronically signed. 2020  2:29PM HPI  Patient is a 49 year old male from home with PMHx of parkinson's disease, CHFrEF (EF35-40%), CAD, HTN, BPH s/p TURP (2019, previous urologist unknown) presents with right groin and testicular pain. Patient had a cervical spine surgery 1 week prior and had rivas catheter placed at that time. For 2 days prior to admission, patient c/o testicular/groin pain, dysuria, and an episode of hematuria. Has never seen blood in urine prior to this episode. Denies LUTS s/p his TURP. Denied chest pain, SOB, abdominal pain. Patient is not sexually active.  In the ED, patient noted to be UA positive, given ceftriaxone. Patient was septic, with vitals significant for hypotension and sinus tachycardia in which patient was given 2L bolus of NS. CBC significant for leukocytosis.  Currently states testicular/groin pain resolved and swelling improved. Patient currently receiving Levaquin. Urine culture pending. Blood cultures no growth to date.    Testicular US demonstrating hypervascularity on right testicle/epidiymis consistent with epididymitis, no torsion, bilateral hydroceles.    Of note, patient states he had TURP performed at [facility] in 2019, however patient does not know the name of his previous urologist and records of this procedure are unable to be found.          PAST MEDICAL & SURGICAL HISTORY:  Unilateral inguinal hernia, without obstruction or gangrene, not specified as recurrent  Chronic neck pain  Chronic pain of both shoulders  Scoliosis  Swelling of both ankles  Constipation  H/O CHF  Non-recurrent unilateral inguinal hernia without obstruction or gangrene  Expected difficult intubation: Dr. Decker notified , limited ROM of neck, Mallapati III  Cervical radiculopathy: as per family unstable C1, C2 , Dr. Decker notified , limited ROM of neck, pt is difficult intubation risk, family to bring results of MRI of neck   Hypertension  MI (myocardial infarction): 2018- old  CAD (coronary artery disease): CABG x 2 - 2018  BPH (benign prostatic hyperplasia): TURP- 2019  Parkinsons disease: onset   Anxiety  Hypercholesterolemia  H/O inguinal hernia repair: R side by Dr. Galeas  S/P cervical spinal fusion: c1 c2 screws  S/P TURP: 2019  S/P CABG (coronary artery bypass graft): 2018      MEDICATIONS  (STANDING):  aspirin enteric coated 81 milliGRAM(s) Oral daily  atorvastatin 80 milliGRAM(s) Oral at bedtime  buPROPion XL . 150 milliGRAM(s) Oral daily  carbidopa/levodopa  25/100 1 Tablet(s) Oral <User Schedule>  cholecalciferol 1000 Unit(s) Oral daily  clopidogrel Tablet 75 milliGRAM(s) Oral daily  enoxaparin Injectable 40 milliGRAM(s) SubCutaneous daily  entacapone 200 milliGRAM(s) Oral three times a day  levoFLOXacin IVPB 500 milliGRAM(s) IV Intermittent every 24 hours  pantoprazole    Tablet 40 milliGRAM(s) Oral before breakfast  tamsulosin 0.4 milliGRAM(s) Oral at bedtime    MEDICATIONS  (PRN):  acetaminophen   Tablet .. 650 milliGRAM(s) Oral every 6 hours PRN Temp greater or equal to 38C (100.4F), Mild Pain (1 - 3)      FAMILY HISTORY:  Family history of acute myocardial infarction (Sibling): 2 brothers- all  of MI      Allergies    No Known Drug Allergies  Seafood (Blisters; Swelling; Pruritus; Hives)    Intolerances        SOCIAL HISTORY:    REVIEW OF SYSTEMS: Otherwise negative as stated in HPI    Physical Exam  Vital signs  T(C): 36.6 (20 @ 05:50), Max: 36.7 (20 @ 20:34)  HR: 108 (20 @ 05:50)  BP: 124/65 (20 @ 05:50)  SpO2: 100% (20 @ 05:50)  Wt(kg): --    Output      Gen: NAD  Pulm: No respiratory distress	  CV: RRR  GI: S/ND/NT  : normal uncircumcised male, normal meatus, right testicular and epididymis swollen, no testicular or epididymal tenderness to palpation, no inguinal hernias appreciated, left testicle and epididymis WNL, no overlying skin changes  MSK: moves all 4 limbs spontaneously    LABS:       @ 06:28    WBC 18.45 / Hct 30.1  / SCr 0.87      @ 06:34    WBC 29.36 / Hct 32.2  / SCr 1.10         139  |  107  |  11  ----------------------------<  95  3.7   |  25  |  0.87    Ca    8.8      2020 06:28  Phos  2.9     -  Mg     1.6     -    TPro  6.7  /  Alb  2.3<L>  /  TBili  0.5  /  DBili  x   /  AST  16  /  ALT  8<L>  /  AlkPhos  98        Urinalysis Basic - ( 2020 19:53 )    Color: Yellow / Appearance: Hazy / S.015 / pH: x  Gluc: x / Ketone: Trace  / Bili: Negative / Urobili: Negative   Blood: x / Protein: 30 mg/dL / Nitrite: Negative   Leuk Esterase: Moderate / RBC: 5-10 /HPF / WBC >50 /HPF   Sq Epi: x / Non Sq Epi: Occasional /HPF / Bacteria: Few /HPF            Urine Cx: pending    Blood Cx: NGTD    RADIOLOGY:    EXAM:  CT ABDOMEN AND PELVIS IC                            PROCEDURE DATE:  2020          INTERPRETATION:  CLINICAL INFORMATION: Right groin pain, prior history of hernia repair    COMPARISON: 2019    PROCEDURE:  CT of the Abdomen and Pelvis was performed with intravenous contrast.  Intravenous contrast: 90 ml Omnipaque 350. 10 ml discarded.  Oral contrast: None.  Sagittal and coronal reformats were performed.    FINDINGS:  LOWER CHEST: Heart is mildly enlarged. Minimal left basilar atelectasis.    LIVER: Within normal limits.  BILE DUCTS: Normal caliber.  GALLBLADDER: Within normal limits.  SPLEEN: Within normal limits.  PANCREAS: Within normal limits.  ADRENALS: Within normal limits.  KIDNEYS/URETERS: Within normal limits.    BLADDER: Within normal limits.  REPRODUCTIVE ORGANS: No pelvic mass, pelvic adenopathy or pelvic free fluid.    BOWEL: No bowel obstruction. Appendix is within normal limits.  PERITONEUM: No ascites.  VESSELS: Atherosclerotic changes of the aorta without aneurysmal dilatation.  RETROPERITONEUM/LYMPH NODES: No lymphadenopathy.  ABDOMINAL WALL: Mildly prominent right inguinal/gonadal vessels. No right inguinal hernia or fluid collections.  BONES: Within normal limits.    IMPRESSION:  No bowel obstruction or gross bowel wall thickening. Normal appendix.    No significant right inguinal hernia or right inguinal fluid collections as clinically questioned.        SOSA OWEN M.D., ATTENDING RADIOLOGIST  This document has been electronically signed. 2020  8:43PM            EXAM:  US SCROTUM AND CONTENTS                            PROCEDURE DATE:  2020          INTERPRETATION:  Scrotal ultrasound    Indication: Right lower groin pain.    Scrotal ultrasound is performed without a previous examination for comparison. The right testis measures 4.4 x 2.9 x 3.6 cm and the left testis measures 4.1 x 1.8 x 3.3 cm. No evidence for an intratesticular mass. Duplex Doppler flow is demonstrated for both testes. There is increase color Doppler flow in the right testis.    The right epididymal head measures 1.1 x 0.6 x 0.7 cm and the left epididymal head measures 0.8 x 0.8 x 0.8 cm. There is increased color Doppler flow in the right epididymis. There is a 0.6 x 0.4 x 2.3 cm hyperechoic structure with posterior shadowing in the right epididymis, suggestive of a calcification.    Bilateral hydroceles, moderate on the right and mild on the left.    No evidence for a varicocele.    Impression: Increased color Doppler flow in the right testis and right epididymis, compatible with right epididymitis-orchitis.    Bilateral hydroceles.                JACK FIGUEROA M.D., ATTENDING RADIOLOGIST  This document has been electronically signed. 2020  2:29PM

## 2020-07-30 NOTE — PROGRESS NOTE ADULT - PROBLEM SELECTOR PLAN 9
IMPROVE VTE score: 1  Will manage with: Lovenox    [ ] Previous VTE                                    3  [ ] Thrombophilia                                  2  [ ] Lower limb paralysis                        2  (unable to hold up >15 seconds)    [ ] Current Cancer (within 6 months)        2   [x] Immobilization > 24 hrs                    1  [ ] ICU/CCU stay > 24 hrs                      1  [] Age > 60                                         1

## 2020-07-31 ENCOUNTER — TRANSCRIPTION ENCOUNTER (OUTPATIENT)
Age: 50
End: 2020-07-31

## 2020-07-31 VITALS
OXYGEN SATURATION: 100 % | HEART RATE: 78 BPM | DIASTOLIC BLOOD PRESSURE: 70 MMHG | SYSTOLIC BLOOD PRESSURE: 115 MMHG | RESPIRATION RATE: 17 BRPM

## 2020-07-31 LAB
ANION GAP SERPL CALC-SCNC: 6 MMOL/L — SIGNIFICANT CHANGE UP (ref 5–17)
BUN SERPL-MCNC: 7 MG/DL — SIGNIFICANT CHANGE UP (ref 7–18)
CALCIUM SERPL-MCNC: 8.8 MG/DL — SIGNIFICANT CHANGE UP (ref 8.4–10.5)
CHLORIDE SERPL-SCNC: 109 MMOL/L — HIGH (ref 96–108)
CO2 SERPL-SCNC: 23 MMOL/L — SIGNIFICANT CHANGE UP (ref 22–31)
CREAT SERPL-MCNC: 0.76 MG/DL — SIGNIFICANT CHANGE UP (ref 0.5–1.3)
GLUCOSE SERPL-MCNC: 85 MG/DL — SIGNIFICANT CHANGE UP (ref 70–99)
HCT VFR BLD CALC: 31.3 % — LOW (ref 39–50)
HGB BLD-MCNC: 10 G/DL — LOW (ref 13–17)
MCHC RBC-ENTMCNC: 27.2 PG — SIGNIFICANT CHANGE UP (ref 27–34)
MCHC RBC-ENTMCNC: 31.9 GM/DL — LOW (ref 32–36)
MCV RBC AUTO: 85.3 FL — SIGNIFICANT CHANGE UP (ref 80–100)
NRBC # BLD: 0 /100 WBCS — SIGNIFICANT CHANGE UP (ref 0–0)
PLATELET # BLD AUTO: 323 K/UL — SIGNIFICANT CHANGE UP (ref 150–400)
POTASSIUM SERPL-MCNC: 3.7 MMOL/L — SIGNIFICANT CHANGE UP (ref 3.5–5.3)
POTASSIUM SERPL-SCNC: 3.7 MMOL/L — SIGNIFICANT CHANGE UP (ref 3.5–5.3)
RBC # BLD: 3.67 M/UL — LOW (ref 4.2–5.8)
RBC # FLD: 12.5 % — SIGNIFICANT CHANGE UP (ref 10.3–14.5)
SODIUM SERPL-SCNC: 138 MMOL/L — SIGNIFICANT CHANGE UP (ref 135–145)
WBC # BLD: 9.88 K/UL — SIGNIFICANT CHANGE UP (ref 3.8–10.5)
WBC # FLD AUTO: 9.88 K/UL — SIGNIFICANT CHANGE UP (ref 3.8–10.5)

## 2020-07-31 PROCEDURE — 99239 HOSP IP/OBS DSCHRG MGMT >30: CPT

## 2020-07-31 PROCEDURE — 93005 ELECTROCARDIOGRAM TRACING: CPT

## 2020-07-31 PROCEDURE — 87040 BLOOD CULTURE FOR BACTERIA: CPT

## 2020-07-31 PROCEDURE — 96374 THER/PROPH/DIAG INJ IV PUSH: CPT

## 2020-07-31 PROCEDURE — 74177 CT ABD & PELVIS W/CONTRAST: CPT

## 2020-07-31 PROCEDURE — 80061 LIPID PANEL: CPT

## 2020-07-31 PROCEDURE — 80048 BASIC METABOLIC PNL TOTAL CA: CPT

## 2020-07-31 PROCEDURE — 83036 HEMOGLOBIN GLYCOSYLATED A1C: CPT

## 2020-07-31 PROCEDURE — 83605 ASSAY OF LACTIC ACID: CPT

## 2020-07-31 PROCEDURE — 82607 VITAMIN B-12: CPT

## 2020-07-31 PROCEDURE — 87641 MR-STAPH DNA AMP PROBE: CPT

## 2020-07-31 PROCEDURE — 97162 PT EVAL MOD COMPLEX 30 MIN: CPT

## 2020-07-31 PROCEDURE — 83690 ASSAY OF LIPASE: CPT

## 2020-07-31 PROCEDURE — 84484 ASSAY OF TROPONIN QUANT: CPT

## 2020-07-31 PROCEDURE — 81001 URINALYSIS AUTO W/SCOPE: CPT

## 2020-07-31 PROCEDURE — 36415 COLL VENOUS BLD VENIPUNCTURE: CPT

## 2020-07-31 PROCEDURE — 87086 URINE CULTURE/COLONY COUNT: CPT

## 2020-07-31 PROCEDURE — 80053 COMPREHEN METABOLIC PANEL: CPT

## 2020-07-31 PROCEDURE — U0003: CPT

## 2020-07-31 PROCEDURE — 97116 GAIT TRAINING THERAPY: CPT

## 2020-07-31 PROCEDURE — 96375 TX/PRO/DX INJ NEW DRUG ADDON: CPT

## 2020-07-31 PROCEDURE — 82306 VITAMIN D 25 HYDROXY: CPT

## 2020-07-31 PROCEDURE — 82746 ASSAY OF FOLIC ACID SERUM: CPT

## 2020-07-31 PROCEDURE — 86769 SARS-COV-2 COVID-19 ANTIBODY: CPT

## 2020-07-31 PROCEDURE — 97110 THERAPEUTIC EXERCISES: CPT

## 2020-07-31 PROCEDURE — 71045 X-RAY EXAM CHEST 1 VIEW: CPT

## 2020-07-31 PROCEDURE — 84100 ASSAY OF PHOSPHORUS: CPT

## 2020-07-31 PROCEDURE — 85027 COMPLETE CBC AUTOMATED: CPT

## 2020-07-31 PROCEDURE — 83735 ASSAY OF MAGNESIUM: CPT

## 2020-07-31 PROCEDURE — 76870 US EXAM SCROTUM: CPT

## 2020-07-31 PROCEDURE — 84443 ASSAY THYROID STIM HORMONE: CPT

## 2020-07-31 PROCEDURE — 87640 STAPH A DNA AMP PROBE: CPT

## 2020-07-31 PROCEDURE — 99285 EMERGENCY DEPT VISIT HI MDM: CPT

## 2020-07-31 RX ORDER — FUROSEMIDE 40 MG
20 TABLET ORAL DAILY
Refills: 0 | Status: DISCONTINUED | OUTPATIENT
Start: 2020-07-31 | End: 2020-07-31

## 2020-07-31 RX ADMIN — CARBIDOPA AND LEVODOPA 1 TABLET(S): 25; 100 TABLET ORAL at 08:31

## 2020-07-31 RX ADMIN — PANTOPRAZOLE SODIUM 40 MILLIGRAM(S): 20 TABLET, DELAYED RELEASE ORAL at 06:09

## 2020-07-31 RX ADMIN — Medication 25 MILLIGRAM(S): at 17:09

## 2020-07-31 RX ADMIN — Medication 25 MILLIGRAM(S): at 05:46

## 2020-07-31 RX ADMIN — CARBIDOPA AND LEVODOPA 1 TABLET(S): 25; 100 TABLET ORAL at 14:12

## 2020-07-31 RX ADMIN — BUPROPION HYDROCHLORIDE 150 MILLIGRAM(S): 150 TABLET, EXTENDED RELEASE ORAL at 11:42

## 2020-07-31 RX ADMIN — ENOXAPARIN SODIUM 40 MILLIGRAM(S): 100 INJECTION SUBCUTANEOUS at 11:42

## 2020-07-31 RX ADMIN — CLOPIDOGREL BISULFATE 75 MILLIGRAM(S): 75 TABLET, FILM COATED ORAL at 11:42

## 2020-07-31 RX ADMIN — Medication 1000 UNIT(S): at 11:42

## 2020-07-31 RX ADMIN — ENTACAPONE 200 MILLIGRAM(S): 200 TABLET, FILM COATED ORAL at 14:12

## 2020-07-31 RX ADMIN — ENTACAPONE 200 MILLIGRAM(S): 200 TABLET, FILM COATED ORAL at 05:46

## 2020-07-31 RX ADMIN — Medication 20 MILLIGRAM(S): at 17:09

## 2020-07-31 RX ADMIN — Medication 81 MILLIGRAM(S): at 11:42

## 2020-07-31 NOTE — PROGRESS NOTE ADULT - PROBLEM SELECTOR PLAN 2
UA positive   UC growing >3 micro-organisms, likely contaminated  US testicle indicates right epididymitis, bilateral hydrocele  c/w  levofloxacin 500mg IV once daily for 10 days.  NSAIDs  -Scrotal elevation with rolled towel (discussed with RN)  -No urologic intervention warranted at this time, patient may follow up outpatient with his previous urologist or with Dr. Martell:  Appreciate  input
UA positive, right groin pain, and enlarged and tender right testicle on physical exam.  - follow  US testicle  - Started on levofloxacin 500mg IV qdaily for 10 days.  - Follow up urine and blood cultures, however blood cultures not drawn prior to initiation of antibiotics
UA positive   UC growing >3 micro-organisms, likely contaminated  US testicle indicates right epididymitis, bilateral hydrocele  c/w  levofloxacin 500mg IV once daily for 10 days.  NSAIDs  -Scrotal elevation with rolled towel (discussed with RN)  -No urologic intervention warranted at this time, patient may follow up outpatient with his previous urologist or with Dr. Martell:  Appreciate  input

## 2020-07-31 NOTE — PROGRESS NOTE ADULT - PROBLEM SELECTOR PLAN 7
Hx of BPH s/p TURP  Continue with tamsulosin

## 2020-07-31 NOTE — PROGRESS NOTE ADULT - PROBLEM SELECTOR PLAN 4
S/P cervical surgery 1 week prior to admission.  Continue with cervical spine collar  PT eval  Fall precautions
S/P cervical surgery 1 week prior to admission.  Continue with cervical spine collar
S/P cervical surgery 1 week prior to admission.  Continue with cervical spine collar  PT eval  Fall precautions

## 2020-07-31 NOTE — DISCHARGE NOTE PROVIDER - HOSPITAL COURSE
Patient is a 49 year old male from home with PMHx of parkinson's disease, CHFrEF(EF35-40%), CAD, HTN, BPH s/p TURP presenting with chief complaint of right lower groin pain and testicular pain. Patient had a cervical spine surgery approximately 7 days prior to presentation in which patient endorsed a traumatic rivas insertion pre op. Found to have a +UA, leukocytosis meeting SIRS criteria. Admitted to medicine with sepsis, secondary to UTI day 3 Levaquin.     Testicular US demonstrating hypervascularity on right testicle/epidiymis consistent with epididymitis, no torsion, bilateral hydroceles. Blood cultures with NGTD, urine culture likely contaminated 3+ organisms, resent today 7/31. Urology consulted and does not recommend any urologic intervention at this time. Patient medically optimized for discharge with PO Levoquin to complete a total of 10 days and outpatient follow up.         Discharge discussed with attending         This is only a brief summary, for full hospital course please see EMR.

## 2020-07-31 NOTE — DISCHARGE NOTE NURSING/CASE MANAGEMENT/SOCIAL WORK - PATIENT PORTAL LINK FT
You can access the FollowMyHealth Patient Portal offered by [facility] by registering at the following website: [email or web address]. By joining [employer]’s FollowMyHealth portal, you will also be able to view your health information using other applications (apps) compatible with our system.

## 2020-07-31 NOTE — PROGRESS NOTE ADULT - PROBLEM SELECTOR PLAN 3
Hx of CHFrEF with EF 35-40% 1/2019  No concerns for acute CHF exacerbation at this time.  Patient on metoprolol, lasix at home. Not on any ACEi/ARB  - reports baseline BP in 90s-100s - likely autonomic dysfunction from Parkinson   - will hold BP meds for 1 more day - will restart if BP stays stable

## 2020-07-31 NOTE — PROGRESS NOTE ADULT - PROBLEM SELECTOR PLAN 5
Hx of Parkinson's disease  - Continue with home medications, carbidopa/levodopa and entacapone  Fall precautions  Aspiration precautions
Hx of Parkinson's disease  - Continue with home medications, carbidopa/levodopa and entacapone
Hx of Parkinson's disease  - Continue with home medications, carbidopa/levodopa and entacapone  Fall precautions  Aspiration precautions

## 2020-07-31 NOTE — PROGRESS NOTE ADULT - SUBJECTIVE AND OBJECTIVE BOX
NP Note discussed with  primary attending    Patient is a 49 year old male from home with PMHx of parkinson's disease, CHFrEF(EF35-40%), CAD, HTN, BPH s/p TURP presenting with chief complaint of right lower groin pain and testicular pain. Patient had a cervical spine surgery approximately 7 days prior to presentation in which patient endorsed had a Elmore catheter that was "shoved up" his penis causing pain. Patient admitted to medicine with sepsis secondary to UTI day 3 Levaquin.   Testicular US demonstrating hypervascularity on right testicle/epidiymis consistent with epididymitis, no torsion, bilateral hydroceles. Blood cultures with NGTD, urine culture likely contaminated 3+ organisms, resent today. Urology consulted and does not recommend any urologic intervention at this time.     Patient seen and examined at bedside endorses continued pain and swelling to the right scrotum. Hemodynamically stable breathing with ease on room air and appears in no acute distress. Leukocytosis resolved denies urinary complaints.   INTERVAL HPI/OVERNIGHT EVENTS: Right testicular pain     MEDICATIONS  (STANDING):  aspirin enteric coated 81 milliGRAM(s) Oral daily  atorvastatin 80 milliGRAM(s) Oral at bedtime  buPROPion XL . 150 milliGRAM(s) Oral daily  carbidopa/levodopa  25/100 1 Tablet(s) Oral <User Schedule>  cholecalciferol 1000 Unit(s) Oral daily  clopidogrel Tablet 75 milliGRAM(s) Oral daily  enoxaparin Injectable 40 milliGRAM(s) SubCutaneous daily  entacapone 200 milliGRAM(s) Oral three times a day  levoFLOXacin IVPB 500 milliGRAM(s) IV Intermittent every 24 hours  metoprolol tartrate 25 milliGRAM(s) Oral two times a day  pantoprazole    Tablet 40 milliGRAM(s) Oral before breakfast  tamsulosin 0.4 milliGRAM(s) Oral at bedtime    MEDICATIONS  (PRN):  acetaminophen   Tablet .. 650 milliGRAM(s) Oral every 6 hours PRN Temp greater or equal to 38C (100.4F), Mild Pain (1 - 3)  __________________________________________________  REVIEW OF SYSTEMS:    CONSTITUTIONAL: No fever,   EYES: no acute visual disturbances  NECK: No pain or stiffness  RESPIRATORY: No cough; No shortness of breath  CARDIOVASCULAR: No chest pain, no palpitations  GASTROINTESTINAL: No pain. No nausea or vomiting; No diarrhea   NEUROLOGICAL: No headache or numbness, no tremors  MUSCULOSKELETAL: No joint pain, no muscle pain  GENITOURINARY: no dysuria, no frequency, no hesitancy  PSYCHIATRY: no depression , no anxiety  ALL OTHER  ROS negative        Vital Signs Last 24 Hrs  T(C): 36.6 (31 Jul 2020 05:00), Max: 36.9 (30 Jul 2020 21:05)  T(F): 97.8 (31 Jul 2020 05:00), Max: 98.4 (30 Jul 2020 21:05)  HR: 88 (31 Jul 2020 05:00) (88 - 105)  BP: 124/71 (31 Jul 2020 05:00) (122/70 - 132/87)  BP(mean): --  RR: 18 (31 Jul 2020 05:00) (18 - 18)  SpO2: 100% (31 Jul 2020 05:00) (100% - 100%)    ________________________________________________  PHYSICAL EXAM:  GENERAL: NAD  HEENT: Normocephalic;  conjunctivae and sclerae clear;  NECK : supple  CHEST/LUNG: Clear to auscultation bilaterally with good air entry   HEART: S1 S2  regular; no murmurs, gallops or rubs  ABDOMEN: Soft, Nontender, Nondistended; Bowel sounds present  : Right testicular swelling   EXTREMITIES: no cyanosis; no edema; no calf tenderness  SKIN: warm and dry; no rash  NERVOUS SYSTEM:  Awake and alert; Oriented  to place, person and time ; no new deficits    _________________________________________________  LABS:                        10.0   9.88  )-----------( 323      ( 31 Jul 2020 07:00 )             31.3     07-31    138  |  109<H>  |  7   ----------------------------<  85  3.7   |  23  |  0.76    Ca    8.8      31 Jul 2020 07:00        Consultant(s) Notes Reviewed:   YES/ No    Care Discussed with Consultants :     Plan of care was discussed with patient and /or primary care giver; all questions and concerns were addressed and care was aligned with patient's wishes.

## 2020-07-31 NOTE — PROGRESS NOTE ADULT - PROBLEM SELECTOR PLAN 1
UA positive   right scrotal swelling and erythema    s/p rocephin in ED  afebrile, leukocytosis downtrending  BC NGTD  UC growing >3 micro-organisms, likely contaminated  c/w levofloxacin 500mg IV qdaily for total of 10 days. May transition to oral upon discharge.
UA positive - follow urine culture   right scrotal swelling and erythema  - reports improvement then last night   s/p rocephin in ed   - Started on levofloxacin 500mg IV qdaily for total of 10 days. May transition to oral upon discharge.  - Follow up urine cultures. Follow up blood cultures, however blood cultures not drawn prior to initiation of antibiotics.
UA positive   right scrotal swelling and erythema    s/p rocephin in ED  afebrile, leukocytosis downtrending  BC NGTD  UC growing >3 micro-organisms, likely contaminated  c/w levofloxacin 500mg IV qdaily for total of 10 days. May transition to oral upon discharge.

## 2020-07-31 NOTE — DISCHARGE NOTE PROVIDER - NSDCPNSUBOBJ_GEN_ALL_CORE
MEDICAL ATTENDING NOTE        Patient is a 49y old  Male who presents with a chief complaint of Sepsis (31 Jul 2020 13:10)            INTERVAL HPI/OVERNIGHT EVENTS: reports resolution of symptoms.         MEDICATIONS  (STANDING):    aspirin enteric coated 81 milliGRAM(s) Oral daily    atorvastatin 80 milliGRAM(s) Oral at bedtime    buPROPion XL . 150 milliGRAM(s) Oral daily    carbidopa/levodopa  25/100 1 Tablet(s) Oral <User Schedule>    cholecalciferol 1000 Unit(s) Oral daily    clopidogrel Tablet 75 milliGRAM(s) Oral daily    enoxaparin Injectable 40 milliGRAM(s) SubCutaneous daily    entacapone 200 milliGRAM(s) Oral three times a day    furosemide    Tablet 20 milliGRAM(s) Oral daily    levoFLOXacin IVPB 500 milliGRAM(s) IV Intermittent every 24 hours    metoprolol tartrate 25 milliGRAM(s) Oral two times a day    pantoprazole    Tablet 40 milliGRAM(s) Oral before breakfast    tamsulosin 0.4 milliGRAM(s) Oral at bedtime        MEDICATIONS  (PRN):    acetaminophen   Tablet .. 650 milliGRAM(s) Oral every 6 hours PRN Temp greater or equal to 38C (100.4F), Mild Pain (1 - 3)            ----------------------------------------------------------------------------------    REVIEW OF SYSTEMS: negative    Vital Signs Last 24 Hrs    T(C): 36.4 (31 Jul 2020 13:13), Max: 36.9 (30 Jul 2020 21:05)    T(F): 97.6 (31 Jul 2020 13:13), Max: 98.4 (30 Jul 2020 21:05)    HR: 62 (31 Jul 2020 13:13) (62 - 104)    BP: 118/75 (31 Jul 2020 13:13) (118/75 - 132/87)    BP(mean): --    RR: 14 (31 Jul 2020 13:13) (14 - 18)    SpO2: 100% (31 Jul 2020 13:13) (100% - 100%)        _________________    PHYSICAL EXAM:    ---------------------------     NAD; Normocephalic;     LUNGS - no wheezing    HEART: S1 S2+     ABDOMEN: Soft, Nontender, non distended    EXTREMITIES: no cyanosis; no edema    NERVOUS SYSTEM:  Awake and alert; no focal neuro deficits appreciated    : right scrotal swelling - improving then admission         _________________________________________________    LABS:                            10.0     9.88  )-----------( 323      ( 31 Jul 2020 07:00 )               31.3         07-31        138  |  109<H>  |  7     ----------------------------<  85    3.7   |  23  |  0.76        Ca    8.8      31 Jul 2020 07:00

## 2020-07-31 NOTE — DISCHARGE NOTE PROVIDER - NSDCMRMEDTOKEN_GEN_ALL_CORE_FT
aspirin 81 mg oral delayed release tablet: 1 tab(s) orally once a day  atorvastatin 80 mg oral tablet: 1 tab(s) orally once a day  buPROPion 150 mg/24 hours (XL) oral tablet, extended release: 1 tab(s) orally every 24 hours  carbidopa-levodopa 25 mg-100 mg oral tablet: 1 tab(s) orally 3 times a day  cholecalciferol 1000 intl units (25 mcg) oral tablet: 1 tab(s) orally once a day  docusate sodium 100 mg oral tablet: 1 tab(s) orally 2 times a day  entacapone 200 mg oral tablet: 1 tab(s) orally 3 times a day  ezetimibe 10 mg oral tablet: 1 tab(s) orally once a day  furosemide 40 mg oral tablet: 1 tab(s) orally once a day  levoFLOXacin 500 mg oral tablet: 1 tab(s) orally once a day   metoprolol tartrate 25 mg oral tablet: 1 tab(s) orally 2 times a day   pantoprazole 40 mg oral delayed release tablet: 1 tab(s) orally once a day  Plavix 75 mg oral tablet: 1 tab(s) orally once a day  tamsulosin 0.4 mg oral capsule: 1 cap(s) orally once a day

## 2020-07-31 NOTE — DISCHARGE NOTE PROVIDER - PROVIDER TOKENS
PROVIDER:[TOKEN:[[id number]],FOLLOWUP:[1 week]],PROVIDER:[TOKEN:[[id number]],FOLLOWUP:[1 week]]

## 2020-07-31 NOTE — DISCHARGE NOTE PROVIDER - CARE PROVIDER_API CALL
Tadeo Martell  UROLOGY  [address] [place] [address]  Phone: [phone]  Fax: [phone]  Follow Up Time: 1 week    Erin Jaime  GERIATRIC MEDICINE  [address]  [place] [id number]  Phone: [phone]  Fax: [phone]  Follow Up Time: 1 week

## 2020-07-31 NOTE — DISCHARGE NOTE PROVIDER - ATTENDING COMMENTS
A/P:-    Pt is seen and evaluated by bedside.     Sepsis 2/2 epididymitis - sepsis resolved, started on levoflox, scrotal swelling improving, Uro consulted- scrotal elevation, compete total 10 days of antibiotics, urine culture - >3 organism likely contaminated    CHF- not in exacerbation, discharge on home meds     CAD    cervical radiculopathy- continue PT     Parkinsons - continue home meds, outpatient neuro follow up     BPH     HTN    DVT proph            Plan of care was discussed with patient ; all questions and concerns were addressed and care was aligned with patient's wishes.    Case discussed with team.

## 2020-07-31 NOTE — DISCHARGE NOTE PROVIDER - NSDCCPCAREPLAN_GEN_ALL_CORE_FT
PRINCIPAL DISCHARGE DIAGNOSIS  Diagnosis: Sepsis secondary to UTI  Assessment and Plan of Treatment: You were found to be in sepsis secondary to your urinary tract infection. This has now improved with intravenous antibiotics. A urine culture was sent to the lab and takes about 24-48 hours to result which will tell us the exact organism in your urine. But because you have improved so drastically you will continue the antibiotic you were given here for a total of 10 days last dose 8/7. Please continue your antibiotic until they are all finished even if you feel better. Please follow up with you primary care doctor in 1 week.         SECONDARY DISCHARGE DIAGNOSES  Diagnosis: Right epididymitis  Assessment and Plan of Treatment: Epididymitis is an inflammation of the coiled tube (epididymis) at the back of the testicle that stores and carries  sperm. Males of any age can get epididymitis. Epididymitis is most often caused by a bacterial infection,  including sexually transmitted diseases (STDs), such as Gonorrhea or chlamydia. Sometimes, a testicle also  may become inflamed — a condition called epididymo-orchitis.  Signs and symptoms of epididymitis might include:  • A swollen, red or warm scrotum  • Testicle pain and tenderness, usually on one side  • Painful urination or an urgent or frequent need to urinate  • Discharge from the penis  • Painful intercourse or ejaculation  • A lump on the testicle  • Enlarged lymph nodes in the groin  • Pain or discomfort in the lower abdomen or pelvic area  • Blood in the semen  • Less commonly, fever  Antibiotics are needed to treat only bacterial epididymitis and epididymo-orchitis. If the cause of the bacterial  infection is an STD, your sexual partner also needs treatment. Be sure to take the entire course of antibiotics  prescribed by your doctor, even if your symptoms clear up sooner, to ensure that the infection is gone.  It might take several weeks for the tenderness to disappear. Resting, supporting the scrotum with an athletic  strap, applying ice packs and taking pain medication can help relieve discomfort.  Your doctor is likely to recommend a follow-up visit to check that the infection has completely cleared up. If it  hasn't, your doctor might prescribe another antibiotic. However, for most people epididymitis clears up within  three months.    Diagnosis: H/O cervical spine surgery  Assessment and Plan of Treatment: Please follow the instructions of your surgeon in regards to your post op recovery. Follow up with them when you get home from the hospital to let them know you were recently hospitalized

## 2020-08-01 LAB
CULTURE RESULTS: SIGNIFICANT CHANGE UP
SPECIMEN SOURCE: SIGNIFICANT CHANGE UP

## 2020-08-03 LAB
CULTURE RESULTS: SIGNIFICANT CHANGE UP
CULTURE RESULTS: SIGNIFICANT CHANGE UP
SPECIMEN SOURCE: SIGNIFICANT CHANGE UP
SPECIMEN SOURCE: SIGNIFICANT CHANGE UP

## 2020-10-06 ENCOUNTER — INPATIENT (INPATIENT)
Facility: HOSPITAL | Age: 50
LOS: 2 days | Discharge: HOME | DRG: 293 | End: 2020-10-09
Attending: INTERNAL MEDICINE | Admitting: INTERNAL MEDICINE
Payer: MEDICARE

## 2020-10-06 VITALS
RESPIRATION RATE: 20 BRPM | DIASTOLIC BLOOD PRESSURE: 70 MMHG | OXYGEN SATURATION: 99 % | WEIGHT: 147.71 LBS | TEMPERATURE: 98 F | HEART RATE: 87 BPM | SYSTOLIC BLOOD PRESSURE: 120 MMHG | HEIGHT: 66 IN

## 2020-10-06 DIAGNOSIS — Z95.1 PRESENCE OF AORTOCORONARY BYPASS GRAFT: Chronic | ICD-10-CM

## 2020-10-06 DIAGNOSIS — Z98.1 ARTHRODESIS STATUS: Chronic | ICD-10-CM

## 2020-10-06 DIAGNOSIS — R06.02 SHORTNESS OF BREATH: ICD-10-CM

## 2020-10-06 DIAGNOSIS — Z98.890 OTHER SPECIFIED POSTPROCEDURAL STATES: Chronic | ICD-10-CM

## 2020-10-06 DIAGNOSIS — Z90.79 ACQUIRED ABSENCE OF OTHER GENITAL ORGAN(S): Chronic | ICD-10-CM

## 2020-10-06 LAB
ALBUMIN SERPL ELPH-MCNC: 3.3 G/DL — LOW (ref 3.5–5)
ALP SERPL-CCNC: 114 U/L — SIGNIFICANT CHANGE UP (ref 40–120)
ALT FLD-CCNC: 20 U/L DA — SIGNIFICANT CHANGE UP (ref 10–60)
ANION GAP SERPL CALC-SCNC: 2 MMOL/L — LOW (ref 5–17)
APTT BLD: 27.3 SEC — LOW (ref 27.5–35.5)
AST SERPL-CCNC: 22 U/L — SIGNIFICANT CHANGE UP (ref 10–40)
BASOPHILS # BLD AUTO: 0.02 K/UL — SIGNIFICANT CHANGE UP (ref 0–0.2)
BASOPHILS NFR BLD AUTO: 0.3 % — SIGNIFICANT CHANGE UP (ref 0–2)
BILIRUB SERPL-MCNC: 0.2 MG/DL — SIGNIFICANT CHANGE UP (ref 0.2–1.2)
BUN SERPL-MCNC: 20 MG/DL — HIGH (ref 7–18)
CALCIUM SERPL-MCNC: 8.8 MG/DL — SIGNIFICANT CHANGE UP (ref 8.4–10.5)
CHLORIDE SERPL-SCNC: 105 MMOL/L — SIGNIFICANT CHANGE UP (ref 96–108)
CO2 SERPL-SCNC: 31 MMOL/L — SIGNIFICANT CHANGE UP (ref 22–31)
CREAT SERPL-MCNC: 1.01 MG/DL — SIGNIFICANT CHANGE UP (ref 0.5–1.3)
EOSINOPHIL # BLD AUTO: 0.17 K/UL — SIGNIFICANT CHANGE UP (ref 0–0.5)
EOSINOPHIL NFR BLD AUTO: 3 % — SIGNIFICANT CHANGE UP (ref 0–6)
GLUCOSE SERPL-MCNC: 86 MG/DL — SIGNIFICANT CHANGE UP (ref 70–99)
HCT VFR BLD CALC: 33.6 % — LOW (ref 39–50)
HGB BLD-MCNC: 11 G/DL — LOW (ref 13–17)
IMM GRANULOCYTES NFR BLD AUTO: 0.2 % — SIGNIFICANT CHANGE UP (ref 0–1.5)
INR BLD: 1.12 RATIO — SIGNIFICANT CHANGE UP (ref 0.88–1.16)
LYMPHOCYTES # BLD AUTO: 1.14 K/UL — SIGNIFICANT CHANGE UP (ref 1–3.3)
LYMPHOCYTES # BLD AUTO: 19.8 % — SIGNIFICANT CHANGE UP (ref 13–44)
MAGNESIUM SERPL-MCNC: 2 MG/DL — SIGNIFICANT CHANGE UP (ref 1.6–2.6)
MCHC RBC-ENTMCNC: 26.3 PG — LOW (ref 27–34)
MCHC RBC-ENTMCNC: 32.7 GM/DL — SIGNIFICANT CHANGE UP (ref 32–36)
MCV RBC AUTO: 80.4 FL — SIGNIFICANT CHANGE UP (ref 80–100)
MONOCYTES # BLD AUTO: 0.7 K/UL — SIGNIFICANT CHANGE UP (ref 0–0.9)
MONOCYTES NFR BLD AUTO: 12.2 % — SIGNIFICANT CHANGE UP (ref 2–14)
NEUTROPHILS # BLD AUTO: 3.72 K/UL — SIGNIFICANT CHANGE UP (ref 1.8–7.4)
NEUTROPHILS NFR BLD AUTO: 64.5 % — SIGNIFICANT CHANGE UP (ref 43–77)
NRBC # BLD: 0 /100 WBCS — SIGNIFICANT CHANGE UP (ref 0–0)
NT-PROBNP SERPL-SCNC: 705 PG/ML — HIGH (ref 0–125)
PLATELET # BLD AUTO: 233 K/UL — SIGNIFICANT CHANGE UP (ref 150–400)
POTASSIUM SERPL-MCNC: 3.9 MMOL/L — SIGNIFICANT CHANGE UP (ref 3.5–5.3)
POTASSIUM SERPL-SCNC: 3.9 MMOL/L — SIGNIFICANT CHANGE UP (ref 3.5–5.3)
PROT SERPL-MCNC: 7 G/DL — SIGNIFICANT CHANGE UP (ref 6–8.3)
PROTHROM AB SERPL-ACNC: 13.2 SEC — SIGNIFICANT CHANGE UP (ref 10.6–13.6)
RBC # BLD: 4.18 M/UL — LOW (ref 4.2–5.8)
RBC # FLD: 13.6 % — SIGNIFICANT CHANGE UP (ref 10.3–14.5)
SODIUM SERPL-SCNC: 138 MMOL/L — SIGNIFICANT CHANGE UP (ref 135–145)
TROPONIN I SERPL-MCNC: <0.015 NG/ML — SIGNIFICANT CHANGE UP (ref 0–0.04)
WBC # BLD: 5.76 K/UL — SIGNIFICANT CHANGE UP (ref 3.8–10.5)
WBC # FLD AUTO: 5.76 K/UL — SIGNIFICANT CHANGE UP (ref 3.8–10.5)

## 2020-10-06 PROCEDURE — 99283 EMERGENCY DEPT VISIT LOW MDM: CPT

## 2020-10-06 PROCEDURE — 71046 X-RAY EXAM CHEST 2 VIEWS: CPT | Mod: 26

## 2020-10-06 RX ORDER — FUROSEMIDE 40 MG
40 TABLET ORAL ONCE
Refills: 0 | Status: COMPLETED | OUTPATIENT
Start: 2020-10-06 | End: 2020-10-06

## 2020-10-06 RX ADMIN — Medication 40 MILLIGRAM(S): at 19:55

## 2020-10-06 NOTE — ED PROVIDER NOTE - CLINICAL SUMMARY MEDICAL DECISION MAKING FREE TEXT BOX
Attending Ivet Suggs: 50M presenting with two weeks of worsening dyspnea on exertion and bilateral edema. 2+ pitting edema bilaterally on exam. breathing comfortably on RA. no chest pain. concern for CHF. will get labs, cxr, ekg. likely admission.

## 2020-10-06 NOTE — ED ADULT NURSE NOTE - OBJECTIVE STATEMENT
Pt presented to the ED due to lower extremity edema, +1 edema.  Pt also states he experienced some SOB.  PT had hx heart sx in 2018,  Pt had spinal sx in July 2020.

## 2020-10-06 NOTE — ED PROVIDER NOTE - PROGRESS NOTE DETAILS
patient and wife updated on results. patient requesting rivas as he has been urinating frequently and is afraid he may fall because of his parkinson's. agree with plan for admission. Garfield Suggs

## 2020-10-06 NOTE — ED PROVIDER NOTE - PHYSICAL EXAMINATION
GENERAL: NAD, well-developed  NEURO: AO x4, moving all extremities  EYES: conjunctiva and sclera clear  NECK: c-collar in place  CHEST/LUNG: Clear to auscultation bilaterally; No wheezes, rales or rhonchi  HEART: Regular rate and rhythm; No murmurs, rubs, or gallops  ABDOMEN: Soft, Nontender, Nondistended; Bowel sounds present, no masses.  EXTREMITIES: 2+ pitting edema up to lower thigh b/l; 2+ Peripheral Pulses; No clubbing or cyanosis  SKIN: Warm, dry, intact, no rashes or lesions  PSYCH: affect appropriate

## 2020-10-06 NOTE — ED PROVIDER NOTE - NS ED ROS FT
CONSTITUTIONAL: No weakness, fevers, or chills  EYES/ENT: No visual changes;  No vertigo or throat pain   NECK: No pain or stiffness  RESPIRATORY: exertional SOB; No cough, wheezing, hemoptysis  CARDIOVASCULAR: LE edema; No chest pain or palpitations  GASTROINTESTINAL: No abdominal or epigastric pain; No nausea, vomiting, diarrhea, or constipation; No hematemesis, melena, or hematochezia  GENITOURINARY: No dysuria, frequency, or hematuria  NEUROLOGICAL: No numbness or weakness  SKIN: No itching or new rashes

## 2020-10-06 NOTE — ED ADULT NURSE NOTE - ED STAT RN HANDOFF DETAILS
pt. remained stable. denies pain. admitted to [facility] for SOB.denies  chest pain.  transfer to  503 A report given to ellen fried. no acute  distress noted

## 2020-10-06 NOTE — ED PROVIDER NOTE - OBJECTIVE STATEMENT
50M w/ h/o CAD (s/p CABG x2 02/2018), HTN, HLD, Parkinsons, and recent c-spine sx p/w two weeks of LE edema a/w exertional dyspnea. Edema has gradually worsened over past two weeks and now up to mid-thigh. Pt has no h/o chronic edema s/p CABG. Pt w/ chronic exertional dyspnea, but acutely worsened two weeks ago. Pt previously ambulated around one-bedroom appt w/ cane w/o dyspnea, but now becomes winded after a few steps. Parkinsons medication dose increased 6wks ago, but no other recent changes in medications. No reported fevers, chills, CP, palpitations, cough, abdominal pain, n/v/d, or urinary symptoms.

## 2020-10-07 DIAGNOSIS — I25.10 ATHEROSCLEROTIC HEART DISEASE OF NATIVE CORONARY ARTERY WITHOUT ANGINA PECTORIS: ICD-10-CM

## 2020-10-07 DIAGNOSIS — N40.0 BENIGN PROSTATIC HYPERPLASIA WITHOUT LOWER URINARY TRACT SYMPTOMS: ICD-10-CM

## 2020-10-07 DIAGNOSIS — G20 PARKINSON'S DISEASE: ICD-10-CM

## 2020-10-07 DIAGNOSIS — Z29.9 ENCOUNTER FOR PROPHYLACTIC MEASURES, UNSPECIFIED: ICD-10-CM

## 2020-10-07 DIAGNOSIS — M54.2 CERVICALGIA: ICD-10-CM

## 2020-10-07 DIAGNOSIS — I10 ESSENTIAL (PRIMARY) HYPERTENSION: ICD-10-CM

## 2020-10-07 DIAGNOSIS — I50.9 HEART FAILURE, UNSPECIFIED: ICD-10-CM

## 2020-10-07 LAB
24R-OH-CALCIDIOL SERPL-MCNC: 35.9 NG/ML — SIGNIFICANT CHANGE UP (ref 30–80)
A1C WITH ESTIMATED AVERAGE GLUCOSE RESULT: 5.9 % — HIGH (ref 4–5.6)
ANION GAP SERPL CALC-SCNC: 5 MMOL/L — SIGNIFICANT CHANGE UP (ref 5–17)
BUN SERPL-MCNC: 16 MG/DL — SIGNIFICANT CHANGE UP (ref 7–18)
CALCIUM SERPL-MCNC: 9.2 MG/DL — SIGNIFICANT CHANGE UP (ref 8.4–10.5)
CHLORIDE SERPL-SCNC: 103 MMOL/L — SIGNIFICANT CHANGE UP (ref 96–108)
CHOLEST SERPL-MCNC: 179 MG/DL — SIGNIFICANT CHANGE UP (ref 10–199)
CO2 SERPL-SCNC: 32 MMOL/L — HIGH (ref 22–31)
CREAT SERPL-MCNC: 1.06 MG/DL — SIGNIFICANT CHANGE UP (ref 0.5–1.3)
D DIMER BLD IA.RAPID-MCNC: 303 NG/ML DDU — HIGH
ESTIMATED AVERAGE GLUCOSE: 123 MG/DL — HIGH (ref 68–114)
GLUCOSE SERPL-MCNC: 90 MG/DL — SIGNIFICANT CHANGE UP (ref 70–99)
HCT VFR BLD CALC: 38.2 % — LOW (ref 39–50)
HDLC SERPL-MCNC: 74 MG/DL — SIGNIFICANT CHANGE UP
HGB BLD-MCNC: 12.2 G/DL — LOW (ref 13–17)
LIPID PNL WITH DIRECT LDL SERPL: 97 MG/DL — SIGNIFICANT CHANGE UP
MAGNESIUM SERPL-MCNC: 2 MG/DL — SIGNIFICANT CHANGE UP (ref 1.6–2.6)
MCHC RBC-ENTMCNC: 25.8 PG — LOW (ref 27–34)
MCHC RBC-ENTMCNC: 31.9 GM/DL — LOW (ref 32–36)
MCV RBC AUTO: 80.8 FL — SIGNIFICANT CHANGE UP (ref 80–100)
NRBC # BLD: 0 /100 WBCS — SIGNIFICANT CHANGE UP (ref 0–0)
PHOSPHATE SERPL-MCNC: 3.8 MG/DL — SIGNIFICANT CHANGE UP (ref 2.5–4.5)
PLATELET # BLD AUTO: 269 K/UL — SIGNIFICANT CHANGE UP (ref 150–400)
POTASSIUM SERPL-MCNC: 3.4 MMOL/L — LOW (ref 3.5–5.3)
POTASSIUM SERPL-SCNC: 3.4 MMOL/L — LOW (ref 3.5–5.3)
RBC # BLD: 4.73 M/UL — SIGNIFICANT CHANGE UP (ref 4.2–5.8)
RBC # FLD: 13.9 % — SIGNIFICANT CHANGE UP (ref 10.3–14.5)
SARS-COV-2 IGG SERPL QL IA: POSITIVE
SARS-COV-2 IGM SERPL IA-ACNC: 4.54 INDEX — HIGH
SARS-COV-2 RNA SPEC QL NAA+PROBE: SIGNIFICANT CHANGE UP
SODIUM SERPL-SCNC: 140 MMOL/L — SIGNIFICANT CHANGE UP (ref 135–145)
TOTAL CHOLESTEROL/HDL RATIO MEASUREMENT: 2.4 RATIO — LOW (ref 3.4–9.6)
TRIGL SERPL-MCNC: 40 MG/DL — SIGNIFICANT CHANGE UP (ref 10–149)
TROPONIN I SERPL-MCNC: <0.015 NG/ML — SIGNIFICANT CHANGE UP (ref 0–0.04)
TSH SERPL-MCNC: 0.79 UU/ML — SIGNIFICANT CHANGE UP (ref 0.34–4.82)
WBC # BLD: 6.4 K/UL — SIGNIFICANT CHANGE UP (ref 3.8–10.5)
WBC # FLD AUTO: 6.4 K/UL — SIGNIFICANT CHANGE UP (ref 3.8–10.5)

## 2020-10-07 PROCEDURE — 71275 CT ANGIOGRAPHY CHEST: CPT | Mod: 26

## 2020-10-07 PROCEDURE — 93970 EXTREMITY STUDY: CPT | Mod: 26

## 2020-10-07 RX ORDER — FUROSEMIDE 40 MG
20 TABLET ORAL DAILY
Refills: 0 | Status: DISCONTINUED | OUTPATIENT
Start: 2020-10-08 | End: 2020-10-09

## 2020-10-07 RX ORDER — ENTACAPONE 200 MG/1
200 TABLET, FILM COATED ORAL THREE TIMES A DAY
Refills: 0 | Status: DISCONTINUED | OUTPATIENT
Start: 2020-10-07 | End: 2020-10-09

## 2020-10-07 RX ORDER — CLOPIDOGREL BISULFATE 75 MG/1
75 TABLET, FILM COATED ORAL DAILY
Refills: 0 | Status: DISCONTINUED | OUTPATIENT
Start: 2020-10-07 | End: 2020-10-09

## 2020-10-07 RX ORDER — ASPIRIN/CALCIUM CARB/MAGNESIUM 324 MG
81 TABLET ORAL DAILY
Refills: 0 | Status: DISCONTINUED | OUTPATIENT
Start: 2020-10-07 | End: 2020-10-09

## 2020-10-07 RX ORDER — BUPROPION HYDROCHLORIDE 150 MG/1
150 TABLET, EXTENDED RELEASE ORAL DAILY
Refills: 0 | Status: DISCONTINUED | OUTPATIENT
Start: 2020-10-07 | End: 2020-10-09

## 2020-10-07 RX ORDER — PANTOPRAZOLE SODIUM 20 MG/1
40 TABLET, DELAYED RELEASE ORAL
Refills: 0 | Status: DISCONTINUED | OUTPATIENT
Start: 2020-10-07 | End: 2020-10-09

## 2020-10-07 RX ORDER — CARBIDOPA AND LEVODOPA 25; 100 MG/1; MG/1
1 TABLET ORAL THREE TIMES A DAY
Refills: 0 | Status: DISCONTINUED | OUTPATIENT
Start: 2020-10-07 | End: 2020-10-09

## 2020-10-07 RX ORDER — ATORVASTATIN CALCIUM 80 MG/1
80 TABLET, FILM COATED ORAL AT BEDTIME
Refills: 0 | Status: DISCONTINUED | OUTPATIENT
Start: 2020-10-07 | End: 2020-10-09

## 2020-10-07 RX ORDER — POTASSIUM CHLORIDE 20 MEQ
40 PACKET (EA) ORAL EVERY 4 HOURS
Refills: 0 | Status: COMPLETED | OUTPATIENT
Start: 2020-10-07 | End: 2020-10-07

## 2020-10-07 RX ORDER — CHOLECALCIFEROL (VITAMIN D3) 125 MCG
1000 CAPSULE ORAL DAILY
Refills: 0 | Status: DISCONTINUED | OUTPATIENT
Start: 2020-10-07 | End: 2020-10-09

## 2020-10-07 RX ORDER — ENOXAPARIN SODIUM 100 MG/ML
40 INJECTION SUBCUTANEOUS DAILY
Refills: 0 | Status: DISCONTINUED | OUTPATIENT
Start: 2020-10-07 | End: 2020-10-09

## 2020-10-07 RX ORDER — POLYETHYLENE GLYCOL 3350 17 G/17G
17 POWDER, FOR SOLUTION ORAL DAILY
Refills: 0 | Status: DISCONTINUED | OUTPATIENT
Start: 2020-10-07 | End: 2020-10-09

## 2020-10-07 RX ORDER — FUROSEMIDE 40 MG
40 TABLET ORAL DAILY
Refills: 0 | Status: DISCONTINUED | OUTPATIENT
Start: 2020-10-07 | End: 2020-10-07

## 2020-10-07 RX ORDER — METOPROLOL TARTRATE 50 MG
25 TABLET ORAL
Refills: 0 | Status: DISCONTINUED | OUTPATIENT
Start: 2020-10-07 | End: 2020-10-09

## 2020-10-07 RX ORDER — TAMSULOSIN HYDROCHLORIDE 0.4 MG/1
0.4 CAPSULE ORAL AT BEDTIME
Refills: 0 | Status: DISCONTINUED | OUTPATIENT
Start: 2020-10-07 | End: 2020-10-09

## 2020-10-07 RX ADMIN — TAMSULOSIN HYDROCHLORIDE 0.4 MILLIGRAM(S): 0.4 CAPSULE ORAL at 21:28

## 2020-10-07 RX ADMIN — Medication 40 MILLIGRAM(S): at 05:06

## 2020-10-07 RX ADMIN — ENTACAPONE 200 MILLIGRAM(S): 200 TABLET, FILM COATED ORAL at 21:28

## 2020-10-07 RX ADMIN — ATORVASTATIN CALCIUM 80 MILLIGRAM(S): 80 TABLET, FILM COATED ORAL at 21:28

## 2020-10-07 RX ADMIN — Medication 40 MILLIEQUIVALENT(S): at 17:19

## 2020-10-07 RX ADMIN — Medication 25 MILLIGRAM(S): at 05:06

## 2020-10-07 RX ADMIN — CARBIDOPA AND LEVODOPA 1 TABLET(S): 25; 100 TABLET ORAL at 21:28

## 2020-10-07 RX ADMIN — Medication 1000 UNIT(S): at 12:48

## 2020-10-07 RX ADMIN — CLOPIDOGREL BISULFATE 75 MILLIGRAM(S): 75 TABLET, FILM COATED ORAL at 12:48

## 2020-10-07 RX ADMIN — Medication 40 MILLIEQUIVALENT(S): at 09:58

## 2020-10-07 RX ADMIN — ENOXAPARIN SODIUM 40 MILLIGRAM(S): 100 INJECTION SUBCUTANEOUS at 12:48

## 2020-10-07 RX ADMIN — PANTOPRAZOLE SODIUM 40 MILLIGRAM(S): 20 TABLET, DELAYED RELEASE ORAL at 06:03

## 2020-10-07 RX ADMIN — Medication 81 MILLIGRAM(S): at 12:48

## 2020-10-07 RX ADMIN — CARBIDOPA AND LEVODOPA 1 TABLET(S): 25; 100 TABLET ORAL at 05:06

## 2020-10-07 RX ADMIN — BUPROPION HYDROCHLORIDE 150 MILLIGRAM(S): 150 TABLET, EXTENDED RELEASE ORAL at 12:48

## 2020-10-07 RX ADMIN — CARBIDOPA AND LEVODOPA 1 TABLET(S): 25; 100 TABLET ORAL at 13:22

## 2020-10-07 RX ADMIN — POLYETHYLENE GLYCOL 3350 17 GRAM(S): 17 POWDER, FOR SOLUTION ORAL at 12:48

## 2020-10-07 RX ADMIN — ENTACAPONE 200 MILLIGRAM(S): 200 TABLET, FILM COATED ORAL at 13:22

## 2020-10-07 RX ADMIN — Medication 40 MILLIEQUIVALENT(S): at 13:22

## 2020-10-07 RX ADMIN — ENTACAPONE 200 MILLIGRAM(S): 200 TABLET, FILM COATED ORAL at 05:06

## 2020-10-07 NOTE — CONSULT NOTE ADULT - ASSESSMENT
51 yo M from home lives with sister ambulates with a cane PMH  CAD (s/p CABG x2 02/2018), CHFrEF(EF35-40%),  HTN, BPH s/p TURP, HTN, HLD, Parkinsonism and recent c-spine fusion presented with CHATTERJEE and b/l LE edema X10 days,  1.Tele monitoring.  2.Echocardiogram.  3.Dopplers-r/o dvt.  4.Dec lasix 20mg iv ad.  5.CAD-asa,b blocker,statin.  6.BPH-flomax.  7.GI and DVT prophylaxis.

## 2020-10-07 NOTE — H&P ADULT - PROBLEM SELECTOR PLAN 2
s/p CABG 2018, on aspirin , Plavix , statin, ezetimibe    [place] resume figu[place] emeds    check A1c and lipid panel

## 2020-10-07 NOTE — H&P ADULT - NSHPPHYSICALEXAM_GEN_ALL_CORE
CONSTITUTIONAL:  laying in bed , in Neck collar   ENMT: Airway patent, Nasal mucosa clear. Mouth with normal mucosa. Throat has no vesicles, no oropharyngeal exudates and uvula is midline.  EYES: Clear bilaterally, pupils equal, round and reactive to light. EOMI.  CARDIAC: Normal rate, regular rhythm.  Heart sounds S1, S2.  No murmurs, rubs or gallops   RESPIRATORY: Breath sounds clear and equal bilaterally. No wheezes, rhales or rhonchi  MUSCULOSKELETAL: Spine appears normal, range of motion is not limited, no muscle or joint tenderness  EXTREMITIES: b/l Le edema +2 upto mid shin   NEUROLOGICAL: Alert and oriented, no focal deficits, no motor or sensory deficits.  SKIN: No rash, skin turgor   Abdoman : soft NT , ND

## 2020-10-07 NOTE — H&P ADULT - NSICDXPASTSURGICALHX_GEN_ALL_CORE_FT
PAST SURGICAL HISTORY:  H/O inguinal hernia repair R side by Dr. Galeas    S/P CABG (coronary artery bypass graft) 2/2018    S/P cervical spinal fusion c1 c2 screws    S/P TURP 1/2019

## 2020-10-07 NOTE — CONSULT NOTE ADULT - SUBJECTIVE AND OBJECTIVE BOX
CHIEF COMPLAINT:Patient is a 50y old  Male who presents with a chief complaint of Dyspnea on exertion , bilateral lower extremity edema.      HPI:  51 yo M from home lives with sister ambulates with a cane PMHX  CAD (s/p CABG x2 2018), CHFrEF(EF35-40%),  HTN, BPH s/p TURP, HTN, HLD, Parkinsonism and recent c-spine fusion presented with CHATTERJEE and b/l LE edema X10 days.   Patient reports limited ambulation due to worsening SOB associated with b/l LE  Edema which gradually worsened over past two weeks and now up to mid-thigh. Patient denies chest pain, however reports wage left arm pain , associated with occasional cold sweats and PND. Patient denies chronic edema s/p CABG. however endorses chronic CHATTERJEE which aggravated over past 2 weeks. Denies recent Echo or stress test has been done.  Pt previously ambulated around one-bedroom appt w/ cane w/o dyspnea, but now becomes winded after a few steps. Parkinson medication dose increased 6wks ago, but no other recent changes in medications. Denies nausea, vomiting, diarrhea, abdominal pain, chest pain, palpitations, dizziness, headache, cough, wheezing, joint pain or swelling, fever, chills.      ED : patient received 40 mg Lasix Iv with improvement in LE edema. Patient requested Elmore for convenience , pro , Tnx1 negative,          PAST MEDICAL & SURGICAL HISTORY:  Chronic neck pain    Chronic pain of both shoulders    Scoliosis    H/O CHF    Cervical radiculopathy  as per family unstable C1, C2 , Dr. Decker notified , limited ROM of neck, pt is difficult intubation risk, family to bring results of MRI of neck     Hypertension    CAD (coronary artery disease)  CABG x 2 - 2018    BPH (benign prostatic hyperplasia)  TURP- 2019    Parkinsons disease  onset     Anxiety    Hypercholesterolemia    H/O inguinal hernia repair  R side by Dr. Galeas    S/P cervical spinal fusion  c1 c2 screws    S/P TURP  2019    S/P CABG (coronary artery bypass graft)  2018        MEDICATIONS  (STANDING):  aspirin enteric coated 81 milliGRAM(s) Oral daily  atorvastatin 80 milliGRAM(s) Oral at bedtime  buPROPion XL . 150 milliGRAM(s) Oral daily  carbidopa/levodopa  25/100 1 Tablet(s) Oral three times a day  cholecalciferol 1000 Unit(s) Oral daily  clopidogrel Tablet 75 milliGRAM(s) Oral daily  enoxaparin Injectable 40 milliGRAM(s) SubCutaneous daily  entacapone 200 milliGRAM(s) Oral three times a day  furosemide   Injectable 40 milliGRAM(s) IV Push daily  metoprolol tartrate 25 milliGRAM(s) Oral two times a day  pantoprazole    Tablet 40 milliGRAM(s) Oral before breakfast  polyethylene glycol 3350 17 Gram(s) Oral daily  tamsulosin 0.4 milliGRAM(s) Oral at bedtime    MEDICATIONS  (PRN):      FAMILY HISTORY:  Family history of acute myocardial infarction (Sibling)  2 brothers- all  of MI        SOCIAL HISTORY:    [x ] Non-smoker    [x ] Alcohol-denies    Allergies    No Known Drug Allergies  Seafood (Blisters; Swelling; Pruritus; Hives)    Intolerances    	    REVIEW OF SYSTEMS:  CONSTITUTIONAL: No fever, weight loss, or fatigue  EYES: No eye pain, visual disturbances, or discharge  ENT:  No difficulty hearing, tinnitus, vertigo; No sinus or throat pain  NECK: No pain or stiffness  RESPIRATORY: No cough, wheezing, chills or hemoptysis; + Shortness of Breath  CARDIOVASCULAR: No chest pain, palpitations, passing out, dizziness, + leg swelling  GASTROINTESTINAL: No abdominal or epigastric pain. No nausea, vomiting, or hematemesis; No diarrhea or constipation. No melena or hematochezia.  GENITOURINARY: No dysuria, frequency, hematuria, or incontinence  NEUROLOGICAL: No headaches, memory loss, loss of strength, numbness, or tremors  SKIN: No itching, burning, rashes, or lesions   LYMPH Nodes: No enlarged glands  ENDOCRINE: No heat or cold intolerance; No hair loss  MUSCULOSKELETAL: No joint pain or swelling; No muscle, back, or extremity pain  PSYCHIATRIC: No depression, anxiety, mood swings, or difficulty sleeping  HEME/LYMPH: No easy bruising, or bleeding gums  ALLERGY AND IMMUNOLOGIC: No hives or eczema	        PHYSICAL EXAM:  T(C): 36.8 (10-07-20 @ 07:58), Max: 36.8 (10-07-20 @ 04:31)  HR: 78 (10-07-20 @ 07:58) (78 - 112)  BP: 103/63 (10-07-20 @ 07:58) (103/63 - 132/77)  RR: 18 (10-07-20 @ 07:58) (17 - 20)  SpO2: 99% (10-07-20 @ 07:58) (97% - 99%)      06 Oct 2020 07:01  -  07 Oct 2020 07:00  --------------------------------------------------------  IN: 0 mL / OUT: 400 mL / NET: -400 mL        Appearance: Normal	  HEENT:   Normal oral mucosa, PERRL, EOMI	  Lymphatic: No lymphadenopathy  Cardiovascular: Normal S1 S2, No JVD, No murmurs, No edema  Respiratory: Lungs clear to auscultation	  Psychiatry: A & O x 3, Mood & affect appropriate  Gastrointestinal:  Soft, Non-tender, + BS	  Skin: No rashes, No ecchymoses, No cyanosis	  Neurologic: Non-focal  Extremities: Normal range of motion, No clubbing, cyanosis or edema  Vascular: Peripheral pulses palpable 2+ bilaterally    	    ECG:  	NSR,q anterior and inferior leads   	  	  LABS:	 	    CARDIAC MARKERS:  CARDIAC MARKERS ( 07 Oct 2020 06:57 )  <0.015 ng/mL / x     / x     / x     / x      CARDIAC MARKERS ( 06 Oct 2020 19:56 )  <0.015 ng/mL / x     / x     / x     / x                                  12.2   6.40  )-----------( 269      ( 07 Oct 2020 06:57 )             38.2     10-    140  |  103  |  16  ----------------------------<  90  3.4<L>   |  32<H>  |  1.06    Ca    9.2      07 Oct 2020 06:57  Phos  3.8     10-07  Mg     2.0     10-07    TPro  7.0  /  Alb  3.3<L>  /  TBili  0.2  /  DBili  x   /  AST  22  /  ALT  20  /  AlkPhos  114  10-06    proBNP: Serum Pro-Brain Natriuretic Peptide: 705 pg/mL (10-06 @ 19:56)    Lipid Profile: Cholesterol 179  LDL 97  HDL 74  TG 40      TSH: Thyroid Stimulating Hormone, Serum: 0.79 uU/mL (10-07 @ 06:57)        ra< from: CT Angio Chest w/ IV Cont (10.07.20 @ 01:19) >  EXAM:  CT ANGIO CHEST (W)AW IC                            PROCEDURE DATE:  10/07/2020          INTERPRETATION:  CLINICAL INFORMATION: Shortness of breath. Rule out PE.    TECHNIQUE: Multiple contiguous axial CTA images of the chest were obtained following the administration of 1 20 cc of Omnipaque 350, 80 cc discarded. Coronal, sagittal and 3-D reformatted images provided.    COMPARISON: CT chest 2019.    FINDINGS: Evaluation of multiple bilateral segmental and subsegmental pulmonary arteries are limited due to suboptimal opacification/motion artifact. Otherwise no definite evidence for filling defect within the central pulmonary arteries only to suggest pulmonary embolus. The main pulmonary arterial segment is not dilated. No thoracic aortic aneurysm or dissection demonstrated. The heart is mildly enlarged. No pericardial effusion or pericardial thickening demonstrated. Coronary artery calcifications suggested. Status post median sternotomy/CABG.    The esophagus is collapsed with suggestion of a small hiatal hernia. No enlarged axillary, mediastinal or hilar lymph nodes.    The central tracheobronchial tree is grossly patent. Mild dependent lung changes are seen without focal consolidation, significant pleural effusion or pneumothorax.    Posterior spinal fusion of the cervical thoracic spine.    IMPRESSION:  1.Evaluation of multiple bilateral segmental and subsegmental pulmonary arteries are limited due to suboptimal opacification/motion artifact. Otherwise no definite evidence for filling defect within the central pulmonary arteries to suggest pulmonary embolus.  2. No focal infiltrate, significant pleural effusion or pneumothorax.      EXAM:  XR CHEST PA LAT 2V                            PROCEDURE DATE:  10/06/2020          INTERPRETATION:  PROCEDURE: PA and lateral views of the chest.    CLINICAL INFORMATION: Chest pain.    COMPARISON: 2020.    FINDINGS:    The patient is status post sternotomy and cervical fusion.    Lungs: The lungs are clear.  Heart: The heart is normal in size.  Mediastinum: The mediastinum is within normal limits.    IMPRESSION:    Clear lungs.

## 2020-10-07 NOTE — H&P ADULT - PROBLEM SELECTOR PLAN 6
s/p TURP, on Flomax 0.4 qd   pt requested and recived Elmore in ED   will resume home meds   strict I&O   TOV before discharge

## 2020-10-07 NOTE — H&P ADULT - PROBLEM SELECTOR PLAN 3
patient on carbi-levodopa  25/100 TId    will resume and monitor sx   bowel regimen started for occasional constipation

## 2020-10-07 NOTE — H&P ADULT - HISTORY OF PRESENT ILLNESS
49yo M from home lives with sister ambulates with a cane PMH  CAD (s/p CABG x2 02/2018), CHFrEF(EF35-40%),  HTN, BPH s/p TURP, HTN, HLD, Parkinsonism and recent c-spine fusion presented with CHATTERJEE and b/l LE edema X10 days.   Patient reports limited ambulation due to worsening SOB associated with b/l LE  Edema which gradually worsened over past two weeks and now up to mid-thigh. Patient denies chest pain, however reports wage left arm pain , associated with occasional cold sweats and PND. Patient denies chronic edema s/p CABG. however endorses chronic CHATTERJEE which aggravated over past 2 weeks. Denies recent Echo or stress test has been done.  Pt previously ambulated around one-bedroom appt w/ cane w/o dyspnea, but now becomes winded after a few steps. Parkinson medication dose increased 6wks ago, but no other recent changes in medications. Denies nausea, vomiting, diarrhea, abdominal pain, chest pain, palpitations, dizziness, headache, cough, wheezing, joint pain or swelling, fever, chills.      ED : patient received 40 mg Lasix Iv with improvement in LE edema. Patient requested Elmore for convenience , pro , Tnx1 negative,    GOC : FULL code

## 2020-10-07 NOTE — H&P ADULT - NSICDXFAMILYHX_GEN_ALL_CORE_FT
FAMILY HISTORY:  Sibling  Still living? Unknown  Family history of acute myocardial infarction, Age at diagnosis: Age Unknown

## 2020-10-07 NOTE — H&P ADULT - PROBLEM SELECTOR PLAN 1
History of CHFrEF(EF35-40% in 1/2019) , pw CHATTERJEE and BL LE edema , pro  , CXR no pleural effusion , CTA negative for PE or infiltration, saturates 98% on RA, TnX1 negative    patient on lasix 40mg qd at home, along with aspirin , statin and betablocker    sp Lasix 40mg in Ed    Patient requested and received Elmore for convenience in ED   ECG : NSR    IV Lasix 40 mg started   aspirin , statin , Ezetimibe, betablocker started    Check Echocardiogram   - Stricts I/O and daily Weights  - Telemetry  - check T2  - Cardiology Dr Ayala

## 2020-10-07 NOTE — H&P ADULT - ASSESSMENT
49yo M from home lives with sister ambulates with a cane PMH  CAD (s/p CABG x2 02/2018), CHFrEF(EF35-40%),  HTN, BPH s/p TURP, HTN, HLD, Parkinsonism and recent c-spine fusion presented with CHATTERJEE and b/l LE edema X10 days.  Admitted fir CHF exacerbation , cardiac work up.

## 2020-10-07 NOTE — H&P ADULT - NSICDXPASTMEDICALHX_GEN_ALL_CORE_FT
PAST MEDICAL HISTORY:  Anxiety     BPH (benign prostatic hyperplasia) TURP- 1/2019    CAD (coronary artery disease) CABG x 2 - 2/2018    Cervical radiculopathy as per family unstable C1, C2 , Dr. Decker notified , limited ROM of neck, pt is difficult intubation risk, family to bring results of MRI of neck 2018    Chronic neck pain     Chronic pain of both shoulders     H/O CHF     Hypercholesterolemia     Hypertension     Parkinsons disease onset 2011    Scoliosis

## 2020-10-08 LAB
ANION GAP SERPL CALC-SCNC: 4 MMOL/L — LOW (ref 5–17)
BUN SERPL-MCNC: 15 MG/DL — SIGNIFICANT CHANGE UP (ref 7–18)
CALCIUM SERPL-MCNC: 8.9 MG/DL — SIGNIFICANT CHANGE UP (ref 8.4–10.5)
CHLORIDE SERPL-SCNC: 109 MMOL/L — HIGH (ref 96–108)
CO2 SERPL-SCNC: 28 MMOL/L — SIGNIFICANT CHANGE UP (ref 22–31)
CREAT SERPL-MCNC: 1.04 MG/DL — SIGNIFICANT CHANGE UP (ref 0.5–1.3)
GLUCOSE SERPL-MCNC: 91 MG/DL — SIGNIFICANT CHANGE UP (ref 70–99)
HCT VFR BLD CALC: 36.3 % — LOW (ref 39–50)
HGB BLD-MCNC: 11.9 G/DL — LOW (ref 13–17)
MAGNESIUM SERPL-MCNC: 2.2 MG/DL — SIGNIFICANT CHANGE UP (ref 1.6–2.6)
MCHC RBC-ENTMCNC: 26.4 PG — LOW (ref 27–34)
MCHC RBC-ENTMCNC: 32.8 GM/DL — SIGNIFICANT CHANGE UP (ref 32–36)
MCV RBC AUTO: 80.5 FL — SIGNIFICANT CHANGE UP (ref 80–100)
NRBC # BLD: 0 /100 WBCS — SIGNIFICANT CHANGE UP (ref 0–0)
PHOSPHATE SERPL-MCNC: 3.2 MG/DL — SIGNIFICANT CHANGE UP (ref 2.5–4.5)
PLATELET # BLD AUTO: 240 K/UL — SIGNIFICANT CHANGE UP (ref 150–400)
POTASSIUM SERPL-MCNC: 4 MMOL/L — SIGNIFICANT CHANGE UP (ref 3.5–5.3)
POTASSIUM SERPL-SCNC: 4 MMOL/L — SIGNIFICANT CHANGE UP (ref 3.5–5.3)
RBC # BLD: 4.51 M/UL — SIGNIFICANT CHANGE UP (ref 4.2–5.8)
RBC # FLD: 14.3 % — SIGNIFICANT CHANGE UP (ref 10.3–14.5)
SODIUM SERPL-SCNC: 141 MMOL/L — SIGNIFICANT CHANGE UP (ref 135–145)
WBC # BLD: 6.38 K/UL — SIGNIFICANT CHANGE UP (ref 3.8–10.5)
WBC # FLD AUTO: 6.38 K/UL — SIGNIFICANT CHANGE UP (ref 3.8–10.5)

## 2020-10-08 PROCEDURE — 93306 TTE W/DOPPLER COMPLETE: CPT | Mod: 26

## 2020-10-08 RX ADMIN — BUPROPION HYDROCHLORIDE 150 MILLIGRAM(S): 150 TABLET, EXTENDED RELEASE ORAL at 11:37

## 2020-10-08 RX ADMIN — ENTACAPONE 200 MILLIGRAM(S): 200 TABLET, FILM COATED ORAL at 21:53

## 2020-10-08 RX ADMIN — PANTOPRAZOLE SODIUM 40 MILLIGRAM(S): 20 TABLET, DELAYED RELEASE ORAL at 05:48

## 2020-10-08 RX ADMIN — POLYETHYLENE GLYCOL 3350 17 GRAM(S): 17 POWDER, FOR SOLUTION ORAL at 11:13

## 2020-10-08 RX ADMIN — ENTACAPONE 200 MILLIGRAM(S): 200 TABLET, FILM COATED ORAL at 13:38

## 2020-10-08 RX ADMIN — CARBIDOPA AND LEVODOPA 1 TABLET(S): 25; 100 TABLET ORAL at 13:37

## 2020-10-08 RX ADMIN — Medication 81 MILLIGRAM(S): at 11:11

## 2020-10-08 RX ADMIN — CARBIDOPA AND LEVODOPA 1 TABLET(S): 25; 100 TABLET ORAL at 21:54

## 2020-10-08 RX ADMIN — CARBIDOPA AND LEVODOPA 1 TABLET(S): 25; 100 TABLET ORAL at 05:44

## 2020-10-08 RX ADMIN — Medication 25 MILLIGRAM(S): at 05:45

## 2020-10-08 RX ADMIN — Medication 1000 UNIT(S): at 11:11

## 2020-10-08 RX ADMIN — ATORVASTATIN CALCIUM 80 MILLIGRAM(S): 80 TABLET, FILM COATED ORAL at 21:54

## 2020-10-08 RX ADMIN — TAMSULOSIN HYDROCHLORIDE 0.4 MILLIGRAM(S): 0.4 CAPSULE ORAL at 21:53

## 2020-10-08 RX ADMIN — CLOPIDOGREL BISULFATE 75 MILLIGRAM(S): 75 TABLET, FILM COATED ORAL at 11:12

## 2020-10-08 RX ADMIN — ENTACAPONE 200 MILLIGRAM(S): 200 TABLET, FILM COATED ORAL at 05:45

## 2020-10-08 RX ADMIN — Medication 25 MILLIGRAM(S): at 17:37

## 2020-10-08 RX ADMIN — Medication 20 MILLIGRAM(S): at 05:45

## 2020-10-08 RX ADMIN — ENOXAPARIN SODIUM 40 MILLIGRAM(S): 100 INJECTION SUBCUTANEOUS at 11:13

## 2020-10-08 NOTE — PROGRESS NOTE ADULT - PROBLEM SELECTOR PLAN 6
s/p TURP, on Flomax 0.4 qd   pt requested and received Elmore in ED  will resume home meds   strict I&O   TOV before discharge

## 2020-10-08 NOTE — PROGRESS NOTE ADULT - SUBJECTIVE AND OBJECTIVE BOX
CHIEF COMPLAINT:Patient is a 50y old  Male who presents with a chief complaint of Dyspnea on exertion , bilateral lower extremity edema.Pt feeling better.    	  REVIEW OF SYSTEMS:  CONSTITUTIONAL: No fever, weight loss, or fatigue  EYES: No eye pain, visual disturbances, or discharge  ENT:  No difficulty hearing, tinnitus, vertigo; No sinus or throat pain  NECK: No pain or stiffness  RESPIRATORY: No cough, wheezing, chills or hemoptysis; No Shortness of Breath  CARDIOVASCULAR: No chest pain, palpitations, passing out, dizziness, or leg swelling  GASTROINTESTINAL: No abdominal or epigastric pain. No nausea, vomiting, or hematemesis; No diarrhea or constipation. No melena or hematochezia.  GENITOURINARY: No dysuria, frequency, hematuria, or incontinence  NEUROLOGICAL: No headaches, memory loss, loss of strength, numbness, or tremors  SKIN: No itching, burning, rashes, or lesions   LYMPH Nodes: No enlarged glands  ENDOCRINE: No heat or cold intolerance; No hair loss  MUSCULOSKELETAL: No joint pain or swelling; No muscle, back, or extremity pain  PSYCHIATRIC: No depression, anxiety, mood swings, or difficulty sleeping  HEME/LYMPH: No easy bruising, or bleeding gums  ALLERGY AND IMMUNOLOGIC: No hives or eczema	        PHYSICAL EXAM:  T(C): 36.1 (10-08-20 @ 08:30), Max: 37 (10-07-20 @ 11:09)  HR: 73 (10-08-20 @ 08:30) (73 - 92)  BP: 102/63 (10-08-20 @ 08:30) (102/63 - 121/73)  RR: 18 (10-08-20 @ 08:30) (17 - 18)  SpO2: 98% (10-08-20 @ 08:30) (98% - 100%)  Wt(kg): --  I&O's Summary    07 Oct 2020 07:01  -  08 Oct 2020 07:00  --------------------------------------------------------  IN: 405 mL / OUT: 3250 mL / NET: -2845 mL        Appearance: Normal	  HEENT:   Normal oral mucosa, PERRL, EOMI	  Lymphatic: No lymphadenopathy  Cardiovascular: Normal S1 S2, No JVD, No murmurs, No edema  Respiratory: Lungs clear to auscultation	  Psychiatry: A & O x 3, Mood & affect appropriate  Gastrointestinal:  Soft, Non-tender, + BS	  Skin: No rashes, No ecchymoses, No cyanosis	  Neurologic: Non-focal  Extremities: Normal range of motion, No clubbing, cyanosis or edema  Vascular: Peripheral pulses palpable 2+ bilaterally    MEDICATIONS  (STANDING):  aspirin enteric coated 81 milliGRAM(s) Oral daily  atorvastatin 80 milliGRAM(s) Oral at bedtime  buPROPion XL . 150 milliGRAM(s) Oral daily  carbidopa/levodopa  25/100 1 Tablet(s) Oral three times a day  cholecalciferol 1000 Unit(s) Oral daily  clopidogrel Tablet 75 milliGRAM(s) Oral daily  enoxaparin Injectable 40 milliGRAM(s) SubCutaneous daily  entacapone 200 milliGRAM(s) Oral three times a day  furosemide   Injectable 20 milliGRAM(s) IV Push daily  metoprolol tartrate 25 milliGRAM(s) Oral two times a day  pantoprazole    Tablet 40 milliGRAM(s) Oral before breakfast  polyethylene glycol 3350 17 Gram(s) Oral daily  tamsulosin 0.4 milliGRAM(s) Oral at bedtime      TELEMETRY: nsr,pvc's	    	  	  LABS:	 	    CARDIAC MARKERS ( 07 Oct 2020 06:57 )  <0.015 ng/mL / x     / x     / x     / x      CARDIAC MARKERS ( 06 Oct 2020 19:56 )  <0.015 ng/mL / x     / x     / x     / x                             11.9   6.38  )-----------( 240      ( 08 Oct 2020 07:35 )             36.3     10-08    141  |  109<H>  |  15  ----------------------------<  91  4.0   |  28  |  1.04    Ca    8.9      08 Oct 2020 07:35  Phos  3.2     10-08  Mg     2.2     10-08    TPro  7.0  /  Alb  3.3<L>  /  TBili  0.2  /  DBili  x   /  AST  22  /  ALT  20  /  AlkPhos  114  10-06    proBNP: Serum Pro-Brain Natriuretic Peptide: 705 pg/mL (10-06 @ 19:56)    Lipid Profile: Cholesterol 179  LDL 97  HDL 74  TG 40      TSH: Thyroid Stimulating Hormone, Serum: 0.79 uU/mL (10-07 @ 06:57)      	    Doppler-no DVT.

## 2020-10-08 NOTE — PHYSICAL THERAPY INITIAL EVALUATION ADULT - MANUAL MUSCLE TESTING RESULTS, REHAB EVAL
Airway patent, TM normal bilaterally, normal appearing mouth, nose, throat, neck supple with full range of motion, no cervical adenopathy.
Grossly 4-/5 to jack. UE's and LE's

## 2020-10-08 NOTE — PHYSICAL THERAPY INITIAL EVALUATION ADULT - GENERAL OBSERVATIONS, REHAB EVAL
Pt. received supine in bed, NAD, on telemetry and has rivas catheter. Pt. cooperative and motivated during eval. Pt. A&O x 3.

## 2020-10-08 NOTE — PROGRESS NOTE ADULT - PROBLEM SELECTOR PLAN 1
History of CHFrEF(EF35-40% in 1/2019) , pw CHATTERJEE and BL LE edema , pro  , CXR no pleural effusion , CTA negative for PE or infiltration, saturates 98% on RA,  Patient requested and received Elmore for convenience in ED, D/c on 10/8  pro .  EKG : NSR   Echo showed EF of 44%  c/w IV lasix 20mg  Stricts I/O and daily Weights  Cardiology Dr Ayala

## 2020-10-08 NOTE — PHYSICAL THERAPY INITIAL EVALUATION ADULT - ACTIVE RANGE OF MOTION EXAMINATION, REHAB EVAL
neck limited in all planes due to cervical fusion/deficits as listed below/bilateral upper extremity Active ROM was WFL (within functional limits)/bilateral  lower extremity Active ROM was WFL (within functional limits)

## 2020-10-08 NOTE — PROGRESS NOTE ADULT - SUBJECTIVE AND OBJECTIVE BOX
PGY-1 Progress Note discussed with attending    PAGER #: [[phone]] TILL 5:00 PM  PLEASE CONTACT ON CALL TEAM:  - On Call Team (Please refer to Andres) FROM 5:00 PM - 8:30PM  - Nightfloat Team FROM 8:30 -7:30 AM    CHIEF COMPLAINT & BRIEF HOSPITAL COURSE: 49yo M from home lives with sister ambulates with a cane PMH  CAD (s/p CABG x2 02/2018), CHFrEF(EF35-40%),  HTN, BPH s/p TURP, HTN, HLD, Parkinsonism and recent c-spine fusion presented with CHATTERJEE and b/l LE edema X10 days.   Patient reports limited ambulation due to worsening SOB associated with b/l LE  Edema which gradually worsened over past two weeks and now up to mid-thigh. Patient denies chest pain, however reports wage left arm pain , associated with occasional cold sweats and PND. Patient denies chronic edema s/p CABG. however endorses chronic CHATTERJEE which aggravated over past 2 weeks. Denies recent Echo or stress test has been done.  Pt previously ambulated around one-bedroom appt w/ cane w/o dyspnea, but now becomes winded after a few steps. Parkinson medication dose increased 6wks ago, but no other recent changes in medications. Denies nausea, vomiting, diarrhea, abdominal pain, chest pain, palpitations, dizziness, headache, cough, wheezing, joint pain or swelling, fever, chills.    ED : patient received 40 mg Lasix Iv with improvement in LE edema. Patient requested Elmore for convenience , pro , Tnx1 negative,    INTERVAL HPI/OVERNIGHT EVENTS: No adverse events overnight    REVIEW OF SYSTEMS:  CONSTITUTIONAL: No fever, weight loss, or fatigue  RESPIRATORY: No cough, wheezing, chills or hemoptysis; No shortness of breath  CARDIOVASCULAR: No chest pain, palpitations, dizziness, or leg swelling  GASTROINTESTINAL: No abdominal pain. No nausea, vomiting, or hematemesis; No diarrhea or constipation. No melena or hematochezia.  GENITOURINARY: No dysuria or hematuria, urinary frequency  NEUROLOGICAL: No headaches, memory loss, loss of strength, numbness, or tremors  SKIN: No itching, burning, rashes, or lesions     Vital Signs Last 24 Hrs  T(C): 36.6 (08 Oct 2020 15:10), Max: 36.9 (07 Oct 2020 19:50)  T(F): 97.8 (08 Oct 2020 15:10), Max: 98.5 (07 Oct 2020 19:50)  HR: 83 (08 Oct 2020 15:10) (73 - 92)  BP: 111/52 (08 Oct 2020 15:10) (102/63 - 121/73)  BP(mean): 65 (08 Oct 2020 10:01) (65 - 70)  RR: 18 (08 Oct 2020 15:10) (17 - 18)  SpO2: 100% (08 Oct 2020 15:10) (98% - 100%)    PHYSICAL EXAMINATION:  GENERAL: NAD, well built  HEAD:  Atraumatic, Normocephalic  EYES:  conjunctiva and sclera clear  NECK: Supple, No JVD, Normal thyroid  CHEST/LUNG: Clear to auscultation. Clear to percussion bilaterally; No rales, rhonchi, wheezing, or rubs  HEART: Regular rate and rhythm; No murmurs, rubs, or gallops  ABDOMEN: Soft, Nontender, Nondistended; Bowel sounds present  NERVOUS SYSTEM:  Alert & Oriented X3,    EXTREMITIES:  2+ Peripheral Pulses, No clubbing, cyanosis, or edema  SKIN: warm dry                          11.9   6.38  )-----------( 240      ( 08 Oct 2020 07:35 )             36.3     10-08    141  |  109<H>  |  15  ----------------------------<  91  4.0   |  28  |  1.04    Ca    8.9      08 Oct 2020 07:35  Phos  3.2     10-08  Mg     2.2     10-08    TPro  7.0  /  Alb  3.3<L>  /  TBili  0.2  /  DBili  x   /  AST  22  /  ALT  20  /  AlkPhos  114  10-06    LIVER FUNCTIONS - ( 06 Oct 2020 19:56 )  Alb: 3.3 g/dL / Pro: 7.0 g/dL / ALK PHOS: 114 U/L / ALT: 20 U/L DA / AST: 22 U/L / GGT: x           CARDIAC MARKERS ( 07 Oct 2020 06:57 )  <0.015 ng/mL / x     / x     / x     / x      CARDIAC MARKERS ( 06 Oct 2020 19:56 )  <0.015 ng/mL / x     / x     / x     / x          PT/INR - ( 06 Oct 2020 19:57 )   PT: 13.2 sec;   INR: 1.12 ratio         PTT - ( 06 Oct 2020 19:57 )  PTT:27.3 sec    CAPILLARY BLOOD GLUCOSE      RADIOLOGY & ADDITIONAL TESTS: PGY-1 Progress Note discussed with attending    PAGER #: [[phone]] TILL 5:00 PM  PLEASE CONTACT ON CALL TEAM:  - On Call Team (Please refer to Andres) FROM 5:00 PM - 8:30PM  - Nightfloat Team FROM 8:30 -7:30 AM    CHIEF COMPLAINT & BRIEF HOSPITAL COURSE: 49yo M from home lives with sister ambulates with a cane PMH  CAD (s/p CABG x2 02/2018), CHFrEF(EF35-40%),  HTN, BPH s/p TURP, HTN, HLD, Parkinsonism and recent c-spine fusion presented with CHATTERJEE and b/l LE edema X10 days.   Patient reports limited ambulation due to worsening SOB associated with b/l LE  Edema which gradually worsened over past two weeks and now up to mid-thigh. Patient denies chest pain, however reports wage left arm pain , associated with occasional cold sweats and PND. Patient denies chronic edema s/p CABG. however endorses chronic CHATTERJEE which aggravated over past 2 weeks. Denies recent Echo or stress test has been done.  Pt previously ambulated around one-bedroom appt w/ cane w/o dyspnea, but now becomes winded after a few steps. Parkinson medication dose increased 6wks ago, but no other recent changes in medications. Denies nausea, vomiting, diarrhea, abdominal pain, chest pain, palpitations, dizziness, headache, cough, wheezing, joint pain or swelling, fever, chills. For the CHF workup, EKG showed NSR, Trops were negative, Echo showed Ef of 44%. c/w lasix IV 20mg daily. PT recommends home PT. Cardiology Dr. Ayala following.    ED : patient received 40 mg Lasix Iv with improvement in LE edema. Patient requested Elmore for convenience , pro , Tnx1 negative,    INTERVAL HPI/OVERNIGHT EVENTS: No adverse events overnight    REVIEW OF SYSTEMS:  CONSTITUTIONAL: No fever, weight loss, or fatigue  RESPIRATORY: No cough, wheezing, chills or hemoptysis; No shortness of breath  CARDIOVASCULAR: No chest pain, palpitations, dizziness, or leg swelling  GASTROINTESTINAL: No abdominal pain. No nausea, vomiting, or hematemesis; No diarrhea or constipation. No melena or hematochezia.  GENITOURINARY: No dysuria or hematuria, urinary frequency  NEUROLOGICAL: No headaches, memory loss, loss of strength, numbness, or tremors  SKIN: No itching, burning, rashes, or lesions     Vital Signs Last 24 Hrs  T(C): 36.6 (08 Oct 2020 15:10), Max: 36.9 (07 Oct 2020 19:50)  T(F): 97.8 (08 Oct 2020 15:10), Max: 98.5 (07 Oct 2020 19:50)  HR: 83 (08 Oct 2020 15:10) (73 - 92)  BP: 111/52 (08 Oct 2020 15:10) (102/63 - 121/73)  BP(mean): 65 (08 Oct 2020 10:01) (65 - 70)  RR: 18 (08 Oct 2020 15:10) (17 - 18)  SpO2: 100% (08 Oct 2020 15:10) (98% - 100%)    PHYSICAL EXAMINATION:  GENERAL: NAD, well built  HEAD:  Atraumatic, Normocephalic  EYES:  conjunctiva and sclera clear  NECK: Supple, No JVD, Normal thyroid  CHEST/LUNG: Clear to auscultation. Clear to percussion bilaterally; No rales, rhonchi, wheezing, or rubs  HEART: Regular rate and rhythm; No murmurs, rubs, or gallops  ABDOMEN: Soft, Nontender, Nondistended; Bowel sounds present  NERVOUS SYSTEM:  Alert & Oriented X3,    EXTREMITIES:  2+ Peripheral Pulses, No clubbing, cyanosis, or edema  SKIN: warm dry                          11.9   6.38  )-----------( 240      ( 08 Oct 2020 07:35 )             36.3     10-08    141  |  109<H>  |  15  ----------------------------<  91  4.0   |  28  |  1.04    Ca    8.9      08 Oct 2020 07:35  Phos  3.2     10-08  Mg     2.2     10-08    TPro  7.0  /  Alb  3.3<L>  /  TBili  0.2  /  DBili  x   /  AST  22  /  ALT  20  /  AlkPhos  114  10-06    LIVER FUNCTIONS - ( 06 Oct 2020 19:56 )  Alb: 3.3 g/dL / Pro: 7.0 g/dL / ALK PHOS: 114 U/L / ALT: 20 U/L DA / AST: 22 U/L / GGT: x           CARDIAC MARKERS ( 07 Oct 2020 06:57 )  <0.015 ng/mL / x     / x     / x     / x      CARDIAC MARKERS ( 06 Oct 2020 19:56 )  <0.015 ng/mL / x     / x     / x     / x          PT/INR - ( 06 Oct 2020 19:57 )   PT: 13.2 sec;   INR: 1.12 ratio         PTT - ( 06 Oct 2020 19:57 )  PTT:27.3 sec    CAPILLARY BLOOD GLUCOSE      RADIOLOGY & ADDITIONAL TESTS:

## 2020-10-08 NOTE — PROGRESS NOTE ADULT - SUBJECTIVE AND OBJECTIVE BOX
INTERVAL HPI/OVERNIGHT EVENTS:  Patient seen at bedside,doing better,no acute events  VITAL SIGNS:  T(F): 97 (10-08-20 @ 08:30)  HR: 73 (10-08-20 @ 08:30)  BP: 102/63 (10-08-20 @ 08:30)  RR: 18 (10-08-20 @ 08:30)  SpO2: 98% (10-08-20 @ 08:30)  Wt(kg): --    PHYSICAL EXAM:  awake,alert  Constitutional:  Eyes:  ENMT:perrla  Neck:  Respiratory:clear  Cardiovascular:s1 2,m-none  Gastrointestinal:soft,bs pos  Extremities:  Vascular:  Neurological:no focal deficit  Musculoskeletal:    MEDICATIONS  (STANDING):  aspirin enteric coated 81 milliGRAM(s) Oral daily  atorvastatin 80 milliGRAM(s) Oral at bedtime  buPROPion XL . 150 milliGRAM(s) Oral daily  carbidopa/levodopa  25/100 1 Tablet(s) Oral three times a day  cholecalciferol 1000 Unit(s) Oral daily  clopidogrel Tablet 75 milliGRAM(s) Oral daily  enoxaparin Injectable 40 milliGRAM(s) SubCutaneous daily  entacapone 200 milliGRAM(s) Oral three times a day  furosemide   Injectable 20 milliGRAM(s) IV Push daily  metoprolol tartrate 25 milliGRAM(s) Oral two times a day  pantoprazole    Tablet 40 milliGRAM(s) Oral before breakfast  polyethylene glycol 3350 17 Gram(s) Oral daily  tamsulosin 0.4 milliGRAM(s) Oral at bedtime    MEDICATIONS  (PRN):      Allergies    No Known Drug Allergies  Seafood (Blisters; Swelling; Pruritus; Hives)    Intolerances        LABS:                        11.9   6.38  )-----------( 240      ( 08 Oct 2020 07:35 )             36.3     10-08    141  |  109<H>  |  15  ----------------------------<  91  4.0   |  28  |  1.04    Ca    8.9      08 Oct 2020 07:35  Phos  3.2     10-08  Mg     2.2     10-08    TPro  7.0  /  Alb  3.3<L>  /  TBili  0.2  /  DBili  x   /  AST  22  /  ALT  20  /  AlkPhos  114  10-06    PT/INR - ( 06 Oct 2020 19:57 )   PT: 13.2 sec;   INR: 1.12 ratio         PTT - ( 06 Oct 2020 19:57 )  PTT:27.3 sec    Assessment and Plan:    Assessment:  · Assessment	  51yo M from home lives with sister ambulates with a cane PMH  CAD (s/p CABG x2 02/2018), CHFrEF(EF35-40%),  HTN, BPH s/p TURP, HTN, HLD, Parkinsonism and recent c-spine fusion presented with CHATTERJEE and b/l LE edema X10 days.  Admitted fir CHF exacerbation , cardiac work up.      Problem/Plan - 1:  ·  Problem: CHF (congestive heart failure).-improved clinically.   cont lasix    Patient requested and received Elmore    IV Lasix 40 mg started   aspirin , statin , Ezetimibe, betablocker started   -f/up echo-  - Telemetry  - check T2  - Cardiology Dr Ayala.   doppler l/extrem neg     Problem/Plan - 2:  ·  Problem: CAD (coronary artery disease).  Plan: s/p CABG 2018, on aspirin , Plavix , statin, ezetimibe    wuill resume figueroa emeds    check A1c and lipid panel.      Problem/Plan - 3:  ·  Problem: Parkinsons disease.  Plan: patient on carbi-levodopa  25/100 TId    will resume and monitor sx   bowel regimen started for occasional constipation.      Problem/Plan - 4:  ·  Problem: Chronic neck pain.  Plan: Patient s/p c1-c2 infusion in July 2020,   c/w nocturnal collar    pain management as needed.      Problem/Plan - 5:  ·  Problem: Hypertension.  Plan: on metoprolol 25mg BID at home    will resume with parameters    monitor BP.      Problem/Plan - 6:  Problem: BPH (benign prostatic hyperplasia). Plan: s/p TURP, on Flomax 0.4 qd   pt requested and recived Elmore in ED   will resume home meds   strict I&O   TOV before discharge.     Problem/Plan - 7:  ·  Problem: Prophylactic measure.  Plan: patient on aspirin and Plavix    will start Lovenox 40 mg sq qd.

## 2020-10-08 NOTE — PHYSICAL THERAPY INITIAL EVALUATION ADULT - CRITERIA FOR SKILLED THERAPEUTIC INTERVENTIONS
impairments found/functional limitations in following categories/risk reduction/prevention/rehab potential/predicted duration of therapy intervention/anticipated discharge recommendation/anticipated equipment needs at discharge/therapy frequency

## 2020-10-09 ENCOUNTER — TRANSCRIPTION ENCOUNTER (OUTPATIENT)
Age: 50
End: 2020-10-09

## 2020-10-09 VITALS
DIASTOLIC BLOOD PRESSURE: 73 MMHG | RESPIRATION RATE: 18 BRPM | HEART RATE: 83 BPM | SYSTOLIC BLOOD PRESSURE: 125 MMHG | TEMPERATURE: 98 F | OXYGEN SATURATION: 100 %

## 2020-10-09 LAB
ANION GAP SERPL CALC-SCNC: 5 MMOL/L — SIGNIFICANT CHANGE UP (ref 5–17)
BUN SERPL-MCNC: 17 MG/DL — SIGNIFICANT CHANGE UP (ref 7–18)
CALCIUM SERPL-MCNC: 9.4 MG/DL — SIGNIFICANT CHANGE UP (ref 8.4–10.5)
CHLORIDE SERPL-SCNC: 104 MMOL/L — SIGNIFICANT CHANGE UP (ref 96–108)
CO2 SERPL-SCNC: 29 MMOL/L — SIGNIFICANT CHANGE UP (ref 22–31)
CREAT SERPL-MCNC: 1.25 MG/DL — SIGNIFICANT CHANGE UP (ref 0.5–1.3)
GLUCOSE SERPL-MCNC: 104 MG/DL — HIGH (ref 70–99)
HCT VFR BLD CALC: 39.7 % — SIGNIFICANT CHANGE UP (ref 39–50)
HGB BLD-MCNC: 12.9 G/DL — LOW (ref 13–17)
MCHC RBC-ENTMCNC: 26.5 PG — LOW (ref 27–34)
MCHC RBC-ENTMCNC: 32.5 GM/DL — SIGNIFICANT CHANGE UP (ref 32–36)
MCV RBC AUTO: 81.5 FL — SIGNIFICANT CHANGE UP (ref 80–100)
NRBC # BLD: 0 /100 WBCS — SIGNIFICANT CHANGE UP (ref 0–0)
PLATELET # BLD AUTO: 269 K/UL — SIGNIFICANT CHANGE UP (ref 150–400)
POTASSIUM SERPL-MCNC: 3.8 MMOL/L — SIGNIFICANT CHANGE UP (ref 3.5–5.3)
POTASSIUM SERPL-SCNC: 3.8 MMOL/L — SIGNIFICANT CHANGE UP (ref 3.5–5.3)
RBC # BLD: 4.87 M/UL — SIGNIFICANT CHANGE UP (ref 4.2–5.8)
RBC # FLD: 14.1 % — SIGNIFICANT CHANGE UP (ref 10.3–14.5)
SODIUM SERPL-SCNC: 138 MMOL/L — SIGNIFICANT CHANGE UP (ref 135–145)
WBC # BLD: 5.85 K/UL — SIGNIFICANT CHANGE UP (ref 3.8–10.5)
WBC # FLD AUTO: 5.85 K/UL — SIGNIFICANT CHANGE UP (ref 3.8–10.5)

## 2020-10-09 RX ORDER — FUROSEMIDE 40 MG
1 TABLET ORAL
Qty: 30 | Refills: 0
Start: 2020-10-09 | End: 2020-11-07

## 2020-10-09 RX ORDER — LISINOPRIL 2.5 MG/1
1 TABLET ORAL
Qty: 30 | Refills: 0
Start: 2020-10-09 | End: 2020-11-07

## 2020-10-09 RX ORDER — ATORVASTATIN CALCIUM 80 MG/1
1 TABLET, FILM COATED ORAL
Qty: 0 | Refills: 0 | DISCHARGE

## 2020-10-09 RX ORDER — SPIRONOLACTONE 25 MG/1
1 TABLET, FILM COATED ORAL
Qty: 0 | Refills: 0 | DISCHARGE
Start: 2020-10-09

## 2020-10-09 RX ORDER — SPIRONOLACTONE 25 MG/1
25 TABLET, FILM COATED ORAL DAILY
Refills: 0 | Status: DISCONTINUED | OUTPATIENT
Start: 2020-10-09 | End: 2020-10-09

## 2020-10-09 RX ORDER — FUROSEMIDE 40 MG
1 TABLET ORAL
Qty: 0 | Refills: 0 | DISCHARGE

## 2020-10-09 RX ORDER — LISINOPRIL 2.5 MG/1
2.5 TABLET ORAL DAILY
Refills: 0 | Status: DISCONTINUED | OUTPATIENT
Start: 2020-10-09 | End: 2020-10-09

## 2020-10-09 RX ORDER — FUROSEMIDE 40 MG
20 TABLET ORAL DAILY
Refills: 0 | Status: DISCONTINUED | OUTPATIENT
Start: 2020-10-09 | End: 2020-10-09

## 2020-10-09 RX ORDER — SPIRONOLACTONE 25 MG/1
1 TABLET, FILM COATED ORAL
Qty: 30 | Refills: 0
Start: 2020-10-09 | End: 2020-11-07

## 2020-10-09 RX ORDER — ATORVASTATIN CALCIUM 80 MG/1
1 TABLET, FILM COATED ORAL
Qty: 0 | Refills: 0 | DISCHARGE
Start: 2020-10-09

## 2020-10-09 RX ADMIN — BUPROPION HYDROCHLORIDE 150 MILLIGRAM(S): 150 TABLET, EXTENDED RELEASE ORAL at 13:57

## 2020-10-09 RX ADMIN — ENTACAPONE 200 MILLIGRAM(S): 200 TABLET, FILM COATED ORAL at 06:16

## 2020-10-09 RX ADMIN — Medication 81 MILLIGRAM(S): at 11:38

## 2020-10-09 RX ADMIN — LISINOPRIL 2.5 MILLIGRAM(S): 2.5 TABLET ORAL at 11:46

## 2020-10-09 RX ADMIN — ENTACAPONE 200 MILLIGRAM(S): 200 TABLET, FILM COATED ORAL at 13:54

## 2020-10-09 RX ADMIN — CARBIDOPA AND LEVODOPA 1 TABLET(S): 25; 100 TABLET ORAL at 06:16

## 2020-10-09 RX ADMIN — SPIRONOLACTONE 25 MILLIGRAM(S): 25 TABLET, FILM COATED ORAL at 13:55

## 2020-10-09 RX ADMIN — CARBIDOPA AND LEVODOPA 1 TABLET(S): 25; 100 TABLET ORAL at 13:54

## 2020-10-09 RX ADMIN — Medication 1000 UNIT(S): at 11:40

## 2020-10-09 RX ADMIN — Medication 20 MILLIGRAM(S): at 06:16

## 2020-10-09 RX ADMIN — Medication 20 MILLIGRAM(S): at 11:46

## 2020-10-09 RX ADMIN — CLOPIDOGREL BISULFATE 75 MILLIGRAM(S): 75 TABLET, FILM COATED ORAL at 11:38

## 2020-10-09 RX ADMIN — ENOXAPARIN SODIUM 40 MILLIGRAM(S): 100 INJECTION SUBCUTANEOUS at 11:39

## 2020-10-09 RX ADMIN — PANTOPRAZOLE SODIUM 40 MILLIGRAM(S): 20 TABLET, DELAYED RELEASE ORAL at 06:16

## 2020-10-09 RX ADMIN — POLYETHYLENE GLYCOL 3350 17 GRAM(S): 17 POWDER, FOR SOLUTION ORAL at 11:39

## 2020-10-09 NOTE — PROGRESS NOTE ADULT - SUBJECTIVE AND OBJECTIVE BOX
CHIEF COMPLAINT:Patient is a 50y old  Male who presents with a chief complaint of Dyspnea on exertion , bilateral lower extremity edema.Pt feeling better.    	  REVIEW OF SYSTEMS:  CONSTITUTIONAL: No fever, weight loss, or fatigue  EYES: No eye pain, visual disturbances, or discharge  ENT:  No difficulty hearing, tinnitus, vertigo; No sinus or throat pain  NECK: No pain or stiffness  RESPIRATORY: No cough, wheezing, chills or hemoptysis; No Shortness of Breath  CARDIOVASCULAR: No chest pain, palpitations, passing out, dizziness, or leg swelling  GASTROINTESTINAL: No abdominal or epigastric pain. No nausea, vomiting, or hematemesis; No diarrhea or constipation. No melena or hematochezia.  GENITOURINARY: No dysuria, frequency, hematuria, or incontinence  NEUROLOGICAL: No headaches, memory loss, loss of strength, numbness, or tremors  SKIN: No itching, burning, rashes, or lesions   LYMPH Nodes: No enlarged glands  ENDOCRINE: No heat or cold intolerance; No hair loss  MUSCULOSKELETAL: No joint pain or swelling; No muscle, back, or extremity pain  PSYCHIATRIC: No depression, anxiety, mood swings, or difficulty sleeping  HEME/LYMPH: No easy bruising, or bleeding gums  ALLERGY AND IMMUNOLOGIC: No hives or eczema	      PHYSICAL EXAM:  T(C): 36.7 (10-09-20 @ 08:00), Max: 36.7 (10-09-20 @ 08:00)  HR: 70 (10-09-20 @ 08:00) (70 - 86)  BP: 121/79 (10-09-20 @ 08:00) (105/55 - 121/79)  RR: 18 (10-09-20 @ 08:00) (18 - 20)  SpO2: 98% (10-09-20 @ 08:00) (98% - 100%)  Wt(kg): --  I&O's Summary    08 Oct 2020 07:01  -  09 Oct 2020 07:00  --------------------------------------------------------  IN: 150 mL / OUT: 401 mL / NET: -251 mL        Appearance: Normal	  HEENT:   Normal oral mucosa, PERRL, EOMI	  Lymphatic: No lymphadenopathy  Cardiovascular: Normal S1 S2, No JVD, No murmurs, No edema  Respiratory: Lungs clear to auscultation	  Psychiatry: A & O x 3, Mood & affect appropriate  Gastrointestinal:  Soft, Non-tender, + BS	  Skin: No rashes, No ecchymoses, No cyanosis	  Neurologic: Non-focal  Extremities: Normal range of motion, No clubbing, cyanosis or edema  Vascular: Peripheral pulses palpable 2+ bilaterally    MEDICATIONS  (STANDING):  aspirin enteric coated 81 milliGRAM(s) Oral daily  atorvastatin 80 milliGRAM(s) Oral at bedtime  buPROPion XL . 150 milliGRAM(s) Oral daily  carbidopa/levodopa  25/100 1 Tablet(s) Oral three times a day  cholecalciferol 1000 Unit(s) Oral daily  clopidogrel Tablet 75 milliGRAM(s) Oral daily  enoxaparin Injectable 40 milliGRAM(s) SubCutaneous daily  entacapone 200 milliGRAM(s) Oral three times a day  furosemide   Injectable 20 milliGRAM(s) IV Push daily  metoprolol tartrate 25 milliGRAM(s) Oral two times a day  pantoprazole    Tablet 40 milliGRAM(s) Oral before breakfast  polyethylene glycol 3350 17 Gram(s) Oral daily  tamsulosin 0.4 milliGRAM(s) Oral at bedtime    	  	  LABS:	 	                         12.9   5.85  )-----------( 269      ( 09 Oct 2020 07:30 )             39.7     10-09    138  |  104  |  17  ----------------------------<  104<H>  3.8   |  29  |  1.25    Ca    9.4      09 Oct 2020 07:30  Phos  3.2     10-08  Mg     2.2     10-08      proBNP: Serum Pro-Brain Natriuretic Peptide: 705 pg/mL (10-06 @ 19:56)    Lipid Profile: Cholesterol 179  LDL 97  HDL 74  TG 40    HgA1c:   TSH: Thyroid Stimulating Hormone, Serum: 0.79 uU/mL (10-07 @ 06:57)      	    Transthoracic Echocardiogram (10.08.20 @ 07:31)  ------------------------------------------------------------------------  OBSERVATIONS:  Mitral Valve: Normal mitral valve. Trace mitral  regurgitation.  Aortic Root: Normal aortic root.  Aortic Valve: Normal trileaflet aortic valve. No aortic  stenosis. No aortic valve regurgitation seen.  Left Atrium: Normal left atrium.  LA volume index = 25  cc/m2.  Left Ventricle: Moderate segmental left ventricular  systolic dysfunction (EF 44% by biplane). The apex, apical  anterior wall, and the apical septum are hypokinetic. No  obvious evidence of apical LV thrombus, consider use of  endocardial contrast agent (Definity) to improve  visualization if clinically indicated.  Normal left  ventricular internal dimensions and wall thicknesses. A  false tendon is noted. This is a normal variant.  Normal  diastolic function.  Right Heart: Normal right atrium. Normal right ventricular  size and systolic function (TAPSE 1.7 cm). Normal tricuspid  valve. Trace tricuspid regurgitation. Normal pulmonic  valve. Trace pulmonic insufficiency is noted.  Pericardium/PleuraNo pericardial effusion.  Hemodynamic: Insufficient tricuspid regurgitation jet to  allow calculation of RVSP.

## 2020-10-09 NOTE — PROGRESS NOTE ADULT - REASON FOR ADMISSION
Dyspnea on exertion , bilateral lower extremity edema

## 2020-10-09 NOTE — PROGRESS NOTE ADULT - SUBJECTIVE AND OBJECTIVE BOX
INTERVAL HPI/OVERNIGHT EVENTS:  Patient seen at [facility],awake,doing better,less sob  VITAL SIGNS:  T(F): 98.1 (10-09-20 @ 08:00)  HR: 70 (10-09-20 @ 08:00)  BP: 121/79 (10-09-20 @ 08:00)  RR: 18 (10-09-20 @ 08:00)  SpO2: 98% (10-09-20 @ 08:00)  Wt(kg): --    PHYSICAL EXAM:  awake,no dystress  Constitutional:  Eyes:  ENMT:perrla  Neck:  Respiratory:clear  Cardiovascular:s1 s2,m-none  Gastrointestinal:soft,bs pos  Extremities:  Vascular:  Neurological:no focal deficit  Musculoskeletal:    MEDICATIONS  (STANDING):  aspirin enteric coated 81 milliGRAM(s) Oral daily  atorvastatin 80 milliGRAM(s) Oral at bedtime  buPROPion XL . 150 milliGRAM(s) Oral daily  carbidopa/levodopa  25/100 1 Tablet(s) Oral three times a day  cholecalciferol 1000 Unit(s) Oral daily  clopidogrel Tablet 75 milliGRAM(s) Oral daily  enoxaparin Injectable 40 milliGRAM(s) SubCutaneous daily  entacapone 200 milliGRAM(s) Oral three times a day  furosemide    Tablet 20 milliGRAM(s) Oral daily  lisinopril 2.5 milliGRAM(s) Oral daily  metoprolol tartrate 25 milliGRAM(s) Oral two times a day  pantoprazole    Tablet 40 milliGRAM(s) Oral before breakfast  polyethylene glycol 3350 17 Gram(s) Oral daily  spironolactone 25 milliGRAM(s) Oral daily  tamsulosin 0.4 milliGRAM(s) Oral at bedtime    MEDICATIONS  (PRN):      Allergies    No Known Drug Allergies  Seafood (Blisters; Swelling; Pruritus; Hives)    Intolerances        LABS:                        12.9   5.85  )-----------( 269      ( 09 Oct 2020 07:30 )             39.7     10-09    138  |  104  |  17  ----------------------------<  104<H>  3.8   |  29  |  1.25    Ca    9.4      09 Oct 2020 07:30  Phos  3.2     10-08  Mg     2.2     10-08      Assessment and Plan:    Assessment:  · Assessment	  49yo M from home lives with sister ambulates with a cane PMH  CAD (s/p CABG x2 02/2018), CHFrEF(EF35-40%),  HTN, BPH s/p TURP, HTN, HLD, Parkinsonism and recent c-spine fusion presented with CHATTERJEE and b/l LE edema X10 days.  Admitted fir CHF exacerbation , cardiac work up.      Problem/Plan - 1:  ·  Problem: CHF (congestive heart failure).-improved clinically.   cont lasix    Patient requested and received Elmore    aspirin , statin , Ezetimibe, betablocker started   -echo- results noticed  - d/c tele  - f/up card with recomendat appret   -PT evaluation for d/c planning     Problem/Plan - 2:  ·  Problem: CAD (coronary artery disease).  Plan: s/p CABG 2018, on aspirin , Plavix , statin, ezetimibe    wuill resume figueroa emeds    check A1c and lipid panel.      Problem/Plan - 3:  ·  Problem: Parkinsons disease.  Plan: patient on carbi-levodopa  25/100 TId    will resume and monitor sx   bowel regimen started for occasional constipation.      Problem/Plan - 4:  ·  Problem: Chronic neck pain.  Plan: Patient s/p c1-c2 infusion in July 2020,   c/w nocturnal collar    pain management as needed.      Problem/Plan - 5:  ·  Problem: Hypertension.  Plan: on metoprolol 25mg BID at home    will resume with parameters    monitor BP.      Problem/Plan - 6:  Problem: BPH (benign prostatic hyperplasia). Plan: s/p TURP, on Flomax 0.4 qd   pt requested and recived Elmore in ED   will resume home meds   strict I&O   TOV before discharge.     Problem/Plan - 7:  ·  Problem: Prophylactic measure.  Plan: patient on aspirin and Plavix    will start Lovenox 40 mg sq qd.

## 2020-10-09 NOTE — DISCHARGE NOTE PROVIDER - CARE PROVIDER_API CALL
Erin Jaime  GERIATRIC MEDICINE  [address] , [address]  [place] [address]  Phone: [phone]  Fax: [phone]  Follow Up Time:

## 2020-10-09 NOTE — DISCHARGE NOTE NURSING/CASE MANAGEMENT/SOCIAL WORK - PATIENT PORTAL LINK FT
You can access the FollowMyHealth Patient Portal offered by [facility] by registering at the following website: [email or web address]. By joining [employer]’s FollowMyHealth portal, you will also be able to view your health information using other applications (apps) compatible with our system.

## 2020-10-09 NOTE — DISCHARGE NOTE PROVIDER - NSDCCPCAREPLAN_GEN_ALL_CORE_FT
PRINCIPAL DISCHARGE DIAGNOSIS  Diagnosis: SOB (shortness of breath) on exertion  Assessment and Plan of Treatment:        PRINCIPAL DISCHARGE DIAGNOSIS  Diagnosis: CHF exacerbation  Assessment and Plan of Treatment: Weigh yourself daily.  If you gain 3lbs in 3 days, or 5lbs in a week call your Health Care Provider.  Do not eat or drink foods containing more than 2000mg of salt (sodium) in your diet every day.  Call your Health Care Provider if you have any swelling or increased swelling in your feet, ankles, and/or stomach.  Take all of your medication as directed.  If you become dizzy call your Health Care Provider.        SECONDARY DISCHARGE DIAGNOSES  Diagnosis: Coronary artery disease  Assessment and Plan of Treatment:     Diagnosis: Parkinsons disease  Assessment and Plan of Treatment:     Diagnosis: Hypertension  Assessment and Plan of Treatment:     Diagnosis: BPH (benign prostatic hyperplasia)  Assessment and Plan of Treatment:      PRINCIPAL DISCHARGE DIAGNOSIS  Diagnosis: CHF exacerbation  Assessment and Plan of Treatment: You came to the hospital with shortness of breath and swelling of both the legs. You chest xray showed no pleural effusion, your ct chest with contrast showed no pulmonary edema, echo cardiogram has been stable from the previous echo. ekg showed normal sinus rhythm. We monitored you on telemetry.  You were treated with IV lasix and transitioned to oral lasix tablets which you must continue at home.     Weigh yourself daily. If you gain 3lbs in 3 days, or 5lbs in a week call your Health Care Provider. Do not eat or drink foods containing more than 2000mg of salt (sodium) in your diet every day. We added lisinopril and aldactone to your medications.  Call your Health Care Provider if you have any swelling or increased swelling in your feet, ankles, and/or stomach.  Take all of your medication as directed.  If you become dizzy call your Health Care Provider.        SECONDARY DISCHARGE DIAGNOSES  Diagnosis: Coronary artery disease  Assessment and Plan of Treatment: Sameer have history of Coronary artery disease.echo cardiogram has been stable from the previous echo. ekg showed normal sinus rhythm. We added lisinopril and aldactone to your medications.    Diagnosis: Parkinsons disease  Assessment and Plan of Treatment:     Diagnosis: Hypertension  Assessment and Plan of Treatment:     Diagnosis: BPH (benign prostatic hyperplasia)  Assessment and Plan of Treatment:      PRINCIPAL DISCHARGE DIAGNOSIS  Diagnosis: CHF exacerbation  Assessment and Plan of Treatment: You came to the hospital with shortness of breath and swelling of both the legs. You chest xray showed no pleural effusion, your ct chest with contrast showed no pulmonary edema, echo cardiogram has been stable from the previous echo. ekg showed normal sinus rhythm. We monitored you on telemetry.  You were treated with IV lasix and transitioned to oral lasix tablets which you must continue at home.     Weigh yourself daily. If you gain 3lbs in 3 days, or 5lbs in a week call your Health Care Provider. Do not eat or drink foods containing more than 2000mg of salt (sodium) in your diet every day. We added lisinopril and aldactone to your medications.  Call your Health Care Provider if you have any swelling or increased swelling in your feet, ankles, and/or stomach.  Take all of your medication as directed.  If you become dizzy call your Health Care Provider.        SECONDARY DISCHARGE DIAGNOSES  Diagnosis: Coronary artery disease  Assessment and Plan of Treatment: Sameer have history of Coronary artery disease.echo cardiogram has been stable from the previous echo. ekg showed normal sinus rhythm. We added metoprolol, lisinopril and aldactone to your medications.    Diagnosis: Parkinsons disease  Assessment and Plan of Treatment: You have history of Parkinson's disease. Continue with your home medication of sinemet 25/100.    Diagnosis: Hypertension  Assessment and Plan of Treatment: You have a history of Hypertension. Your Blood Pressure was adequately controlled with metoprolol,  lisnopril and hydralazine.   - You should continue on the current antihypertensive regimen regularly.  - You blood pressure should be within 140-120/80-90.  - You should follow-up with your PCP within 1 week of your discharge for routine blood pressure monitoring at your next visit.    Diagnosis: BPH (benign prostatic hyperplasia)  Assessment and Plan of Treatment: You have history of BPH. Continue with your home meds of Flomax.     PRINCIPAL DISCHARGE DIAGNOSIS  Diagnosis: CHF exacerbation  Assessment and Plan of Treatment: You came to the hospital with shortness of breath and swelling of both the legs. You chest xray showed no pleural effusion, your ct chest with contrast showed no pulmonary edema, echo cardiogram has been stable from the previous echo with EF of 44%. Ekg showed normal sinus rhythm. We monitored you on telemetry.  You were treated with IV lasix and transitioned to oral lasix tablets which you must continue at home.    We changed your Lasix to 20mg every day. We added spironolactone for further diuresis.   Weigh yourself daily. If you gain 3lbs in 3 days, or 5lbs in a week call your Health Care Provider. Do not eat or drink foods containing more than 2000mg of salt (sodium) in your diet every day. We added lisinopril and aldactone to your medications.  Call your Health Care Provider if you have any swelling or increased swelling in your feet, ankles, and/or stomach.  Take all of your medication as directed.  If you become dizzy call your Health Care Provider.        SECONDARY DISCHARGE DIAGNOSES  Diagnosis: BPH (benign prostatic hyperplasia)  Assessment and Plan of Treatment: You have history of BPH. Continue with your home meds of Flomax.    Diagnosis: Hypertension  Assessment and Plan of Treatment: You have a history of Hypertension. Your Blood Pressure was adequately controlled with metoprolol,  lisnopril and hydralazine.   - You should continue on the current antihypertensive regimen regularly.  - You blood pressure should be within 140-120/80-90.  - You should follow-up with your PCP within 1 week of your discharge for routine blood pressure monitoring at your next visit.    Diagnosis: Parkinsons disease  Assessment and Plan of Treatment: You have history of Parkinson's disease. Continue with your home medication of sinemet 25/100.    Diagnosis: Coronary artery disease  Assessment and Plan of Treatment: You have history of Coronary artery disease. Echocardiogram has been stable from the previous echo. ekg showed normal sinus rhythm. We added metoprolol, lisinopril and aldactone to your medications.

## 2020-10-09 NOTE — PROGRESS NOTE ADULT - ASSESSMENT
49 yo M from home lives with sister ambulates with a cane PMH  CAD (s/p CABG x2 02/2018), CHFrEF(EF35-40%),  HTN, BPH s/p TURP, HTN, HLD, Parkinsonism and recent c-spine fusion presented with CHATTERJEE and b/l LE edema X10 days,acute on chronic systolic HF.,  1.D/C Tele monitoring.  2.Accute on chronic systolic HF-change to po lasix,add aldactone and low dose ACE.  3.Stress test as outpatient.  4.CAD-asa,b blocker,statin.  5.BPH-flomax.  6.GI and DVT prophylaxis.
51 yo M from home lives with sister ambulates with a cane PMH  CAD (s/p CABG x2 02/2018), CHFrEF(EF35-40%),  HTN, BPH s/p TURP, HTN, HLD, Parkinsonism and recent c-spine fusion presented with CHATTERJEE and b/l LE edema X10 days,  1.Tele monitoring.  2.Echocardiogram.  3.Cont lasix 20mg iv qd.  4.CAD-asa,b blocker,statin.  5.BPH-flomax.  6.GI and DVT prophylaxis.
49yo M from home lives with sister ambulates with a cane PMH  CAD (s/p CABG x2 02/2018), CHFrEF(EF35-40%),  HTN, BPH s/p TURP, HTN, HLD, Parkinsonism and recent c-spine fusion presented with CHATTEREJE and b/l LE edema X10 days.  Admitted fir CHF exacerbation , cardiac work up.

## 2020-10-09 NOTE — DISCHARGE NOTE PROVIDER - HOSPITAL COURSE
51yo M from home lives with sister ambulates with a cane PMH  CAD (s/p CABG x2 02/2018), CHFrEF(EF35-40%),  HTN, BPH s/p TURP, HTN, HLD, Parkinsonism and recent c-spine fusion presented with CHATTERJEE and b/l LE edema X10 days.   Patient reports limited ambulation due to worsening SOB associated with b/l LE  Edema which gradually worsened over past two weeks and now up to mid-thigh. Patient denies chest pain, however reports wage left arm pain , associated with occasional cold sweats and PND. Patient denies chronic edema s/p CABG. however endorses chronic CHATTERJEE which aggravated over past 2 weeks. Denies recent Echo or stress test has been done.  Pt previously ambulated around one-bedroom appt w/ cane w/o dyspnea, but now becomes winded after a few steps. Parkinson medication dose increased 6wks ago, but no other recent changes in medications. Denies nausea, vomiting, diarrhea, abdominal pain, chest pain, palpitations, dizziness, headache, cough, wheezing, joint pain or swelling, fever, chills. For the CHF workup, EKG showed NSR, Trops were negative, Echo showed Ef of 44%. c/w lasix 20mg PO daily. PT recommends home PT. Stress test to be done o/p. Cardiology Dr. Ayala.  Patient is stable for discharge per attending and is advised to follow up with PCP as outpatient  Please refer to patient's complete medical chart with documents for a full hospital course, for this is only a brief summary.

## 2020-10-21 PROCEDURE — 85027 COMPLETE CBC AUTOMATED: CPT

## 2020-10-21 PROCEDURE — 93970 EXTREMITY STUDY: CPT

## 2020-10-21 PROCEDURE — 71046 X-RAY EXAM CHEST 2 VIEWS: CPT

## 2020-10-21 PROCEDURE — 97162 PT EVAL MOD COMPLEX 30 MIN: CPT

## 2020-10-21 PROCEDURE — 85730 THROMBOPLASTIN TIME PARTIAL: CPT

## 2020-10-21 PROCEDURE — 96374 THER/PROPH/DIAG INJ IV PUSH: CPT

## 2020-10-21 PROCEDURE — 80053 COMPREHEN METABOLIC PANEL: CPT

## 2020-10-21 PROCEDURE — 85610 PROTHROMBIN TIME: CPT

## 2020-10-21 PROCEDURE — 83880 ASSAY OF NATRIURETIC PEPTIDE: CPT

## 2020-10-21 PROCEDURE — 87635 SARS-COV-2 COVID-19 AMP PRB: CPT

## 2020-10-21 PROCEDURE — 84100 ASSAY OF PHOSPHORUS: CPT

## 2020-10-21 PROCEDURE — 99285 EMERGENCY DEPT VISIT HI MDM: CPT | Mod: 25

## 2020-10-21 PROCEDURE — 82306 VITAMIN D 25 HYDROXY: CPT

## 2020-10-21 PROCEDURE — 84443 ASSAY THYROID STIM HORMONE: CPT

## 2020-10-21 PROCEDURE — 36415 COLL VENOUS BLD VENIPUNCTURE: CPT

## 2020-10-21 PROCEDURE — 84484 ASSAY OF TROPONIN QUANT: CPT

## 2020-10-21 PROCEDURE — 83036 HEMOGLOBIN GLYCOSYLATED A1C: CPT

## 2020-10-21 PROCEDURE — 93306 TTE W/DOPPLER COMPLETE: CPT

## 2020-10-21 PROCEDURE — 86769 SARS-COV-2 COVID-19 ANTIBODY: CPT

## 2020-10-21 PROCEDURE — 80061 LIPID PANEL: CPT

## 2020-10-21 PROCEDURE — 93005 ELECTROCARDIOGRAM TRACING: CPT

## 2020-10-21 PROCEDURE — 85025 COMPLETE CBC W/AUTO DIFF WBC: CPT

## 2020-10-21 PROCEDURE — 71275 CT ANGIOGRAPHY CHEST: CPT

## 2020-10-21 PROCEDURE — 83735 ASSAY OF MAGNESIUM: CPT

## 2020-10-21 PROCEDURE — 80048 BASIC METABOLIC PNL TOTAL CA: CPT

## 2020-10-21 PROCEDURE — 85379 FIBRIN DEGRADATION QUANT: CPT

## 2020-12-15 NOTE — BRIEF OPERATIVE NOTE - CONDITION POST OP
[facility]  Obstetrics  Discharge Summary      Patient Name: Miguel Angel Moreno  MRN: [id number]  Admission Date: 2020  Hospital Length of Stay: 2 days  Discharge Date and Time:  12/15/2020 7:49 AM  Attending Physician: David Lora MD   Discharging Provider: David Lora MD  Primary Care Provider: Demarco Escobar MD (Inactive)    HPI: 26 yo now  female admitted at 39.4 wks with SROM. Patient was unreferred. Desired TOLAC.    Procedure(s) (LRB):   SECTION (N/A)   Bilateral Tubal ligation    Hospital Course: The patient's labor course was complicated by fetal intolerance to labor. She is now s/p rLTCS with BTL as she desired permanent sterilization as well. Her postpartum course was uncomplicated. On day of discharge, she was tolerating PO. Her pain was well controlled. She was voiding spontaneously, passing flatus, ambulating. She was deemed stable for discharge to home.    Final Active Diagnoses:    Diagnosis Date Noted POA    PRINCIPAL PROBLEM:   delivery delivered [O82] 2020 No    Leakage of amniotic fluid [O42.90] 2020 Yes      Problems Resolved During this Admission:      Lab Results   Component Value Date    WBC 14.58 (H) 2020    HGB 7.5 (L) 2020    HCT 23.9 (L) 2020    MCV 79 (L) 2020     2020       O POS    Feeding Method: bottle    Immunizations     Date Immunization Status Dose Route/Site Given by    20 174 Influenza - Quadrivalent - PF *Preferred* (6 months and older) Deleted 0.5 mL Intramuscular/     20 1743 Influenza - Quadrivalent - PF *Preferred* (6 months and older) Given 0.5 mL Intramuscular/Right deltoid Anita Tidwell RN          Delivery:    Episiotomy:     Lacerations:     Repair suture:     Repair # of packets:     Blood loss (ml):       Birth information:  YOB: 2020   Time of birth: 10:08 PM   Sex: female   Delivery type: , Low Transverse    Gestational Age: 39w4d    Delivery Clinician:      Other providers:       Additional  information:  Forceps:    Vacuum:    Breech:    Observed anomalies      Living?:           APGARS  One minute Five minutes Ten minutes   Skin color:         Heart rate:         Grimace:         Muscle tone:         Breathing:         Totals: 9  9        Placenta: Delivered:       appearance    Pending Diagnostic Studies:     None          Discharged Condition: good    Disposition: Home or Self Care    Follow Up:  Follow-up Information     David Lora MD On 12/22/2020.    Specialties: Obstetrics, Obstetrics and Gynecology  Why: 230pm for incision check  Contact information:  [address]  [address]  [place] [address]  [phone]                 Patient Instructions:   No discharge procedures on file.  Medications:  Current Discharge Medication List      CONTINUE these medications which have NOT CHANGED    Details   ferrous sulfate (FEOSOL) 325 mg (65 mg iron) Tab tablet Take 1 tablet (325 mg total) by mouth once daily.  Qty: 30 tablet, Refills: 0      ibuprofen (ADVIL,MOTRIN) 800 MG tablet Take 1 tablet (800 mg total) by mouth every 8 (eight) hours as needed.  Qty: 30 tablet, Refills: 0    Associated Diagnoses: Postoperative pain      oxyCODONE (ROXICODONE) 5 MG immediate release tablet Take 1 tablet (5 mg total) by mouth every 6 (six) hours as needed.  Qty: 30 tablet, Refills: 0    Comments: Quantity prescribed more than 7 day supply? Yes, quantity medically necessary      oxyCODONE-acetaminophen (PERCOCET) 5-325 mg per tablet Take 1 tablet by mouth every 6 (six) hours as needed.  Qty: 30 tablet, Refills: 0    Comments: Quantity prescribed more than 7 day supply? No  Associated Diagnoses: Postoperative pain      prenatal vit-iron fum-folic ac 28 mg iron- 800 mcg Tab Take 1 tablet by mouth once daily.  Qty: 30 tablet, Refills: 9    Associated Diagnoses: Supervision of other high risk pregnancy, antepartum,  unspecified trimester         STOP taking these medications       senna-docusate 8.6-50 mg (PERICOLACE) 8.6-50 mg per tablet Comments:   Reason for Stopping:               David Lora MD  Obstetrics  [facility]   [employer]

## 2021-02-04 NOTE — ED PROVIDER NOTE - NS ED SCRIBE STATEMENT
Render Risk Assessment In Note?: no Note Text (......Xxx Chief Complaint.): This diagnosis correlates with the Detail Level: Zone Other (Free Text): OTC Differin Attending

## 2021-03-03 NOTE — ED ADULT NURSE NOTE - NS ED NURSE LEVEL OF CONSCIOUSNESS ORIENTATION
Follow up with your primary care physician for further monitoring in 1-2 weeks. Please call to arrange appointment. If you do not have a PCP you can establish care at the address above.    Follow up with cardiology within 1-2 weeks of discharge.  Follow up with ENT to discuss the results of your thyroid biopsy.    
Oriented - self; Oriented - place; Oriented - time

## 2021-03-15 NOTE — ED PROVIDER NOTE - PSH
Called patient and there was no answer. Left message on answering machine for patient to call back.     No significant past surgical history

## 2021-03-17 NOTE — PATIENT PROFILE ADULT. - NS PRO CONTRA FLU 1
From: Lourdes Coker  To: Elayne Joseph  Sent: 3/16/2021 9:27 PM CDT  Subject: Non-Urgent Medical Question    Hi- I found that I can make an appointment for the COVID vaccine since I fall under the immunocompromised category. Would it be possible to get me a note from the doctor stating that I have psoriasis which is an autoimmune disease & that the medication I take lowers my immune response? yes

## 2021-04-28 NOTE — ED PROVIDER NOTE - NS_EDPROVIDERDISPOUSERTYPE_ED_A_ED
Calm/Appropriate Scribe Attestation (For Scribes USE Only)... Scribe Attestation (For Scribes USE Only).../Attending Attestation (For Attendings USE Only)...

## 2021-05-08 NOTE — PATIENT PROFILE ADULT - NSTOBACCONEVERSMOKERY/N_GEN_A
Implemented All Universal Safety Interventions:  [place] to call system. Call bell, personal items and telephone within reach. Instruct patient to call for assistance. Room bathroom lighting operational. Non-slip footwear when patient is off stretcher. Physically safe environment: no spills, clutter or unnecessary equipment. Stretcher in lowest position, wheels locked, appropriate side rails in place.
Yes

## 2021-07-29 NOTE — PHYSICAL THERAPY INITIAL EVALUATION ADULT - REFERRING PHYSICIAN, REHAB EVAL
Please let patient know that I received a message from Dr. Vern Matthews that the mammogram looks good. Also report is benign. This is good news. Wero Moreno

## 2021-08-03 NOTE — H&P PST ADULT - NSCAFFEAMTFREQ_GEN_ALL_CORE_SD
Meningococcal ACWY Vaccine: What You Need to Know  Why get vaccinated? Meningococcal ACWY vaccine can help protect against meningococcal disease caused by serogroups A, C, W, and Y. A different meningococcal vaccine is available that can help protect against serogroup B. Meningococcal disease can cause meningitis (infection of the lining of the brain and spinal cord) and infections of the blood. Even when it is treated, meningococcal disease kills 10 to 15 infected people out of 100. And of those who survive, about 10 to 20 out of every 100 will suffer disabilities such as hearing loss, brain damage, kidney damage, loss of limbs, nervous system problems, or severe scars from skin grafts.   Anyone can get meningococcal disease but certain people are at increased risk, including:  · Infants younger than one year old  · Adolescents and young adults 12 through 21years old  · People with certain medical conditions that affect the immune system  · Microbiologists who routinely work with isolates of N. meningitidis, the bacteria that cause meningococcal disease  · People at risk because of an outbreak in their community  Meningococcal ACWY vaccine  Adolescents need 2 doses of a meningococcal ACWY vaccine:  · First dose: 6 or 12 year of age  · Second (booster) dose: 12years of age  In addition to routine vaccination for adolescents, meningococcal ACWY vaccine is also recommended for certain groups of people:  · People at risk because of a serogroup A, C, W, or Y meningococcal disease outbreak  · People with HIV  · Anyone whose spleen is damaged or has been removed, including people with sickle cell disease  · Anyone with a rare immune system condition called \"persistent complement component deficiency\"  · Anyone taking a type of drug called a complement inhibitor, such as eculizumab (also called Soliris®) or ravulizumab (also called Ultomiris®)  · Microbiologists who routinely work with isolates of N. meningitidis  · Anyone traveling to, or living in, a part of the world where meningococcal disease is common, such as parts of [place]  · [employer] freshmen living in residence halls  · [address] recruits  Talk with your health care provider  Tell your vaccine provider if the person getting the vaccine:  · Has had an allergic reaction after a previous dose of meningococcal ACWY vaccine, or has any severe, life-threatening allergies. In some cases, your health care provider may decide to postpone meningococcal ACWY vaccination to a future visit. Not much is known about the risks of this vaccine for a pregnant woman or breastfeeding mother. However, pregnancy or breastfeeding are not reasons to avoid meningococcal ACWY vaccination. A pregnant or breastfeeding woman should be vaccinated if otherwise indicated. People with minor illnesses, such as a cold, may be vaccinated. People who are moderately or severely ill should usually wait until they recover before getting meningococcal ACWY vaccine. Your health care provider can give you more information. Risks of a vaccine reaction  · Redness or soreness where the shot is given can happen after meningococcal ACWY vaccine. · A small percentage of people who receive meningococcal ACWY vaccine experience muscle or joint pains. People sometimes faint after medical procedures, including vaccination. Tell your provider if you feel dizzy or have vision changes or ringing in the ears. As with any medicine, there is a very remote chance of a vaccine causing a severe allergic reaction, other serious injury, or death. What if there is a serious problem? An allergic reaction could occur after the vaccinated person leaves the clinic.  If you see signs of a severe allergic reaction (hives, swelling of the face and throat, difficulty breathing, a fast heartbeat, dizziness, or weakness), call 9-1-1 and get the person to the nearest hospital.  For other signs that concern you, call your health care provider. Adverse reactions should be reported to the Vaccine Adverse Event Reporting System (VAERS). Your health care provider will usually file this report, or you can do it yourself. Visit the VAERS website at www.vaers. hhs.gov or call 1-982.396.5509. VAERS is only for reporting reactions, and VAERS staff do not give medical advice. The National Vaccine Injury Compensation Program  The National Vaccine Injury Compensation Program (VICP) is a federal program that was created to compensate people who may have been injured by certain vaccines. Visit the VICP website at www.hrsa.gov/vaccinecompensation or call 1-126.241.1965 to learn about the program and about filing a claim. There is a time limit to file a claim for compensation. How can I learn more? · Ask your health care provider. · Call your local or state health department. · Contact the Centers for Disease Control and Prevention (CDC):  ? Call 4-994.345.6353 (4-116-TYD-INFO) or  ? Visit CDC's website at www.cdc.gov/vaccines  Vaccine Information Statement (Interim)  Meningococcal ACWY Vaccines  08-  42 [address] [place] 026QC-50  Department of Health and Human Services  Centers for Disease Control and Prevention  Many Vaccine Information Statements are available in [place]ian and other languages. See www.immunize.org/vis. Hojas de información sobre vacunas están disponibles en español y en muchos otros idiomas. Visite www.immunize.org/vis. Care instructions adapted under license by [employer] (which disclaims liability or warranty for this information). If you have questions about a medical condition or this instruction, always ask your healthcare professional. [place] [address] any warranty or liability for your use of this information. Well Care - Tips for Teens: Care Instructions  Your Care Instructions     Being a teen can be exciting and tough. You are finding your place in the world.  And you may have a lot on your mind these days too--school, friends, sports, parents, and maybe even how you look. Some teens begin to feel the effects of stress, such as headaches, neck or back pain, or an upset stomach. To feel your best, it is important to start good health habits now. Follow-up care is a key part of your treatment and safety. Be sure to make and go to all appointments, and call your doctor if you are having problems. It's also a good idea to know your test results and keep a list of the medicines you take. How can you care for yourself at home? Staying healthy can help you cope with stress or depression. Here are some tips to keep you healthy. · Get at least 30 minutes of exercise on most days of the week. Walking is a good choice. You also may want to do other activities, such as running, swimming, cycling, or playing tennis or team sports. · Try cutting back on time spent on TV or video games each day. · Munch at least 5 helpings of fruits and veggies. A helping is a piece of fruit or ½ cup of vegetables. · Cut back to 1 can or small cup of soda or juice drink a day. Try water and milk instead. · Cheese, yogurt, milk--have at least 3 cups a day to get the calcium you need. · The decision to have sex is a serious one that only you can make. Not having sex is the best way to prevent HIV, STIs (sexually transmitted infections), and pregnancy. · If you do choose to have sex, condoms and birth control can increase your chances of protection against STIs and pregnancy. · Talk to an adult you feel comfortable with. Confide in this person and ask for his or her advice. This can be a parent, a teacher, a , or someone else you trust.  Healthy ways to deal with stress   · Get 9 to 10 hours of sleep every night. · Eat healthy meals. · Go for a long walk. · Dance. Shoot hoops. Go for a bike ride. Get some exercise.   · Talk with someone you trust.  · Laugh, cry, sing, or write in a journal.  When should you call for help? Call 911 anytime you think you may need emergency care. For example, call if:    · You feel life is meaningless or think about killing yourself. Talk to a counselor or doctor if any of the following problems lasts for 2 or more weeks.    · You feel sad a lot or cry all the time.     · You have trouble sleeping or sleep too much.     · You find it hard to concentrate, make decisions, or remember things.     · You change how you normally eat.     · You feel guilty for no reason. Where can you learn more? Go to http://www.gray.com/  Enter C968 in the search box to learn more about \"Well Care - Tips for Teens: Care Instructions. \"  Current as of: May 27, 2020               Content Version: 12.8  © 2006-2021 Healthwise, Incorporated. Care instructions adapted under license by [employer] (which disclaims liability or warranty for this information). If you have questions about a medical condition or this instruction, always ask your healthcare professional. [address] any warranty or liability for your use of this information. 1-2 cups/cans per day

## 2021-08-18 NOTE — ED PROVIDER NOTE - MUSCULOSKELETAL [+], MLM
PAST MEDICAL HISTORY:  Cigarette smoker current    COPD (chronic obstructive pulmonary disease)     Costal chondritis     Emphysema lung     Gastritis     Herniated disc, cervical lumbar    Hyperparathyroidism     MVP (mitral valve prolapse)     Osteoporosis     Prediabetes     Psoriatic arthritis     Tachycardia     Ulcerative colitis     Varicose veins     Vertebral fracture, closed Lumbar x 4    
pedal edema

## 2021-09-11 NOTE — DISCHARGE NOTE ADULT - NS AS DC STROKE DX YN
PRINCIPAL DISCHARGE DIAGNOSIS  Diagnosis: Preeclampsia in postpartum period  Assessment and Plan of Treatment:       SECONDARY DISCHARGE DIAGNOSES  Diagnosis: Spinal puncture headache  Assessment and Plan of Treatment:      no

## 2021-12-09 NOTE — ED ADULT NURSE NOTE - NSFALLRSKPASTHIST_ED_ALL_ED
no
This is a 58 y/o male with Left knee pain who is scheduled for Left knee arthroscopy     Patient instructed on     1. To follow instructions as per ASU for NPO status   2. On the use of EZ sponges  3 Aware that he needs medical clearance (to be done by Dr. Walker 12/10/21)  4. Instructed to call [phone] to confirm COVID 19 appointment which is scheduled for 12/11/2021

## 2022-07-16 ENCOUNTER — INPATIENT (INPATIENT)
Facility: HOSPITAL | Age: 52
LOS: 3 days | Discharge: HOME | DRG: 872 | End: 2022-07-20
Attending: GENERAL PRACTICE | Admitting: GENERAL PRACTICE
Payer: MEDICARE

## 2022-07-16 VITALS
DIASTOLIC BLOOD PRESSURE: 77 MMHG | TEMPERATURE: 101 F | SYSTOLIC BLOOD PRESSURE: 116 MMHG | HEIGHT: 66 IN | OXYGEN SATURATION: 95 % | RESPIRATION RATE: 18 BRPM | HEART RATE: 109 BPM

## 2022-07-16 DIAGNOSIS — Z29.9 ENCOUNTER FOR PROPHYLACTIC MEASURES, UNSPECIFIED: ICD-10-CM

## 2022-07-16 DIAGNOSIS — Z98.1 ARTHRODESIS STATUS: Chronic | ICD-10-CM

## 2022-07-16 DIAGNOSIS — N39.0 URINARY TRACT INFECTION, SITE NOT SPECIFIED: ICD-10-CM

## 2022-07-16 DIAGNOSIS — Z90.79 ACQUIRED ABSENCE OF OTHER GENITAL ORGAN(S): Chronic | ICD-10-CM

## 2022-07-16 DIAGNOSIS — Z98.890 OTHER SPECIFIED POSTPROCEDURAL STATES: Chronic | ICD-10-CM

## 2022-07-16 DIAGNOSIS — I50.9 HEART FAILURE, UNSPECIFIED: ICD-10-CM

## 2022-07-16 DIAGNOSIS — N12 TUBULO-INTERSTITIAL NEPHRITIS, NOT SPECIFIED AS ACUTE OR CHRONIC: ICD-10-CM

## 2022-07-16 DIAGNOSIS — A41.9 SEPSIS, UNSPECIFIED ORGANISM: ICD-10-CM

## 2022-07-16 DIAGNOSIS — G20 PARKINSON'S DISEASE: ICD-10-CM

## 2022-07-16 DIAGNOSIS — Z95.1 PRESENCE OF AORTOCORONARY BYPASS GRAFT: Chronic | ICD-10-CM

## 2022-07-16 DIAGNOSIS — I25.10 ATHEROSCLEROTIC HEART DISEASE OF NATIVE CORONARY ARTERY WITHOUT ANGINA PECTORIS: ICD-10-CM

## 2022-07-16 DIAGNOSIS — D64.9 ANEMIA, UNSPECIFIED: ICD-10-CM

## 2022-07-16 DIAGNOSIS — N40.0 BENIGN PROSTATIC HYPERPLASIA WITHOUT LOWER URINARY TRACT SYMPTOMS: ICD-10-CM

## 2022-07-16 DIAGNOSIS — I10 ESSENTIAL (PRIMARY) HYPERTENSION: ICD-10-CM
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
## 2022-07-16 LAB
ALBUMIN SERPL ELPH-MCNC: 2.3 G/DL — LOW (ref 3.5–5)
ALP SERPL-CCNC: 70 U/L — SIGNIFICANT CHANGE UP (ref 40–120)
ALT FLD-CCNC: 65 U/L DA — HIGH (ref 10–60)
ANION GAP SERPL CALC-SCNC: 6 MMOL/L — SIGNIFICANT CHANGE UP (ref 5–17)
APPEARANCE UR: CLEAR — SIGNIFICANT CHANGE UP
AST SERPL-CCNC: 19 U/L — SIGNIFICANT CHANGE UP (ref 10–40)
BACTERIA # UR AUTO: ABNORMAL /HPF
BASOPHILS # BLD AUTO: 0.03 K/UL — SIGNIFICANT CHANGE UP (ref 0–0.2)
BASOPHILS NFR BLD AUTO: 0.2 % — SIGNIFICANT CHANGE UP (ref 0–2)
BILIRUB SERPL-MCNC: 0.7 MG/DL — SIGNIFICANT CHANGE UP (ref 0.2–1.2)
BILIRUB UR-MCNC: NEGATIVE — SIGNIFICANT CHANGE UP
BUN SERPL-MCNC: 18 MG/DL — SIGNIFICANT CHANGE UP (ref 7–18)
CALCIUM SERPL-MCNC: 9 MG/DL — SIGNIFICANT CHANGE UP (ref 8.4–10.5)
CHLORIDE SERPL-SCNC: 106 MMOL/L — SIGNIFICANT CHANGE UP (ref 96–108)
CO2 SERPL-SCNC: 23 MMOL/L — SIGNIFICANT CHANGE UP (ref 22–31)
COLOR SPEC: YELLOW — SIGNIFICANT CHANGE UP
CREAT SERPL-MCNC: 1.07 MG/DL — SIGNIFICANT CHANGE UP (ref 0.5–1.3)
DIFF PNL FLD: ABNORMAL
EGFR: 84 ML/MIN/1.73M2 — SIGNIFICANT CHANGE UP
EOSINOPHIL # BLD AUTO: 0 K/UL — SIGNIFICANT CHANGE UP (ref 0–0.5)
EOSINOPHIL NFR BLD AUTO: 0 % — SIGNIFICANT CHANGE UP (ref 0–6)
EPI CELLS # UR: SIGNIFICANT CHANGE UP /HPF
GLUCOSE SERPL-MCNC: 123 MG/DL — HIGH (ref 70–99)
GLUCOSE UR QL: NEGATIVE — SIGNIFICANT CHANGE UP
HCT VFR BLD CALC: 35.8 % — LOW (ref 39–50)
HGB BLD-MCNC: 11.9 G/DL — LOW (ref 13–17)
HIV 1 & 2 AB SERPL IA.RAPID: SIGNIFICANT CHANGE UP
IMM GRANULOCYTES NFR BLD AUTO: 0.8 % — SIGNIFICANT CHANGE UP (ref 0–1.5)
KETONES UR-MCNC: ABNORMAL
LACTATE SERPL-SCNC: 1 MMOL/L — SIGNIFICANT CHANGE UP (ref 0.7–2)
LEUKOCYTE ESTERASE UR-ACNC: ABNORMAL
LYMPHOCYTES # BLD AUTO: 0.43 K/UL — LOW (ref 1–3.3)
LYMPHOCYTES # BLD AUTO: 2.3 % — LOW (ref 13–44)
MCHC RBC-ENTMCNC: 28.3 PG — SIGNIFICANT CHANGE UP (ref 27–34)
MCHC RBC-ENTMCNC: 33.2 GM/DL — SIGNIFICANT CHANGE UP (ref 32–36)
MCV RBC AUTO: 85.2 FL — SIGNIFICANT CHANGE UP (ref 80–100)
MONOCYTES # BLD AUTO: 1.57 K/UL — HIGH (ref 0–0.9)
MONOCYTES NFR BLD AUTO: 8.5 % — SIGNIFICANT CHANGE UP (ref 2–14)
NEUTROPHILS # BLD AUTO: 16.32 K/UL — HIGH (ref 1.8–7.4)
NEUTROPHILS NFR BLD AUTO: 88.2 % — HIGH (ref 43–77)
NITRITE UR-MCNC: POSITIVE
NRBC # BLD: 0 /100 WBCS — SIGNIFICANT CHANGE UP (ref 0–0)
PH UR: 6 — SIGNIFICANT CHANGE UP (ref 5–8)
PLATELET # BLD AUTO: 365 K/UL — SIGNIFICANT CHANGE UP (ref 150–400)
POTASSIUM SERPL-MCNC: 4.2 MMOL/L — SIGNIFICANT CHANGE UP (ref 3.5–5.3)
POTASSIUM SERPL-SCNC: 4.2 MMOL/L — SIGNIFICANT CHANGE UP (ref 3.5–5.3)
PROT SERPL-MCNC: 7 G/DL — SIGNIFICANT CHANGE UP (ref 6–8.3)
PROT UR-MCNC: 100
RBC # BLD: 4.2 M/UL — SIGNIFICANT CHANGE UP (ref 4.2–5.8)
RBC # FLD: 13.6 % — SIGNIFICANT CHANGE UP (ref 10.3–14.5)
RBC CASTS # UR COMP ASSIST: ABNORMAL /HPF (ref 0–2)
SARS-COV-2 RNA SPEC QL NAA+PROBE: SIGNIFICANT CHANGE UP
SODIUM SERPL-SCNC: 135 MMOL/L — SIGNIFICANT CHANGE UP (ref 135–145)
SP GR SPEC: 1.01 — SIGNIFICANT CHANGE UP (ref 1.01–1.02)
UROBILINOGEN FLD QL: NEGATIVE — SIGNIFICANT CHANGE UP
WBC # BLD: 18.5 K/UL — HIGH (ref 3.8–10.5)
WBC # FLD AUTO: 18.5 K/UL — HIGH (ref 3.8–10.5)
WBC UR QL: ABNORMAL /HPF (ref 0–5)

## 2022-07-16 PROCEDURE — 99223 1ST HOSP IP/OBS HIGH 75: CPT | Mod: GC

## 2022-07-16 PROCEDURE — 71045 X-RAY EXAM CHEST 1 VIEW: CPT | Mod: 26

## 2022-07-16 PROCEDURE — 93010 ELECTROCARDIOGRAM REPORT: CPT

## 2022-07-16 PROCEDURE — 76870 US EXAM SCROTUM: CPT | Mod: 26

## 2022-07-16 PROCEDURE — 99285 EMERGENCY DEPT VISIT HI MDM: CPT | Mod: CS

## 2022-07-16 RX ORDER — CEFTRIAXONE 500 MG/1
1000 INJECTION, POWDER, FOR SOLUTION INTRAMUSCULAR; INTRAVENOUS EVERY 24 HOURS
Refills: 0 | Status: DISCONTINUED | OUTPATIENT
Start: 2022-07-16 | End: 2022-07-19

## 2022-07-16 RX ORDER — MAGNESIUM OXIDE 400 MG ORAL TABLET 241.3 MG
400 TABLET ORAL DAILY
Refills: 0 | Status: DISCONTINUED | OUTPATIENT
Start: 2022-07-16 | End: 2022-07-20

## 2022-07-16 RX ORDER — ASPIRIN/CALCIUM CARB/MAGNESIUM 324 MG
81 TABLET ORAL DAILY
Refills: 0 | Status: DISCONTINUED | OUTPATIENT
Start: 2022-07-16 | End: 2022-07-20

## 2022-07-16 RX ORDER — ATORVASTATIN CALCIUM 80 MG/1
80 TABLET, FILM COATED ORAL AT BEDTIME
Refills: 0 | Status: DISCONTINUED | OUTPATIENT
Start: 2022-07-16 | End: 2022-07-20

## 2022-07-16 RX ORDER — PREGABALIN 225 MG/1
1000 CAPSULE ORAL DAILY
Refills: 0 | Status: DISCONTINUED | OUTPATIENT
Start: 2022-07-16 | End: 2022-07-20

## 2022-07-16 RX ORDER — PANTOPRAZOLE SODIUM 20 MG/1
40 TABLET, DELAYED RELEASE ORAL
Refills: 0 | Status: DISCONTINUED | OUTPATIENT
Start: 2022-07-16 | End: 2022-07-20

## 2022-07-16 RX ORDER — SENNA PLUS 8.6 MG/1
2 TABLET ORAL AT BEDTIME
Refills: 0 | Status: DISCONTINUED | OUTPATIENT
Start: 2022-07-16 | End: 2022-07-20

## 2022-07-16 RX ORDER — PANTOPRAZOLE SODIUM 20 MG/1
40 TABLET, DELAYED RELEASE ORAL ONCE
Refills: 0 | Status: COMPLETED | OUTPATIENT
Start: 2022-07-16 | End: 2022-07-16

## 2022-07-16 RX ORDER — CARBIDOPA, LEVODOPA, AND ENTACAPONE 50; 200; 200 MG/1; MG/1; MG/1
1 TABLET, FILM COATED ORAL ONCE
Refills: 0 | Status: COMPLETED | OUTPATIENT
Start: 2022-07-16 | End: 2022-07-16

## 2022-07-16 RX ORDER — CARBIDOPA AND LEVODOPA 25; 100 MG/1; MG/1
1 TABLET ORAL
Refills: 0 | Status: DISCONTINUED | OUTPATIENT
Start: 2022-07-16 | End: 2022-07-16

## 2022-07-16 RX ORDER — CARBIDOPA AND LEVODOPA 25; 100 MG/1; MG/1
1 TABLET ORAL
Qty: 0 | Refills: 0 | DISCHARGE

## 2022-07-16 RX ORDER — SODIUM CHLORIDE 9 MG/ML
2000 INJECTION, SOLUTION INTRAVENOUS ONCE
Refills: 0 | Status: COMPLETED | OUTPATIENT
Start: 2022-07-16 | End: 2022-07-16

## 2022-07-16 RX ORDER — CHOLECALCIFEROL (VITAMIN D3) 125 MCG
1 CAPSULE ORAL
Qty: 0 | Refills: 0 | DISCHARGE

## 2022-07-16 RX ORDER — DOCUSATE SODIUM 100 MG
1 CAPSULE ORAL
Qty: 0 | Refills: 0 | DISCHARGE

## 2022-07-16 RX ORDER — BUPROPION HYDROCHLORIDE 150 MG/1
150 TABLET, EXTENDED RELEASE ORAL ONCE
Refills: 0 | Status: COMPLETED | OUTPATIENT
Start: 2022-07-16 | End: 2022-07-16

## 2022-07-16 RX ORDER — POLYETHYLENE GLYCOL 3350 17 G/17G
17 POWDER, FOR SOLUTION ORAL DAILY
Refills: 0 | Status: DISCONTINUED | OUTPATIENT
Start: 2022-07-16 | End: 2022-07-20

## 2022-07-16 RX ORDER — METOPROLOL TARTRATE 50 MG
12.5 TABLET ORAL
Refills: 0 | Status: DISCONTINUED | OUTPATIENT
Start: 2022-07-16 | End: 2022-07-20

## 2022-07-16 RX ORDER — PANTOPRAZOLE SODIUM 20 MG/1
1 TABLET, DELAYED RELEASE ORAL
Qty: 0 | Refills: 0 | DISCHARGE

## 2022-07-16 RX ORDER — BUPROPION HYDROCHLORIDE 150 MG/1
150 TABLET, EXTENDED RELEASE ORAL DAILY
Refills: 0 | Status: DISCONTINUED | OUTPATIENT
Start: 2022-07-16 | End: 2022-07-20

## 2022-07-16 RX ORDER — TAMSULOSIN HYDROCHLORIDE 0.4 MG/1
0.4 CAPSULE ORAL AT BEDTIME
Refills: 0 | Status: DISCONTINUED | OUTPATIENT
Start: 2022-07-16 | End: 2022-07-20

## 2022-07-16 RX ORDER — METOPROLOL TARTRATE 50 MG
12.5 TABLET ORAL ONCE
Refills: 0 | Status: COMPLETED | OUTPATIENT
Start: 2022-07-16 | End: 2022-07-16

## 2022-07-16 RX ORDER — ENTACAPONE 200 MG/1
1 TABLET, FILM COATED ORAL
Qty: 0 | Refills: 0 | DISCHARGE

## 2022-07-16 RX ORDER — ACETAMINOPHEN 500 MG
650 TABLET ORAL EVERY 6 HOURS
Refills: 0 | Status: DISCONTINUED | OUTPATIENT
Start: 2022-07-16 | End: 2022-07-20

## 2022-07-16 RX ORDER — CLOPIDOGREL BISULFATE 75 MG/1
75 TABLET, FILM COATED ORAL DAILY
Refills: 0 | Status: DISCONTINUED | OUTPATIENT
Start: 2022-07-16 | End: 2022-07-20

## 2022-07-16 RX ORDER — EZETIMIBE 10 MG/1
1 TABLET ORAL
Qty: 0 | Refills: 0 | DISCHARGE

## 2022-07-16 RX ORDER — KETOROLAC TROMETHAMINE 30 MG/ML
30 SYRINGE (ML) INJECTION ONCE
Refills: 0 | Status: DISCONTINUED | OUTPATIENT
Start: 2022-07-16 | End: 2022-07-16

## 2022-07-16 RX ORDER — CHOLECALCIFEROL (VITAMIN D3) 125 MCG
1000 CAPSULE ORAL DAILY
Refills: 0 | Status: DISCONTINUED | OUTPATIENT
Start: 2022-07-16 | End: 2022-07-20

## 2022-07-16 RX ORDER — SPIRONOLACTONE 25 MG/1
25 TABLET, FILM COATED ORAL DAILY
Refills: 0 | Status: DISCONTINUED | OUTPATIENT
Start: 2022-07-16 | End: 2022-07-16

## 2022-07-16 RX ORDER — ENOXAPARIN SODIUM 100 MG/ML
40 INJECTION SUBCUTANEOUS EVERY 24 HOURS
Refills: 0 | Status: DISCONTINUED | OUTPATIENT
Start: 2022-07-16 | End: 2022-07-20

## 2022-07-16 RX ORDER — CARBIDOPA, LEVODOPA, AND ENTACAPONE 50; 200; 200 MG/1; MG/1; MG/1
1 TABLET, FILM COATED ORAL
Refills: 0 | Status: DISCONTINUED | OUTPATIENT
Start: 2022-07-16 | End: 2022-07-20

## 2022-07-16 RX ORDER — CEFTRIAXONE 500 MG/1
1000 INJECTION, POWDER, FOR SOLUTION INTRAMUSCULAR; INTRAVENOUS ONCE
Refills: 0 | Status: COMPLETED | OUTPATIENT
Start: 2022-07-16 | End: 2022-07-16

## 2022-07-16 RX ADMIN — CEFTRIAXONE 100 MILLIGRAM(S): 500 INJECTION, POWDER, FOR SOLUTION INTRAMUSCULAR; INTRAVENOUS at 06:23

## 2022-07-16 RX ADMIN — Medication 81 MILLIGRAM(S): at 13:02

## 2022-07-16 RX ADMIN — CARBIDOPA, LEVODOPA, AND ENTACAPONE 1 TABLET(S): 50; 200; 200 TABLET, FILM COATED ORAL at 16:47

## 2022-07-16 RX ADMIN — POLYETHYLENE GLYCOL 3350 17 GRAM(S): 17 POWDER, FOR SOLUTION ORAL at 13:03

## 2022-07-16 RX ADMIN — Medication 30 MILLIGRAM(S): at 06:56

## 2022-07-16 RX ADMIN — SODIUM CHLORIDE 2000 MILLILITER(S): 9 INJECTION, SOLUTION INTRAVENOUS at 07:40

## 2022-07-16 RX ADMIN — PANTOPRAZOLE SODIUM 40 MILLIGRAM(S): 20 TABLET, DELAYED RELEASE ORAL at 09:35

## 2022-07-16 RX ADMIN — Medication 12.5 MILLIGRAM(S): at 09:36

## 2022-07-16 RX ADMIN — CEFTRIAXONE 100 MILLIGRAM(S): 500 INJECTION, POWDER, FOR SOLUTION INTRAMUSCULAR; INTRAVENOUS at 13:03

## 2022-07-16 RX ADMIN — Medication 1000 UNIT(S): at 16:48

## 2022-07-16 RX ADMIN — SENNA PLUS 2 TABLET(S): 8.6 TABLET ORAL at 23:00

## 2022-07-16 RX ADMIN — Medication 30 MILLIGRAM(S): at 06:23

## 2022-07-16 RX ADMIN — SPIRONOLACTONE 25 MILLIGRAM(S): 25 TABLET, FILM COATED ORAL at 09:56

## 2022-07-16 RX ADMIN — ATORVASTATIN CALCIUM 80 MILLIGRAM(S): 80 TABLET, FILM COATED ORAL at 23:00

## 2022-07-16 RX ADMIN — CARBIDOPA, LEVODOPA, AND ENTACAPONE 1 TABLET(S): 50; 200; 200 TABLET, FILM COATED ORAL at 09:56

## 2022-07-16 RX ADMIN — BUPROPION HYDROCHLORIDE 150 MILLIGRAM(S): 150 TABLET, EXTENDED RELEASE ORAL at 13:23

## 2022-07-16 RX ADMIN — SODIUM CHLORIDE 2000 MILLILITER(S): 9 INJECTION, SOLUTION INTRAVENOUS at 06:23

## 2022-07-16 RX ADMIN — PREGABALIN 1000 MICROGRAM(S): 225 CAPSULE ORAL at 13:02

## 2022-07-16 RX ADMIN — CARBIDOPA, LEVODOPA, AND ENTACAPONE 1 TABLET(S): 50; 200; 200 TABLET, FILM COATED ORAL at 20:18

## 2022-07-16 RX ADMIN — MAGNESIUM OXIDE 400 MG ORAL TABLET 400 MILLIGRAM(S): 241.3 TABLET ORAL at 17:44

## 2022-07-16 RX ADMIN — BUPROPION HYDROCHLORIDE 150 MILLIGRAM(S): 150 TABLET, EXTENDED RELEASE ORAL at 09:56

## 2022-07-16 RX ADMIN — Medication 12.5 MILLIGRAM(S): at 17:46

## 2022-07-16 RX ADMIN — TAMSULOSIN HYDROCHLORIDE 0.4 MILLIGRAM(S): 0.4 CAPSULE ORAL at 23:00

## 2022-07-16 RX ADMIN — CLOPIDOGREL BISULFATE 75 MILLIGRAM(S): 75 TABLET, FILM COATED ORAL at 13:02

## 2022-07-16 RX ADMIN — Medication 650 MILLIGRAM(S): at 20:47

## 2022-07-16 RX ADMIN — Medication 650 MILLIGRAM(S): at 20:16

## 2022-07-16 RX ADMIN — ENOXAPARIN SODIUM 40 MILLIGRAM(S): 100 INJECTION SUBCUTANEOUS at 13:02

## 2022-07-16 NOTE — H&P ADULT - PROBLEM SELECTOR PLAN 3
Pt w/ hx of PD  s/p fall at home, landing in buttocks  fall precautions  f/u Xray of b/l hip  ordered PT evaluation

## 2022-07-16 NOTE — PATIENT PROFILE ADULT - TRANSPORTATION
no aerobic capacity/endurance/focal weakness EXTENSORS R 4th,5th digits of hand/muscle strength/ROM/sensory integrity

## 2022-07-16 NOTE — H&P ADULT - PROBLEM SELECTOR PLAN 1
admit to medicine floor  started on ceftriaxone 1 g q 24 hrs   f/u BCX and UCX  f/u scrotum US pt with sepsis criteria (fever, tachycardia, leukocytosis and (+) UA)  lactate neg  s/p 2L bolus in ED + single dose of ceftriaxone  admit to medicine floor  started on ceftriaxone 1 g q 24 hrs  tylenol 650 mg po q 6 hrs PRN for fever   f/u BCX and UCX  f/u scrotum US

## 2022-07-16 NOTE — ED ADULT NURSE NOTE - INTERVENTIONS DEFINITIONS
Room bathroom lighting operational/Non-slip footwear when patient is off stretcher/Physically safe environment: no spills, clutter or unnecessary equipment/Stretcher in lowest position, wheels locked, appropriate side rails in place/Monitor gait and stability/Provide visual clues: red socks

## 2022-07-16 NOTE — ED PROVIDER NOTE - PHYSICAL EXAMINATION
Stephen:   Vitals normal   mild distress  Awake Alert oriented to person, place, situation,   Normocephalic, atraumatic, fusion of c-t spine  lungs clear bilaterally  heart s1s rrr,  Abdomen soft, nontender, nondistended  + CVA/lower left back tenderness   No LE swelling    No rash  Neuro exam grossly intact, no weakness, numbness,

## 2022-07-16 NOTE — ED PROVIDER NOTE - CARE PLAN
Principal Discharge DX:	Sepsis   1 Principal Discharge DX:	Sepsis  Secondary Diagnosis:	Pyelonephritis

## 2022-07-16 NOTE — H&P ADULT - PROBLEM SELECTOR PLAN 6
Pt not on CHF exacerbation  Last TTE (10/2020) EF 44%, apical anterior wall and apical septum are hypokinetic  on metoprolol tratrate 12.5 mg PO BID+ lasix 20 mg po daily + spironolactone 25 mg poqd at home  only resumed BB for now  holding diuretics for now in the s/o sepsis

## 2022-07-16 NOTE — PATIENT PROFILE ADULT - FALL HARM RISK - HARM RISK INTERVENTIONS

## 2022-07-16 NOTE — ED PROVIDER NOTE - OBJECTIVE STATEMENT
52 yo with Parkinsons presents with back pain.  Patient states he fell one week ago but had no pain at that time.  Yesterday he developed back pain.  He also notes dyuria and foul smelling urine x 1 week.

## 2022-07-16 NOTE — ED ADULT TRIAGE NOTE - PAIN RATING/NUMBER SCALE (0-10): ACTIVITY
Chief Complaint   Patient presents with     Abnormal Bleeding Problem     Pt is here to follow up on abnormal periods     Felisha Blake [facility]  8:47 AM 2/8/2019    
6

## 2022-07-16 NOTE — PATIENT PROFILE ADULT - INTERNATIONAL TRAVEL
[facility]  Op Note: Lap Salpingectomies  Juanita Cedillo  : 1992  MRN: [id number]  CSN: [id number]     Preoperative Dx: Desires permanent sterilization      Postoperative Dx: Desires permanent sterilization     Procedure Preformed: LAPAROSCOPIC BILATERAL SALPINGECTOMY (Bilateral)    Surgeon:  Sebastian Navas MD    Anesthesia: General    Estimated Blood Loss: minimal    Findings: Normal tubes and ovaries     Description of Procedure: Patient was given general anesthesia, prepped and draped in the lithotomy position. The bladder was drained. A single-tooth tenaculum was put on the anterior lip of the cervix and the VCare cannula was placed. Then I proceeded to work abdominally. A sub umbilical incision was made with a knife. Veress needle was introduced. Pneumoperitoneum was achieved with 4L of CO2 and then the trocar was introduced suprapubically.  Through this, the Harmonic scalpel was taken and then I proceeded to manipulate the uterus and cauterized the proximal end of the uterus.  Cauterization was continued along the mesosalpinx until the tube was removed. This was removed through the suprapubic port. This was done bilaterally and without any problem. Following this, both surgical sites were   noted to be hemostatic, so this completed the procedure. The instruments were removed. Pneumoperitoneum was allowed to resolve, and the abdominal incisions were closed with 3-0 monocryl . Vaginal instruments were removed. She was replaced to a supine position, allowed to awaken, and taken to the recovery room in stable condition.    Complications: None     Sebastian Navas MD  2021  14:06 CST                  No

## 2022-07-16 NOTE — ED ADULT NURSE NOTE - OBJECTIVE STATEMENT
Pt presents to ED with c/o  lower back pain and generalized weakness. Discoloration noted to the lower extremities.

## 2022-07-16 NOTE — H&P ADULT - NSHPPHYSICALEXAM_GEN_ALL_CORE
LOS:     VITALS:   T(C): 36.9 (07-16-22 @ 11:50), Max: 38.7 (07-16-22 @ 06:05)  HR: 95 (07-16-22 @ 11:50) (95 - 112)  BP: 146/55 (07-16-22 @ 11:50) (116/77 - 146/55)  RR: 20 (07-16-22 @ 11:50) (18 - 20)  SpO2: 95% (07-16-22 @ 11:50) (95% - 99%)    GENERAL: NAD, lying in bed comfortably  HEAD:  Atraumatic, Normocephalic  EYES: EOMI, PERRLA, conjunctiva and sclera clear  ENT: Moist mucous membranes  NECK: No JVD, post surgical changes on lower C-spine due to spinal fusion surgery  CHEST/LUNG: sternotomy surgery. Clear to auscultation bilaterally; No rales, rhonchi, wheezing, or rubs. Unlabored respirations  HEART: Regular rate and rhythm; No murmurs, rubs, or gallops  ABDOMEN: BSx4; Soft, nontender, nondistended; no CVA tenderness  : no evidence of erythema, swelling or tenderness on testicular exam   EXTREMITIES:  2+ Peripheral Pulses, brisk capillary refill. No clubbing, cyanosis, or edema  NERVOUS SYSTEM:  A&Ox3, no focal deficits   SKIN: LE hyperpigmentation due to venous stasis

## 2022-07-16 NOTE — ED PROVIDER NOTE - CLINICAL SUMMARY MEDICAL DECISION MAKING FREE TEXT BOX
patient found to be septic at triage, febrile and tachycardic, back pain likely pyelonephritis, sepsis work up, ceftriaxone and admit to medicine

## 2022-07-16 NOTE — ED PROVIDER NOTE - HIV OFFER
This is a cream - I sent the new med to pharmacy - she can just use externally.   Please let patient know we sent a new med due to lack of coverage for the med she had in the past. Yes, get tested

## 2022-07-16 NOTE — ED PROVIDER NOTE - PROGRESS NOTE DETAILS
MARQUES: Sign out taken from Dr. Mitchell for sepsis pending urine. Urine positive. Spoke to Dr. Wilson. Asked for pt to be admitted to his service but have hospitalist do H&P. I spoke to Dr. Whiting who will see patient.

## 2022-07-16 NOTE — H&P ADULT - ASSESSMENT
Pt is a 50 yo M from home ambulates w/ cane w/ pmhx of PD, CHF, CAD (s/p CABG 2018), HTN, BPH comes in after sustaining a mechanical fall 1 wk ago and urinary symptoms. In the ED pt was found febrile, tachycardic and normotensive. Labs significant for leukocytosis with neutrophilia, anemia,  lactate neg, UA (+) for proteinuria, hematuria, pyuria and bacteriuria.  ED course  2L bolus of NS + single dose of ceftriaxone. Pt is admitted for sepsis due to urinary infection.

## 2022-07-16 NOTE — CHART NOTE - NSCHARTNOTEFT_GEN_A_CORE
US of scrotum showed pt with epididymitis and orchitis on the right testicle. Given low suspicion for STD, will c/w monotherapy with ceftriaxone 1 gr q 24 hrs. Primary team to adjust abx based on clinical response and UCX results/sensitivity.

## 2022-07-16 NOTE — H&P ADULT - ATTENDING COMMENTS
Patient seen and examined at bedside.    Vital Signs Last 24 Hrs  T(C): 36.9 (16 Jul 2022 11:50), Max: 38.7 (16 Jul 2022 06:05)  T(F): 98.4 (16 Jul 2022 11:50), Max: 101.6 (16 Jul 2022 06:05)  HR: 95 (16 Jul 2022 11:50) (95 - 112)  BP: 146/55 (16 Jul 2022 11:50) (116/77 - 146/55)  BP(mean): --  RR: 20 (16 Jul 2022 11:50) (18 - 20)  SpO2: 95% (16 Jul 2022 11:50) (95% - 99%)    Parameters below as of 16 Jul 2022 11:50  Patient On (Oxygen Delivery Method): room air      Labs and imaging studies noted.    Assessment and plan: Patient is a 50 yo M from home ambulates w/ cane w/ PMH of Parkinson's disease on Carbidopa-Levodopa, CHF, CAD (s/p CABG 2018), HTN, BPH comes in after sustaining a mechanical fall 1 wk ago and urinary symptoms. As per the pt and his sister Ms. Santana sustained a mechanical fall 1 wk ago, landed on his buttocks. Denied head trauma or LOC. Pt did not have significant pain afterwards. However, on Monday pt started experiencing abdominal and lower back pain, associated with dysuria and increased urinary frequency that would happen on an on and off basis. He also experienced pain on (R) testicle. As per pt and his sister he also experienced decreased appetite, decreased oral intake and some nausea, no episodes of emesis. Due to weakness he had to ambulate w/ walker. Denies fever, chills or any other acute complaint.     Patient seen and examined at bedside.    Vital Signs Last 24 Hrs  T(C): 36.9 (16 Jul 2022 11:50), Max: 38.7 (16 Jul 2022 06:05)  T(F): 98.4 (16 Jul 2022 11:50), Max: 101.6 (16 Jul 2022 06:05)  HR: 95 (16 Jul 2022 11:50) (95 - 112)  BP: 146/55 (16 Jul 2022 11:50) (116/77 - 146/55)  BP(mean): --  RR: 20 (16 Jul 2022 11:50) (18 - 20)  SpO2: 95% (16 Jul 2022 11:50) (95% - 99%)    Parameters below as of 16 Jul 2022 11:50  Patient On (Oxygen Delivery Method): room air      Labs and imaging studies noted.    Assessment and plan:    Sepsis  Acute UTI  Mechanical fall   Parkinson's disease on Carbidopa-Levodopa  CAD (s/p CABG 2018)  HTN  BPH Patient is a 52 yo M from home ambulates w/ cane w/ PMH of Parkinson's disease on Carbidopa-Levodopa, CHF, CAD (s/p CABG 2018), HTN, BPH comes in after sustaining a mechanical fall 1 wk ago and urinary symptoms. As per the pt and his sister Ms. Santana sustained a mechanical fall 1 wk ago, landed on his buttocks. Denied head trauma or LOC. Pt did not have significant pain afterwards. However, on Monday pt started experiencing abdominal and lower back pain, associated with dysuria and increased urinary frequency that would happen on an on and off basis. He also experienced pain on (R) testicle. As per pt and his sister he also experienced decreased appetite, decreased oral intake and some nausea, no episodes of emesis. Due to weakness he had to ambulate w/ walker. Denies fever, chills or any other acute complaint.     Patient seen and examined at bedside.    Vital Signs Last 24 Hrs  T(C): 36.9 (16 Jul 2022 11:50), Max: 38.7 (16 Jul 2022 06:05)  T(F): 98.4 (16 Jul 2022 11:50), Max: 101.6 (16 Jul 2022 06:05)  HR: 95 (16 Jul 2022 11:50) (95 - 112)  BP: 146/55 (16 Jul 2022 11:50) (116/77 - 146/55)  BP(mean): --  RR: 20 (16 Jul 2022 11:50) (18 - 20)  SpO2: 95% (16 Jul 2022 11:50) (95% - 99%)    AAOx3, NAD  chest b/l CTA, RRR  abd soft, nt, nd. no CVA tenderness  resting tremor+  no LE swelling    Labs and imaging studies noted.    Assessment and plan:     Parameters below as of 16 Jul 2022 11:50  Patient On (Oxygen Delivery Method): room air      Labs and imaging studies noted.    Assessment and plan: Patient is a 52 yo M from home ambulates w/ cane w/ PMH of Parkinson's disease on Carbidopa-Levodopa, CHF, CAD (s/p CABG 2018), HTN, BPH comes in after sustaining a mechanical fall 1 wk ago and urinary symptoms and left flank pain.    Sepsis  Acute UTI  Mechanical fall   Parkinson's disease on Carbidopa-Levodopa  CAD (s/p CABG 2018)  HTN  BPH    - p/w left flank pain , dysuria foul smelling urine x 3-4 days  - patient febrile temp 101F, tachycardiac, no CVA tenderness on exam.  - positive UA, leucocytosis, lactate negative.   - s/p 2 litre NS, iv Ceftriaxone in ED.  - renal function intact, no CVA tenderness. will hold off imaging, consider CT vs. renal US if no clinical improvement pr worsening renal function.   - c/w iv ceftriaxone.   - follow blood cx, urine cx.  - Patient also c/o testicular pain, no swelling on exam, h/o hydrocele. follow testicular US.  - follow hip x ray  - resume home medication for PD  - Hold diuretics in setting of acute sepsis. resumed BB  - PT eval

## 2022-07-16 NOTE — H&P ADULT - HISTORY OF PRESENT ILLNESS
Pt is a 52 yo M from home ambulates w/ cane w/ pmhx of PD, CHF, CAD (s/p CABG 2018), HTN, BPH comes in after 1 wk ago and urinary symptoms. As per the pt and his sister Ms. Santana sustained a mechanical fall 1 wk ago, landed on his buttocks. Denied head trauma or LOC. Pt did not have significant pain afterwards. However, on monday pt started experiencing abdominal and lower back pain, associated with dysuria and increased urinary frequency that would happen on an on and off basis. He also experienced pain on (R) testicle. As per pt and his sister he also experienced decreased appetite, decreased oral intake and some nausea, no episodes of emesis. Due to weakness he had to ambulate w/ walker. Denies fever, chills or any other acute complaint.  Pt is a 50 yo M from home ambulates w/ cane w/ pmhx of PD, CHF, CAD (s/p CABG 2018), HTN, BPH comes in after sustaining a mechanical fall 1 wk ago and urinary symptoms. As per the pt and his sister Ms. Santana sustained a mechanical fall 1 wk ago, landed on his buttocks. Denied head trauma or LOC. Pt did not have significant pain afterwards. However, on monday pt started experiencing abdominal and lower back pain, associated with dysuria and increased urinary frequency that would happen on an on and off basis. He also experienced pain on (R) testicle. As per pt and his sister he also experienced decreased appetite, decreased oral intake and some nausea, no episodes of emesis. Due to weakness he had to ambulate w/ walker. Denies fever, chills or any other acute complaint.

## 2022-07-17 LAB
ALBUMIN SERPL ELPH-MCNC: 1.9 G/DL — LOW (ref 3.5–5)
ALP SERPL-CCNC: 60 U/L — SIGNIFICANT CHANGE UP (ref 40–120)
ALT FLD-CCNC: 32 U/L DA — SIGNIFICANT CHANGE UP (ref 10–60)
ANION GAP SERPL CALC-SCNC: 6 MMOL/L — SIGNIFICANT CHANGE UP (ref 5–17)
AST SERPL-CCNC: 21 U/L — SIGNIFICANT CHANGE UP (ref 10–40)
BASOPHILS # BLD AUTO: 0.01 K/UL — SIGNIFICANT CHANGE UP (ref 0–0.2)
BASOPHILS NFR BLD AUTO: 0.1 % — SIGNIFICANT CHANGE UP (ref 0–2)
BILIRUB SERPL-MCNC: 0.6 MG/DL — SIGNIFICANT CHANGE UP (ref 0.2–1.2)
BUN SERPL-MCNC: 12 MG/DL — SIGNIFICANT CHANGE UP (ref 7–18)
CALCIUM SERPL-MCNC: 8.5 MG/DL — SIGNIFICANT CHANGE UP (ref 8.4–10.5)
CHLORIDE SERPL-SCNC: 111 MMOL/L — HIGH (ref 96–108)
CO2 SERPL-SCNC: 24 MMOL/L — SIGNIFICANT CHANGE UP (ref 22–31)
CREAT SERPL-MCNC: 0.84 MG/DL — SIGNIFICANT CHANGE UP (ref 0.5–1.3)
EGFR: 106 ML/MIN/1.73M2 — SIGNIFICANT CHANGE UP
EOSINOPHIL # BLD AUTO: 0.02 K/UL — SIGNIFICANT CHANGE UP (ref 0–0.5)
EOSINOPHIL NFR BLD AUTO: 0.2 % — SIGNIFICANT CHANGE UP (ref 0–6)
GLUCOSE SERPL-MCNC: 68 MG/DL — LOW (ref 70–99)
HCT VFR BLD CALC: 31.2 % — LOW (ref 39–50)
HGB BLD-MCNC: 10.3 G/DL — LOW (ref 13–17)
IMM GRANULOCYTES NFR BLD AUTO: 0.8 % — SIGNIFICANT CHANGE UP (ref 0–1.5)
LYMPHOCYTES # BLD AUTO: 0.57 K/UL — LOW (ref 1–3.3)
LYMPHOCYTES # BLD AUTO: 4.3 % — LOW (ref 13–44)
MAGNESIUM SERPL-MCNC: 2.3 MG/DL — SIGNIFICANT CHANGE UP (ref 1.6–2.6)
MCHC RBC-ENTMCNC: 28.6 PG — SIGNIFICANT CHANGE UP (ref 27–34)
MCHC RBC-ENTMCNC: 33 GM/DL — SIGNIFICANT CHANGE UP (ref 32–36)
MCV RBC AUTO: 86.7 FL — SIGNIFICANT CHANGE UP (ref 80–100)
MONOCYTES # BLD AUTO: 1.16 K/UL — HIGH (ref 0–0.9)
MONOCYTES NFR BLD AUTO: 8.8 % — SIGNIFICANT CHANGE UP (ref 2–14)
NEUTROPHILS # BLD AUTO: 11.35 K/UL — HIGH (ref 1.8–7.4)
NEUTROPHILS NFR BLD AUTO: 85.8 % — HIGH (ref 43–77)
NRBC # BLD: 0 /100 WBCS — SIGNIFICANT CHANGE UP (ref 0–0)
PHOSPHATE SERPL-MCNC: 3 MG/DL — SIGNIFICANT CHANGE UP (ref 2.5–4.5)
PLATELET # BLD AUTO: 332 K/UL — SIGNIFICANT CHANGE UP (ref 150–400)
POTASSIUM SERPL-MCNC: 4 MMOL/L — SIGNIFICANT CHANGE UP (ref 3.5–5.3)
POTASSIUM SERPL-SCNC: 4 MMOL/L — SIGNIFICANT CHANGE UP (ref 3.5–5.3)
PROT SERPL-MCNC: 5.7 G/DL — LOW (ref 6–8.3)
RBC # BLD: 3.6 M/UL — LOW (ref 4.2–5.8)
RBC # FLD: 13.6 % — SIGNIFICANT CHANGE UP (ref 10.3–14.5)
SODIUM SERPL-SCNC: 141 MMOL/L — SIGNIFICANT CHANGE UP (ref 135–145)
WBC # BLD: 13.22 K/UL — HIGH (ref 3.8–10.5)
WBC # FLD AUTO: 13.22 K/UL — HIGH (ref 3.8–10.5)

## 2022-07-17 RX ADMIN — PREGABALIN 1000 MICROGRAM(S): 225 CAPSULE ORAL at 11:29

## 2022-07-17 RX ADMIN — CARBIDOPA, LEVODOPA, AND ENTACAPONE 1 TABLET(S): 50; 200; 200 TABLET, FILM COATED ORAL at 14:02

## 2022-07-17 RX ADMIN — CLOPIDOGREL BISULFATE 75 MILLIGRAM(S): 75 TABLET, FILM COATED ORAL at 11:30

## 2022-07-17 RX ADMIN — TAMSULOSIN HYDROCHLORIDE 0.4 MILLIGRAM(S): 0.4 CAPSULE ORAL at 23:11

## 2022-07-17 RX ADMIN — SENNA PLUS 2 TABLET(S): 8.6 TABLET ORAL at 23:11

## 2022-07-17 RX ADMIN — PANTOPRAZOLE SODIUM 40 MILLIGRAM(S): 20 TABLET, DELAYED RELEASE ORAL at 06:52

## 2022-07-17 RX ADMIN — MAGNESIUM OXIDE 400 MG ORAL TABLET 400 MILLIGRAM(S): 241.3 TABLET ORAL at 11:30

## 2022-07-17 RX ADMIN — BUPROPION HYDROCHLORIDE 150 MILLIGRAM(S): 150 TABLET, EXTENDED RELEASE ORAL at 11:30

## 2022-07-17 RX ADMIN — CEFTRIAXONE 100 MILLIGRAM(S): 500 INJECTION, POWDER, FOR SOLUTION INTRAMUSCULAR; INTRAVENOUS at 13:54

## 2022-07-17 RX ADMIN — Medication 81 MILLIGRAM(S): at 11:29

## 2022-07-17 RX ADMIN — Medication 1000 UNIT(S): at 11:30

## 2022-07-17 RX ADMIN — POLYETHYLENE GLYCOL 3350 17 GRAM(S): 17 POWDER, FOR SOLUTION ORAL at 11:29

## 2022-07-17 RX ADMIN — ATORVASTATIN CALCIUM 80 MILLIGRAM(S): 80 TABLET, FILM COATED ORAL at 23:11

## 2022-07-17 RX ADMIN — CARBIDOPA, LEVODOPA, AND ENTACAPONE 1 TABLET(S): 50; 200; 200 TABLET, FILM COATED ORAL at 11:29

## 2022-07-17 RX ADMIN — CARBIDOPA, LEVODOPA, AND ENTACAPONE 1 TABLET(S): 50; 200; 200 TABLET, FILM COATED ORAL at 18:40

## 2022-07-17 RX ADMIN — CARBIDOPA, LEVODOPA, AND ENTACAPONE 1 TABLET(S): 50; 200; 200 TABLET, FILM COATED ORAL at 06:52

## 2022-07-17 RX ADMIN — ENOXAPARIN SODIUM 40 MILLIGRAM(S): 100 INJECTION SUBCUTANEOUS at 13:54

## 2022-07-17 NOTE — CONSULT NOTE ADULT - ASSESSMENT
Patient is a 51y old  Male  from home ambulates w/ cane w/ pmhx of PD, CHF, CAD (s/p CABG 2018), HTN, BPH comes in after sustaining a mechanical fall 1 wk ago and urinary symptoms.  Denied head trauma or LOC. Pt did not have significant pain afterwards. However, on Monday pt started experiencing abdominal and lower back pain, associated with dysuria and increased urinary frequency.  He also experienced pain on (R) testicle. On admission, she found to have fever, tachycardia, leukocytosis and positive Urine analysis. He has started on Ceftriaxone and the ID consult requested to assist with further evaluation and antibiotic management..    # Sepsis ( Fever + Tachycardia + Leukocytosis )  # UTI    would recommend:    1. Follow up Urine culture  2. Blood cultures X 2  for completeness   3. Continue  Ceftriaxone until work up is done    will follow the patient with you and make further recommendation based on the clinical course and Lab results  Thank you for the opportunity to participate in Mr. FRAGOSO's care      Attending Attestation:    Spent more than 65 minutes on total encounter, more than 50 % of the visit was spent counseling and/or coordinating care by the Attending physician.       Patient is a 51y old  Male  from home ambulates w/ cane w/ pmhx of PD, CHF, CAD (s/p CABG 2018), HTN, BPH comes in after sustaining a mechanical fall 1 wk ago and urinary symptoms.  Denied head trauma or LOC. Pt did not have significant pain afterwards. However, on Monday pt started experiencing abdominal and lower back pain, associated with dysuria and increased urinary frequency.  He also experienced pain on (R) testicle. On admission, she found to have fever, tachycardia, leukocytosis and positive Urine analysis. He has started on Ceftriaxone and the ID consult requested to assist with further evaluation and antibiotic management..    # Sepsis ( Fever + Tachycardia + Leukocytosis )  # UTI -   # Parkinsons disease     would recommend:    1. Follow up Urine culture  2. Continue  Ceftriaxone until work up is done  3. Management of Parkinsons as per Primary team     will follow the patient with you and make further recommendation based on the clinical course and Lab results  Thank you for the opportunity to participate in Mr. FRAGOSO's care      Attending Attestation:    Spent more than 65 minutes on total encounter, more than 50 % of the visit was spent counseling and/or coordinating care by the Attending physician.

## 2022-07-17 NOTE — PROGRESS NOTE ADULT - PROBLEM SELECTOR PLAN 1
pt with sepsis criteria (fever, tachycardia, leukocytosis and (+) UA)  lactate neg  s/p 2L bolus in ED + single dose of ceftriaxone  continue ceftriaxone 1 g q 24 hrs  tylenol 650 mg po q 6 hrs PRN for fever   BCX - neg  f/u UCx  scrotum US - R orchitis, epididymitis

## 2022-07-17 NOTE — CONSULT NOTE ADULT - SUBJECTIVE AND OBJECTIVE BOX
Patient is a 51y old  Male  from home ambulates w/ cane w/ pmhx of PD, CHF, CAD (s/p CABG 2018), HTN, BPH comes in after sustaining a mechanical fall 1 wk ago and urinary symptoms. As per the pt and his sister Ms. Santana sustained a mechanical fall 1 wk ago, landed on his buttocks. Denied head trauma or LOC. Pt did not have significant pain afterwards. However, on monday pt started experiencing abdominal and lower back pain, associated with dysuria and increased urinary frequency that would happen on an on and off basis. He also experienced pain on (R) testicle. As per pt and his sister he also experienced decreased appetite, decreased oral intake and some nausea, no episodes of emesis. Due to weakness he had to ambulate w/ walker. Denies fever, chills or any other acute complaint.       REVIEW OF SYSTEMS: Total of twelve systems have been reviewed with patient and found to be negative unless mentioned in HPI        PAST MEDICAL & SURGICAL HISTORY:  Hypercholesterolemia  Anxiety  Parkinsons disease  onset   BPH (benign prostatic hyperplasia)  TURP- 2019  CAD (coronary artery disease)  CABG x 2 - 2018  Hypertension  Cervical radiculopathy  as per family unstable C1, C2 , Dr. Decker notified , limited ROM of neck, pt is difficult intubation risk, family to bring results of MRI of neck   H/O CHF  Scoliosis  Chronic pain of both shoulders  Chronic neck pain  S/P CABG (coronary artery bypass graft), 2018  S/P TURP, 2019  S/P cervical spinal fusion  c1 c2 screws  H/O inguinal hernia repair  R side by Dr. Galeas        SOCIAL HISTORY  Alcohol: Does not drink  Tobacco: Does not smoke  Illicit substance use: None      FAMILY HISTORY: Non contributory to the present illness        ALLERGIES: No Known Drug Allergies  Seafood (Blisters; Swelling; Pruritus; Hives)      Vital Signs Last 24 Hrs  T(C): 36.7 (2022 14:07), Max: 37.7 (2022 05:24)  T(F): 98.1 (2022 14:07), Max: 99.8 (2022 05:24)  HR: 93 (2022 14:07) (79 - 94)  BP: 112/47 (2022 14:07) (101/52 - 112/47)  BP(mean): --  RR: 18 (2022 14:07) (18 - 18)  SpO2: 96% (2022 14:07) (93% - 96%)    Parameters below as of 2022 14:07  Patient On (Oxygen Delivery Method): room air        PHYSICAL EXAM:  GENERAL: Not in distress   CHEST/LUNG:  Aire ntry bilaterally  HEART: s1 and s2 present  ABDOMEN:  Nontender and  Nondistended  EXTREMITIES: No pedal  edema  CNS: Awake and Alert      LABS:                        10.3   13.22 )-----------( 332      ( 2022 07:31 )             31.2       07-17    141  |  111<H>  |  12  ----------------------------<  68<L>  4.0   |  24  |  0.84    Ca    8.5      2022 07:31  Phos  3.0     07-17  Mg     2.3     07-17    TPro  5.7<L>  /  Alb  1.9<L>  /  TBili  0.6  /  DBili  x   /  AST  21  /  ALT  32  /  AlkPhos  60  07-17        Urinalysis Basic - ( 2022 08:07 )  Color: Yellow / Appearance: Clear / S.010 / pH: x  Gluc: x / Ketone: Trace  / Bili: Negative / Urobili: Negative   Blood: x / Protein: 100 / Nitrite: Positive   Leuk Esterase: Moderate / RBC: 5-10 /HPF / WBC 11-25 /HPF   Sq Epi: x / Non Sq Epi: Few /HPF / Bacteria: Few /HPF        MEDICATIONS  (STANDING):  aspirin enteric coated 81 milliGRAM(s) Oral daily  atorvastatin 80 milliGRAM(s) Oral at bedtime  buPROPion XL (24-Hour) . 150 milliGRAM(s) Oral daily  carbidopa/levodopa/entacapone 25/100/200 1 Tablet(s) Oral <User Schedule>  cefTRIAXone   IVPB 1000 milliGRAM(s) IV Intermittent every 24 hours  cholecalciferol 1000 Unit(s) Oral daily  clopidogrel Tablet 75 milliGRAM(s) Oral daily  cyanocobalamin 1000 MICROGram(s) Oral daily  enoxaparin Injectable 40 milliGRAM(s) SubCutaneous every 24 hours  magnesium oxide 400 milliGRAM(s) Oral daily  metoprolol tartrate 12.5 milliGRAM(s) Oral two times a day  pantoprazole    Tablet 40 milliGRAM(s) Oral before breakfast  polyethylene glycol 3350 17 Gram(s) Oral daily  senna 2 Tablet(s) Oral at bedtime  tamsulosin 0.4 milliGRAM(s) Oral at bedtime    MEDICATIONS  (PRN):  acetaminophen     Tablet .. 650 milliGRAM(s) Oral every 6 hours PRN Temp greater or equal to 38C (100.4F)      RADIOLOGY & ADDITIONAL TESTS:               Patient is a 51y old  Male  from home ambulates w/ cane w/ pmhx of PD, CHF, CAD (s/p CABG 2018), HTN, BPH comes in after sustaining a mechanical fall 1 wk ago and urinary symptoms.  Denied head trauma or LOC. Pt did not have significant pain afterwards. However, on Monday pt started experiencing abdominal and lower back pain, associated with dysuria and increased urinary frequency.  He also experienced pain on (R) testicle. On admission, she found to have fever, tachycardia, leukocytosis and positive Urine analysis. He has started on Ceftriaxone and the ID consult requested to assist with further evaluation and antibiotic management..      REVIEW OF SYSTEMS: Total of twelve systems have been reviewed with patient and found to be negative unless mentioned in HPI      PAST MEDICAL & SURGICAL HISTORY:  Hypercholesterolemia  Anxiety  Parkinsons disease, onset   BPH (benign prostatic hyperplasia)  TURP- 2019  CAD (coronary artery disease), CABG x 2 - 2018  Hypertension  Cervical radiculopathy, as per family unstable C1, C2 , Dr. Decker notified , limited ROM of neck, pt is difficult intubation risk, family to bring results of MRI of neck   H/O CHF  Scoliosis  Chronic pain of both shoulders, Chronic neck pain  S/P CABG (coronary artery bypass graft), 2018  S/P TURP, 2019  S/P cervical spinal fusion , c1 c2 screws  H/O inguinal hernia repair, R side by Dr. Galeas      SOCIAL HISTORY  Alcohol: Does not drink  Tobacco: Does not smoke  Illicit substance use: None      FAMILY HISTORY: Non contributory to the present illness        ALLERGIES: No Known Drug Allergies  Seafood (Blisters; Swelling; Pruritus; Hives)      Vital Signs Last 24 Hrs  T(C): 36.7 (2022 14:07), Max: 37.7 (2022 05:24)  T(F): 98.1 (2022 14:07), Max: 99.8 (2022 05:24)  HR: 93 (2022 14:07) (79 - 94)  BP: 112/47 (2022 14:07) (101/52 - 112/47)  BP(mean): --  RR: 18 (2022 14:07) (18 - 18)  SpO2: 96% (2022 14:07) (93% - 96%)    Parameters below as of 2022 14:07  Patient On (Oxygen Delivery Method): room air        PHYSICAL EXAM:  GENERAL: Not in distress   CHEST/LUNG:  Not using accessory muscles   HEART: s1 and s2 present  ABDOMEN:  Nontender and  Nondistended  EXTREMITIES: No pedal  edema  CNS: Awake and Alert      LABS:                        10.3   13.22 )-----------( 332      ( 2022 07:31 )             31.2       07-    141  |  111<H>  |  12  ----------------------------<  68<L>  4.0   |  24  |  0.84    Ca    8.5      2022 07:31  Phos  3.0       Mg     2.3         TPro  5.7<L>  /  Alb  1.9<L>  /  TBili  0.6  /  DBili  x   /  AST  21  /  ALT  32  /  AlkPhos  60          Urinalysis Basic - ( 2022 08:07 )  Color: Yellow / Appearance: Clear / S.010 / pH: x  Gluc: x / Ketone: Trace  / Bili: Negative / Urobili: Negative   Blood: x / Protein: 100 / Nitrite: Positive   Leuk Esterase: Moderate / RBC: 5-10 /HPF / WBC 11-25 /HPF   Sq Epi: x / Non Sq Epi: Few /HPF / Bacteria: Few /HPF        MEDICATIONS  (STANDING):    aspirin enteric coated 81 milliGRAM(s) Oral daily  atorvastatin 80 milliGRAM(s) Oral at bedtime  buPROPion XL (24-Hour) . 150 milliGRAM(s) Oral daily  carbidopa/levodopa/entacapone 25/100/200 1 Tablet(s) Oral <User Schedule>  cefTRIAXone   IVPB 1000 milliGRAM(s) IV Intermittent every 24 hours  cholecalciferol 1000 Unit(s) Oral daily  clopidogrel Tablet 75 milliGRAM(s) Oral daily  cyanocobalamin 1000 MICROGram(s) Oral daily  enoxaparin Injectable 40 milliGRAM(s) SubCutaneous every 24 hours  magnesium oxide 400 milliGRAM(s) Oral daily  metoprolol tartrate 12.5 milliGRAM(s) Oral two times a day  pantoprazole    Tablet 40 milliGRAM(s) Oral before breakfast  polyethylene glycol 3350 17 Gram(s) Oral daily  senna 2 Tablet(s) Oral at bedtime  tamsulosin 0.4 milliGRAM(s) Oral at bedtime    MEDICATIONS  (PRN):  acetaminophen     Tablet .. 650 milliGRAM(s) Oral every 6 hours PRN Temp greater or equal to 38C (100.4F)        RADIOLOGY & ADDITIONAL TESTS:    22: US Testicles (22 @ 12:35) maging findings suggest epididymitis and orchitis on the right side.   Correlate clinically and follow-up to document resolution.    22: Xray Chest 1 View-PORTABLE IMMEDIATE (22 @ 07:21) No focal consolidation.        MICROBIOLOGY DATA:    Culture - Blood (22 @ 10:18)   Specimen Source: .Blood Blood-Peripheral   Culture Results: No growth to date.     Culture - Blood (22 @ 10:18)   Specimen Source: .Blood Blood-Peripheral   Culture Results: No growth to date.    Rapid HIV-1/2 Antibody (22 @ 06:15)   Rapid HIV-1/2 Antibody: Nonreact:                  Patient is a 51y old  Male  from home ambulates w/ cane w/ pmhx of PD, CHF, CAD (s/p CABG 2018), HTN, BPH comes in after sustaining a mechanical fall 1 wk ago and urinary symptoms.  Denied head trauma or LOC. Pt did not have significant pain afterwards. However, on Monday pt started experiencing abdominal and lower back pain, associated with dysuria and increased urinary frequency.  He also experienced pain on (R) testicle. On admission, she found to have fever, tachycardia, leukocytosis and positive Urine analysis. He has started on Ceftriaxone and the ID consult requested to assist with further evaluation and antibiotic management..      REVIEW OF SYSTEMS: Total of twelve systems have been reviewed with patient and found to be negative unless mentioned in HPI      PAST MEDICAL & SURGICAL HISTORY:  Hypercholesterolemia  Anxiety  Parkinsons disease, onset   BPH (benign prostatic hyperplasia)  TURP- 2019  CAD (coronary artery disease), CABG x 2 - 2018  Hypertension  Cervical radiculopathy, as per family unstable C1, C2 , Dr. Decker notified , limited ROM of neck, pt is difficult intubation risk, family to bring results of MRI of neck   H/O CHF  Scoliosis  Chronic pain of both shoulders, Chronic neck pain  S/P CABG (coronary artery bypass graft), 2018  S/P TURP, 2019  S/P cervical spinal fusion , c1 c2 screws  H/O inguinal hernia repair, R side by Dr. Galeas      SOCIAL HISTORY  Alcohol: Does not drink  Tobacco: Does not smoke  Illicit substance use: None      FAMILY HISTORY: Non contributory to the present illness      ALLERGIES: No Known Drug Allergies  Seafood (Blisters; Swelling; Pruritus; Hives)      Vital Signs Last 24 Hrs  T(C): 36.7 (2022 14:07), Max: 37.7 (2022 05:24)  T(F): 98.1 (2022 14:07), Max: 99.8 (2022 05:24)  HR: 93 (2022 14:07) (79 - 94)  BP: 112/47 (2022 14:07) (101/52 - 112/47)  BP(mean): --  RR: 18 (2022 14:07) (18 - 18)  SpO2: 96% (2022 14:07) (93% - 96%)    Parameters below as of 2022 14:07  Patient On (Oxygen Delivery Method): room air      PHYSICAL EXAM:  GENERAL: Not in distress   CHEST/LUNG:  Not using accessory muscles   HEART: s1 and s2 present  ABDOMEN:  Nontender and  Nondistended  EXTREMITIES: No pedal  edema  CNS: Awake and Alert      LABS:                        10.3   13.22 )-----------( 332      ( 2022 07:31 )             31.2       07-    141  |  111<H>  |  12  ----------------------------<  68<L>  4.0   |  24  |  0.84    Ca    8.5      2022 07:31  Phos  3.0       Mg     2.3         TPro  5.7<L>  /  Alb  1.9<L>  /  TBili  0.6  /  DBili  x   /  AST  21  /  ALT  32  /  AlkPhos  60        Urinalysis Basic - ( 2022 08:07 )  Color: Yellow / Appearance: Clear / S.010 / pH: x  Gluc: x / Ketone: Trace  / Bili: Negative / Urobili: Negative   Blood: x / Protein: 100 / Nitrite: Positive   Leuk Esterase: Moderate / RBC: 5-10 /HPF / WBC 11-25 /HPF   Sq Epi: x / Non Sq Epi: Few /HPF / Bacteria: Few /HPF        MEDICATIONS  (STANDING):    aspirin enteric coated 81 milliGRAM(s) Oral daily  atorvastatin 80 milliGRAM(s) Oral at bedtime  buPROPion XL (24-Hour) . 150 milliGRAM(s) Oral daily  carbidopa/levodopa/entacapone 25/100/200 1 Tablet(s) Oral <User Schedule>  cefTRIAXone   IVPB 1000 milliGRAM(s) IV Intermittent every 24 hours  cholecalciferol 1000 Unit(s) Oral daily  clopidogrel Tablet 75 milliGRAM(s) Oral daily  cyanocobalamin 1000 MICROGram(s) Oral daily  enoxaparin Injectable 40 milliGRAM(s) SubCutaneous every 24 hours  magnesium oxide 400 milliGRAM(s) Oral daily  metoprolol tartrate 12.5 milliGRAM(s) Oral two times a day  pantoprazole    Tablet 40 milliGRAM(s) Oral before breakfast  polyethylene glycol 3350 17 Gram(s) Oral daily  senna 2 Tablet(s) Oral at bedtime  tamsulosin 0.4 milliGRAM(s) Oral at bedtime    MEDICATIONS  (PRN):  acetaminophen     Tablet .. 650 milliGRAM(s) Oral every 6 hours PRN Temp greater or equal to 38C (100.4F)        RADIOLOGY & ADDITIONAL TESTS:    22: US Testicles (22 @ 12:35) maging findings suggest epididymitis and orchitis on the right side.   Correlate clinically and follow-up to document resolution.    22: Xray Chest 1 View-PORTABLE IMMEDIATE (22 @ 07:21) No focal consolidation.        MICROBIOLOGY DATA:    Culture - Blood (22 @ 10:18)   Specimen Source: .Blood Blood-Peripheral   Culture Results: No growth to date.     Culture - Blood (22 @ 10:18)   Specimen Source: .Blood Blood-Peripheral   Culture Results: No growth to date.    Rapid HIV-1/2 Antibody (22 @ 06:15)   Rapid HIV-1/2 Antibody: Nonreact:

## 2022-07-17 NOTE — PROGRESS NOTE ADULT - ASSESSMENT
_________________________________________________________________________________________  ========>>  M E D I C A L   A T T E N D I N G    F O L L O W  U P  N O T E  <<=========  -----------------------------------------------------------------------------------------------------    - Patient seen and examined by me earlier today.   - In summary,  ISAIAH FRAGOSO is a 51y year old man admitted with urosepsis   - Patient today overall doing ok, comfortable, eating OK.  overall otherwise doing better       no pain, wants to get out of bed     ==================>> REVIEW OF SYSTEM <<=================    GEN: no fever, no chills, no pain  RESP: no SOB, no cough, no sputum  CVS: no chest pain, no palpitations, no edema  GI: no abdominal pain, no nausea  : no dysuria, no frequency, no hematuria    pain in testicles improved   Neuro: no headache, no dizziness  Derm : no itching, no rash    ==================>> PHYSICAL EXAM <<=================    GEN: A&O X 3 , NAD , comfortable, pleasant, calm   HEENT: NCAT, PERRL, MMM, hearing intact  Neck: supple , no JVD appreciated  CVS: S1S2 , regular , No M/R/G appreciated  PULM: CTA B/L,  no W/R/R appreciated  ABD.: soft. non tender, non distended,  bowel sounds present  Extrem: intact pulses , no edema , chronic contractures   PSYCH : normal mood,  not anxious                            ( Note Written / Date of service :  22 )    ==================>> MEDICATIONS <<====================    MEDICATIONS  (STANDING):  aspirin enteric coated 81 milliGRAM(s) Oral daily  atorvastatin 80 milliGRAM(s) Oral at bedtime  buPROPion XL (24-Hour) . 150 milliGRAM(s) Oral daily  carbidopa/levodopa/entacapone 25/100/200 1 Tablet(s) Oral <User Schedule>  cefTRIAXone   IVPB 1000 milliGRAM(s) IV Intermittent every 24 hours  cholecalciferol 1000 Unit(s) Oral daily  clopidogrel Tablet 75 milliGRAM(s) Oral daily  cyanocobalamin 1000 MICROGram(s) Oral daily  enoxaparin Injectable 40 milliGRAM(s) SubCutaneous every 24 hours  magnesium oxide 400 milliGRAM(s) Oral daily  metoprolol tartrate 12.5 milliGRAM(s) Oral two times a day  pantoprazole    Tablet 40 milliGRAM(s) Oral before breakfast  polyethylene glycol 3350 17 Gram(s) Oral daily  senna 2 Tablet(s) Oral at bedtime  tamsulosin 0.4 milliGRAM(s) Oral at bedtime    MEDICATIONS  (PRN):  acetaminophen     Tablet .. 650 milliGRAM(s) Oral every 6 hours PRN Temp greater or equal to 38C (100.4F)    ___________  Active diet:  Diet, DASH/TLC:   Sodium & Cholesterol Restricted  ___________________    ==================>> VITAL SIGNS <<==================    T(C): 36.7 (22 @ 14:07), Max: 37.7 (22 @ 05:24)  HR: 93 (22 @ 14:07) (79 - 94)  BP: 112/47 (22 @ 14:07) (101/52 - 112/47)  RR: 18 (22 @ 14:07) (18 - 18)  SpO2: 96% (22 @ 14:07) (93% - 96%)      ==================>> LAB AND IMAGING <<==================                        10.3   13.22 )-----------( 332      ( 2022 07:31 )             31.2        07-17    141  |  111<H>  |  12  ----------------------------<  68<L>  4.0   |  24  |  0.84    Ca    8.5      2022 07:31  Phos  3.0       Mg     2.3     17    TPro  5.7<L>  /  Alb  1.9<L>  /  TBili  0.6  /  DBili  x   /  AST  21  /  ALT  32  /  AlkPhos  60  07-17    Urinalysis Basic - ( 2022 08:07 )  Color: Yellow / Appearance: Clear / S.010 / pH: x  Gluc: x / Ketone: Trace  / Bili: Negative / Urobili: Negative   Blood: x / Protein: 100 / Nitrite: Positive   Leuk Esterase: Moderate / RBC: 5-10 /HPF / WBC 11-25 /HPF   Sq Epi: x / Non Sq Epi: Few /HPF / Bacteria: Few /HPF    ____________________________    M I C R O B I O L O G Y :    Culture - Blood (collected 2022 10:18)  Source: .Blood Blood-Peripheral  Preliminary Report (2022 11:01):    No growth to date.    Culture - Blood (collected 2022 10:18)  Source: .Blood Blood-Peripheral  Preliminary Report (2022 11:01):    No growth to date.    < from: US Testicles (22 @ 12:35) >  IMPRESSION:  Imaging findings suggest epididymitis and orchitis on the right side.   Correlate clinically and follow-up to document resolution.  < end of copied text >    __________________________________________________________________________________  ===============>>  A S S E S S M E N T   A N D   P L A N <<===============  ------------------------------------------------------------------------------------------    · Assessment	  Pt is a 50 yo M from home ambulates w/ cane w/ pmhx of PD, CHF, CAD (s/p CABG 2018), HTN, BPH comes in after sustaining a mechanical fall 1 wk ago and urinary symptoms. In the ED pt was found febrile, tachycardic and normotensive. Labs significant for leukocytosis with neutrophilia, anemia,  lactate neg, UA (+) for proteinuria, hematuria, pyuria and bacteriuria.  ED course  2L bolus of NS + single dose of ceftriaxone. Pt is admitted for sepsis due to urinary infection.     Problem/Plan - 1:  ·  Problem: Sepsis, UTI, Epididymitis / orchitis  ID consulted, following   abx per ID recom   tylenol  PRN for fever / pain   f/u BCX and UCX    Problem/Plan - 2:  ·  Problem:  Fall at home.   ·  Plan: Pt w/ hx of PD  s/p fall at home, landing in buttocks  fall precautions  f/u Xray of b/l hip  PT evaluation.    Problem/Plan - 4:  ·  Problem: Anemia.   ·  Plan: On admission Hgb 11.8  on ferrous gluconate at home  asymptomatic  monitor CBC daily.    Problem/Plan - 5:  ·  Problem: Parkinson disease.   ·  Plan: pt on carbidopa-levodopa-entacapone 25/100/200 mg   resumed home medication.    Problem/Plan - 6:  ·  Problem: Congestive heart disease.   ·  Plan: Pt not on CHF exacerbation  Last TTE (10/2020) EF 44%, apical anterior wall and apical septum are hypokinetic  on metoprolol tratrate 12.5 mg PO BID+ lasix 20 mg po daily + spironolactone 25 mg poqd at home  only resumed BB for now  holding diuretics for now in the s/o sepsis.       resume as improved / as needed    Problem/Plan - 7:  ·  Problem: CAD (coronary artery disease).   ·  Plan: Pt with hx of CABG in 2018  on DAPT at home + lipitor 80 mg daily+ ezetimibe   resumed home meds.    Problem/Plan - 8:  ·  Problem: HTN (hypertension).   ·  Plan: Pt on lisinopril 2.5 mg at home  holding anti-hypertensives for now in the s/o sepsis  monitor BP.    Problem/Plan - 9:  ·  Problem: BPH (benign prostatic hyperplasia).   ·  Plan: pt on tamsulosin 0.4 mg poqd at home  resumed home med.    Problem/Plan - 10:  ·  Problem: DVT prophylaxis.   ·  Plan; lovenox 40 mg SC QD.    --------------------------------------------  Case discussed with pt  Education given on findings and plan of care  ___________________________  HKarly Wilson D.O.  Pager: [phone]

## 2022-07-17 NOTE — PROGRESS NOTE ADULT - SUBJECTIVE AND OBJECTIVE BOX
PGY-2 Progress Note discussed with attending    PAGER #: [[phone]] TILL 5:00 PM  PLEASE CONTACT ON CALL TEAM:  - On Call Team (Please refer to Andres) FROM 5:00 PM - 8:30PM  - Nightfloat Team FROM 8:30 -7:30 AM    CHIEF COMPLAINT & BRIEF HOSPITAL COURSE:    INTERVAL HPI/OVERNIGHT EVENTS:       REVIEW OF SYSTEMS:  CONSTITUTIONAL: No fever, weight loss, or fatigue  RESPIRATORY: No cough, wheezing, chills or hemoptysis; No shortness of breath  CARDIOVASCULAR: No chest pain, palpitations, dizziness, or leg swelling  GASTROINTESTINAL: No abdominal pain. No nausea, vomiting, or hematemesis; No diarrhea or constipation. No melena or hematochezia.  GENITOURINARY: No dysuria or hematuria, urinary frequency  NEUROLOGICAL: No headaches, memory loss, loss of strength, numbness, or tremors  SKIN: No itching, burning, rashes, or lesions     MEDICATIONS  (STANDING):  aspirin enteric coated 81 milliGRAM(s) Oral daily  atorvastatin 80 milliGRAM(s) Oral at bedtime  buPROPion XL (24-Hour) . 150 milliGRAM(s) Oral daily  carbidopa/levodopa/entacapone 25/100/200 1 Tablet(s) Oral <User Schedule>  cefTRIAXone   IVPB 1000 milliGRAM(s) IV Intermittent every 24 hours  cholecalciferol 1000 Unit(s) Oral daily  clopidogrel Tablet 75 milliGRAM(s) Oral daily  cyanocobalamin 1000 MICROGram(s) Oral daily  enoxaparin Injectable 40 milliGRAM(s) SubCutaneous every 24 hours  magnesium oxide 400 milliGRAM(s) Oral daily  metoprolol tartrate 12.5 milliGRAM(s) Oral two times a day  pantoprazole    Tablet 40 milliGRAM(s) Oral before breakfast  polyethylene glycol 3350 17 Gram(s) Oral daily  senna 2 Tablet(s) Oral at bedtime  tamsulosin 0.4 milliGRAM(s) Oral at bedtime    MEDICATIONS  (PRN):  acetaminophen     Tablet .. 650 milliGRAM(s) Oral every 6 hours PRN Temp greater or equal to 38C (100.4F)      Vital Signs Last 24 Hrs  T(C): 37.7 (17 Jul 2022 05:24), Max: 37.7 (17 Jul 2022 05:24)  T(F): 99.8 (17 Jul 2022 05:24), Max: 99.8 (17 Jul 2022 05:24)  HR: 94 (17 Jul 2022 05:24) (79 - 95)  BP: 107/56 (17 Jul 2022 05:24) (101/52 - 146/55)  BP(mean): --  RR: 18 (17 Jul 2022 05:24) (18 - 20)  SpO2: 93% (17 Jul 2022 05:24) (93% - 98%)    Parameters below as of 17 Jul 2022 05:24  Patient On (Oxygen Delivery Method): room air        PHYSICAL EXAMINATION:  GENERAL: NAD, well built  HEAD:  Atraumatic, Normocephalic  EYES:  conjunctiva and sclera clear  NECK: Supple, No JVD, Normal thyroid  CHEST/LUNG: Clear to auscultation. Clear to percussion bilaterally; No rales, rhonchi, wheezing, or rubs  HEART: Regular rate and rhythm; No murmurs, rubs, or gallops  ABDOMEN: Soft, Nontender, Nondistended; Bowel sounds present, no pain or masses on palpation  NERVOUS SYSTEM:  Alert & Oriented X3  : voiding well  EXTREMITIES:  2+ Peripheral Pulses, No clubbing, cyanosis, or edema  SKIN: warm dry                          10.3   13.22 )-----------( 332      ( 17 Jul 2022 07:31 )             31.2     07-17    141  |  111<H>  |  12  ----------------------------<  68<L>  4.0   |  24  |  0.84    Ca    8.5      17 Jul 2022 07:31  Phos  3.0     07-17  Mg     2.3     07-17    TPro  5.7<L>  /  Alb  1.9<L>  /  TBili  0.6  /  DBili  x   /  AST  21  /  ALT  32  /  AlkPhos  60  07-17    LIVER FUNCTIONS - ( 17 Jul 2022 07:31 )  Alb: 1.9 g/dL / Pro: 5.7 g/dL / ALK PHOS: 60 U/L / ALT: 32 U/L DA / AST: 21 U/L / GGT: x                   I&O's Summary        Culture - Blood (collected 16 Jul 2022 10:18)  Source: .Blood Blood-Peripheral  Preliminary Report (17 Jul 2022 11:01):    No growth to date.    Culture - Blood (collected 16 Jul 2022 10:18)  Source: .Blood Blood-Peripheral  Preliminary Report (17 Jul 2022 11:01):    No growth to date.        CAPILLARY BLOOD GLUCOSE      RADIOLOGY & ADDITIONAL TESTS:                   PGY-2 Progress Note discussed with attending    PAGER #: [[phone]] TILL 5:00 PM  PLEASE CONTACT ON CALL TEAM:  - On Call Team (Please refer to Andres) FROM 5:00 PM - 8:30PM  - Nightfloat Team FROM 8:30 -7:30 AM    CHIEF COMPLAINT & BRIEF HOSPITAL COURSE:    Pt is a 52 yo M from home ambulates w/ cane w/ pmhx of PD, CHF, CAD (s/p CABG 2018), HTN, BPH comes in after sustaining a mechanical fall 1 wk ago and urinary symptoms. As per the pt and his sister Ms. Santana sustained a mechanical fall 1 wk ago, landed on his buttocks. Denied head trauma or LOC. Pt did not have significant pain afterwards. However, on monday pt started experiencing abdominal and lower back pain, associated with dysuria and increased urinary frequency that would happen on an on and off basis. He also experienced pain on (R) testicle. As per pt and his sister he also experienced decreased appetite, decreased oral intake and some nausea, no episodes of emesis. Due to weakness he had to ambulate w/ walker. Denies fever, chills or any other acute complaint.     INTERVAL HPI/OVERNIGHT EVENTS:     Patient was examined at bedside, AAOx3, stable, NAD. Still has some hematuria but patient states that her urine is clearer compared to before. No acute events overnight.    REVIEW OF SYSTEMS:  CONSTITUTIONAL: No fever, weight loss, or fatigue  RESPIRATORY: No cough, wheezing, chills or hemoptysis; No shortness of breath  CARDIOVASCULAR: No chest pain, palpitations, dizziness, or leg swelling  GASTROINTESTINAL: No abdominal pain. No nausea, vomiting, or hematemesis; No diarrhea or constipation. No melena or hematochezia.  GENITOURINARY: (+) hematuria. No dysuria, urinary frequency  NEUROLOGICAL: No headaches, memory loss, loss of strength, numbness, or tremors  SKIN: No itching, burning, rashes, or lesions     MEDICATIONS  (STANDING):  aspirin enteric coated 81 milliGRAM(s) Oral daily  atorvastatin 80 milliGRAM(s) Oral at bedtime  buPROPion XL (24-Hour) . 150 milliGRAM(s) Oral daily  carbidopa/levodopa/entacapone 25/100/200 1 Tablet(s) Oral <User Schedule>  cefTRIAXone   IVPB 1000 milliGRAM(s) IV Intermittent every 24 hours  cholecalciferol 1000 Unit(s) Oral daily  clopidogrel Tablet 75 milliGRAM(s) Oral daily  cyanocobalamin 1000 MICROGram(s) Oral daily  enoxaparin Injectable 40 milliGRAM(s) SubCutaneous every 24 hours  magnesium oxide 400 milliGRAM(s) Oral daily  metoprolol tartrate 12.5 milliGRAM(s) Oral two times a day  pantoprazole    Tablet 40 milliGRAM(s) Oral before breakfast  polyethylene glycol 3350 17 Gram(s) Oral daily  senna 2 Tablet(s) Oral at bedtime  tamsulosin 0.4 milliGRAM(s) Oral at bedtime    MEDICATIONS  (PRN):  acetaminophen     Tablet .. 650 milliGRAM(s) Oral every 6 hours PRN Temp greater or equal to 38C (100.4F)      Vital Signs Last 24 Hrs  T(C): 37.7 (17 Jul 2022 05:24), Max: 37.7 (17 Jul 2022 05:24)  T(F): 99.8 (17 Jul 2022 05:24), Max: 99.8 (17 Jul 2022 05:24)  HR: 94 (17 Jul 2022 05:24) (79 - 95)  BP: 107/56 (17 Jul 2022 05:24) (101/52 - 146/55)  BP(mean): --  RR: 18 (17 Jul 2022 05:24) (18 - 20)  SpO2: 93% (17 Jul 2022 05:24) (93% - 98%)    Parameters below as of 17 Jul 2022 05:24  Patient On (Oxygen Delivery Method): room air        PHYSICAL EXAMINATION:  GENERAL: NAD  HEAD:  Atraumatic, Normocephalic  EYES:  conjunctiva and sclera clear  NECK: Supple, No JVD, Normal thyroid  CHEST/LUNG: Clear to auscultation. Clear to percussion bilaterally; No rales, rhonchi, wheezing, or rubs  HEART: Regular rate and rhythm; No murmurs, rubs, or gallops  ABDOMEN: Soft, Nontender, Nondistended; Bowel sounds present, no pain or masses on palpation  NERVOUS SYSTEM:  Alert & Oriented X3; (+) intention tremors (2/2 Parkinsons)  : voiding well  EXTREMITIES:  2+ Peripheral Pulses, No clubbing, cyanosis, or edema  SKIN: warm dry                          10.3   13.22 )-----------( 332      ( 17 Jul 2022 07:31 )             31.2     07-17    141  |  111<H>  |  12  ----------------------------<  68<L>  4.0   |  24  |  0.84    Ca    8.5      17 Jul 2022 07:31  Phos  3.0     07-17  Mg     2.3     07-17    TPro  5.7<L>  /  Alb  1.9<L>  /  TBili  0.6  /  DBili  x   /  AST  21  /  ALT  32  /  AlkPhos  60  07-17    LIVER FUNCTIONS - ( 17 Jul 2022 07:31 )  Alb: 1.9 g/dL / Pro: 5.7 g/dL / ALK PHOS: 60 U/L / ALT: 32 U/L DA / AST: 21 U/L / GGT: x                   I&O's Summary        Culture - Blood (collected 16 Jul 2022 10:18)  Source: .Blood Blood-Peripheral  Preliminary Report (17 Jul 2022 11:01):    No growth to date.    Culture - Blood (collected 16 Jul 2022 10:18)  Source: .Blood Blood-Peripheral  Preliminary Report (17 Jul 2022 11:01):    No growth to date.        CAPILLARY BLOOD GLUCOSE      RADIOLOGY & ADDITIONAL TESTS:

## 2022-07-18 ENCOUNTER — TRANSCRIPTION ENCOUNTER (OUTPATIENT)
Age: 52
End: 2022-07-18

## 2022-07-18 LAB
ALBUMIN SERPL ELPH-MCNC: 2.1 G/DL — LOW (ref 3.5–5)
ALP SERPL-CCNC: 62 U/L — SIGNIFICANT CHANGE UP (ref 40–120)
ALT FLD-CCNC: 43 U/L DA — SIGNIFICANT CHANGE UP (ref 10–60)
ANION GAP SERPL CALC-SCNC: 7 MMOL/L — SIGNIFICANT CHANGE UP (ref 5–17)
AST SERPL-CCNC: 38 U/L — SIGNIFICANT CHANGE UP (ref 10–40)
BASOPHILS # BLD AUTO: 0.01 K/UL — SIGNIFICANT CHANGE UP (ref 0–0.2)
BASOPHILS NFR BLD AUTO: 0.1 % — SIGNIFICANT CHANGE UP (ref 0–2)
BILIRUB SERPL-MCNC: 0.4 MG/DL — SIGNIFICANT CHANGE UP (ref 0.2–1.2)
BUN SERPL-MCNC: 9 MG/DL — SIGNIFICANT CHANGE UP (ref 7–18)
CALCIUM SERPL-MCNC: 8.9 MG/DL — SIGNIFICANT CHANGE UP (ref 8.4–10.5)
CHLORIDE SERPL-SCNC: 112 MMOL/L — HIGH (ref 96–108)
CO2 SERPL-SCNC: 25 MMOL/L — SIGNIFICANT CHANGE UP (ref 22–31)
CREAT SERPL-MCNC: 0.86 MG/DL — SIGNIFICANT CHANGE UP (ref 0.5–1.3)
EGFR: 105 ML/MIN/1.73M2 — SIGNIFICANT CHANGE UP
EOSINOPHIL # BLD AUTO: 0.07 K/UL — SIGNIFICANT CHANGE UP (ref 0–0.5)
EOSINOPHIL NFR BLD AUTO: 0.8 % — SIGNIFICANT CHANGE UP (ref 0–6)
GLUCOSE SERPL-MCNC: 70 MG/DL — SIGNIFICANT CHANGE UP (ref 70–99)
HCT VFR BLD CALC: 32.6 % — LOW (ref 39–50)
HGB BLD-MCNC: 10.7 G/DL — LOW (ref 13–17)
IMM GRANULOCYTES NFR BLD AUTO: 0.6 % — SIGNIFICANT CHANGE UP (ref 0–1.5)
LYMPHOCYTES # BLD AUTO: 0.57 K/UL — LOW (ref 1–3.3)
LYMPHOCYTES # BLD AUTO: 6.6 % — LOW (ref 13–44)
MAGNESIUM SERPL-MCNC: 2.4 MG/DL — SIGNIFICANT CHANGE UP (ref 1.6–2.6)
MCHC RBC-ENTMCNC: 28.2 PG — SIGNIFICANT CHANGE UP (ref 27–34)
MCHC RBC-ENTMCNC: 32.8 GM/DL — SIGNIFICANT CHANGE UP (ref 32–36)
MCV RBC AUTO: 86 FL — SIGNIFICANT CHANGE UP (ref 80–100)
MONOCYTES # BLD AUTO: 0.87 K/UL — SIGNIFICANT CHANGE UP (ref 0–0.9)
MONOCYTES NFR BLD AUTO: 10.1 % — SIGNIFICANT CHANGE UP (ref 2–14)
NEUTROPHILS # BLD AUTO: 7.06 K/UL — SIGNIFICANT CHANGE UP (ref 1.8–7.4)
NEUTROPHILS NFR BLD AUTO: 81.8 % — HIGH (ref 43–77)
NRBC # BLD: 0 /100 WBCS — SIGNIFICANT CHANGE UP (ref 0–0)
PHOSPHATE SERPL-MCNC: 3.7 MG/DL — SIGNIFICANT CHANGE UP (ref 2.5–4.5)
PLATELET # BLD AUTO: 373 K/UL — SIGNIFICANT CHANGE UP (ref 150–400)
POTASSIUM SERPL-MCNC: 3.9 MMOL/L — SIGNIFICANT CHANGE UP (ref 3.5–5.3)
POTASSIUM SERPL-SCNC: 3.9 MMOL/L — SIGNIFICANT CHANGE UP (ref 3.5–5.3)
PROT SERPL-MCNC: 6.3 G/DL — SIGNIFICANT CHANGE UP (ref 6–8.3)
RBC # BLD: 3.79 M/UL — LOW (ref 4.2–5.8)
RBC # FLD: 13.6 % — SIGNIFICANT CHANGE UP (ref 10.3–14.5)
SODIUM SERPL-SCNC: 144 MMOL/L — SIGNIFICANT CHANGE UP (ref 135–145)
WBC # BLD: 8.63 K/UL — SIGNIFICANT CHANGE UP (ref 3.8–10.5)
WBC # FLD AUTO: 8.63 K/UL — SIGNIFICANT CHANGE UP (ref 3.8–10.5)

## 2022-07-18 RX ADMIN — CLOPIDOGREL BISULFATE 75 MILLIGRAM(S): 75 TABLET, FILM COATED ORAL at 12:07

## 2022-07-18 RX ADMIN — POLYETHYLENE GLYCOL 3350 17 GRAM(S): 17 POWDER, FOR SOLUTION ORAL at 12:07

## 2022-07-18 RX ADMIN — ATORVASTATIN CALCIUM 80 MILLIGRAM(S): 80 TABLET, FILM COATED ORAL at 21:23

## 2022-07-18 RX ADMIN — CARBIDOPA, LEVODOPA, AND ENTACAPONE 1 TABLET(S): 50; 200; 200 TABLET, FILM COATED ORAL at 12:06

## 2022-07-18 RX ADMIN — CEFTRIAXONE 100 MILLIGRAM(S): 500 INJECTION, POWDER, FOR SOLUTION INTRAMUSCULAR; INTRAVENOUS at 12:06

## 2022-07-18 RX ADMIN — MAGNESIUM OXIDE 400 MG ORAL TABLET 400 MILLIGRAM(S): 241.3 TABLET ORAL at 12:08

## 2022-07-18 RX ADMIN — CARBIDOPA, LEVODOPA, AND ENTACAPONE 1 TABLET(S): 50; 200; 200 TABLET, FILM COATED ORAL at 19:04

## 2022-07-18 RX ADMIN — Medication 12.5 MILLIGRAM(S): at 07:02

## 2022-07-18 RX ADMIN — CARBIDOPA, LEVODOPA, AND ENTACAPONE 1 TABLET(S): 50; 200; 200 TABLET, FILM COATED ORAL at 15:28

## 2022-07-18 RX ADMIN — TAMSULOSIN HYDROCHLORIDE 0.4 MILLIGRAM(S): 0.4 CAPSULE ORAL at 21:23

## 2022-07-18 RX ADMIN — SENNA PLUS 2 TABLET(S): 8.6 TABLET ORAL at 21:22

## 2022-07-18 RX ADMIN — BUPROPION HYDROCHLORIDE 150 MILLIGRAM(S): 150 TABLET, EXTENDED RELEASE ORAL at 12:06

## 2022-07-18 RX ADMIN — Medication 1000 UNIT(S): at 12:07

## 2022-07-18 RX ADMIN — ENOXAPARIN SODIUM 40 MILLIGRAM(S): 100 INJECTION SUBCUTANEOUS at 12:07

## 2022-07-18 RX ADMIN — PREGABALIN 1000 MICROGRAM(S): 225 CAPSULE ORAL at 12:07

## 2022-07-18 RX ADMIN — Medication 110 MILLIGRAM(S): at 15:27

## 2022-07-18 RX ADMIN — PANTOPRAZOLE SODIUM 40 MILLIGRAM(S): 20 TABLET, DELAYED RELEASE ORAL at 07:02

## 2022-07-18 RX ADMIN — CARBIDOPA, LEVODOPA, AND ENTACAPONE 1 TABLET(S): 50; 200; 200 TABLET, FILM COATED ORAL at 07:03

## 2022-07-18 RX ADMIN — Medication 81 MILLIGRAM(S): at 12:06

## 2022-07-18 NOTE — DISCHARGE NOTE PROVIDER - NSDCMRMEDTOKEN_GEN_ALL_CORE_FT
aspirin 81 mg oral delayed release tablet: 1 tab(s) orally once a day  atorvastatin 80 mg oral tablet: 1 tab(s) orally once a day (at bedtime)  buPROPion 150 mg/24 hours (XL) oral tablet, extended release: 1 tab(s) orally every 24 hours  carbidopa-levodopa 25 mg-100 mg oral tablet: 1 tab(s) orally 4 times a day at  7 AM, 11 AM, 3 PM and 7 PM  cholecalciferol 1000 intl units (25 mcg) oral tablet: 1 tab(s) orally once a day  docusate sodium 100 mg oral tablet: 1 tab(s) orally every 8 hours  ezetimibe 10 mg oral tablet: 1 tab(s) orally once a day  furosemide 20 mg oral tablet: 1 tab(s) orally once a day -for bronchospasm   Kynmobi 10 mg sublingual film: 1 each sublingual prn  lisinopril 2.5 mg oral tablet: 1 tab(s) orally once a day  magnesium oxide 400 mg oral tablet: 1 tab(s) orally once a day  metoprolol tartrate 25 mg oral tablet: 0.5 tab(s) orally 2 times a day  pantoprazole 40 mg oral delayed release tablet: 1 tab(s) orally once a day  Plavix 75 mg oral tablet: 1 tab(s) orally once a day  spironolactone 25 mg oral tablet: 1 tab(s) orally once a day  tamsulosin 0.4 mg oral capsule: 1 cap(s) orally once a day  Vitamin B-12 1000 mcg oral tablet: 1 tab(s) orally once a day   aspirin 81 mg oral delayed release tablet: 1 tab(s) orally once a day  atorvastatin 80 mg oral tablet: 1 tab(s) orally once a day (at bedtime)  buPROPion 150 mg/24 hours (XL) oral tablet, extended release: 1 tab(s) orally every 24 hours  carbidopa-levodopa 25 mg-100 mg oral tablet: 1 tab(s) orally 4 times a day at  7 AM, 11 AM, 3 PM and 7 PM  cholecalciferol 1000 intl units (25 mcg) oral tablet: 1 tab(s) orally once a day  docusate sodium 100 mg oral tablet: 1 tab(s) orally every 8 hours  ezetimibe 10 mg oral tablet: 1 tab(s) orally once a day  furosemide 20 mg oral tablet: 1 tab(s) orally once a day -for bronchospasm   Kynmobi 10 mg sublingual film: 1 each sublingual prn  levoFLOXacin 750 mg oral tablet: 1 tab(s) orally every 24 hours from July 21 to 26, 2022 (to complete 7 days)  lisinopril 2.5 mg oral tablet: 1 tab(s) orally once a day  magnesium oxide 400 mg oral tablet: 1 tab(s) orally once a day  metoprolol tartrate 25 mg oral tablet: 0.5 tab(s) orally 2 times a day  pantoprazole 40 mg oral delayed release tablet: 1 tab(s) orally once a day  Plavix 75 mg oral tablet: 1 tab(s) orally once a day  spironolactone 25 mg oral tablet: 1 tab(s) orally once a day  tamsulosin 0.4 mg oral capsule: 1 cap(s) orally once a day  Vitamin B-12 1000 mcg oral tablet: 1 tab(s) orally once a day

## 2022-07-18 NOTE — DISCHARGE NOTE PROVIDER - NSDCCPCAREPLAN_GEN_ALL_CORE_FT
PRINCIPAL DISCHARGE DIAGNOSIS  Diagnosis: Sepsis due to Escherichia coli  Assessment and Plan of Treatment:       SECONDARY DISCHARGE DIAGNOSES  Diagnosis: Parkinson disease  Assessment and Plan of Treatment:     Diagnosis: Congestive heart disease  Assessment and Plan of Treatment:     Diagnosis: CAD (coronary artery disease)  Assessment and Plan of Treatment:     Diagnosis: HTN (hypertension)  Assessment and Plan of Treatment:     Diagnosis: BPH (benign prostatic hyperplasia)  Assessment and Plan of Treatment:      PRINCIPAL DISCHARGE DIAGNOSIS  Diagnosis: Gram-negative sepsis, unspecified  Assessment and Plan of Treatment: You presented with dysuria, increased urinary frequency, hematuria and right testicular pain. In the ED, you have fever, tachycardia, elevate dwhite blood cell count. You were subsequently admitted for sepsis. Urinalysis was positive for Urinary Tract Infection. Ultrasound of the testes showed right orchitis and epidydimitis. Urine culture grew Escherichia coli and Pseudomonas aeruginosa. Infectious Disease (Dr. Kuo) was consulted. You were initially on Ceftriaxone and Doxycycline. Ceftriaxone was later changed to Meropenem. Sensitivities came back. You were stable, improved and were subsequently discharged. Continue taking antibiotics. Follow-up with your Primary Care Doctor.      SECONDARY DISCHARGE DIAGNOSES  Diagnosis: Acute UTI (urinary tract infection)  Assessment and Plan of Treatment: You presented with dysuria, increased urinary frequency and hematuria. You tested positive for Urinary Tract Infection. You were subsequently admitted for sepsis.    Diagnosis: Orchitis and epididymitis  Assessment and Plan of Treatment:     Diagnosis: Parkinson disease  Assessment and Plan of Treatment:     Diagnosis: Congestive heart disease  Assessment and Plan of Treatment:     Diagnosis: CAD (coronary artery disease)  Assessment and Plan of Treatment:     Diagnosis: HTN (hypertension)  Assessment and Plan of Treatment:     Diagnosis: BPH (benign prostatic hyperplasia)  Assessment and Plan of Treatment:      PRINCIPAL DISCHARGE DIAGNOSIS  Diagnosis: Gram-negative sepsis, unspecified  Assessment and Plan of Treatment: You presented with dysuria, increased urinary frequency, hematuria and right testicular pain. In the ED, you have fever, tachycardia, elevate dwhite blood cell count. You were subsequently admitted for sepsis. Urinalysis was positive for Urinary Tract Infection. Ultrasound of the testes showed right orchitis and epidydimitis. Urine culture grew Escherichia coli and Pseudomonas aeruginosa. Infectious Disease (Dr. Kuo) was consulted. You were initially on Ceftriaxone and Doxycycline. Ceftriaxone was later changed to Meropenem. Sensitivities came back. You were stable, improved and were subsequently discharged. Continue taking antibiotics. Follow-up with your Primary Care Doctor.      SECONDARY DISCHARGE DIAGNOSES  Diagnosis: Acute UTI (urinary tract infection)  Assessment and Plan of Treatment: You presented with dysuria, increased urinary frequency and hematuria. You tested positive for Urinary Tract Infection. You were subsequently admitted for sepsis.  Urine culture grew Escherichia coli and Pseudomonas aeruginosa. Infectious Disease (Dr. Kuo) was consulted. You were initially on Ceftriaxone and Doxycycline. Ceftriaxone was later changed to Meropenem. Sensitivities came back. You were stable, improved and were subsequently discharged. Continue taking antibiotics. Follow-up with your Primary Care Doctor.    Diagnosis: Orchitis and epididymitis  Assessment and Plan of Treatment: You presented with right testicular pain. Ultrasound of the testes showed right orchitis and epidydimitis. You were initially on Ceftriaxone and Doxycycline. Ceftriaxone was later changed to Meropenem. Sensitivities came back. You were stable, improved and were subsequently discharged. Continue taking antibiotics. Follow-up with your Primary Care Doctor.    Diagnosis: Chronic systolic congestive heart failure  Assessment and Plan of Treatment: You have history of chronic systolic heart failure with latest echocardiogram showing reduced ejection fraction (40-45%). Home medications Metoprolol was resumed. Lisinopril, Spironolactone and Lasix were held in the setting of sepsis. You may resume all your home medications upon discharge. Follow-up with your Primary Care Doctor.    Diagnosis: CAD (coronary artery disease)  Assessment and Plan of Treatment: You have Coronary Artery Disease. Home medication Aspirin, Atorvastatin and Plavix were resumed. Blood pressure and heart rate were monitored and it was stable. Follow-up with your Primary Care Doctor.    Diagnosis: HTN (hypertension)  Assessment and Plan of Treatment: You have Hypertension. Home medication Metoprolol was resumed. Lisinopril and Lasix were held in the setting of sepsis. Follow-up with your Primary Care Doctor.    Diagnosis: BPH (benign prostatic hyperplasia)  Assessment and Plan of Treatment:     Diagnosis: Parkinson disease  Assessment and Plan of Treatment: You have Parkinsons Disease. Home medication Carbidopa-Levodopa was resumed. Follow-up with your Primary Care Doctor.     PRINCIPAL DISCHARGE DIAGNOSIS  Diagnosis: Gram-negative sepsis, unspecified  Assessment and Plan of Treatment: You presented with dysuria, increased urinary frequency, hematuria and right testicular pain. In the ED, you have fever, tachycardia, elevate dwhite blood cell count. You were subsequently admitted for sepsis. Urinalysis was positive for Urinary Tract Infection. Ultrasound of the testes showed right orchitis and epidydimitis. Urine culture grew Escherichia coli and Pseudomonas aeruginosa. Infectious Disease (Dr. Kuo) was consulted. You were initially on Ceftriaxone and Doxycycline. Ceftriaxone was later changed to Meropenem. Sensitivities came back. You were stable, improved and were subsequently discharged. Continue taking antibiotics. Follow-up with your Primary Care Doctor.      SECONDARY DISCHARGE DIAGNOSES  Diagnosis: Acute UTI (urinary tract infection)  Assessment and Plan of Treatment: You presented with dysuria, increased urinary frequency and hematuria. You tested positive for Urinary Tract Infection. You were subsequently admitted for sepsis.  Urine culture grew Escherichia coli and Pseudomonas aeruginosa. Infectious Disease (Dr. Kuo) was consulted. You were initially on Ceftriaxone and Doxycycline. Ceftriaxone was later changed to Meropenem. Sensitivities came back. You were stable, improved and were subsequently discharged. Continue taking antibiotics. Follow-up with your Primary Care Doctor.    Diagnosis: Orchitis and epididymitis  Assessment and Plan of Treatment: You presented with right testicular pain. Ultrasound of the testes showed right orchitis and epidydimitis. You were initially on Ceftriaxone and Doxycycline. Ceftriaxone was later changed to Meropenem. Sensitivities came back. You were stable, improved and were subsequently discharged. Continue taking antibiotics. Follow-up with your Primary Care Doctor.    Diagnosis: Chronic systolic congestive heart failure  Assessment and Plan of Treatment: You have history of chronic systolic heart failure with latest echocardiogram showing reduced ejection fraction (40-45%). Home medications Metoprolol was resumed. Lisinopril, Spironolactone and Lasix were held in the setting of sepsis. You may resume all your home medications upon discharge. Follow-up with your Primary Care Doctor.    Diagnosis: CAD (coronary artery disease)  Assessment and Plan of Treatment: You have Coronary Artery Disease. Home medication Aspirin, Atorvastatin and Plavix were resumed. Blood pressure and heart rate were monitored and it was stable. Follow-up with your Primary Care Doctor.    Diagnosis: HTN (hypertension)  Assessment and Plan of Treatment: You have Hypertension. Home medication Metoprolol was resumed. Lisinopril and Lasix were held in the setting of sepsis. Follow-up with your Primary Care Doctor.    Diagnosis: BPH (benign prostatic hyperplasia)  Assessment and Plan of Treatment: You have BPH on home medication tamsulosin and Kynmobi. They were resumed during your admission. Follow-up with your Primary Care Doctor.    Diagnosis: Parkinson disease  Assessment and Plan of Treatment: You have Parkinsons Disease. Home medication Carbidopa-Levodopa was resumed. Follow-up with your Primary Care Doctor.     PRINCIPAL DISCHARGE DIAGNOSIS  Diagnosis: Gram-negative sepsis, unspecified  Assessment and Plan of Treatment: You presented with dysuria, increased urinary frequency, hematuria and right testicular pain. In the ED, you have fever, tachycardia, elevate dwhite blood cell count. You were subsequently admitted for sepsis. Urinalysis was positive for Urinary Tract Infection. Ultrasound of the testes showed right orchitis and epidydimitis. Urine culture grew Escherichia coli and Pseudomonas aeruginosa. Infectious Disease (Dr. Kuo) was consulted. You were initially on Ceftriaxone and Doxycycline. Ceftriaxone was later changed to Meropenem. Sensitivities came back. You were stable, improved and were subsequently discharged. Continue taking antibiotics Levofloxacin 750 mg daily from July 21 to 26, 2022 to complete 7 days. Follow-up with your Primary Care Doctor.      SECONDARY DISCHARGE DIAGNOSES  Diagnosis: Acute UTI (urinary tract infection)  Assessment and Plan of Treatment: You presented with dysuria, increased urinary frequency and hematuria. You tested positive for Urinary Tract Infection. You were subsequently admitted for sepsis.  Urine culture grew Escherichia coli and Pseudomonas aeruginosa. Infectious Disease (Dr. Kuo) was consulted. You were initially on Ceftriaxone and Doxycycline. Ceftriaxone was later changed to Meropenem. Sensitivities came back. You were stable, improved and were subsequently discharged. Continue taking antibiotics Levofloxacin 750 mg daily from July 21 to 26, 2022 to complete 7 days. Follow-up with your Primary Care Doctor.    Diagnosis: Orchitis and epididymitis  Assessment and Plan of Treatment: You presented with right testicular pain. Ultrasound of the testes showed right orchitis and epidydimitis. You were initially on Ceftriaxone and Doxycycline. Ceftriaxone was later changed to Meropenem. Sensitivities came back. You were stable, improved and were subsequently discharged. Continue taking antibiotics. Follow-up with your Primary Care Doctor.    Diagnosis: Chronic systolic congestive heart failure  Assessment and Plan of Treatment: You have history of chronic systolic heart failure with latest echocardiogram showing reduced ejection fraction (40-45%). Home medications Metoprolol was resumed. Lisinopril, Spironolactone and Lasix were held in the setting of sepsis. You may resume all your home medications upon discharge. Follow-up with your Primary Care Doctor.    Diagnosis: CAD (coronary artery disease)  Assessment and Plan of Treatment: You have Coronary Artery Disease. Home medication Aspirin, Atorvastatin and Plavix were resumed. Blood pressure and heart rate were monitored and it was stable. Follow-up with your Primary Care Doctor.    Diagnosis: HTN (hypertension)  Assessment and Plan of Treatment: You have Hypertension. Home medication Metoprolol was resumed. Lisinopril and Lasix were held in the setting of sepsis. Follow-up with your Primary Care Doctor.    Diagnosis: BPH (benign prostatic hyperplasia)  Assessment and Plan of Treatment: You have BPH on home medication tamsulosin and Kynmobi. They were resumed during your admission. Follow-up with your Primary Care Doctor.    Diagnosis: Parkinson disease  Assessment and Plan of Treatment: You have Parkinsons Disease. Home medication Carbidopa-Levodopa was resumed. Follow-up with your Primary Care Doctor.

## 2022-07-18 NOTE — DISCHARGE NOTE PROVIDER - HOSPITAL COURSE
Pt is a 50 yo M from home ambulates w/ cane w/ pmhx of PD, CHF, CAD (s/p CABG 2018), HTN, BPH comes in after sustaining a mechanical fall 1 wk ago and urinary symptoms. As per the pt and his sister Ms. Santana sustained a mechanical fall 1 wk ago, landed on his buttocks. Denied head trauma or LOC. Pt did not have significant pain afterwards. However, on monday pt started experiencing abdominal and lower back pain, associated with dysuria and increased urinary frequency that would happen on an on and off basis. He also experienced pain on (R) testicle. As per pt and his sister he also experienced decreased appetite, decreased oral intake and some nausea, no episodes of emesis. Due to weakness he had to ambulate w/ walker. Denies fever, chills or any other acute complaint.    Pt is a 50 yo M from home ambulates w/ cane w/ pmhx of PD, CHF, CAD (s/p CABG 2018), HTN, BPH comes in after sustaining a mechanical fall 1 wk ago and urinary symptoms. As per the pt and his sister Ms. Santana sustained a mechanical fall 1 wk ago, landed on his buttocks. Denied head trauma or LOC. Pt did not have significant pain afterwards. However, on monday pt started experiencing abdominal and lower back pain, associated with dysuria and increased urinary frequency that would happen on an on and off basis. He also experienced pain on (R) testicle. As per pt and his sister he also experienced decreased appetite, decreased oral intake and some nausea, no episodes of emesis. Due to weakness he had to ambulate w/ walker. Denies fever, chills or any other acute complaint.     Patient was admitted for sepsis 2/2 UTI. Urinalysis was positive for UTI. Urine culture grew E. coli and Pseudomonas aeruginosa. Blood culture was negative. US Testes showed R orchitis and epidydimitis. Infectious Disease (Dr. Kuo) was consulted. IV antibiotics Ceftriaxone and Doxycycline was started. Ceftriaxone was later changed to Meropenem. he also has hematuria but Hgb is stable and no signs of active bleeding. His hematuria slowly resolved. Home medications for HTN, CAD, CHF, BPH and Parkinsons were resumed. He also reported fall from home but not complaining of any hip pain. Physical Therapy recommended home PT.    Patient was stable, improved and was subsequently discharged. Continue taking antibiotics. Follow-up with Primary Care Doctor.     Pt is a 50 yo M from home ambulates w/ cane w/ pmhx of PD, CHF, CAD (s/p CABG 2018), HTN, BPH comes in after sustaining a mechanical fall 1 wk ago and urinary symptoms. As per the pt and his sister Ms. Santana sustained a mechanical fall 1 wk ago, landed on his buttocks. Denied head trauma or LOC. Pt did not have significant pain afterwards. However, on monday pt started experiencing abdominal and lower back pain, associated with dysuria and increased urinary frequency that would happen on an on and off basis. He also experienced pain on (R) testicle. As per pt and his sister he also experienced decreased appetite, decreased oral intake and some nausea, no episodes of emesis. Due to weakness he had to ambulate w/ walker. Denies fever, chills or any other acute complaint.     Patient was admitted for sepsis 2/2 UTI. Urinalysis was positive for UTI. Urine culture grew E. coli and Pseudomonas aeruginosa. Blood culture was negative. US Testes showed R orchitis and epidydimitis. Infectious Disease (Dr. Kuo) was consulted. IV antibiotics Ceftriaxone and Doxycycline was started. Ceftriaxone was later changed to Meropenem. he also has hematuria but Hgb is stable and no signs of active bleeding. His hematuria slowly resolved. Home medications for HTN, CAD, CHF, BPH and Parkinsons were resumed. He also reported fall from home but not complaining of any hip pain. Physical Therapy recommended home PT.    Patient was stable, improved and was subsequently discharged. Continue taking antibiotics Levaquin 750 mg OD x 6 more days (until July 26, 2022). Follow-up with Primary Care Doctor.

## 2022-07-18 NOTE — PROGRESS NOTE ADULT - PROBLEM SELECTOR PLAN 6
Pt not on CHF exacerbation  Last TTE (10/2020) EF 44%, apical anterior wall and apical septum are hypokinetic  on metoprolol tratrate 12.5 mg PO BID+ lasix 20 mg po daily + spironolactone 25 mg poqd at home  only resumed BB for now  holding diuretics for now in the s/o sepsis Pt not on CHF exacerbation  Last TTE (10/2020) EF 44%, apical anterior wall and apical septum are hypokinetic  on metoprolol tratrate 12.5 mg PO BID+ lasix 20 mg po daily + spironolactone 25 mg poqd at home  only resumed BB for now  holding diuretics for now in the s/o sepsis; resume as improved / as needed

## 2022-07-18 NOTE — PROGRESS NOTE ADULT - SUBJECTIVE AND OBJECTIVE BOX
Patient is seen and examined at the bed side, is afebrile. The Leukocytosis trended down to normal. The Urien culture grew E.coli and Pseudomonas, sensitivities is pending.         REVIEW OF SYSTEMS: All other review systems are negative      ALLERGIES: No Known Drug Allergies  Seafood (Blisters; Swelling; Pruritus; Hives)      Vital Signs Last 24 Hrs  T(C): 37.2 (2022 12:49), Max: 37.7 (2022 05:24)  T(F): 99 (2022 12:49), Max: 99.8 (2022 05:24)  HR: 88 (2022 12:49) (88 - 100)  BP: 109/71 (2022 17:41) (109/71 - 134/82)  BP(mean): --  RR: 16 (2022 12:49) (16 - 18)  SpO2: 97% (2022 12:49) (95% - 97%)    Parameters below as of 2022 12:49  Patient On (Oxygen Delivery Method): room air        PHYSICAL EXAM:  GENERAL: Not in distress   CHEST/LUNG:  Not using accessory muscles   HEART: s1 and s2 present  ABDOMEN:  Nontender and  Nondistended  EXTREMITIES: No pedal  edema  CNS: Awake and Alert      LABS:                        10.7   8.63  )-----------( 373      ( 2022 08:02 )             32.6                           10.3   13.22 )-----------( 332      ( 2022 07:31 )             31.2         18    144  |  112<H>  |  9   ----------------------------<  70  3.9   |  25  |  0.86    Ca    8.9      2022 08:02  Phos  3.7     -18  Mg     2.4     18    TPro  6.3  /  Alb  2.1<L>  /  TBili  0.4  /  DBili  x   /  AST  38  /  ALT  43  /  AlkPhos  62  -18    07-17    141  |  111<H>  |  12  ----------------------------<  68<L>  4.0   |  24  |  0.84    Ca    8.5      2022 07:31  Phos  3.0     07-17  Mg     2.3     07-17    TPro  5.7<L>  /  Alb  1.9<L>  /  TBili  0.6  /  DBili  x   /  AST  21  /  ALT  32  /  AlkPhos  60  07-17      Urinalysis Basic - ( 2022 08:07 )  Color: Yellow / Appearance: Clear / S.010 / pH: x  Gluc: x / Ketone: Trace  / Bili: Negative / Urobili: Negative   Blood: x / Protein: 100 / Nitrite: Positive   Leuk Esterase: Moderate / RBC: 5-10 /HPF / WBC 11-25 /HPF   Sq Epi: x / Non Sq Epi: Few /HPF / Bacteria: Few /HPF        MEDICATIONS  (STANDING):    aspirin enteric coated 81 milliGRAM(s) Oral daily  atorvastatin 80 milliGRAM(s) Oral at bedtime  buPROPion XL (24-Hour) . 150 milliGRAM(s) Oral daily  carbidopa/levodopa/entacapone 25/100/200 1 Tablet(s) Oral <User Schedule>  cefTRIAXone   IVPB 1000 milliGRAM(s) IV Intermittent every 24 hours  cholecalciferol 1000 Unit(s) Oral daily  clopidogrel Tablet 75 milliGRAM(s) Oral daily  cyanocobalamin 1000 MICROGram(s) Oral daily  doxycycline IVPB      enoxaparin Injectable 40 milliGRAM(s) SubCutaneous every 24 hours  magnesium oxide 400 milliGRAM(s) Oral daily  metoprolol tartrate 12.5 milliGRAM(s) Oral two times a day  pantoprazole    Tablet 40 milliGRAM(s) Oral before breakfast  polyethylene glycol 3350 17 Gram(s) Oral daily  senna 2 Tablet(s) Oral at bedtime  tamsulosin 0.4 milliGRAM(s) Oral at bedtime          RADIOLOGY & ADDITIONAL TESTS:    22: US Testicles (22 @ 12:35) maging findings suggest epididymitis and orchitis on the right side.   Correlate clinically and follow-up to document resolution.    22: Xray Chest 1 View-PORTABLE IMMEDIATE (22 @ 07:21) No focal consolidation.        MICROBIOLOGY DATA:    Culture - Urine (22 @ 19:08)   Specimen Source: Clean Catch Clean Catch (Midstream)   Culture Results:   50,000 - 99,000 CFU/mL Escherichia coli   50,000 - 99,000 CFU/mL Pseudomonas aeruginosa     Culture - Blood (22 @ 10:18)   Specimen Source: .Blood Blood-Peripheral   Culture Results: No growth to date.     Culture - Blood (22 @ 10:18)   Specimen Source: .Blood Blood-Peripheral   Culture Results: No growth to date.    Rapid HIV-1/2 Antibody (22 @ 06:15)   Rapid HIV-1/2 Antibody: Nonreact:

## 2022-07-18 NOTE — DISCHARGE NOTE PROVIDER - CARE PROVIDERS DIRECT ADDRESSES
[email or web address]

## 2022-07-18 NOTE — PROGRESS NOTE ADULT - PROBLEM SELECTOR PLAN 3
Pt w/ hx of PD  s/p fall at home, landing in buttocks  fall precautions  ordered PT evaluation Pt w/ hx of PD  s/p fall at home, landing in buttocks  fall precautions  PT recommends home PT

## 2022-07-18 NOTE — PROGRESS NOTE ADULT - PROBLEM SELECTOR PLAN 4
On admission Hgb 11.8  on ferrous gluconate at home  asymptomatic  monitor CBC daily On admission Hgb 10.7  on ferrous gluconate at home  asymptomatic  monitor CBC daily

## 2022-07-18 NOTE — PROGRESS NOTE ADULT - SUBJECTIVE AND OBJECTIVE BOX
PGY-2 Progress Note discussed with attending    PAGER #: [[phone]] TILL 5:00 PM  PLEASE CONTACT ON CALL TEAM:  - On Call Team (Please refer to Andres) FROM 5:00 PM - 8:30PM  - Nightfloat Team FROM 8:30 -7:30 AM    CHIEF COMPLAINT & BRIEF HOSPITAL COURSE:  Pt is a 50 yo M from home ambulates w/ cane w/ pmhx of PD, CHF, CAD (s/p CABG 2018), HTN, BPH comes in after sustaining a mechanical fall 1 wk ago and urinary symptoms. As per the pt and his sister Ms. Santana sustained a mechanical fall 1 wk ago, landed on his buttocks. Denied head trauma or LOC. Pt did not have significant pain afterwards. However, on monday pt started experiencing abdominal and lower back pain, associated with dysuria and increased urinary frequency that would happen on an on and off basis. He also experienced pain on (R) testicle. As per pt and his sister he also experienced decreased appetite, decreased oral intake and some nausea, no episodes of emesis. Due to weakness he had to ambulate w/ walker. Denies fever, chills or any other acute complaint    INTERVAL HPI/OVERNIGHT EVENTS:   No acute overnight events noted. Seen at bedside, in no acute distress, and A&Ox3. Pt states improved testicular pain, improved hematuria, and no dysuria. No complaints of LBP or hip pain. Pt voiding regularly, no BM, has an appetite. No fever, chills, CP, SOB, n/v/d.     REVIEW OF SYSTEMS:  CONSTITUTIONAL: No fever, weight loss, or fatigue  RESPIRATORY: No cough, wheezing, chills or hemoptysis; No shortness of breath  CARDIOVASCULAR: No chest pain, palpitations, dizziness, or leg swelling  GASTROINTESTINAL: + constipation. No abdominal pain. No nausea, vomiting, or hematemesis; No diarrhea. No melena or hematochezia.  GENITOURINARY: improved testicular pain, dysuria, hematuria, and urinary frequency  NEUROLOGICAL: + hand tremors. No headaches, memory loss, loss of strength, or numbness.   SKIN: No itching, burning, rashes, or lesions     MEDICATIONS  (STANDING):  aspirin enteric coated 81 milliGRAM(s) Oral daily  atorvastatin 80 milliGRAM(s) Oral at bedtime  buPROPion XL (24-Hour) . 150 milliGRAM(s) Oral daily  carbidopa/levodopa/entacapone 25/100/200 1 Tablet(s) Oral <User Schedule>  cefTRIAXone   IVPB 1000 milliGRAM(s) IV Intermittent every 24 hours  cholecalciferol 1000 Unit(s) Oral daily  clopidogrel Tablet 75 milliGRAM(s) Oral daily  cyanocobalamin 1000 MICROGram(s) Oral daily  doxycycline IVPB      doxycycline IVPB 100 milliGRAM(s) IV Intermittent once  enoxaparin Injectable 40 milliGRAM(s) SubCutaneous every 24 hours  magnesium oxide 400 milliGRAM(s) Oral daily  metoprolol tartrate 12.5 milliGRAM(s) Oral two times a day  pantoprazole    Tablet 40 milliGRAM(s) Oral before breakfast  polyethylene glycol 3350 17 Gram(s) Oral daily  senna 2 Tablet(s) Oral at bedtime  tamsulosin 0.4 milliGRAM(s) Oral at bedtime    MEDICATIONS  (PRN):  acetaminophen     Tablet .. 650 milliGRAM(s) Oral every 6 hours PRN Temp greater or equal to 38C (100.4F)    Vital Signs Last 24 Hrs  T(C): 37.2 (18 Jul 2022 12:49), Max: 37.7 (18 Jul 2022 05:24)  T(F): 99 (18 Jul 2022 12:49), Max: 99.8 (18 Jul 2022 05:24)  HR: 88 (18 Jul 2022 12:49) (88 - 100)  BP: 134/82 (18 Jul 2022 12:49) (112/47 - 134/82)  BP(mean): --  RR: 16 (18 Jul 2022 12:49) (16 - 18)  SpO2: 97% (18 Jul 2022 12:49) (95% - 97%)    Parameters below as of 18 Jul 2022 12:49  Patient On (Oxygen Delivery Method): room air    PHYSICAL EXAMINATION:  GENERAL: NAD, pleasant, male.   HEAD:  Atraumatic, Normocephalic  EYES:  conjunctiva and sclera clear  NECK: Supple, No JVD, Normal thyroid. Post cervical changes due to spinal fusion surgery.   CHEST/LUNG: Clear to auscultation. Clear to percussion bilaterally; No rales, rhonchi, wheezing, or rubs  HEART: Regular rate and rhythm; No murmurs, rubs, or gallops  ABDOMEN: Soft, Nontender, Nondistended; Bowel sounds present, no pain or masses on palpation. No suprapubic tenderness. No CVA tenderness.   NERVOUS SYSTEM:  Alert & Oriented X3, B/L hand tremor.   : no testicular tenderness, erythema or swelling. Voiding well  EXTREMITIES:  2+ Peripheral Pulses, No clubbing, cyanosis, or edema  SKIN: warm dry, ,chronic venous stasis dermatitis.                           10.7   8.63  )-----------( 373      ( 18 Jul 2022 08:02 )             32.6     07-18    144  |  112<H>  |  9   ----------------------------<  70  3.9   |  25  |  0.86    Ca    8.9      18 Jul 2022 08:02  Phos  3.7     07-18  Mg     2.4     07-18    TPro  6.3  /  Alb  2.1<L>  /  TBili  0.4  /  DBili  x   /  AST  38  /  ALT  43  /  AlkPhos  62  07-18    LIVER FUNCTIONS - ( 18 Jul 2022 08:02 )  Alb: 2.1 g/dL / Pro: 6.3 g/dL / ALK PHOS: 62 U/L / ALT: 43 U/L DA / AST: 38 U/L / GGT: x           I&O's Summary    17 Jul 2022 07:01  -  18 Jul 2022 07:00  --------------------------------------------------------  IN: 0 mL / OUT: 1 mL / NET: -1 mL      Culture - Urine (collected 16 Jul 2022 19:08)  Source: Clean Catch Clean Catch (Midstream)  Preliminary Report (18 Jul 2022 09:52):    50,000 - 99,000 CFU/mL Escherichia coli    50,000 - 99,000 CFU/mL Gram Negative Rods    Culture - Blood (collected 16 Jul 2022 10:18)  Source: .Blood Blood-Peripheral  Preliminary Report (17 Jul 2022 11:01):    No growth to date.    Culture - Blood (collected 16 Jul 2022 10:18)  Source: .Blood Blood-Peripheral  Preliminary Report (17 Jul 2022 11:01):    No growth to date.    RADIOLOGY & ADDITIONAL TESTS:  < from: Xray Chest 1 View-PORTABLE IMMEDIATE (07.16.22 @ 07:21) >  FINDINGS:  Lines, Catheters and Support Devices: None.    Heart Size, Mediastinum and Hilar Contours: Patient is again noted to be   status post median sternotomy and CABG. Heart size is suboptimally   evaluated on this AP projection. Normal mediastinal and hilar contours.    Lungs: No focal consolidation. No pleural effusions. No pneumothorax.    Bones/Soft Tissues: No acute abnormalities of the soft tissues and   osseous structures.    IMPRESSION:  No focal consolidation.    < from: US Testicles (07.16.22 @ 12:35) >  FINDINGS:    RIGHT:  Right testis: 3.6 cm x 1.4 cm x  2.8 cm. Heterogeneous echogenicity   involving the entire testes. Increased flow.  Right epididymis: Heterogeneous echogenicity and increased flow.  Right hydrocele: Small reactive hydrocele.  Right varicocele: None.    LEFT:  Left testis: 4.3 cm x 1.5 cm x 3.0 cm. Normal echogenicity and   echotexture with no masses or areas of architectural distortion. Normal   arterial and venous blood flow pattern.  Left epididymis: Within normal limits.  Left hydrocele: None.  Left varicocele: Present.    IMPRESSION:    Imaging findings suggest epididymitis and orchitis on the right side.   Correlate clinically and follow-up to document resolution.                     PGY-2 Progress Note discussed with attending    PAGER #: [[phone]] TILL 5:00 PM  PLEASE CONTACT ON CALL TEAM:  - On Call Team (Please refer to Andres) FROM 5:00 PM - 8:30PM  - Nightfloat Team FROM 8:30 -7:30 AM    CHIEF COMPLAINT & BRIEF HOSPITAL COURSE:  Pt is a 52 yo M from home ambulates w/ cane w/ pmhx of PD, CHF, CAD (s/p CABG 2018), HTN, BPH comes in after sustaining a mechanical fall 1 wk ago and urinary symptoms. As per the pt and his sister Ms. Santana sustained a mechanical fall 1 wk ago, landed on his buttocks. Denied head trauma or LOC. Pt did not have significant pain afterwards. However, on monday pt started experiencing abdominal and lower back pain, associated with dysuria and increased urinary frequency that would happen on an on and off basis. He also experienced pain on (R) testicle. As per pt and his sister he also experienced decreased appetite, decreased oral intake and some nausea, no episodes of emesis. Due to weakness he had to ambulate w/ walker. Denies fever, chills or any other acute complaint    INTERVAL HPI/OVERNIGHT EVENTS:   No acute overnight events noted. Seen at bedside, in no acute distress, and A&Ox3. Pt states improved testicular pain, improved hematuria, and no dysuria. No complaints of LBP or hip pain. Pt voiding regularly, no BM, has an appetite. No fever, chills, CP, SOB, n/v/d.     REVIEW OF SYSTEMS:  CONSTITUTIONAL: No fever, weight loss, or fatigue  RESPIRATORY: No cough, wheezing, chills or hemoptysis; No shortness of breath  CARDIOVASCULAR: No chest pain, palpitations, dizziness, or leg swelling  GASTROINTESTINAL: + constipation. No abdominal pain. No nausea, vomiting, or hematemesis; No diarrhea. No melena or hematochezia.  GENITOURINARY: improved testicular pain, dysuria, hematuria, and urinary frequency  NEUROLOGICAL: + hand tremors. No headaches, memory loss, loss of strength, or numbness.   SKIN: No itching, burning, rashes, or lesions     MEDICATIONS  (STANDING):  aspirin enteric coated 81 milliGRAM(s) Oral daily  atorvastatin 80 milliGRAM(s) Oral at bedtime  buPROPion XL (24-Hour) . 150 milliGRAM(s) Oral daily  carbidopa/levodopa/entacapone 25/100/200 1 Tablet(s) Oral <User Schedule>  cefTRIAXone   IVPB 1000 milliGRAM(s) IV Intermittent every 24 hours  cholecalciferol 1000 Unit(s) Oral daily  clopidogrel Tablet 75 milliGRAM(s) Oral daily  cyanocobalamin 1000 MICROGram(s) Oral daily  doxycycline IVPB      doxycycline IVPB 100 milliGRAM(s) IV Intermittent once  enoxaparin Injectable 40 milliGRAM(s) SubCutaneous every 24 hours  magnesium oxide 400 milliGRAM(s) Oral daily  metoprolol tartrate 12.5 milliGRAM(s) Oral two times a day  pantoprazole    Tablet 40 milliGRAM(s) Oral before breakfast  polyethylene glycol 3350 17 Gram(s) Oral daily  senna 2 Tablet(s) Oral at bedtime  tamsulosin 0.4 milliGRAM(s) Oral at bedtime    MEDICATIONS  (PRN):  acetaminophen     Tablet .. 650 milliGRAM(s) Oral every 6 hours PRN Temp greater or equal to 38C (100.4F)    Vital Signs Last 24 Hrs  T(C): 37.2 (18 Jul 2022 12:49), Max: 37.7 (18 Jul 2022 05:24)  T(F): 99 (18 Jul 2022 12:49), Max: 99.8 (18 Jul 2022 05:24)  HR: 88 (18 Jul 2022 12:49) (88 - 100)  BP: 134/82 (18 Jul 2022 12:49) (112/47 - 134/82)  BP(mean): --  RR: 16 (18 Jul 2022 12:49) (16 - 18)  SpO2: 97% (18 Jul 2022 12:49) (95% - 97%)    Parameters below as of 18 Jul 2022 12:49  Patient On (Oxygen Delivery Method): room air    PHYSICAL EXAMINATION:  GENERAL: NAD, pleasant, male.   HEAD:  Atraumatic, Normocephalic  EYES:  conjunctiva and sclera clear  NECK: Supple, No JVD, Normal thyroid. Post cervical changes due to spinal fusion surgery.   CHEST/LUNG: Clear to auscultation. Clear to percussion bilaterally; No rales, rhonchi, wheezing, or rubs  HEART: Regular rate and rhythm; No murmurs, rubs, or gallops  ABDOMEN: Soft, Nontender, Nondistended; Bowel sounds present, no pain or masses on palpation. No suprapubic tenderness. No CVA tenderness.   NERVOUS SYSTEM:  Alert & Oriented X3, B/L hand tremor.   : no testicular tenderness, erythema or swelling. Voiding well  EXTREMITIES:  2+ Peripheral Pulses, No clubbing, cyanosis, or edema  SKIN: warm dry, chronic B/L LE hyperpigmented skin                          10.7   8.63  )-----------( 373      ( 18 Jul 2022 08:02 )             32.6     07-18    144  |  112<H>  |  9   ----------------------------<  70  3.9   |  25  |  0.86    Ca    8.9      18 Jul 2022 08:02  Phos  3.7     07-18  Mg     2.4     07-18    TPro  6.3  /  Alb  2.1<L>  /  TBili  0.4  /  DBili  x   /  AST  38  /  ALT  43  /  AlkPhos  62  07-18    LIVER FUNCTIONS - ( 18 Jul 2022 08:02 )  Alb: 2.1 g/dL / Pro: 6.3 g/dL / ALK PHOS: 62 U/L / ALT: 43 U/L DA / AST: 38 U/L / GGT: x           I&O's Summary    17 Jul 2022 07:01  -  18 Jul 2022 07:00  --------------------------------------------------------  IN: 0 mL / OUT: 1 mL / NET: -1 mL      Culture - Urine (collected 16 Jul 2022 19:08)  Source: Clean Catch Clean Catch (Midstream)  Preliminary Report (18 Jul 2022 09:52):    50,000 - 99,000 CFU/mL Escherichia coli    50,000 - 99,000 CFU/mL Gram Negative Rods    Culture - Blood (collected 16 Jul 2022 10:18)  Source: .Blood Blood-Peripheral  Preliminary Report (17 Jul 2022 11:01):    No growth to date.    Culture - Blood (collected 16 Jul 2022 10:18)  Source: .Blood Blood-Peripheral  Preliminary Report (17 Jul 2022 11:01):    No growth to date.    RADIOLOGY & ADDITIONAL TESTS:  < from: Xray Chest 1 View-PORTABLE IMMEDIATE (07.16.22 @ 07:21) >  FINDINGS:  Lines, Catheters and Support Devices: None.    Heart Size, Mediastinum and Hilar Contours: Patient is again noted to be   status post median sternotomy and CABG. Heart size is suboptimally   evaluated on this AP projection. Normal mediastinal and hilar contours.    Lungs: No focal consolidation. No pleural effusions. No pneumothorax.    Bones/Soft Tissues: No acute abnormalities of the soft tissues and   osseous structures.    IMPRESSION:  No focal consolidation.    < from: US Testicles (07.16.22 @ 12:35) >  FINDINGS:    RIGHT:  Right testis: 3.6 cm x 1.4 cm x  2.8 cm. Heterogeneous echogenicity   involving the entire testes. Increased flow.  Right epididymis: Heterogeneous echogenicity and increased flow.  Right hydrocele: Small reactive hydrocele.  Right varicocele: None.    LEFT:  Left testis: 4.3 cm x 1.5 cm x 3.0 cm. Normal echogenicity and   echotexture with no masses or areas of architectural distortion. Normal   arterial and venous blood flow pattern.  Left epididymis: Within normal limits.  Left hydrocele: None.  Left varicocele: Present.    IMPRESSION:    Imaging findings suggest epididymitis and orchitis on the right side.   Correlate clinically and follow-up to document resolution.

## 2022-07-18 NOTE — PROGRESS NOTE ADULT - ASSESSMENT
Patient is a 51y old  Male  from home ambulates w/ cane w/ pmhx of PD, CHF, CAD (s/p CABG 2018), HTN, BPH comes in after sustaining a mechanical fall 1 wk ago and urinary symptoms.  Denied head trauma or LOC. Pt did not have significant pain afterwards. However, on Monday pt started experiencing abdominal and lower back pain, associated with dysuria and increased urinary frequency.  He also experienced pain on (R) testicle. On admission, she found to have fever, tachycardia, leukocytosis and positive Urine analysis. He has started on Ceftriaxone and the ID consult requested to assist with further evaluation and antibiotic management..    # Sepsis ( Fever + Tachycardia + Leukocytosis )  # UTI -   # Parkinsons disease     would recommend:    1. Follow up Final Urine culture  for sensitivities of E.coli and Pseudomonas  2. Change Ceftriaxone To Meropenem until sensitivities of Pseudomonas is available   3. Management of Parkinsons as per Primary team     d/w House staff     Attending Attestation:    Spent more than 45 minutes on total encounter, more than 50 % of the visit was spent counseling and/or coordinating care by the Attending physician.

## 2022-07-18 NOTE — PROGRESS NOTE ADULT - PROBLEM SELECTOR PLAN 1
pt with sepsis criteria (fever, tachycardia, leukocytosis and (+) UA)  lactate neg  s/p 2L bolus in ED + single dose of ceftriaxone  continue ceftriaxone 1 g q 24 hrs  tylenol 650 mg po q 6 hrs PRN for fever   BCX - neg  f/u UCx  scrotum US - R orchitis, epididymitis pt with sepsis criteria (fever, tachycardia, leukocytosis and (+) UA)  lactate neg  s/p 2L bolus in ED + single dose of ceftriaxone  BCX - neg  UCx - E.coli (50K-99K); pending sensitives   scrotum US - R orchitis, epididymitis  tylenol 650 mg po q 6 hrs PRN for fever  c/w 1g ceftriaxone (day 3) q24 hrs   initiate IV doxycyline 100mg for orchitis, as per ID   ID following

## 2022-07-18 NOTE — PROGRESS NOTE ADULT - ASSESSMENT
_________________________________________________________________________________________  ========>>  M E D I C A L   A T T E N D I N G    F O L L O W  U P  N O T E  <<=========  -----------------------------------------------------------------------------------------------------    - Patient seen and examined by me earlier today.   - In summary,  ISAIAH FRAGOSO is a 51y year old man admitted with urosepsis   - Patient today overall doing ok, comfortable, eating OK.  overall otherwise doing better , OOb, worked with PT, pain improved     ==================>> REVIEW OF SYSTEM <<=================    GEN: no fever, no chills, no pain  RESP: no SOB, no cough, no sputum  CVS: no chest pain, no palpitations, no edema  GI: no abdominal pain, no nausea  : no dysuria, no frequency, no hematuria    pain in testicles improved   Neuro: no headache, no dizziness  Derm : no itching, no rash    ==================>> PHYSICAL EXAM <<=================    GEN: A&O X 3 , NAD , comfortable, pleasant, calm   HEENT: NCAT, PERRL, MMM, hearing intact  Neck: supple , no JVD appreciated  CVS: S1S2 , regular , No M/R/G appreciated  PULM: CTA B/L,  no W/R/R appreciated  ABD.: soft. non tender, non distended,  bowel sounds present  Extrem: intact pulses , no edema , chronic contractures   PSYCH : normal mood,  not anxious                             ( Note written / Date of service 07-18-22 )    ==================>> MEDICATIONS <<====================    aspirin enteric coated 81 milliGRAM(s) Oral daily  atorvastatin 80 milliGRAM(s) Oral at bedtime  buPROPion XL (24-Hour) . 150 milliGRAM(s) Oral daily  carbidopa/levodopa/entacapone 25/100/200 1 Tablet(s) Oral <User Schedule>  cefTRIAXone   IVPB 1000 milliGRAM(s) IV Intermittent every 24 hours  cholecalciferol 1000 Unit(s) Oral daily  clopidogrel Tablet 75 milliGRAM(s) Oral daily  cyanocobalamin 1000 MICROGram(s) Oral daily  doxycycline IVPB      enoxaparin Injectable 40 milliGRAM(s) SubCutaneous every 24 hours  magnesium oxide 400 milliGRAM(s) Oral daily  metoprolol tartrate 12.5 milliGRAM(s) Oral two times a day  pantoprazole    Tablet 40 milliGRAM(s) Oral before breakfast  polyethylene glycol 3350 17 Gram(s) Oral daily  senna 2 Tablet(s) Oral at bedtime  tamsulosin 0.4 milliGRAM(s) Oral at bedtime    MEDICATIONS  (PRN):  acetaminophen     Tablet .. 650 milliGRAM(s) Oral every 6 hours PRN Temp greater or equal to 38C (100.4F)    ___________  Active diet:  Diet, DASH/TLC:   Sodium & Cholesterol Restricted  ___________________    ==================>> VITAL SIGNS <<==================    Height (cm): 167.6  Weight (kg): 64.2  BMI (kg/m2): 22.9  Vital Signs Last 24 HrsT(C): 37.2 (07-18-22 @ 12:49)  T(F): 99 (07-18-22 @ 12:49), Max: 99.8 (07-18-22 @ 05:24)  HR: 88 (07-18-22 @ 12:49) (88 - 100)  BP: 109/71 (07-18-22 @ 17:41)  RR: 16 (07-18-22 @ 12:49) (16 - 18)  SpO2: 97% (07-18-22 @ 12:49) (95% - 97%)       ==================>> LAB AND IMAGING <<==================                        10.7   8.63  )-----------( 373      ( 18 Jul 2022 08:02 )             32.6        07-18    144  |  112<H>  |  9   ----------------------------<  70  3.9   |  25  |  0.86    Ca    8.9      18 Jul 2022 08:02  Phos  3.7     07-18  Mg     2.4     07-18    TPro  6.3  /  Alb  2.1<L>  /  TBili  0.4  /  DBili  x   /  AST  38  /  ALT  43  /  AlkPhos  62  07-18    WBC count:   8.63 <<== ,  13.22 <<== ,  18.50 <<==   Hemoglobin:   10.7 <<==,  10.3 <<==,  11.9 <<==  platelets:  373 <==, 332 <==, 365 <==    Creatinine:  0.86  <<==, 0.84  <<==, 1.07  <<==  Sodium:   144  <==, 141  <==, 135  <==       AST:          38 <== , 21 <== , 19 <==      ALT:        43  <== , 32  <== , 65  <==      AP:        62  <=, 60  <=, 70  <=     Bili:        0.4  <=, 0.6  <=, 0.7  <=    ____________________________    M I C R O B I O L O G Y :    Culture - Urine (collected 16 Jul 2022 19:08)  Source: Clean Catch Clean Catch (Midstream)  Preliminary Report (18 Jul 2022 17:51):    50,000 - 99,000 CFU/mL Escherichia coli    50,000 - 99,000 CFU/mL Pseudomonas aeruginosa    Culture - Blood (collected 16 Jul 2022 10:18)  Source: .Blood Blood-Peripheral  Preliminary Report (17 Jul 2022 11:01):    No growth to date.    Culture - Blood (collected 16 Jul 2022 10:18)  Source: .Blood Blood-Peripheral  Preliminary Report (17 Jul 2022 11:01):    No growth to date.    COVID-19 PCR: NotDetec (07-16-22 @ 06:15)    < from: US Testicles (07.16.22 @ 12:35) >  IMPRESSION:  Imaging findings suggest epididymitis and orchitis on the right side.   Correlate clinically and follow-up to document resolution.  < end of copied text >    __________________________________________________________________________________  ===============>>  A S S E S S M E N T   A N D   P L A N <<===============  ------------------------------------------------------------------------------------------    · Assessment	  Pt is a 52 yo M from home ambulates w/ cane w/ pmhx of PD, CHF, CAD (s/p CABG 2018), HTN, BPH comes in after sustaining a mechanical fall 1 wk ago and urinary symptoms. In the ED pt was found febrile, tachycardic and normotensive. Labs significant for leukocytosis with neutrophilia, anemia,  lactate neg, UA (+) for proteinuria, hematuria, pyuria and bacteriuria.  ED course  2L bolus of     Problem/Plan - 1:  ·  Problem: Sepsis, UTI, Epididymitis / orchitis  ID consulted, following   abx per ID recom   tylenol  PRN for fever / pain   f/u BCX and UCX    Problem/Plan - 2:  ·  Problem:  Fall at home.   ·  Plan: Pt w/ hx of PD  s/p fall at home, landing in buttocks  fall precautions  f/u Xray of b/l hip ( pending)   PT evaluation.    Problem/Plan - 4:  ·  Problem: Anemia.   ·  Plan: On admission Hgb 11.8  on ferrous gluconate at home  asymptomatic  monitor CBC daily.    Hemoglobin:   10.7 <<==,  10.3 <<==,  11.9 <<==    Problem/Plan - 5:  ·  Problem: Parkinson disease.   ·  Plan: pt on carbidopa-levodopa-entacapone 25/100/200 mg   resumed home medication.    Problem/Plan - 6:  ·  Problem: Congestive heart disease.   ·  Plan: Pt not on CHF exacerbation  Last TTE (10/2020) EF 44%, apical anterior wall and apical septum are hypokinetic  on metoprolol tratrate 12.5 mg PO BID+ lasix 20 mg po daily + spironolactone 25 mg poqd at home  only resumed BB for now  holding diuretics for now in the s/o sepsis.       resume as improved / as needed    Problem/Plan - 7:  ·  Problem: CAD (coronary artery disease).   ·  Plan: Pt with hx of CABG in 2018  on DAPT at home + lipitor 80 mg daily+ ezetimibe   resumed home meds.    Problem/Plan - 8:  ·  Problem: HTN (hypertension).   ·  Plan: Pt on lisinopril 2.5 mg at home  holding anti-hypertensives for now in the s/o sepsis  monitor BP.    Problem/Plan - 9:  ·  Problem: BPH (benign prostatic hyperplasia).   ·  Plan: pt on tamsulosin 0.4 mg poqd at home  resumed home med.    Problem/Plan - 10:  ·  Problem: DVT prophylaxis.   ·  Plan; lovenox 40 mg SC QD.    --------------------------------------------  Case discussed with pt, sister, HS  Education given on findings and plan of care  ___________________________  H. SOREN Wilson.  Pager: [phone]

## 2022-07-18 NOTE — DISCHARGE NOTE PROVIDER - CARE PROVIDER_API CALL
Erin Jiame)  Geriatric Medicine; Internal Medicine  [id number] [place] [address]  Phone: [phone]  Fax: [phone]  Follow Up Time:    Erin Siegel  ENDOCRINOLOGY/METAB/DIABETES  [address] [place] [address]  Phone: [phone]  Fax: [phone]  Follow Up Time:

## 2022-07-18 NOTE — DISCHARGE NOTE PROVIDER - NSDCCAREPROVSEEN_GEN_ALL_CORE_FT
Katie, Annabel Kuo, Mohsena F Ambida, Larissa Tobin Annabel Dawsondesmond Jin [facility]  Annabel [facility]  Kg Black Lois  User ADM  Erin Jose      [ Greater than 35 min spent for discharge services.   HKarly Wilson ]

## 2022-07-18 NOTE — PROGRESS NOTE ADULT - ASSESSMENT
Pt is a 52 yo M from home ambulates w/ cane w/ pmhx of PD, CHF, CAD (s/p CABG 2018), HTN, BPH comes in after sustaining a mechanical fall 1 wk ago and urinary symptoms. In the ED pt was found febrile, tachycardic and normotensive. Labs significant for leukocytosis with neutrophilia, anemia,  lactate neg, UA (+) for proteinuria, hematuria, pyuria and bacteriuria.  ED course  2L bolus of NS + single dose of ceftriaxone. Pt is admitted for sepsis due to urinary infection.

## 2022-07-19 LAB
-  AMIKACIN: SIGNIFICANT CHANGE UP
-  AMIKACIN: SIGNIFICANT CHANGE UP
-  AMOXICILLIN/CLAVULANIC ACID: SIGNIFICANT CHANGE UP
-  AMPICILLIN/SULBACTAM: SIGNIFICANT CHANGE UP
-  AMPICILLIN: SIGNIFICANT CHANGE UP
-  AZTREONAM: SIGNIFICANT CHANGE UP
-  AZTREONAM: SIGNIFICANT CHANGE UP
-  CEFAZOLIN: SIGNIFICANT CHANGE UP
-  CEFEPIME: SIGNIFICANT CHANGE UP
-  CEFEPIME: SIGNIFICANT CHANGE UP
-  CEFOXITIN: SIGNIFICANT CHANGE UP
-  CEFTAZIDIME: SIGNIFICANT CHANGE UP
-  CEFTRIAXONE: SIGNIFICANT CHANGE UP
-  CIPROFLOXACIN: SIGNIFICANT CHANGE UP
-  CIPROFLOXACIN: SIGNIFICANT CHANGE UP
-  ERTAPENEM: SIGNIFICANT CHANGE UP
-  GENTAMICIN: SIGNIFICANT CHANGE UP
-  GENTAMICIN: SIGNIFICANT CHANGE UP
-  IMIPENEM: SIGNIFICANT CHANGE UP
-  IMIPENEM: SIGNIFICANT CHANGE UP
-  LEVOFLOXACIN: SIGNIFICANT CHANGE UP
-  LEVOFLOXACIN: SIGNIFICANT CHANGE UP
-  MEROPENEM: SIGNIFICANT CHANGE UP
-  MEROPENEM: SIGNIFICANT CHANGE UP
-  NITROFURANTOIN: SIGNIFICANT CHANGE UP
-  PIPERACILLIN/TAZOBACTAM: SIGNIFICANT CHANGE UP
-  PIPERACILLIN/TAZOBACTAM: SIGNIFICANT CHANGE UP
-  TIGECYCLINE: SIGNIFICANT CHANGE UP
-  TOBRAMYCIN: SIGNIFICANT CHANGE UP
-  TOBRAMYCIN: SIGNIFICANT CHANGE UP
-  TRIMETHOPRIM/SULFAMETHOXAZOLE: SIGNIFICANT CHANGE UP
ALBUMIN SERPL ELPH-MCNC: 2 G/DL — LOW (ref 3.5–5)
ALP SERPL-CCNC: 70 U/L — SIGNIFICANT CHANGE UP (ref 40–120)
ALT FLD-CCNC: 96 U/L DA — HIGH (ref 10–60)
ANION GAP SERPL CALC-SCNC: 6 MMOL/L — SIGNIFICANT CHANGE UP (ref 5–17)
AST SERPL-CCNC: 63 U/L — HIGH (ref 10–40)
BILIRUB SERPL-MCNC: 0.4 MG/DL — SIGNIFICANT CHANGE UP (ref 0.2–1.2)
BUN SERPL-MCNC: 10 MG/DL — SIGNIFICANT CHANGE UP (ref 7–18)
CALCIUM SERPL-MCNC: 8.8 MG/DL — SIGNIFICANT CHANGE UP (ref 8.4–10.5)
CHLORIDE SERPL-SCNC: 110 MMOL/L — HIGH (ref 96–108)
CO2 SERPL-SCNC: 26 MMOL/L — SIGNIFICANT CHANGE UP (ref 22–31)
CREAT SERPL-MCNC: 0.89 MG/DL — SIGNIFICANT CHANGE UP (ref 0.5–1.3)
CULTURE RESULTS: SIGNIFICANT CHANGE UP
EGFR: 104 ML/MIN/1.73M2 — SIGNIFICANT CHANGE UP
GLUCOSE SERPL-MCNC: 100 MG/DL — HIGH (ref 70–99)
HCT VFR BLD CALC: 34 % — LOW (ref 39–50)
HGB BLD-MCNC: 11.2 G/DL — LOW (ref 13–17)
MCHC RBC-ENTMCNC: 28.1 PG — SIGNIFICANT CHANGE UP (ref 27–34)
MCHC RBC-ENTMCNC: 32.9 GM/DL — SIGNIFICANT CHANGE UP (ref 32–36)
MCV RBC AUTO: 85.4 FL — SIGNIFICANT CHANGE UP (ref 80–100)
METHOD TYPE: SIGNIFICANT CHANGE UP
METHOD TYPE: SIGNIFICANT CHANGE UP
NRBC # BLD: 0 /100 WBCS — SIGNIFICANT CHANGE UP (ref 0–0)
ORGANISM # SPEC MICROSCOPIC CNT: SIGNIFICANT CHANGE UP
PLATELET # BLD AUTO: 393 K/UL — SIGNIFICANT CHANGE UP (ref 150–400)
POTASSIUM SERPL-MCNC: 3.9 MMOL/L — SIGNIFICANT CHANGE UP (ref 3.5–5.3)
POTASSIUM SERPL-SCNC: 3.9 MMOL/L — SIGNIFICANT CHANGE UP (ref 3.5–5.3)
PROT SERPL-MCNC: 6.8 G/DL — SIGNIFICANT CHANGE UP (ref 6–8.3)
RBC # BLD: 3.98 M/UL — LOW (ref 4.2–5.8)
RBC # FLD: 13.6 % — SIGNIFICANT CHANGE UP (ref 10.3–14.5)
SODIUM SERPL-SCNC: 142 MMOL/L — SIGNIFICANT CHANGE UP (ref 135–145)
SPECIMEN SOURCE: SIGNIFICANT CHANGE UP
WBC # BLD: 7.1 K/UL — SIGNIFICANT CHANGE UP (ref 3.8–10.5)
WBC # FLD AUTO: 7.1 K/UL — SIGNIFICANT CHANGE UP (ref 3.8–10.5)

## 2022-07-19 RX ORDER — MEROPENEM 1 G/30ML
1000 INJECTION INTRAVENOUS EVERY 8 HOURS
Refills: 0 | Status: DISCONTINUED | OUTPATIENT
Start: 2022-07-19 | End: 2022-07-19

## 2022-07-19 RX ADMIN — Medication 110 MILLIGRAM(S): at 06:28

## 2022-07-19 RX ADMIN — PREGABALIN 1000 MICROGRAM(S): 225 CAPSULE ORAL at 12:11

## 2022-07-19 RX ADMIN — Medication 81 MILLIGRAM(S): at 12:12

## 2022-07-19 RX ADMIN — CARBIDOPA, LEVODOPA, AND ENTACAPONE 1 TABLET(S): 50; 200; 200 TABLET, FILM COATED ORAL at 06:28

## 2022-07-19 RX ADMIN — MAGNESIUM OXIDE 400 MG ORAL TABLET 400 MILLIGRAM(S): 241.3 TABLET ORAL at 12:11

## 2022-07-19 RX ADMIN — Medication 1000 UNIT(S): at 12:11

## 2022-07-19 RX ADMIN — ENOXAPARIN SODIUM 40 MILLIGRAM(S): 100 INJECTION SUBCUTANEOUS at 13:57

## 2022-07-19 RX ADMIN — BUPROPION HYDROCHLORIDE 150 MILLIGRAM(S): 150 TABLET, EXTENDED RELEASE ORAL at 12:12

## 2022-07-19 RX ADMIN — ATORVASTATIN CALCIUM 80 MILLIGRAM(S): 80 TABLET, FILM COATED ORAL at 22:01

## 2022-07-19 RX ADMIN — CARBIDOPA, LEVODOPA, AND ENTACAPONE 1 TABLET(S): 50; 200; 200 TABLET, FILM COATED ORAL at 12:12

## 2022-07-19 RX ADMIN — PANTOPRAZOLE SODIUM 40 MILLIGRAM(S): 20 TABLET, DELAYED RELEASE ORAL at 06:28

## 2022-07-19 RX ADMIN — CARBIDOPA, LEVODOPA, AND ENTACAPONE 1 TABLET(S): 50; 200; 200 TABLET, FILM COATED ORAL at 16:09

## 2022-07-19 RX ADMIN — MEROPENEM 100 MILLIGRAM(S): 1 INJECTION INTRAVENOUS at 13:57

## 2022-07-19 RX ADMIN — CARBIDOPA, LEVODOPA, AND ENTACAPONE 1 TABLET(S): 50; 200; 200 TABLET, FILM COATED ORAL at 18:53

## 2022-07-19 RX ADMIN — POLYETHYLENE GLYCOL 3350 17 GRAM(S): 17 POWDER, FOR SOLUTION ORAL at 12:12

## 2022-07-19 RX ADMIN — TAMSULOSIN HYDROCHLORIDE 0.4 MILLIGRAM(S): 0.4 CAPSULE ORAL at 22:01

## 2022-07-19 RX ADMIN — SENNA PLUS 2 TABLET(S): 8.6 TABLET ORAL at 22:00

## 2022-07-19 RX ADMIN — Medication 12.5 MILLIGRAM(S): at 06:28

## 2022-07-19 RX ADMIN — CLOPIDOGREL BISULFATE 75 MILLIGRAM(S): 75 TABLET, FILM COATED ORAL at 12:11

## 2022-07-19 NOTE — PROGRESS NOTE ADULT - PROBLEM SELECTOR PLAN 1
pt with sepsis criteria (fever, tachycardia, leukocytosis and (+) UA)  lactate neg  s/p 2L bolus in ED + single dose of ceftriaxone  BCX - neg  UCx - E.coli (50K-99K); ceftriaxone, pseudomonas aeruginosa (50K-99K); sensitivities available   scrotum US - R orchitis, epididymitis  tylenol 650 mg po q 6 hrs PRN for fever  s/p 1g ceftriaxone (day 3) q24 hrs   c/w IV doxycyline (day 2) 100mg for orchitis, as per ID   c/w IV meropenem (day 1) for pseudomonas   f/u ID abx reccs in light of sensitivity results.   ID following

## 2022-07-19 NOTE — PROGRESS NOTE ADULT - ASSESSMENT
M E D I C A L   A T T E N D I N G    F O L L O W    U P   N O T E  (07-19-22 )                                     ------------------------------------------------------------------------------------------------    patient evaluated by me, case discussed with team, chart, medications, and physical exam reviewed, labs / tests  and vitals reviewed by me, as bellow.   Patient is stable for discharge today. pt overall doing better . to go home with oral antibiotics per ID recom likely later today    Patient to follow up with  PMD, Urology as needed  See discharge document for full note.  Greater than 35 min spent for these services.                              ( note written / Date of service  07-19-22 )    ==================>> MEDICATIONS <<====================    aspirin enteric coated 81 milliGRAM(s) Oral daily  atorvastatin 80 milliGRAM(s) Oral at bedtime  buPROPion XL (24-Hour) . 150 milliGRAM(s) Oral daily  carbidopa/levodopa/entacapone 25/100/200 1 Tablet(s) Oral <User Schedule>  cholecalciferol 1000 Unit(s) Oral daily  clopidogrel Tablet 75 milliGRAM(s) Oral daily  cyanocobalamin 1000 MICROGram(s) Oral daily  doxycycline IVPB      doxycycline IVPB 100 milliGRAM(s) IV Intermittent every 12 hours  enoxaparin Injectable 40 milliGRAM(s) SubCutaneous every 24 hours  magnesium oxide 400 milliGRAM(s) Oral daily  meropenem  IVPB 1000 milliGRAM(s) IV Intermittent every 8 hours  metoprolol tartrate 12.5 milliGRAM(s) Oral two times a day  pantoprazole    Tablet 40 milliGRAM(s) Oral before breakfast  polyethylene glycol 3350 17 Gram(s) Oral daily  senna 2 Tablet(s) Oral at bedtime  tamsulosin 0.4 milliGRAM(s) Oral at bedtime    MEDICATIONS  (PRN):  acetaminophen     Tablet .. 650 milliGRAM(s) Oral every 6 hours PRN Temp greater or equal to 38C (100.4F)    ___________  Active diet:  Diet, DASH/TLC:   Sodium & Cholesterol Restricted  ___________________    ==================>> VITAL SIGNS <<==================    T(C): 36.8 (07-19-22 @ 05:56), Max: 36.8 (07-19-22 @ 05:56)  HR: 97 (07-19-22 @ 05:56) (84 - 97)  BP: 133/81 (07-19-22 @ 05:56) (109/71 - 133/81)  BP(mean): --  RR: 18 (07-19-22 @ 05:56) (18 - 20)  SpO2: 97% (07-19-22 @ 05:56) (97% - 98%)        ==================>> LAB AND IMAGING <<==================                        11.2   7.10  )-----------( 393      ( 19 Jul 2022 07:06 )             34.0        07-19    142  |  110<H>  |  10  ----------------------------<  100<H>  3.9   |  26  |  0.89    Ca    8.8      19 Jul 2022 07:06  Phos  3.7     07-18  Mg     2.4     07-18    TPro  6.8  /  Alb  2.0<L>  /  TBili  0.4  /  DBili  x   /  AST  63<H>  /  ALT  96<H>  /  AlkPhos  70  07-19      WBC count:   7.10 <<== ,  8.63 <<== ,  13.22 <<== ,  18.50 <<==   Hemoglobin:   11.2 <<==,  10.7 <<==,  10.3 <<==,  11.9 <<==  platelets:  393 <==, 373 <==, 332 <==, 365 <==    Creatinine:  0.89  <<==, 0.86  <<==, 0.84  <<==, 1.07  <<==  Sodium:   142  <==, 144  <==, 141  <==, 135  <==       AST:          63 <== , 38 <== , 21 <== , 19 <==      ALT:        96  <== , 43  <== , 32  <== , 65  <==      AP:        70  <=, 62  <=, 60  <=, 70  <=     Bili:        0.4  <=, 0.4  <=, 0.6  <=, 0.7  <=       ____________________________    M I C R O B I O L O G Y :    Culture - Urine (collected 16 Jul 2022 19:08)  Source: Clean Catch Clean Catch (Midstream)  Final Report (19 Jul 2022 08:20):    50,000 - 99,000 CFU/mL Escherichia coli    50,000 - 99,000 CFU/mL Pseudomonas aeruginosa  Organism: Escherichia coli  Pseudomonas aeruginosa (19 Jul 2022 08:20)  Organism: Pseudomonas aeruginosa (19 Jul 2022 08:20)    Sensitivities:      -  Amikacin: S <=16      -  Aztreonam: S 8      -  Cefepime: S <=2      -  Ceftazidime: S 4      -  Ciprofloxacin: S <=0.25      -  Gentamicin: S <=2      -  Imipenem: S 2      -  Levofloxacin: S <=0.5      -  Meropenem: S <=1      -  Piperacillin/Tazobactam: S <=8      -  Tobramycin: S <=2      Method Type: LATONYA  Organism: Escherichia coli (19 Jul 2022 08:20)    Sensitivities:      -  Amikacin: S <=16      -  Amoxicillin/Clavulanic Acid: S <=8/4      -  Ampicillin: R >16 These ampicillin results predict results for amoxicillin      -  Ampicillin/Sulbactam: S <=4/2 Enterobacter, Klebsiella aerogenes, Citrobacter, and Serratia may develop resistance during prolonged therapy (3-4 days)      -  Aztreonam: S <=4      -  Cefazolin: S <=2 (MIC_CL_COM_ENTERIC_CEFAZU) For uncomplicated UTI with K. pneumoniae, E. coli, or P. mirablis: LATONYA <=16 is sensitive and LATONYA >=32 is resistant. This also predicts results for oral agents cefaclor, cefdinir, cefpodoxime, cefprozil, cefuroxime axetil, cephalexin and locarbef for uncomplicated UTI. Note that some isolates may be susceptible to these agents while testing resistant to cefazolin.      -  Cefepime: S <=2      -  Cefoxitin: S <=8      -  Ceftriaxone: S <=1 Enterobacter, Klebsiella aerogenes, Citrobacter, and Serratia may develop resistance during prolonged therapy      -  Ciprofloxacin: S <=0.25      -  Ertapenem: S <=0.5      -  Gentamicin: S <=2      -  Imipenem: S <=1      -  Levofloxacin: S <=0.5      -  Meropenem: S <=1      -  Nitrofurantoin: S <=32 Should not be used to treat pyelonephritis      -  Piperacillin/Tazobactam: S <=8      -  Tigecycline: S <=2      -  Tobramycin: S <=2      -  Trimethoprim/Sulfamethoxazole: S <=0.5/9.5      Method Type: LATONYA    Culture - Blood (collected 16 Jul 2022 10:18)  Source: .Blood Blood-Peripheral  Preliminary Report (17 Jul 2022 11:01):    No growth to date.    Culture - Blood (collected 16 Jul 2022 10:18)  Source: .Blood Blood-Peripheral  Preliminary Report (17 Jul 2022 11:01):    No growth to date.        COVID-19 PCR: NotDetec (07-16-22 @ 06:15)

## 2022-07-19 NOTE — PROGRESS NOTE ADULT - SUBJECTIVE AND OBJECTIVE BOX
PGY-2 Progress Note discussed with attending    PAGER #: [[phone]] TILL 5:00 PM  PLEASE CONTACT ON CALL TEAM:  - On Call Team (Please refer to Andres) FROM 5:00 PM - 8:30PM  - Nightfloat Team FROM 8:30 -7:30 AM    CHIEF COMPLAINT & BRIEF HOSPITAL COURSE:  Pt is a 52 yo M from home ambulates w/ cane w/ pmhx of PD, CHF, CAD (s/p CABG 2018), HTN, BPH comes in after sustaining a mechanical fall 1 wk ago and urinary symptoms. As per the pt and his sister Ms. Santana sustained a mechanical fall 1 wk ago, landed on his buttocks. Denied head trauma or LOC. Pt did not have significant pain afterwards. However, on monday pt started experiencing abdominal and lower back pain, associated with dysuria and increased urinary frequency that would happen on an on and off basis. He also experienced pain on (R) testicle. As per pt and his sister he also experienced decreased appetite, decreased oral intake and some nausea, no episodes of emesis. Due to weakness he had to ambulate w/ walker. Denies fever, chills or any other acute complaint    INTERVAL HPI/OVERNIGHT EVENTS:   No acute overnight events. Patient seen at bedside, in no acute distress, and A&Ox3. Patient denies testicular pain, dysuria and hematuria. No flank pain or hip pain. Voids regularly, notes BM yesterday, and is out of bed to chair. No fevers, chills, CP, SOB, n/v/d.     REVIEW OF SYSTEMS:  CONSTITUTIONAL: No fever, weight loss, or fatigue  RESPIRATORY: No cough, wheezing, chills or hemoptysis; No shortness of breath  CARDIOVASCULAR: No chest pain, palpitations, dizziness, or leg swelling  GASTROINTESTINAL: No abdominal pain. No nausea, vomiting, or hematemesis; No diarrhea or constipation. No melena or hematochezia.  GENITOURINARY: Improved testicular pain, dysuria, hematuria.   NEUROLOGICAL: + hand tremors. No headaches, memory loss, loss of strength, or numbness.  SKIN: No itching, burning, rashes, or lesions     MEDICATIONS  (STANDING):  aspirin enteric coated 81 milliGRAM(s) Oral daily  atorvastatin 80 milliGRAM(s) Oral at bedtime  buPROPion XL (24-Hour) . 150 milliGRAM(s) Oral daily  carbidopa/levodopa/entacapone 25/100/200 1 Tablet(s) Oral <User Schedule>  cholecalciferol 1000 Unit(s) Oral daily  clopidogrel Tablet 75 milliGRAM(s) Oral daily  cyanocobalamin 1000 MICROGram(s) Oral daily  doxycycline IVPB      doxycycline IVPB 100 milliGRAM(s) IV Intermittent every 12 hours  enoxaparin Injectable 40 milliGRAM(s) SubCutaneous every 24 hours  magnesium oxide 400 milliGRAM(s) Oral daily  meropenem  IVPB 1000 milliGRAM(s) IV Intermittent every 8 hours  metoprolol tartrate 12.5 milliGRAM(s) Oral two times a day  pantoprazole    Tablet 40 milliGRAM(s) Oral before breakfast  polyethylene glycol 3350 17 Gram(s) Oral daily  senna 2 Tablet(s) Oral at bedtime  tamsulosin 0.4 milliGRAM(s) Oral at bedtime    MEDICATIONS  (PRN):  acetaminophen     Tablet .. 650 milliGRAM(s) Oral every 6 hours PRN Temp greater or equal to 38C (100.4F)    Vital Signs Last 24 Hrs  T(C): 36.8 (19 Jul 2022 05:56), Max: 37.2 (18 Jul 2022 12:49)  T(F): 98.2 (19 Jul 2022 05:56), Max: 99 (18 Jul 2022 12:49)  HR: 97 (19 Jul 2022 05:56) (84 - 97)  BP: 133/81 (19 Jul 2022 05:56) (109/71 - 134/82)  BP(mean): --  RR: 18 (19 Jul 2022 05:56) (16 - 20)  SpO2: 97% (19 Jul 2022 05:56) (97% - 98%)    Parameters below as of 19 Jul 2022 05:56  Patient On (Oxygen Delivery Method): room air    PHYSICAL EXAMINATION:  GENERAL: NAD, pleasant male   HEAD:  Atraumatic, Normocephalic  EYES:  conjunctiva and sclera clear  NECK: Supple, No JVD, Normal thyroid. Post cervical changes and reduced cervical spine motion 2/2 to spinal fusion surgery.   CHEST/LUNG: Clear to auscultation. Clear to percussion bilaterally; No rales, rhonchi, wheezing, or rubs  HEART: Regular rate and rhythm; No murmurs, rubs, or gallops  ABDOMEN: Soft, Nontender, Nondistended; Bowel sounds present, no pain or masses on palpation. No suprapubic tenderness.   NERVOUS SYSTEM:  Alert & Oriented X3, B/L hand tremors.   : voiding well, no hematuria.   EXTREMITIES:  2+ Peripheral Pulses, No clubbing, cyanosis, or edema  SKIN: warm dry, chronic B/L LE hyperpigmentation.                11.2   7.10  )-----------( 393      ( 19 Jul 2022 07:06 )             34.0     07-19    142  |  110<H>  |  10  ----------------------------<  100<H>  3.9   |  26  |  0.89    Ca    8.8      19 Jul 2022 07:06  Phos  3.7     07-18  Mg     2.4     07-18    TPro  6.8  /  Alb  2.0<L>  /  TBili  0.4  /  DBili  x   /  AST  63<H>  /  ALT  96<H>  /  AlkPhos  70  07-19    LIVER FUNCTIONS - ( 19 Jul 2022 07:06 )  Alb: 2.0 g/dL / Pro: 6.8 g/dL / ALK PHOS: 70 U/L / ALT: 96 U/L DA / AST: 63 U/L / GGT: x           I&O's Summary    Culture - Urine (collected 16 Jul 2022 19:08)  Source: Clean Catch Clean Catch (Midstream)  Final Report (19 Jul 2022 08:20):    50,000 - 99,000 CFU/mL Escherichia coli    50,000 - 99,000 CFU/mL Pseudomonas aeruginosa  Organism: Escherichia coli  Pseudomonas aeruginosa (19 Jul 2022 08:20)  Organism: Pseudomonas aeruginosa (19 Jul 2022 08:20)  Organism: Escherichia coli (19 Jul 2022 08:20)    RADIOLOGY & ADDITIONAL TESTS:    < from: Xray Chest 1 View-PORTABLE IMMEDIATE (07.16.22 @ 07:21) >  FINDINGS:  Lines, Catheters and Support Devices: None.    Heart Size, Mediastinum and Hilar Contours: Patient is again noted to be   status post median sternotomy and CABG. Heart size is suboptimally   evaluated on this AP projection. Normal mediastinal and hilar contours.    Lungs: No focal consolidation. No pleural effusions. No pneumothorax.    Bones/Soft Tissues: No acute abnormalities of the soft tissues and   osseous structures.    IMPRESSION:  No focal consolidation.    < from: US Testicles (07.16.22 @ 12:35) >  FINDINGS:    RIGHT:  Right testis: 3.6 cm x 1.4 cm x  2.8 cm. Heterogeneous echogenicity   involving the entire testes. Increased flow.  Right epididymis: Heterogeneous echogenicity and increased flow.  Right hydrocele: Small reactive hydrocele.  Right varicocele: None.    LEFT:  Left testis: 4.3 cm x 1.5 cm x 3.0 cm. Normal echogenicity and   echotexture with no masses or areas of architectural distortion. Normal   arterial and venous blood flow pattern.  Left epididymis: Within normal limits.  Left hydrocele: None.  Left varicocele: Present.    IMPRESSION:    Imaging findings suggest epididymitis and orchitis on the right side.   Correlate clinically and follow-up to document resolution.

## 2022-07-19 NOTE — PROGRESS NOTE ADULT - PROBLEM SELECTOR PLAN 3
Pt w/ hx of PD  s/p fall at home, landing in buttocks  no current hip pain, back pain or radiculopathy  fall precautions  PT recommends home physical therapy

## 2022-07-19 NOTE — PROGRESS NOTE ADULT - ASSESSMENT
Patient is a 51y old  Male  from home ambulates w/ cane w/ pmhx of PD, CHF, CAD (s/p CABG 2018), HTN, BPH comes in after sustaining a mechanical fall 1 wk ago and urinary symptoms.  Denied head trauma or LOC. Pt did not have significant pain afterwards. However, on Monday pt started experiencing abdominal and lower back pain, associated with dysuria and increased urinary frequency.  He also experienced pain on (R) testicle. On admission, she found to have fever, tachycardia, leukocytosis and positive Urine analysis. He has started on Ceftriaxone and the ID consult requested to assist with further evaluation and antibiotic management..    # Sepsis ( Fever + Tachycardia + Leukocytosis )  # UTI - urine cx grew Pseudomonas and E.coli  # Parkinsons disease     would recommend:    1. May change to oral Levaquin 750 mg daily to continue until 7/26/22 if QTc is less than 500  2. Continue Meropenem and Doxycycline inpatient   3. OOB to chair     d/w House staff , Dr. Garcia    Attending Attestation:    Spent more than 35 minutes on total encounter, more than 50 % of the visit was spent counseling and/or coordinating care by the Attending physician.

## 2022-07-19 NOTE — PROGRESS NOTE ADULT - PROBLEM SELECTOR PLAN 6
Pt not on CHF exacerbation  Last TTE (10/2020) EF 44%, apical anterior wall and apical septum are hypokinetic  on metoprolol tratrate 12.5 mg PO BID+ lasix 20 mg po daily + spironolactone 25 mg poqd at home  only resumed BB for now  holding diuretics for now in the s/o sepsis; resume as improved / as needed

## 2022-07-20 ENCOUNTER — TRANSCRIPTION ENCOUNTER (OUTPATIENT)
Age: 52
End: 2022-07-20

## 2022-07-20 VITALS
HEART RATE: 93 BPM | TEMPERATURE: 99 F | SYSTOLIC BLOOD PRESSURE: 128 MMHG | DIASTOLIC BLOOD PRESSURE: 75 MMHG | OXYGEN SATURATION: 95 % | RESPIRATION RATE: 17 BRPM

## 2022-07-20 PROCEDURE — 87077 CULTURE AEROBIC IDENTIFY: CPT

## 2022-07-20 PROCEDURE — 76870 US EXAM SCROTUM: CPT

## 2022-07-20 PROCEDURE — 85027 COMPLETE CBC AUTOMATED: CPT

## 2022-07-20 PROCEDURE — 96374 THER/PROPH/DIAG INJ IV PUSH: CPT

## 2022-07-20 PROCEDURE — 96375 TX/PRO/DX INJ NEW DRUG ADDON: CPT

## 2022-07-20 PROCEDURE — 71045 X-RAY EXAM CHEST 1 VIEW: CPT

## 2022-07-20 PROCEDURE — 99285 EMERGENCY DEPT VISIT HI MDM: CPT

## 2022-07-20 PROCEDURE — 85025 COMPLETE CBC W/AUTO DIFF WBC: CPT

## 2022-07-20 PROCEDURE — 84100 ASSAY OF PHOSPHORUS: CPT

## 2022-07-20 PROCEDURE — 87040 BLOOD CULTURE FOR BACTERIA: CPT

## 2022-07-20 PROCEDURE — 87635 SARS-COV-2 COVID-19 AMP PRB: CPT

## 2022-07-20 PROCEDURE — 86703 HIV-1/HIV-2 1 RESULT ANTBDY: CPT

## 2022-07-20 PROCEDURE — 83605 ASSAY OF LACTIC ACID: CPT

## 2022-07-20 PROCEDURE — 93005 ELECTROCARDIOGRAM TRACING: CPT

## 2022-07-20 PROCEDURE — 83735 ASSAY OF MAGNESIUM: CPT

## 2022-07-20 PROCEDURE — 97162 PT EVAL MOD COMPLEX 30 MIN: CPT

## 2022-07-20 PROCEDURE — 87086 URINE CULTURE/COLONY COUNT: CPT

## 2022-07-20 PROCEDURE — 82962 GLUCOSE BLOOD TEST: CPT

## 2022-07-20 PROCEDURE — 96361 HYDRATE IV INFUSION ADD-ON: CPT

## 2022-07-20 PROCEDURE — 87186 SC STD MICRODIL/AGAR DIL: CPT

## 2022-07-20 PROCEDURE — 80053 COMPREHEN METABOLIC PANEL: CPT

## 2022-07-20 PROCEDURE — 81001 URINALYSIS AUTO W/SCOPE: CPT

## 2022-07-20 PROCEDURE — 36415 COLL VENOUS BLD VENIPUNCTURE: CPT

## 2022-07-20 RX ORDER — LEVOFLOXACIN 5 MG/ML
1 INJECTION, SOLUTION INTRAVENOUS
Qty: 6 | Refills: 0
Start: 2022-07-20 | End: 2022-07-25

## 2022-07-20 RX ADMIN — BUPROPION HYDROCHLORIDE 150 MILLIGRAM(S): 150 TABLET, EXTENDED RELEASE ORAL at 11:57

## 2022-07-20 RX ADMIN — Medication 1000 UNIT(S): at 11:58

## 2022-07-20 RX ADMIN — CARBIDOPA, LEVODOPA, AND ENTACAPONE 1 TABLET(S): 50; 200; 200 TABLET, FILM COATED ORAL at 11:58

## 2022-07-20 RX ADMIN — PANTOPRAZOLE SODIUM 40 MILLIGRAM(S): 20 TABLET, DELAYED RELEASE ORAL at 06:14

## 2022-07-20 RX ADMIN — PREGABALIN 1000 MICROGRAM(S): 225 CAPSULE ORAL at 11:57

## 2022-07-20 RX ADMIN — Medication 12.5 MILLIGRAM(S): at 06:14

## 2022-07-20 RX ADMIN — MAGNESIUM OXIDE 400 MG ORAL TABLET 400 MILLIGRAM(S): 241.3 TABLET ORAL at 11:57

## 2022-07-20 RX ADMIN — Medication 81 MILLIGRAM(S): at 11:57

## 2022-07-20 RX ADMIN — CARBIDOPA, LEVODOPA, AND ENTACAPONE 1 TABLET(S): 50; 200; 200 TABLET, FILM COATED ORAL at 06:14

## 2022-07-20 RX ADMIN — CLOPIDOGREL BISULFATE 75 MILLIGRAM(S): 75 TABLET, FILM COATED ORAL at 11:57

## 2022-07-20 NOTE — PROGRESS NOTE ADULT - PROBLEM SELECTOR PLAN 1
pt with sepsis criteria (fever, tachycardia, leukocytosis and (+) UA)  lactate neg  s/p 2L bolus in ED + single dose of ceftriaxone  BCX - neg  UCx - E.coli (50K-99K); ceftriaxone, pseudomonas aeruginosa (50K-99K); sensitivities available   scrotum US - R orchitis, epididymitis  tylenol 650 mg po q 6 hrs PRN for fever  s/p 1g ceftriaxone (day 3) q24 hrs   c/w IV doxycyline (day 2) 100mg for orchitis, as per ID   c/w IV meropenem (day 1) for pseudomonas   completed courses. started on levofloxacin po for 7 days (end letty e7/26)   f/u ID abx reccs in light of sensitivity results.   ID following

## 2022-07-20 NOTE — PROGRESS NOTE ADULT - PROBLEM SELECTOR PLAN 8
Pt on lisinopril 2.5 mg at home  holding anti-hypertensives for now in the s/o sepsis  monitor BP

## 2022-07-20 NOTE — PROGRESS NOTE ADULT - PROBLEM SELECTOR PLAN 9
pt on tamsulosin 0.4 mg poqd at home  resumed home med

## 2022-07-20 NOTE — PROGRESS NOTE ADULT - PROBLEM SELECTOR PLAN 5
pt on carbidopa-levodopa-entacapone 25/100/200 mg   resumed home medication

## 2022-07-20 NOTE — PROGRESS NOTE ADULT - SUBJECTIVE AND OBJECTIVE BOX
PGY-1 Progress Note discussed with attending    PAGER #: [[phone]] TILL 5:00 PM  PLEASE CONTACT ON CALL TEAM:  - On Call Team (Please refer to Andres) FROM 5:00 PM - 8:30PM  - Nightfloat Team FROM 8:30 -7:30 AM    CHIEF COMPLAINT & BRIEF HOSPITAL COURSE:      CHIEF COMPLAINT & BRIEF HOSPITAL COURSE:  Pt is a 52 yo M from home ambulates w/ cane w/ pmhx of PD, CHF, CAD (s/p CABG 2018), HTN, BPH comes in after sustaining a mechanical fall 1 wk ago and urinary symptoms. As per the pt and his sister Ms. Santana sustained a mechanical fall 1 wk ago, landed on his buttocks. Denied head trauma or LOC. Pt did not have significant pain afterwards. However, on monday pt started experiencing abdominal and lower back pain, associated with dysuria and increased urinary frequency that would happen on an on and off basis. He also experienced pain on (R) testicle. As per pt and his sister he also experienced decreased appetite, decreased oral intake and some nausea, no episodes of emesis. Due to weakness he had to ambulate w/ walker. Denies fever, chills or any other acute complaint      INTERVAL HPI/OVERNIGHT EVENTS: patient seen at bedside, no acute overnigth events. Not in distress AOX3. No new complaints. Denies any dysuria, change in bowel habits abdominal pain, chest pain, SOB, fever, or N/V.       REVIEW OF SYSTEMS:  CONSTITUTIONAL: No fever, weight loss, or fatigue  RESPIRATORY: No cough, wheezing, chills or hemoptysis; No shortness of breath  CARDIOVASCULAR: No chest pain, palpitations, dizziness, or leg swelling  GASTROINTESTINAL: No abdominal pain. No nausea, vomiting, or hematemesis; No diarrhea or constipation. No melena or hematochezia.  GENITOURINARY: No dysuria or hematuria, urinary frequency  NEUROLOGICAL: No headaches, memory loss, loss of strength, numbness, or tremors  SKIN: No itching, burning, rashes, or lesions     Vital Signs Last 24 Hrs  T(C): 37.2 (20 Jul 2022 05:19), Max: 37.2 (20 Jul 2022 05:19)  T(F): 98.9 (20 Jul 2022 05:19), Max: 98.9 (20 Jul 2022 05:19)  HR: 93 (20 Jul 2022 05:19) (82 - 93)  BP: 128/75 (20 Jul 2022 05:19) (119/73 - 128/75)  BP(mean): --  RR: 17 (20 Jul 2022 05:19) (17 - 20)  SpO2: 95% (20 Jul 2022 05:19) (95% - 97%)    Parameters below as of 20 Jul 2022 05:19  Patient On (Oxygen Delivery Method): room air      PHYSICAL EXAMINATION:  GENERAL: NAD, pleasant male   HEAD:  Atraumatic, Normocephalic  EYES:  conjunctiva and sclera clear  NECK: Supple, No JVD, Normal thyroid. Post cervical changes and reduced cervical spine motion 2/2 to spinal fusion surgery.   CHEST/LUNG: Clear to auscultation. Clear to percussion bilaterally; No rales, rhonchi, wheezing, or rubs  HEART: Regular rate and rhythm; No murmurs, rubs, or gallops  ABDOMEN: Soft, Nontender, Nondistended; Bowel sounds present, no pain or masses on palpation. No suprapubic tenderness.   NERVOUS SYSTEM:  Alert & Oriented X3, B/L hand tremors.   : voiding well, no hematuria.   EXTREMITIES:  2+ Peripheral Pulses, No clubbing, cyanosis, or edema  SKIN: warm dry, chronic B/L LE hyperpigmentation.                           11.2   7.10  )-----------( 393      ( 19 Jul 2022 07:06 )             34.0     07-19    142  |  110<H>  |  10  ----------------------------<  100<H>  3.9   |  26  |  0.89    Ca    8.8      19 Jul 2022 07:06    TPro  6.8  /  Alb  2.0<L>  /  TBili  0.4  /  DBili  x   /  AST  63<H>  /  ALT  96<H>  /  AlkPhos  70  07-19    LIVER FUNCTIONS - ( 19 Jul 2022 07:06 )  Alb: 2.0 g/dL / Pro: 6.8 g/dL / ALK PHOS: 70 U/L / ALT: 96 U/L DA / AST: 63 U/L / GGT: x                   CAPILLARY BLOOD GLUCOSE      RADIOLOGY & ADDITIONAL TESTS:

## 2022-07-20 NOTE — PROGRESS NOTE ADULT - REASON FOR ADMISSION
fall and urinary symptoms

## 2022-07-20 NOTE — DISCHARGE NOTE NURSING/CASE MANAGEMENT/SOCIAL WORK - PATIENT PORTAL LINK FT
You can access the FollowMyHealth Patient Portal offered by [facility] by registering at the following website: [email or web address]. By joining [employer]’s FollowMyHealth portal, you will also be able to view your health information using other applications (apps) compatible with our system.

## 2022-07-20 NOTE — PROGRESS NOTE ADULT - ASSESSMENT
M E D I C A L   A T T E N D I N G    F O L L O W    U P   N O T E  (07-20-22 )                                     ------------------------------------------------------------------------------------------------    patient evaluated by me, case discussed with team, chart, medications, and physical exam reviewed, labs / tests  and vitals reviewed by me, as bellow.   Patient is stable for discharge today.  Patient to follow up with  PMD,  as needed   See discharge document for full note.  Greater than 35 min spent for these services.                              ( note written / Date of service  07-20-22 )    ==================>> MEDICATIONS <<====================    aspirin enteric coated 81 milliGRAM(s) Oral daily  atorvastatin 80 milliGRAM(s) Oral at bedtime  buPROPion XL (24-Hour) . 150 milliGRAM(s) Oral daily  carbidopa/levodopa/entacapone 25/100/200 1 Tablet(s) Oral <User Schedule>  cholecalciferol 1000 Unit(s) Oral daily  clopidogrel Tablet 75 milliGRAM(s) Oral daily  cyanocobalamin 1000 MICROGram(s) Oral daily  enoxaparin Injectable 40 milliGRAM(s) SubCutaneous every 24 hours  levoFLOXacin  Tablet 750 milliGRAM(s) Oral every 24 hours  magnesium oxide 400 milliGRAM(s) Oral daily  metoprolol tartrate 12.5 milliGRAM(s) Oral two times a day  pantoprazole    Tablet 40 milliGRAM(s) Oral before breakfast  polyethylene glycol 3350 17 Gram(s) Oral daily  senna 2 Tablet(s) Oral at bedtime  tamsulosin 0.4 milliGRAM(s) Oral at bedtime    MEDICATIONS  (PRN):  acetaminophen     Tablet .. 650 milliGRAM(s) Oral every 6 hours PRN Temp greater or equal to 38C (100.4F)    ___________  Active diet:  Diet, DASH/TLC:   Sodium & Cholesterol Restricted  ___________________    ==================>> VITAL SIGNS <<==================    T(C): 37.2 (07-20-22 @ 05:19), Max: 37.2 (07-20-22 @ 05:19)  HR: 93 (07-20-22 @ 05:19) (82 - 98)  BP: 128/75 (07-20-22 @ 05:19) (108/59 - 128/75)  BP(mean): --  RR: 17 (07-20-22 @ 05:19) (17 - 20)  SpO2: 95% (07-20-22 @ 05:19) (95% - 97%)       I&O's Summary    19 Jul 2022 07:01  -  20 Jul 2022 07:00  --------------------------------------------------------  IN: 0 mL / OUT: 700 mL / NET: -700 mL       ==================>> LAB AND IMAGING <<==================                        11.2   7.10  )-----------( 393      ( 19 Jul 2022 07:06 )             34.0        07-19    142  |  110<H>  |  10  ----------------------------<  100<H>  3.9   |  26  |  0.89    Ca    8.8      19 Jul 2022 07:06    TPro  6.8  /  Alb  2.0<L>  /  TBili  0.4  /  DBili  x   /  AST  63<H>  /  ALT  96<H>  /  AlkPhos  70  07-19    WBC count:   7.10 <<== ,  8.63 <<== ,  13.22 <<== ,  18.50 <<==   Hemoglobin:   11.2 <<==,  10.7 <<==,  10.3 <<==,  11.9 <<==  platelets:  393 <==, 373 <==, 332 <==, 365 <==    Creatinine:  0.89  <<==, 0.86  <<==, 0.84  <<==, 1.07  <<==  Sodium:   142  <==, 144  <==, 141  <==, 135  <==       AST:          63 <== , 38 <== , 21 <== , 19 <==      ALT:        96  <== , 43  <== , 32  <== , 65  <==      AP:        70  <=, 62  <=, 60  <=, 70  <=     Bili:        0.4  <=, 0.4  <=, 0.6  <=, 0.7  <=                                                    M E D I C A L   A T T E N D I N G    F O L L O W    U P   N O T E  (07-20-22 )                                     ------------------------------------------------------------------------------------------------    patient evaluated by me, case discussed with team, chart, medications, and physical exam reviewed, labs / tests  and vitals reviewed by me, as bellow.   Patient is stable for discharge today.  Patient to follow up with  PMD, MARIBEL as needed   pt reportedly could not go yesterday as had no transportation   See discharge document for full note.  Greater than 35 min spent for these services.                              ( note written / Date of service  07-20-22 )    ==================>> MEDICATIONS <<====================    aspirin enteric coated 81 milliGRAM(s) Oral daily  atorvastatin 80 milliGRAM(s) Oral at bedtime  buPROPion XL (24-Hour) . 150 milliGRAM(s) Oral daily  carbidopa/levodopa/entacapone 25/100/200 1 Tablet(s) Oral <User Schedule>  cholecalciferol 1000 Unit(s) Oral daily  clopidogrel Tablet 75 milliGRAM(s) Oral daily  cyanocobalamin 1000 MICROGram(s) Oral daily  enoxaparin Injectable 40 milliGRAM(s) SubCutaneous every 24 hours  levoFLOXacin  Tablet 750 milliGRAM(s) Oral every 24 hours  magnesium oxide 400 milliGRAM(s) Oral daily  metoprolol tartrate 12.5 milliGRAM(s) Oral two times a day  pantoprazole    Tablet 40 milliGRAM(s) Oral before breakfast  polyethylene glycol 3350 17 Gram(s) Oral daily  senna 2 Tablet(s) Oral at bedtime  tamsulosin 0.4 milliGRAM(s) Oral at bedtime    MEDICATIONS  (PRN):  acetaminophen     Tablet .. 650 milliGRAM(s) Oral every 6 hours PRN Temp greater or equal to 38C (100.4F)    ___________  Active diet:  Diet, DASH/TLC:   Sodium & Cholesterol Restricted  ___________________    ==================>> VITAL SIGNS <<==================    T(C): 37.2 (07-20-22 @ 05:19), Max: 37.2 (07-20-22 @ 05:19)  HR: 93 (07-20-22 @ 05:19) (82 - 98)  BP: 128/75 (07-20-22 @ 05:19) (108/59 - 128/75)  BP(mean): --  RR: 17 (07-20-22 @ 05:19) (17 - 20)  SpO2: 95% (07-20-22 @ 05:19) (95% - 97%)       I&O's Summary    19 Jul 2022 07:01  -  20 Jul 2022 07:00  --------------------------------------------------------  IN: 0 mL / OUT: 700 mL / NET: -700 mL       ==================>> LAB AND IMAGING <<==================                        11.2   7.10  )-----------( 393      ( 19 Jul 2022 07:06 )             34.0        07-19    142  |  110<H>  |  10  ----------------------------<  100<H>  3.9   |  26  |  0.89    Ca    8.8      19 Jul 2022 07:06    TPro  6.8  /  Alb  2.0<L>  /  TBili  0.4  /  DBili  x   /  AST  63<H>  /  ALT  96<H>  /  AlkPhos  70  07-19    WBC count:   7.10 <<== ,  8.63 <<== ,  13.22 <<== ,  18.50 <<==   Hemoglobin:   11.2 <<==,  10.7 <<==,  10.3 <<==,  11.9 <<==  platelets:  393 <==, 373 <==, 332 <==, 365 <==    Creatinine:  0.89  <<==, 0.86  <<==, 0.84  <<==, 1.07  <<==  Sodium:   142  <==, 144  <==, 141  <==, 135  <==       AST:          63 <== , 38 <== , 21 <== , 19 <==      ALT:        96  <== , 43  <== , 32  <== , 65  <==      AP:        70  <=, 62  <=, 60  <=, 70  <=     Bili:        0.4  <=, 0.4  <=, 0.6  <=, 0.7  <=

## 2022-07-20 NOTE — PROGRESS NOTE ADULT - PROBLEM SELECTOR PLAN 7
Pt with hx of CABG in 2018  on DAPT at home + lipitor 80 mg daily+ ezetimibe   resumed home meds

## 2022-07-20 NOTE — DISCHARGE NOTE NURSING/CASE MANAGEMENT/SOCIAL WORK - NSDCPEFALRISK_GEN_ALL_CORE
For information on Fall & Injury Prevention, visit: [email or web address] OR  [email or web address] OR  https://www.cdc.gov/steadi/patient.html

## 2022-08-17 NOTE — ED PROVIDER NOTE - NS ED MD EM SELECTION
Called patient with results. Patient verbalized understanding.    [id number] Exp Problem Focused - Mod. Complex

## 2022-10-17 NOTE — PATIENT PROFILE ADULT. - NS PRO OT REFERRAL QUES 2 YN
Reason for Visit: Consult on Urethral Diverticulum    Diagnosis: Urethral Diverticulum    Rooming Requirements: Normal      [facility] [place]  10/17/22  2:26 PM     no
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
## 2023-01-01 NOTE — ED CLERICAL - CLERICAL COMMENTS
Received COVID[-] result from ER Manager Sherrie. Endorsed room 414C to Sherrie @ 01:30. Received COVID[-] result from ER Manager Sherrie. Endorsed room 414C to CHRISTIAN Mata @ 01:33. [] : Resident Received COVID[-] result from ER Manager Sherrie. Patient upgraded to Tele @ 02:24 as per Anthony GONZALES. Endorsed room 503A to CHRISTIAN Mata @ 02:31.

## 2023-01-19 NOTE — PROGRESS NOTE ADULT - SUBJECTIVE AND OBJECTIVE BOX
Patient is seen and examined at the bed side, is afebrile. The sensitivities of E.coli and Pseudomonas has been reviewed.        REVIEW OF SYSTEMS: All other review systems are negative      ALLERGIES: No Known Drug Allergies  Seafood (Blisters; Swelling; Pruritus; Hives)      Vital Signs Last 24 Hrs  T(C): 36.8 (2022 05:56), Max: 36.8 (2022 05:56)  T(F): 98.2 (2022 05:56), Max: 98.2 (2022 05:56)  HR: 97 (2022 05:56) (84 - 97)  BP: 133/81 (2022 05:56) (109/71 - 133/81)  BP(mean): --  RR: 18 (2022 05:56) (18 - 20)  SpO2: 97% (2022 05:56) (97% - 98%)    Parameters below as of 2022 05:56  Patient On (Oxygen Delivery Method): room air        PHYSICAL EXAM:  GENERAL: Not in distress   CHEST/LUNG:  Not using accessory muscles   HEART: s1 and s2 present  ABDOMEN:  Nontender and  Nondistended  EXTREMITIES: No pedal  edema  CNS: Awake and Alert      LABS:                        11.2   7.10  )-----------( 393      ( 2022 07:06 )             34.0                10.3   13.22 )-----------( 332      ( 2022 07:31 )             31.2       07-19    142  |  110<H>  |  10  ----------------------------<  100<H>  3.9   |  26  |  0.89    Ca    8.8      2022 07:06  Phos  3.7     07-18  Mg     2.4     07-18    TPro  6.8  /  Alb  2.0<L>  /  TBili  0.4  /  DBili  x   /  AST  63<H>  /  ALT  96<H>  /  AlkPhos  70  07-19    07-18    144  |  112<H>  |  9   ----------------------------<  70  3.9   |  25  |  0.86    Ca    8.9      2022 08:02  Phos  3.7     07-18  Mg     2.4     07-18    TPro  6.3  /  Alb  2.1<L>  /  TBili  0.4  /  DBili  x   /  AST  38  /  ALT  43  /  AlkPhos  62  07-18      Urinalysis Basic - ( 2022 08:07 )  Color: Yellow / Appearance: Clear / S.010 / pH: x  Gluc: x / Ketone: Trace  / Bili: Negative / Urobili: Negative   Blood: x / Protein: 100 / Nitrite: Positive   Leuk Esterase: Moderate / RBC: 5-10 /HPF / WBC 11-25 /HPF   Sq Epi: x / Non Sq Epi: Few /HPF / Bacteria: Few /HPF        MEDICATIONS  (STANDING):    aspirin enteric coated 81 milliGRAM(s) Oral daily  atorvastatin 80 milliGRAM(s) Oral at bedtime  buPROPion XL (24-Hour) . 150 milliGRAM(s) Oral daily  carbidopa/levodopa/entacapone 25/100/200 1 Tablet(s) Oral <User Schedule>  cholecalciferol 1000 Unit(s) Oral daily  clopidogrel Tablet 75 milliGRAM(s) Oral daily  cyanocobalamin 1000 MICROGram(s) Oral daily  doxycycline IVPB      doxycycline IVPB 100 milliGRAM(s) IV Intermittent every 12 hours  enoxaparin Injectable 40 milliGRAM(s) SubCutaneous every 24 hours  magnesium oxide 400 milliGRAM(s) Oral daily  meropenem  IVPB 1000 milliGRAM(s) IV Intermittent every 8 hours  metoprolol tartrate 12.5 milliGRAM(s) Oral two times a day  pantoprazole    Tablet 40 milliGRAM(s) Oral before breakfast  polyethylene glycol 3350 17 Gram(s) Oral daily  senna 2 Tablet(s) Oral at bedtime  tamsulosin 0.4 milliGRAM(s) Oral at bedtime        RADIOLOGY & ADDITIONAL TESTS:    22: US Testicles (22 @ 12:35) Imaging findings suggest epididymitis and orchitis on the right side.   Correlate clinically and follow-up to document resolution.    22: Xray Chest 1 View-PORTABLE IMMEDIATE (22 @ 07:21) No focal consolidation.        MICROBIOLOGY DATA:  Culture - Urine (22 @ 19:08)   - Imipenem: S <=1   - Levofloxacin: S <=0.5   - Levofloxacin: S <=0.5   - Meropenem: S <=1   - Meropenem: S <=1   - Nitrofurantoin: S <=32 Should not be used to treat pyelonephritis   - Piperacillin/Tazobactam: S <=8   - Piperacillin/Tazobactam: S <=8   - Tigecycline: S <=2   - Tobramycin: S <=2   - Tobramycin: S <=2   - Trimethoprim/Sulfamethoxazole: S <=0.5/9.5   - Amikacin: S <=16   - Amikacin: S <=16   - Amoxicillin/Clavulanic Acid: S <=8/4   - Ampicillin: R >16 These ampicillin results predict results for amoxicillin   - Ampicillin/Sulbactam: S <=4/2 Enterobacter, Klebsiella aerogenes, Citrobacter, and Serratia may develop resistance during prolonged therapy (3-4 days)   - Aztreonam: S <=4   - Aztreonam: S 8   - Cefazolin: S <=2 (MIC_CL_COM_ENTERIC_CEFAZU) For uncomplicated UTI with K. pneumoniae, E. coli, or P. mirablis: LATONYA <=16 is sensitive and LATONYA >=32 is resistant. This also predicts results for oral agents cefaclor, cefdinir, cefpodoxime, cefprozil, cefuroxime axetil, cephalexin and locarbef for uncomplicated UTI. Note that some isolates may be susceptible to these agents while testing resistant to cefazolin.   - Cefepime: S <=2   - Cefepime: S <=2   - Cefoxitin: S <=8   - Ceftazidime: S 4   - Ceftriaxone: S <=1 Enterobacter, Klebsiella aerogenes, Citrobacter, and Serratia may develop resistance during prolonged therapy   - Ciprofloxacin: S <=0.25   - Ciprofloxacin: S <=0.25   - Ertapenem: S <=0.5   - Gentamicin: S <=2   - Gentamicin: S <=2   - Imipenem: S 2   Specimen Source: Clean Catch Clean Catch (Midstream)   Culture Results:   50,000 - 99,000 CFU/mL Escherichia coli   50,000 - 99,000 CFU/mL Pseudomonas aeruginosa   Organism Identification: Escherichia coli   Pseudomonas aeruginosa   Organism: Escherichia coli   Organism: Pseudomonas aeruginosa   Method Type: LATONYA   Method Type: LATONYA       Culture - Urine (22 @ 19:08)   Specimen Source: Clean Catch Clean Catch (Midstream)   Culture Results: 50,000 - 99,000 CFU/mL Escherichia coli   50,000 - 99,000 CFU/mL Pseudomonas aeruginosa     Culture - Blood (22 @ 10:18)   Specimen Source: .Blood Blood-Peripheral   Culture Results: No growth to date.     Culture - Blood (22 @ 10:18)   Specimen Source: .Blood Blood-Peripheral   Culture Results: No growth to date.    Rapid HIV-1/2 Antibody (22 @ 06:15)   Rapid HIV-1/2 Antibody: Nonreact:              none

## 2023-02-10 NOTE — H&P PST ADULT - HEIGHT IN FEET
EKG shows sinus rhythm  Rate control with metoprolol  Is currently on Eliquis for anticoagulation -on hold secondary to acute anemia 5

## 2023-06-12 ENCOUNTER — EMERGENCY (EMERGENCY)
Facility: HOSPITAL | Age: 53
LOS: 1 days | Discharge: HOME | End: 2023-06-12
Attending: EMERGENCY MEDICINE
Payer: MEDICARE

## 2023-06-12 VITALS
SYSTOLIC BLOOD PRESSURE: 138 MMHG | HEART RATE: 79 BPM | TEMPERATURE: 98 F | OXYGEN SATURATION: 97 % | WEIGHT: 147.05 LBS | DIASTOLIC BLOOD PRESSURE: 70 MMHG | RESPIRATION RATE: 16 BRPM | HEIGHT: 67 IN

## 2023-06-12 VITALS
TEMPERATURE: 98 F | HEART RATE: 80 BPM | RESPIRATION RATE: 18 BRPM | OXYGEN SATURATION: 97 % | DIASTOLIC BLOOD PRESSURE: 76 MMHG | SYSTOLIC BLOOD PRESSURE: 132 MMHG

## 2023-06-12 DIAGNOSIS — Z98.1 ARTHRODESIS STATUS: Chronic | ICD-10-CM

## 2023-06-12 DIAGNOSIS — Z95.1 PRESENCE OF AORTOCORONARY BYPASS GRAFT: Chronic | ICD-10-CM

## 2023-06-12 DIAGNOSIS — Z98.890 OTHER SPECIFIED POSTPROCEDURAL STATES: Chronic | ICD-10-CM

## 2023-06-12 DIAGNOSIS — Z90.79 ACQUIRED ABSENCE OF OTHER GENITAL ORGAN(S): Chronic | ICD-10-CM

## 2023-06-12 LAB
ALBUMIN SERPL ELPH-MCNC: 3.9 G/DL — SIGNIFICANT CHANGE UP (ref 3.5–5)
ALP SERPL-CCNC: 72 U/L — SIGNIFICANT CHANGE UP (ref 40–120)
ALT FLD-CCNC: 32 U/L DA — SIGNIFICANT CHANGE UP (ref 10–60)
ANION GAP SERPL CALC-SCNC: 5 MMOL/L — SIGNIFICANT CHANGE UP (ref 5–17)
APPEARANCE UR: CLEAR — SIGNIFICANT CHANGE UP
AST SERPL-CCNC: 24 U/L — SIGNIFICANT CHANGE UP (ref 10–40)
BACTERIA # UR AUTO: ABNORMAL /HPF
BASOPHILS # BLD AUTO: 0.02 K/UL — SIGNIFICANT CHANGE UP (ref 0–0.2)
BASOPHILS NFR BLD AUTO: 0.3 % — SIGNIFICANT CHANGE UP (ref 0–2)
BILIRUB SERPL-MCNC: 0.8 MG/DL — SIGNIFICANT CHANGE UP (ref 0.2–1.2)
BILIRUB UR-MCNC: NEGATIVE — SIGNIFICANT CHANGE UP
BUN SERPL-MCNC: 13 MG/DL — SIGNIFICANT CHANGE UP (ref 7–18)
CALCIUM SERPL-MCNC: 8.8 MG/DL — SIGNIFICANT CHANGE UP (ref 8.4–10.5)
CHLORIDE SERPL-SCNC: 108 MMOL/L — SIGNIFICANT CHANGE UP (ref 96–108)
CO2 SERPL-SCNC: 25 MMOL/L — SIGNIFICANT CHANGE UP (ref 22–31)
COLOR SPEC: YELLOW — SIGNIFICANT CHANGE UP
COMMENT - URINE: SIGNIFICANT CHANGE UP
CREAT SERPL-MCNC: 0.92 MG/DL — SIGNIFICANT CHANGE UP (ref 0.5–1.3)
DIFF PNL FLD: ABNORMAL
EGFR: 100 ML/MIN/1.73M2 — SIGNIFICANT CHANGE UP
EOSINOPHIL # BLD AUTO: 0.12 K/UL — SIGNIFICANT CHANGE UP (ref 0–0.5)
EOSINOPHIL NFR BLD AUTO: 1.7 % — SIGNIFICANT CHANGE UP (ref 0–6)
EPI CELLS # UR: SIGNIFICANT CHANGE UP /HPF
GLUCOSE SERPL-MCNC: 111 MG/DL — HIGH (ref 70–99)
GLUCOSE UR QL: NEGATIVE — SIGNIFICANT CHANGE UP
HCT VFR BLD CALC: 39.2 % — SIGNIFICANT CHANGE UP (ref 39–50)
HGB BLD-MCNC: 13.2 G/DL — SIGNIFICANT CHANGE UP (ref 13–17)
HYALINE CASTS # UR AUTO: ABNORMAL /LPF
IMM GRANULOCYTES NFR BLD AUTO: 0.1 % — SIGNIFICANT CHANGE UP (ref 0–0.9)
KETONES UR-MCNC: ABNORMAL
LEUKOCYTE ESTERASE UR-ACNC: ABNORMAL
LIDOCAIN IGE QN: 172 U/L — SIGNIFICANT CHANGE UP (ref 73–393)
LYMPHOCYTES # BLD AUTO: 0.77 K/UL — LOW (ref 1–3.3)
LYMPHOCYTES # BLD AUTO: 11.1 % — LOW (ref 13–44)
MCHC RBC-ENTMCNC: 28.6 PG — SIGNIFICANT CHANGE UP (ref 27–34)
MCHC RBC-ENTMCNC: 33.7 GM/DL — SIGNIFICANT CHANGE UP (ref 32–36)
MCV RBC AUTO: 85 FL — SIGNIFICANT CHANGE UP (ref 80–100)
MONOCYTES # BLD AUTO: 0.64 K/UL — SIGNIFICANT CHANGE UP (ref 0–0.9)
MONOCYTES NFR BLD AUTO: 9.2 % — SIGNIFICANT CHANGE UP (ref 2–14)
NEUTROPHILS # BLD AUTO: 5.36 K/UL — SIGNIFICANT CHANGE UP (ref 1.8–7.4)
NEUTROPHILS NFR BLD AUTO: 77.6 % — HIGH (ref 43–77)
NITRITE UR-MCNC: NEGATIVE — SIGNIFICANT CHANGE UP
NRBC # BLD: 0 /100 WBCS — SIGNIFICANT CHANGE UP (ref 0–0)
PH UR: 8 — SIGNIFICANT CHANGE UP (ref 5–8)
PLATELET # BLD AUTO: 169 K/UL — SIGNIFICANT CHANGE UP (ref 150–400)
POTASSIUM SERPL-MCNC: 4.1 MMOL/L — SIGNIFICANT CHANGE UP (ref 3.5–5.3)
POTASSIUM SERPL-SCNC: 4.1 MMOL/L — SIGNIFICANT CHANGE UP (ref 3.5–5.3)
PROT SERPL-MCNC: 7 G/DL — SIGNIFICANT CHANGE UP (ref 6–8.3)
PROT UR-MCNC: 15 MG/DL
RBC # BLD: 4.61 M/UL — SIGNIFICANT CHANGE UP (ref 4.2–5.8)
RBC # FLD: 12.8 % — SIGNIFICANT CHANGE UP (ref 10.3–14.5)
RBC CASTS # UR COMP ASSIST: ABNORMAL /HPF (ref 0–2)
SODIUM SERPL-SCNC: 138 MMOL/L — SIGNIFICANT CHANGE UP (ref 135–145)
SP GR SPEC: 1.01 — SIGNIFICANT CHANGE UP (ref 1.01–1.02)
UROBILINOGEN FLD QL: NEGATIVE — SIGNIFICANT CHANGE UP
WBC # BLD: 6.92 K/UL — SIGNIFICANT CHANGE UP (ref 3.8–10.5)
WBC # FLD AUTO: 6.92 K/UL — SIGNIFICANT CHANGE UP (ref 3.8–10.5)
WBC UR QL: SIGNIFICANT CHANGE UP /HPF (ref 0–5)

## 2023-06-12 PROCEDURE — 80053 COMPREHEN METABOLIC PANEL: CPT

## 2023-06-12 PROCEDURE — 74177 CT ABD & PELVIS W/CONTRAST: CPT | Mod: 26,MA

## 2023-06-12 PROCEDURE — 99284 EMERGENCY DEPT VISIT MOD MDM: CPT

## 2023-06-12 PROCEDURE — 36415 COLL VENOUS BLD VENIPUNCTURE: CPT

## 2023-06-12 PROCEDURE — 83690 ASSAY OF LIPASE: CPT

## 2023-06-12 PROCEDURE — 99284 EMERGENCY DEPT VISIT MOD MDM: CPT | Mod: 25

## 2023-06-12 PROCEDURE — 85025 COMPLETE CBC W/AUTO DIFF WBC: CPT

## 2023-06-12 PROCEDURE — 81001 URINALYSIS AUTO W/SCOPE: CPT

## 2023-06-12 PROCEDURE — 74177 CT ABD & PELVIS W/CONTRAST: CPT | Mod: MA

## 2023-06-12 RX ORDER — LISINOPRIL 2.5 MG/1
1 TABLET ORAL
Qty: 0 | Refills: 0 | DISCHARGE

## 2023-06-12 NOTE — ED PROVIDER NOTE - NSFOLLOWUPINSTRUCTIONS_ED_ALL_ED_FT
Inguinal Hernia    WHAT YOU NEED TO KNOW:    An inguinal hernia happens when organs or abdominal tissue push through a weak spot in the abdominal wall. The abdominal wall is made of fat and muscle. It holds the intestines in place. The hernia may contain fluid, tissue from the abdomen, or part of an organ (such as an intestine).  Inguinal Hernia    DISCHARGE INSTRUCTIONS:    Seek care immediately if:    You have severe abdominal pain with nausea and vomiting.    Your abdomen is larger than usual.    Your hernia gets bigger or is purple or blue.    You see blood in your bowel movements.    You feel weak, dizzy, or faint.  Contact your healthcare provider if:    You have a fever.    You have questions or concerns about your condition or care.  Medicine: You may need the following:    NSAIDs, such as ibuprofen, help decrease swelling, pain, and fever. NSAIDs can cause stomach bleeding or kidney problems in certain people. If you take blood thinner medicine, always ask your healthcare provider if NSAIDs are safe for you. Always read the medicine label and follow directions.    Take your medicine as directed. Contact your healthcare provider if you think your medicine is not helping or if you have side effects. Tell your provider if you are allergic to any medicine. Keep a list of the medicines, vitamins, and herbs you take. Include the amounts, and when and why you take them. Bring the list or the pill bottles to follow-up visits. Carry your medicine list with you in case of an emergency.  Follow up with your healthcare provider as directed: Write down your questions so you remember to ask them during your visits.    Manage your symptoms and prevent another hernia:    Do not lift anything heavy. Heavy lifting can make your hernia worse or cause another hernia. Ask your healthcare provider how much is safe for you to lift.    Drink liquids as directed. Liquids may prevent constipation and straining during a bowel movement. Ask how much liquid to drink each day and which liquids are best for you.    Eat foods high in fiber. Fiber may prevent constipation and straining during a bowel movement. Foods that contain fiber include fruits, vegetables, beans, lentils, and whole grains.    Maintain a healthy weight. If you are overweight, weight loss may prevent your hernia from getting worse. It may also prevent another hernia. Talk to your healthcare provider about exercise and how to lose weight safely if you are overweight.    Do not smoke. Nicotine and other chemicals in cigarettes and cigars can weaken the abdominal wall. This may increase your risk for another hernia. Ask your healthcare provider for information if you currently smoke and need help to quit. E-cigarettes or smokeless tobacco still contain nicotine. Talk to your healthcare provider before you use these products.

## 2023-06-12 NOTE — ED ADULT NURSE NOTE - OBJECTIVE STATEMENT
As per wife pt has left side abd pain near where he had is hernia and he has not had bowel movement in 2 days.

## 2023-06-12 NOTE — ED ADULT TRIAGE NOTE - CHIEF COMPLAINT QUOTE
as per sister " he complained of his Hernia on left side  hurting since yesterday, also he is constipated two days "

## 2023-06-12 NOTE — ED PROVIDER NOTE - CLINICAL SUMMARY MEDICAL DECISION MAKING FREE TEXT BOX
52-year-old male with left lower quadrant pain and constipation with suspicion for hernia.  Plan to perform labs and CT abdomen pelvis to evaluate for hernia/incarceration/constipation, obstruction among other causes.

## 2023-06-12 NOTE — ED PROVIDER NOTE - OBJECTIVE STATEMENT
52-year-old male history of Parkinson's, CHF, CAD, hypertension, right inguinal hernia status postrepair in the past presenting with 2 days of left-sided abdominal pain with swelling and difficulty having bowel movement.  Patient relates difficulty passing with gas over same time.  Reports normal appetite.  Denies any vomiting, or fever but does relate difficulty urinating over same time.

## 2023-06-12 NOTE — ED PROVIDER NOTE - PATIENT PORTAL LINK FT
You can access the FollowMyHealth Patient Portal offered by [facility] by registering at the following website: [email or web address]. By joining [employer]’s FollowMyHealth portal, you will also be able to view your health information using other applications (apps) compatible with our system.

## 2023-06-12 NOTE — ED PROVIDER NOTE - PROGRESS NOTE DETAILS
Reassessed patient, has no abdominal tenderness.  Educated patient and wife on results showing inguinal hernia.  No signs of obstruction or significant constipation.  Patient already has MiraLAX at home.  Educated on use and follow-up.  Educated on signs and symptoms to return sooner to ED.  Answered questions

## 2023-06-22 ENCOUNTER — APPOINTMENT (OUTPATIENT)
Dept: SURGERY | Facility: CLINIC | Age: 53
End: 2023-06-22
Payer: MEDICARE

## 2023-06-22 VITALS — HEIGHT: 66 IN | WEIGHT: 146 LBS | BODY MASS INDEX: 23.46 KG/M2

## 2023-06-22 DIAGNOSIS — Z82.49 FAMILY HISTORY OF ISCHEMIC HEART DISEASE AND OTHER DISEASES OF THE CIRCULATORY SYSTEM: ICD-10-CM

## 2023-06-22 DIAGNOSIS — I50.9 HEART FAILURE, UNSPECIFIED: ICD-10-CM

## 2023-06-22 DIAGNOSIS — R73.03 PREDIABETES.: ICD-10-CM

## 2023-06-22 DIAGNOSIS — I25.10 ATHEROSCLEROTIC HEART DISEASE OF NATIVE CORONARY ARTERY W/OUT ANGINA PECTORIS: ICD-10-CM

## 2023-06-22 DIAGNOSIS — G20 PARKINSON'S DISEASE: ICD-10-CM

## 2023-06-22 DIAGNOSIS — I10 ESSENTIAL (PRIMARY) HYPERTENSION: ICD-10-CM

## 2023-06-22 DIAGNOSIS — E78.00 PURE HYPERCHOLESTEROLEMIA, UNSPECIFIED: ICD-10-CM

## 2023-06-22 DIAGNOSIS — Z63.5 DISRUPTION OF FAMILY BY SEPARATION AND DIVORCE: ICD-10-CM

## 2023-06-22 PROCEDURE — 99203 OFFICE O/P NEW LOW 30 MIN: CPT

## 2023-06-22 RX ORDER — MAGNESIUM OXIDE 241.3 MG/1000MG
400 TABLET ORAL
Refills: 0 | Status: ACTIVE | COMMUNITY

## 2023-06-22 RX ORDER — EZETIMIBE 10 MG/1
10 TABLET ORAL
Refills: 0 | Status: ACTIVE | COMMUNITY

## 2023-06-22 RX ORDER — PANTOPRAZOLE 40 MG/1
40 TABLET, DELAYED RELEASE ORAL
Refills: 0 | Status: ACTIVE | COMMUNITY

## 2023-06-22 RX ORDER — FUROSEMIDE 20 MG/1
20 TABLET ORAL
Refills: 0 | Status: ACTIVE | COMMUNITY

## 2023-06-22 RX ORDER — TAMSULOSIN HYDROCHLORIDE 0.4 MG/1
0.4 CAPSULE ORAL
Refills: 0 | Status: ACTIVE | COMMUNITY

## 2023-06-22 RX ORDER — BUPROPION HYDROCHLORIDE 150 MG/1
150 TABLET, EXTENDED RELEASE ORAL
Refills: 0 | Status: ACTIVE | COMMUNITY

## 2023-06-22 RX ORDER — CLOPIDOGREL BISULFATE 75 MG/1
75 TABLET, FILM COATED ORAL
Refills: 0 | Status: ACTIVE | COMMUNITY

## 2023-06-22 RX ORDER — METOPROLOL TARTRATE 25 MG/1
25 TABLET, FILM COATED ORAL
Refills: 0 | Status: ACTIVE | COMMUNITY

## 2023-06-22 RX ORDER — LISINOPRIL 2.5 MG/1
2.5 TABLET ORAL
Refills: 0 | Status: ACTIVE | COMMUNITY

## 2023-06-22 RX ORDER — FERROUS GLUCONATE 225(27)MG
240 (27 FE) TABLET ORAL
Refills: 0 | Status: ACTIVE | COMMUNITY

## 2023-06-22 RX ORDER — SPIRONOLACTONE 25 MG/1
25 TABLET ORAL
Refills: 0 | Status: ACTIVE | COMMUNITY

## 2023-06-22 RX ORDER — DOCUSATE SODIUM 100 MG
100 TABLET ORAL
Refills: 0 | Status: ACTIVE | COMMUNITY

## 2023-06-22 RX ORDER — APOMORPHINE HYDROCHLORIDE 10 MG/1
10 FILM, SOLUBLE SUBLINGUAL
Refills: 0 | Status: ACTIVE | COMMUNITY

## 2023-06-22 RX ORDER — UBIDECARENONE/VIT E ACET 100MG-5
CAPSULE ORAL
Refills: 0 | Status: ACTIVE | COMMUNITY

## 2023-06-22 RX ORDER — ASPIRIN ENTERIC COATED TABLETS 81 MG 81 MG/1
81 TABLET, DELAYED RELEASE ORAL
Refills: 0 | Status: ACTIVE | COMMUNITY

## 2023-06-22 RX ORDER — CARBIDOPA, LEVODOPA AND ENTACAPONE 25; 100; 200 MG/1; MG/1; MG/1
25-100-200 TABLET, FILM COATED ORAL
Refills: 0 | Status: ACTIVE | COMMUNITY

## 2023-06-22 RX ORDER — ATORVASTATIN CALCIUM 80 MG/1
80 TABLET, FILM COATED ORAL
Refills: 0 | Status: ACTIVE | COMMUNITY

## 2023-06-22 SDOH — SOCIAL STABILITY - SOCIAL INSECURITY: DISRUPTION OF FAMILY BY SEPARATION AND DIVORCE: Z63.5

## 2023-06-22 NOTE — PLAN
[FreeTextEntry1] : Mr. FRAGOSO  was told significance of findings, options, risks and benefits were explained.  Informed consent for left inguinal hernia repair with mesh and potential risks, benefits and alternatives (surgical options were discussed including non-surgical options or the option of no surgery) to the planned surgery were discussed in depth.  All surgical options were discussed including non-surgical treatments.  He wishes to proceed with surgery.  We will plan for surgery on at the next available date, pending any required insurance pre-certification or pre-approval. He agrees to obtain any necessary pre-operative evaluations and testing prior to surgery.\par Patient advised to seek immediate medical attention with any acute change in symptoms or with the development of any new or worsening symptoms.  Patient's questions and concerns addressed to patient's satisfaction, and patient verbalized an understanding of the information discussed.\par \par

## 2023-06-22 NOTE — DATA REVIEWED
[FreeTextEntry1] : \par \par ACC: [id number] EXAM: CT ABDOMEN AND PELVIS IC ORDERED BY: PRIYANK RUSSELL\par \par PROCEDURE DATE: 06/12/2023\par \par \par \par INTERPRETATION: CLINICAL INFORMATION: Left lower quadrant pain\par \par COMPARISON: CT abdomen pelvis 7/28/2020\par \par CONTRAST/COMPLICATIONS:\par IV Contrast: Omnipaque 350 90 cc administered 10 cc discarded\par Oral Contrast: NONE\par Complications: None reported at time of study completion\par \par PROCEDURE:\par CT of the Abdomen and Pelvis was performed.\par Sagittal and coronal reformats were performed.\par \par FINDINGS:\par LOWER CHEST: Median sternotomy. Bilateral gynecomastia.\par \par LIVER: Within normal limits.\par BILE DUCTS: Normal caliber.\par GALLBLADDER: Within normal limits.\par SPLEEN: Within normal limits.\par PANCREAS: Within normal limits.\par ADRENALS: Within normal limits.\par KIDNEYS/URETERS: Right renal cortical scarring. Symmetric renal enhancement without hydronephrosis.\par \par BLADDER: Within normal limits.\par REPRODUCTIVE ORGANS: TURP defect.\par \par BOWEL: No bowel obstruction. Appendix is normal.\par PERITONEUM: No ascites.\par VESSELS: Within normal limits.\par RETROPERITONEUM/LYMPH NODES: No lymphadenopathy.\par ABDOMINAL WALL: Small left inguinal hernia containing fat fluid and a tiny portion of unobstructed sigmoid colon.\par BONES: Degenerative changes.\par \par IMPRESSION:\par Small left inguinal hernia containing fat and fluid as well as a tiny portion of unobstructed sigmoid colon. Cannot exclude fat incarceration. Please correlate for reducibility.\par \par \par KENDY MAGANA MD; Attending Radiologist\par This document has been electronically signed. Jun 12 2023 4:06AM

## 2023-06-22 NOTE — CONSULT LETTER
[Dear  ___] : Dear  [unfilled], [Consult Letter:] : I had the pleasure of evaluating your patient, [unfilled]. [Please see my note below.] : Please see my note below. [Consult Closing:] : Thank you very much for allowing me to participate in the care of this patient.  If you have any questions, please do not hesitate to contact me. [Sincerely,] : Sincerely, [FreeTextEntry3] : Jay Victoria MD, FACS

## 2023-06-22 NOTE — HISTORY OF PRESENT ILLNESS
[de-identified] : Mr. ISAIHA FRAGOSO  is  a 52 year   old patient  who was referred by Dr. Erin Jaime with the chief complaint of having pain and swelling in his  Left   groin.  He has had the condition since March 2023  and is worse when he   is walking or standing.  Mr. FRAGOSO is stating that the swelling  is getting bigger and symptomatic. He denies any fever or  night sweats.  Appetite is good and weight is stable. Mr. FRAGOSO  is s/p right inguinal hernia repair on 12/18/2019. Mr. FRAGOSO  is s/p CT scan abdomen and pelvis in ED on 06/12/2023  that showed Small left inguinal hernia containing fat fluid and a tiny portion of unobstructed sigmoid colon.

## 2023-06-22 NOTE — PHYSICAL EXAM
[Alert] : alert [Oriented to Person] : oriented to person [Oriented to Place] : oriented to place [Oriented to Time] : oriented to time [Calm] : calm [de-identified] : He  is alert, well-groomed, and in NAD\par   [de-identified] : anicteric.  Nasal mucosa pink, septum midline. Oral mucosa pink.  Tongue midline, Pharynx without exudates.\par   [de-identified] : unable to move his neck  due to fusion  [de-identified] :  reducible Left   inguinal hernia.  No evidence of hernia recurrence on the opposite side.  No penile discharge or lesions.  No scrotal swelling or discoloration. Testes descended bilaterally, smooth, without masses. Epididymis non-tender.

## 2023-07-24 ENCOUNTER — OUTPATIENT (OUTPATIENT)
Dept: OUTPATIENT SERVICES | Facility: HOSPITAL | Age: 53
LOS: 1 days | End: 2023-07-24
Payer: MEDICARE

## 2023-07-24 VITALS
WEIGHT: 143.08 LBS | RESPIRATION RATE: 18 BRPM | SYSTOLIC BLOOD PRESSURE: 112 MMHG | HEART RATE: 93 BPM | HEIGHT: 66 IN | OXYGEN SATURATION: 99 % | DIASTOLIC BLOOD PRESSURE: 72 MMHG | TEMPERATURE: 98 F

## 2023-07-24 DIAGNOSIS — K40.90 UNILATERAL INGUINAL HERNIA, WITHOUT OBSTRUCTION OR GANGRENE, NOT SPECIFIED AS RECURRENT: ICD-10-CM

## 2023-07-24 DIAGNOSIS — Z90.79 ACQUIRED ABSENCE OF OTHER GENITAL ORGAN(S): Chronic | ICD-10-CM

## 2023-07-24 DIAGNOSIS — Z95.1 PRESENCE OF AORTOCORONARY BYPASS GRAFT: Chronic | ICD-10-CM

## 2023-07-24 DIAGNOSIS — Z01.818 ENCOUNTER FOR OTHER PREPROCEDURAL EXAMINATION: ICD-10-CM

## 2023-07-24 DIAGNOSIS — I50.9 HEART FAILURE, UNSPECIFIED: ICD-10-CM

## 2023-07-24 DIAGNOSIS — I10 ESSENTIAL (PRIMARY) HYPERTENSION: ICD-10-CM

## 2023-07-24 DIAGNOSIS — Z98.1 ARTHRODESIS STATUS: Chronic | ICD-10-CM

## 2023-07-24 DIAGNOSIS — Z29.9 ENCOUNTER FOR PROPHYLACTIC MEASURES, UNSPECIFIED: ICD-10-CM

## 2023-07-24 DIAGNOSIS — G20 PARKINSON'S DISEASE: ICD-10-CM

## 2023-07-24 DIAGNOSIS — Z98.890 OTHER SPECIFIED POSTPROCEDURAL STATES: Chronic | ICD-10-CM

## 2023-07-24 PROCEDURE — 71046 X-RAY EXAM CHEST 2 VIEWS: CPT | Mod: 26

## 2023-07-24 RX ORDER — APOMORPHINE HYDROCHLORIDE 10-15-20MG
1 KIT SUBLINGUAL
Qty: 0 | Refills: 0 | DISCHARGE

## 2023-07-24 NOTE — H&P PST ADULT - NSICDXFAMILYHX_GEN_ALL_CORE_FT
FAMILY HISTORY:  Father  Still living? Unknown  Family history of heart attack, Age at diagnosis: Age Unknown    Mother  Still living? No  Family history of heart attack, Age at diagnosis: Age Unknown    Sibling  Still living? Unknown  Family history of acute myocardial infarction, Age at diagnosis: Age Unknown

## 2023-07-24 NOTE — H&P PST ADULT - NSICDXPASTMEDICALHX_GEN_ALL_CORE_FT
PAST MEDICAL HISTORY:  Anxiety     BPH (benign prostatic hyperplasia) TURP- 1/2019    CAD (coronary artery disease) CABG x 2 - 2/2018    Cervical radiculopathy as per family unstable C1, C2 , Dr. Decker notified , limited ROM of neck, pt is difficult intubation risk, family to bring results of MRI of neck 2018    Chronic neck pain     Chronic pain of both shoulders     H/O CHF     Hypercholesterolemia     Hypertension     Inguinal hernia, left     Parkinsons disease onset 2011    Scoliosis

## 2023-07-24 NOTE — H&P PST ADULT - NEGATIVE SKIN SYMPTOMS
no rash/no itching/no dryness/no change in size/color of mole no dryness/no change in size/color of mole

## 2023-07-24 NOTE — H&P PST ADULT - PROBLEM SELECTOR PLAN 3
Continue medications as prescribed, Monitor weight. F/u with cardiologist, and go to ED if symptoms of SOB

## 2023-07-24 NOTE — H&P PST ADULT - NSANTHOSAYNRD_GEN_A_CORE
No. RNEEE screening performed.  STOP BANG Legend: 0-2 = LOW Risk; 3-4 = INTERMEDIATE Risk; 5-8 = HIGH Risk

## 2023-07-24 NOTE — H&P PST ADULT - PROBLEM SELECTOR PLAN 5
Pre op instructions discussed with patients and written instructions given to patients. 4% CHG solution given and verbal and written instructions given how to use it to prevent post op infections. Pt will get a call from PST on the day before surgery and inform the time of surgery.  Pt need to come to the hospital on time on the day of surgery.  Instructed patient not to eat or drink anything after midnight the night before surgery, to avoid NSAIDS such as Advil, Aleve, Ibuprofen, Motrin, and Naproxen one week before surgery. May take Tylenol if needed for pain. Instructed to report if there is fever cold or flu like symptoms, or symptoms of  Covid or if she was exposed to anyone with covid. Instructed pt to notify the security when she arrives in the lobby that she is here for surgery and that she need someone 18 years or older to escort home after surgery.

## 2023-07-24 NOTE — H&P PST ADULT - HISTORY OF PRESENT ILLNESS
53 y/o male with h/o CHF, CABG in 2018, Parkinson's disease, spine surgery, cervical fusion C2- T2 and metal plate implant on his spine, presented with left h/o inguinal hernia is scheduled for Left inguinal hernia repair with mesh on 7/26/2023.

## 2023-07-24 NOTE — H&P PST ADULT - ASSESSMENT
See HPI Pt with H/o HTN, CAD, CHF, EF 45%, and spinal fusion C2- T2 with a metal plate, is scheduled for Left inguinal hernia repair with mesh on 7/26/23.  He should not hyperflex or hyperextend his neck as he had 2 cervical fusions as per neurologist.

## 2023-07-24 NOTE — H&P PST ADULT - NEUROLOGICAL COMMENTS
shuffles with Parkinson shuffles with Parkinson, walks with a cane, carpal tunnel syndrome, Spinal surgery, Spinal fusion C2-T2, weakness of upper extremities.

## 2023-07-25 ENCOUNTER — TRANSCRIPTION ENCOUNTER (OUTPATIENT)
Age: 53
End: 2023-07-25

## 2023-07-25 PROCEDURE — 71046 X-RAY EXAM CHEST 2 VIEWS: CPT

## 2023-07-25 PROCEDURE — G0463: CPT

## 2023-07-26 ENCOUNTER — TRANSCRIPTION ENCOUNTER (OUTPATIENT)
Age: 53
End: 2023-07-26

## 2023-07-26 ENCOUNTER — APPOINTMENT (OUTPATIENT)
Dept: SURGERY | Facility: HOSPITAL | Age: 53
End: 2023-07-26
Payer: MEDICARE

## 2023-07-26 ENCOUNTER — OUTPATIENT (OUTPATIENT)
Dept: OUTPATIENT SERVICES | Facility: HOSPITAL | Age: 53
LOS: 1 days | End: 2023-07-26
Payer: MEDICARE

## 2023-07-26 VITALS
DIASTOLIC BLOOD PRESSURE: 80 MMHG | WEIGHT: 143.08 LBS | RESPIRATION RATE: 16 BRPM | SYSTOLIC BLOOD PRESSURE: 141 MMHG | HEART RATE: 91 BPM | HEIGHT: 66 IN | TEMPERATURE: 99 F | OXYGEN SATURATION: 99 %

## 2023-07-26 VITALS
DIASTOLIC BLOOD PRESSURE: 66 MMHG | TEMPERATURE: 98 F | RESPIRATION RATE: 14 BRPM | OXYGEN SATURATION: 100 % | HEART RATE: 77 BPM | SYSTOLIC BLOOD PRESSURE: 116 MMHG

## 2023-07-26 DIAGNOSIS — K40.90 UNILATERAL INGUINAL HERNIA, WITHOUT OBSTRUCTION OR GANGRENE, NOT SPECIFIED AS RECURRENT: ICD-10-CM

## 2023-07-26 DIAGNOSIS — Z90.79 ACQUIRED ABSENCE OF OTHER GENITAL ORGAN(S): Chronic | ICD-10-CM

## 2023-07-26 DIAGNOSIS — Z98.1 ARTHRODESIS STATUS: Chronic | ICD-10-CM

## 2023-07-26 DIAGNOSIS — Z98.890 OTHER SPECIFIED POSTPROCEDURAL STATES: Chronic | ICD-10-CM

## 2023-07-26 DIAGNOSIS — Z95.1 PRESENCE OF AORTOCORONARY BYPASS GRAFT: Chronic | ICD-10-CM

## 2023-07-26 PROCEDURE — 49505 PRP I/HERN INIT REDUC >5 YR: CPT | Mod: AS,LT

## 2023-07-26 PROCEDURE — C1781: CPT

## 2023-07-26 PROCEDURE — 49505 PRP I/HERN INIT REDUC >5 YR: CPT | Mod: LT

## 2023-07-26 DEVICE — MESH HERNIA MARLEX 3 X 6": Type: IMPLANTABLE DEVICE | Status: FUNCTIONAL

## 2023-07-26 RX ORDER — MAGNESIUM OXIDE 400 MG ORAL TABLET 241.3 MG
1 TABLET ORAL
Qty: 0 | Refills: 0 | DISCHARGE

## 2023-07-26 RX ORDER — SODIUM CHLORIDE 9 MG/ML
3 INJECTION INTRAMUSCULAR; INTRAVENOUS; SUBCUTANEOUS EVERY 8 HOURS
Refills: 0 | Status: DISCONTINUED | OUTPATIENT
Start: 2023-07-26 | End: 2023-07-26

## 2023-07-26 RX ORDER — PREGABALIN 225 MG/1
1 CAPSULE ORAL
Qty: 0 | Refills: 0 | DISCHARGE

## 2023-07-26 RX ORDER — PANTOPRAZOLE SODIUM 20 MG/1
1 TABLET, DELAYED RELEASE ORAL
Qty: 0 | Refills: 0 | DISCHARGE

## 2023-07-26 RX ORDER — CLOPIDOGREL BISULFATE 75 MG/1
1 TABLET, FILM COATED ORAL
Qty: 0 | Refills: 0 | DISCHARGE

## 2023-07-26 RX ORDER — OXYCODONE HYDROCHLORIDE 5 MG/1
1 TABLET ORAL
Qty: 4 | Refills: 0
Start: 2023-07-26 | End: 2023-07-26

## 2023-07-26 RX ORDER — CARBIDOPA AND LEVODOPA 25; 100 MG/1; MG/1
1 TABLET ORAL
Qty: 0 | Refills: 0 | DISCHARGE

## 2023-07-26 RX ORDER — SODIUM CHLORIDE 9 MG/ML
1000 INJECTION, SOLUTION INTRAVENOUS
Refills: 0 | Status: DISCONTINUED | OUTPATIENT
Start: 2023-07-26 | End: 2023-07-26

## 2023-07-26 RX ORDER — FENTANYL CITRATE 50 UG/ML
25 INJECTION INTRAVENOUS
Refills: 0 | Status: DISCONTINUED | OUTPATIENT
Start: 2023-07-26 | End: 2023-07-26

## 2023-07-26 RX ORDER — MAGNESIUM OXIDE 400 MG ORAL TABLET 241.3 MG
0 TABLET ORAL
Refills: 0 | DISCHARGE

## 2023-07-26 RX ORDER — BUPROPION HYDROCHLORIDE 150 MG/1
1 TABLET, EXTENDED RELEASE ORAL
Qty: 0 | Refills: 0 | DISCHARGE

## 2023-07-26 RX ORDER — ONDANSETRON 8 MG/1
4 TABLET, FILM COATED ORAL ONCE
Refills: 0 | Status: DISCONTINUED | OUTPATIENT
Start: 2023-07-26 | End: 2023-07-26

## 2023-07-26 RX ORDER — EZETIMIBE 10 MG/1
1 TABLET ORAL
Qty: 0 | Refills: 0 | DISCHARGE

## 2023-07-26 RX ORDER — TAMSULOSIN HYDROCHLORIDE 0.4 MG/1
1 CAPSULE ORAL
Qty: 0 | Refills: 0 | DISCHARGE

## 2023-07-26 RX ORDER — METOPROLOL TARTRATE 50 MG
0.5 TABLET ORAL
Qty: 0 | Refills: 0 | DISCHARGE

## 2023-07-26 RX ORDER — CHOLECALCIFEROL (VITAMIN D3) 125 MCG
1 CAPSULE ORAL
Qty: 0 | Refills: 0 | DISCHARGE

## 2023-07-26 RX ORDER — DOCUSATE SODIUM 100 MG
1 CAPSULE ORAL
Qty: 0 | Refills: 0 | DISCHARGE

## 2023-07-26 NOTE — ASU DISCHARGE PLAN (ADULT/PEDIATRIC) - CARE PROVIDER_API CALL
Jay Victoria  Surgery  [address], [address]  [place] [address]  Phone: [phone]  Fax: [phone]  Follow Up Time:

## 2023-07-26 NOTE — BRIEF OPERATIVE NOTE - NSICDXBRIEFPROCEDURE_GEN_ALL_CORE_FT
PROCEDURES:  Open repair of inguinal hernia using mesh in adult 26-Jul-2023 10:17:38 LEFT Milly Hayden

## 2023-07-27 PROBLEM — K40.90 UNILATERAL INGUINAL HERNIA, WITHOUT OBSTRUCTION OR GANGRENE, NOT SPECIFIED AS RECURRENT: Chronic | Status: ACTIVE | Noted: 2023-07-24

## 2023-08-01 PROBLEM — K40.90 INGUINAL HERNIA, LEFT: Status: ACTIVE | Noted: 2023-06-22

## 2023-08-03 ENCOUNTER — APPOINTMENT (OUTPATIENT)
Dept: SURGERY | Facility: CLINIC | Age: 53
End: 2023-08-03
Payer: MEDICARE

## 2023-08-03 DIAGNOSIS — K40.90 UNILATERAL INGUINAL HERNIA, W/OUT OBSTRUCTION OR GANGRENE, NOT SPECIFIED AS RECURRENT: ICD-10-CM

## 2023-08-03 PROCEDURE — 99024 POSTOP FOLLOW-UP VISIT: CPT

## 2023-08-03 NOTE — HISTORY OF PRESENT ILLNESS
[de-identified] : Mr. FRAGOSO  is s/p left inguinal hernia repair with mesh on 07/26/2023.  Today  Mr. FRAGOSO offers no complaints. patient reports no fever or  chills. patient reports occasional discomfort in the surgical area.  His surgical incisions are healing well. No signs of inflammation, infection or exudate. patient reports good bowel movements and appetite.

## 2023-08-03 NOTE — ASSESSMENT
[FreeTextEntry1] : Mr. FRAGOSO is doing well, with excellent post-operative recovery. All surgical incisions are healing well and as expected. There is no evidence of infection or complication, and he is progressing as expected. Post-operative wound care, activity, restrictions and precautions reinforced. Patient instructed to refrain from any heavy lifting greater than 10-15 pounds for at least 4-6 weeks post-operatively. Patient's questions and concerns addressed to patient's satisfaction.

## 2023-08-03 NOTE — PHYSICAL EXAM
[Alert] : alert [Oriented to Person] : oriented to person [Oriented to Place] : oriented to place [Oriented to Time] : oriented to time [Calm] : calm [de-identified] : He  is alert, well-groomed, and in NAD\par    [de-identified] : anicteric.  Nasal mucosa pink, septum midline. Oral mucosa pink.  Tongue midline, Pharynx without exudates.\par    [de-identified] : unable to move his neck  due to fusion  [de-identified] : left groin Surgical wound is healing well.   no signs of  inflammation or infection. no recurrence .  No penile discharge or lesions.  No scrotal swelling or discoloration. Testes descended bilaterally, smooth, without masses. Epididymis non-tender.

## 2023-08-09 NOTE — ASU PATIENT PROFILE, ADULT - NSALCOHOLUSECOMMENT_GEN_ALL_CORE_FT
Pt returned call.     Conveyed results. Beta cancelled for Thurs.     Pt will start PNV.     US order placed. Pt will call today to schedule. After US results are reviewed pt will schedule FOB visit.      denies

## 2023-09-20 NOTE — DISCHARGE NOTE PROVIDER - NSDCMRMEDTOKEN_GEN_ALL_CORE_FT
aspirin 81 mg oral delayed release tablet: 1 tab(s) orally once a day  atorvastatin 80 mg oral tablet: 1 tab(s) orally once a day  buPROPion 150 mg/24 hours (XL) oral tablet, extended release: 1 tab(s) orally every 24 hours  carbidopa-levodopa 25 mg-100 mg oral tablet: 1 tab(s) orally 3 times a day  cholecalciferol 1000 intl units (25 mcg) oral tablet: 1 tab(s) orally once a day  docusate sodium 100 mg oral tablet: 1 tab(s) orally 2 times a day  entacapone 200 mg oral tablet: 1 tab(s) orally 3 times a day  ezetimibe 10 mg oral tablet: 1 tab(s) orally once a day  furosemide 40 mg oral tablet: 1 tab(s) orally once a day  metoprolol tartrate 25 mg oral tablet: 1 tab(s) orally 2 times a day   pantoprazole 40 mg oral delayed release tablet: 1 tab(s) orally once a day  Plavix 75 mg oral tablet: 1 tab(s) orally once a day  tamsulosin 0.4 mg oral capsule: 1 cap(s) orally once a day   no concerns aspirin 81 mg oral delayed release tablet: 1 tab(s) orally once a day  buPROPion 150 mg/24 hours (XL) oral tablet, extended release: 1 tab(s) orally every 24 hours  carbidopa-levodopa 25 mg-100 mg oral tablet: 1 tab(s) orally 3 times a day  cholecalciferol 1000 intl units (25 mcg) oral tablet: 1 tab(s) orally once a day  docusate sodium 100 mg oral tablet: 1 tab(s) orally 2 times a day  entacapone 200 mg oral tablet: 1 tab(s) orally 3 times a day  ezetimibe 10 mg oral tablet: 1 tab(s) orally once a day  furosemide 20 mg oral tablet: 1 tab(s) orally once a day -for bronchospasm   metoprolol tartrate 25 mg oral tablet: 1 tab(s) orally 2 times a day   pantoprazole 40 mg oral delayed release tablet: 1 tab(s) orally once a day  Plavix 75 mg oral tablet: 1 tab(s) orally once a day  spironolactone 25 mg oral tablet: 1 tab(s) orally once a day  tamsulosin 0.4 mg oral capsule: 1 cap(s) orally once a day  Zestril 2.5 mg oral tablet: 1 tab(s) orally once a day   aspirin 81 mg oral delayed release tablet: 1 tab(s) orally once a day  atorvastatin 80 mg oral tablet: 1 tab(s) orally once a day (at bedtime)  buPROPion 150 mg/24 hours (XL) oral tablet, extended release: 1 tab(s) orally every 24 hours  carbidopa-levodopa 25 mg-100 mg oral tablet: 1 tab(s) orally 3 times a day  cholecalciferol 1000 intl units (25 mcg) oral tablet: 1 tab(s) orally once a day  docusate sodium 100 mg oral tablet: 1 tab(s) orally 2 times a day  entacapone 200 mg oral tablet: 1 tab(s) orally 3 times a day  ezetimibe 10 mg oral tablet: 1 tab(s) orally once a day  furosemide 20 mg oral tablet: 1 tab(s) orally once a day -for bronchospasm   metoprolol tartrate 25 mg oral tablet: 1 tab(s) orally 2 times a day   pantoprazole 40 mg oral delayed release tablet: 1 tab(s) orally once a day  Plavix 75 mg oral tablet: 1 tab(s) orally once a day  spironolactone 25 mg oral tablet: 1 tab(s) orally once a day  tamsulosin 0.4 mg oral capsule: 1 cap(s) orally once a day  Zestril 2.5 mg oral tablet: 1 tab(s) orally once a day

## 2023-10-30 ENCOUNTER — NON-APPOINTMENT (OUTPATIENT)
Age: 53
End: 2023-10-30

## 2023-11-15 NOTE — ED PROVIDER NOTE - PHYSICAL EXAMINATION
[facility] DEPARTMENT OF OPHTHALMOLOGY  ------------------------------------------------------------------------------  Silvana Zhu MD, PGY-3  Contact: TEAMS  ------------------------------------------------------------------------------    Interval History: No acute events overnight. Today patient denying blurred vision, eye pain, flashes, floaters, FBS, erythema, or discharge.     MEDICATIONS  (STANDING):  dexamethasone/neomycin/polymyxin Ointment 1 Application(s) Left EYE every 6 hours  DULoxetine 20 milliGRAM(s) Oral daily  enoxaparin Injectable 40 milliGRAM(s) SubCutaneous every 24 hours  folic acid 1 milliGRAM(s) Oral daily  influenza   Vaccine 0.5 milliLiter(s) IntraMuscular once  lisinopril 5 milliGRAM(s) Oral daily  loratadine 10 milliGRAM(s) Oral daily  multivitamin 1 Tablet(s) Oral daily  naproxen 500 milliGRAM(s) Oral two times a day  pantoprazole    Tablet 40 milliGRAM(s) Oral before breakfast    MEDICATIONS  (PRN):  acetaminophen     Tablet .. 650 milliGRAM(s) Oral every 6 hours PRN Mild Pain (1 - 3)  SUMAtriptan 50 milliGRAM(s) Oral daily PRN Headache      VITALS: T(C): 36.4 (11-15-23 @ 11:14)  T(F): 97.5 (11-15-23 @ 11:14), Max: 98.4 (11-15-23 @ 10:44)  HR: 85 (11-15-23 @ 11:14) (63 - 90)  BP: 121/78 (11-15-23 @ 11:14) (121/78 - 145/83)  RR:  (16 - 20)  SpO2:  (95% - 99%)  Wt(kg): --  General: AAO x 3, appropriate mood and affect    Ophthalmology Exam:  Visual acuity (cc): 20/20 OD. 20/40 OS PHNI.   Pupils: PERRL OU, no APD, normal reactivity, brisk  Intraocular Pressure:  STP OU  Extraocular movements (EOMs): Full OU, no diplopia. trace limitation on abduction OS 2/2 chemosis, pain on left gaze  Confrontational Visual Field (CVF): Full OD. Full OS  Color Plates: 12/12 OU.    Slit Lamp Exam  External: Normal OD. mild periorbital edema, moderate periorbital erythema and tenderness to palpation  Lids/Lashes/Lacrimal Ducts: Flat OD. mild edema and erythema OS, inferior forniceal pigmentation? OS  Sclera/Conjunctiva: White and quiet OD. 2+ injection primarily inferotemporal, 2+ chemosis OS  Cornea: DTBUT OU  Anterior Chamber: Deep and formed OU. no cell, no flare, AC quiet OS  Iris: Flat OU.  Lens: Clear OU.     [facility] DEPARTMENT OF OPHTHALMOLOGY  ------------------------------------------------------------------------------  Silvana Zhu MD, PGY-3  Contact: TEAMS  ------------------------------------------------------------------------------    Interval History: No acute events overnight. Today patient denying blurred vision, eye pain, flashes, floaters, FBS, erythema, or discharge.     MEDICATIONS  (STANDING):  dexamethasone/neomycin/polymyxin Ointment 1 Application(s) Left EYE every 6 hours  DULoxetine 20 milliGRAM(s) Oral daily  enoxaparin Injectable 40 milliGRAM(s) SubCutaneous every 24 hours  folic acid 1 milliGRAM(s) Oral daily  influenza   Vaccine 0.5 milliLiter(s) IntraMuscular once  lisinopril 5 milliGRAM(s) Oral daily  loratadine 10 milliGRAM(s) Oral daily  multivitamin 1 Tablet(s) Oral daily  naproxen 500 milliGRAM(s) Oral two times a day  pantoprazole    Tablet 40 milliGRAM(s) Oral before breakfast    MEDICATIONS  (PRN):  acetaminophen     Tablet .. 650 milliGRAM(s) Oral every 6 hours PRN Mild Pain (1 - 3)  SUMAtriptan 50 milliGRAM(s) Oral daily PRN Headache      VITALS: T(C): 36.4 (11-15-23 @ 11:14)  T(F): 97.5 (11-15-23 @ 11:14), Max: 98.4 (11-15-23 @ 10:44)  HR: 85 (11-15-23 @ 11:14) (63 - 90)  BP: 121/78 (11-15-23 @ 11:14) (121/78 - 145/83)  RR:  (16 - 20)  SpO2:  (95% - 99%)  Wt(kg): --  General: AAO x 3, appropriate mood and affect    Ophthalmology Exam:  Visual acuity (cc): 20/20 OD. 20/40 OS PHNI.   Pupils: PERRL OU, no APD, normal reactivity, brisk  Intraocular Pressure:  STP OU  Extraocular movements (EOMs): Full OU, no diplopia. trace limitation on abduction OS 2/2 chemosis, pain on left gaze  Confrontational Visual Field (CVF): Full OD. Full OS  Color Plates: 12/12 OU.    Slit Lamp Exam  External: Normal OD. mild periorbital edema, moderate periorbital erythema and tenderness to palpation  Lids/Lashes/Lacrimal Ducts: Flat OD. mild edema and erythema OS, inferior forniceal pigmentation? OS  Sclera/Conjunctiva: White and quiet OD. 2+ injection primarily inferotemporal, 2+ chemosis OS  Cornea: DTBUT OU  Anterior Chamber: Deep and formed OU. no cell, no flare, AC quiet OS  Iris: Flat OU.  Lens: Clear OU.     [facility] DEPARTMENT OF OPHTHALMOLOGY  ------------------------------------------------------------------------------  Silvana Zhu MD, PGY-3  Contact: TEAMS  ------------------------------------------------------------------------------    Interval History: No acute events overnight. Today patient denying blurred vision, eye pain, flashes, floaters, FBS, erythema, or discharge.     MEDICATIONS  (STANDING):  dexamethasone/neomycin/polymyxin Ointment 1 Application(s) Left EYE every 6 hours  DULoxetine 20 milliGRAM(s) Oral daily  enoxaparin Injectable 40 milliGRAM(s) SubCutaneous every 24 hours  folic acid 1 milliGRAM(s) Oral daily  influenza   Vaccine 0.5 milliLiter(s) IntraMuscular once  lisinopril 5 milliGRAM(s) Oral daily  loratadine 10 milliGRAM(s) Oral daily  multivitamin 1 Tablet(s) Oral daily  naproxen 500 milliGRAM(s) Oral two times a day  pantoprazole    Tablet 40 milliGRAM(s) Oral before breakfast    MEDICATIONS  (PRN):  acetaminophen     Tablet .. 650 milliGRAM(s) Oral every 6 hours PRN Mild Pain (1 - 3)  SUMAtriptan 50 milliGRAM(s) Oral daily PRN Headache      VITALS: T(C): 36.4 (11-15-23 @ 11:14)  T(F): 97.5 (11-15-23 @ 11:14), Max: 98.4 (11-15-23 @ 10:44)  HR: 85 (11-15-23 @ 11:14) (63 - 90)  BP: 121/78 (11-15-23 @ 11:14) (121/78 - 145/83)  RR:  (16 - 20)  SpO2:  (95% - 99%)  Wt(kg): --  General: AAO x 3, appropriate mood and affect    Ophthalmology Exam:  Visual acuity (cc): 20/20 OD. 20/40 OS PHNI.   Pupils: PERRL OU, no APD, normal reactivity, brisk  Intraocular Pressure:  STP OU  Extraocular movements (EOMs): Full OU, no diplopia. trace limitation on abduction OS 2/2 chemosis, pain on left gaze  Confrontational Visual Field (CVF): Full OD. Full OS  Color Plates: 12/12 OU.    Slit Lamp Exam  External: Normal OD. mild periorbital edema, moderate periorbital erythema and tenderness to palpation  Lids/Lashes/Lacrimal Ducts: Flat OD. mild edema and erythema OS, inferior forniceal pigmentation? OS  Sclera/Conjunctiva: White and quiet OD. 2+ injection primarily inferotemporal, 2+ chemosis OS  Cornea: DTBUT OU  Anterior Chamber: Deep and formed OU. no cell, no flare, AC quiet OS  Iris: Flat OU.  Lens: Clear OU.     [facility] DEPARTMENT OF OPHTHALMOLOGY  ------------------------------------------------------------------------------  Silvana Zhu MD, PGY-3  Contact: TEAMS  ------------------------------------------------------------------------------    Interval History: No acute events overnight. Today patient denying blurred vision, eye pain, flashes, floaters, FBS, erythema, or discharge.     MEDICATIONS  (STANDING):  dexamethasone/neomycin/polymyxin Ointment 1 Application(s) Left EYE every 6 hours  DULoxetine 20 milliGRAM(s) Oral daily  enoxaparin Injectable 40 milliGRAM(s) SubCutaneous every 24 hours  folic acid 1 milliGRAM(s) Oral daily  influenza   Vaccine 0.5 milliLiter(s) IntraMuscular once  lisinopril 5 milliGRAM(s) Oral daily  loratadine 10 milliGRAM(s) Oral daily  multivitamin 1 Tablet(s) Oral daily  naproxen 500 milliGRAM(s) Oral two times a day  pantoprazole    Tablet 40 milliGRAM(s) Oral before breakfast    MEDICATIONS  (PRN):  acetaminophen     Tablet .. 650 milliGRAM(s) Oral every 6 hours PRN Mild Pain (1 - 3)  SUMAtriptan 50 milliGRAM(s) Oral daily PRN Headache      VITALS: T(C): 36.4 (11-15-23 @ 11:14)  T(F): 97.5 (11-15-23 @ 11:14), Max: 98.4 (11-15-23 @ 10:44)  HR: 85 (11-15-23 @ 11:14) (63 - 90)  BP: 121/78 (11-15-23 @ 11:14) (121/78 - 145/83)  RR:  (16 - 20)  SpO2:  (95% - 99%)  Wt(kg): --  General: AAO x 3, appropriate mood and affect    Ophthalmology Exam:  Visual acuity (cc): 20/20 OD. 20/40 OS PHNI.   Pupils: PERRL OU, no APD, normal reactivity, brisk  Intraocular Pressure:  STP OU  Extraocular movements (EOMs): Full OU, no diplopia. trace limitation on abduction OS 2/2 chemosis, pain on left gaze  Confrontational Visual Field (CVF): Full OD. Full OS  Color Plates: 12/12 OU.    Slit Lamp Exam  External: Normal OD. mild periorbital edema, moderate periorbital erythema and tenderness to palpation  Lids/Lashes/Lacrimal Ducts: Flat OD. mild edema and erythema OS, inferior forniceal pigmentation? OS  Sclera/Conjunctiva: trace injection OD. 2+ injection primarily inferotemporal, 2+ chemosis OS  Cornea: DTBUT OU  Anterior Chamber: Deep and formed OU. no cell, no flare, AC quiet OS  Iris: Flat OU.  Lens: Clear OU.     [facility] DEPARTMENT OF OPHTHALMOLOGY  ------------------------------------------------------------------------------  Silvana Zhu MD, PGY-3  Contact: TEAMS  ------------------------------------------------------------------------------    Interval History: No acute events overnight. Today patient denying blurred vision, eye pain, flashes, floaters, FBS, erythema, or discharge.     MEDICATIONS  (STANDING):  dexamethasone/neomycin/polymyxin Ointment 1 Application(s) Left EYE every 6 hours  DULoxetine 20 milliGRAM(s) Oral daily  enoxaparin Injectable 40 milliGRAM(s) SubCutaneous every 24 hours  folic acid 1 milliGRAM(s) Oral daily  influenza   Vaccine 0.5 milliLiter(s) IntraMuscular once  lisinopril 5 milliGRAM(s) Oral daily  loratadine 10 milliGRAM(s) Oral daily  multivitamin 1 Tablet(s) Oral daily  naproxen 500 milliGRAM(s) Oral two times a day  pantoprazole    Tablet 40 milliGRAM(s) Oral before breakfast    MEDICATIONS  (PRN):  acetaminophen     Tablet .. 650 milliGRAM(s) Oral every 6 hours PRN Mild Pain (1 - 3)  SUMAtriptan 50 milliGRAM(s) Oral daily PRN Headache      VITALS: T(C): 36.4 (11-15-23 @ 11:14)  T(F): 97.5 (11-15-23 @ 11:14), Max: 98.4 (11-15-23 @ 10:44)  HR: 85 (11-15-23 @ 11:14) (63 - 90)  BP: 121/78 (11-15-23 @ 11:14) (121/78 - 145/83)  RR:  (16 - 20)  SpO2:  (95% - 99%)  Wt(kg): --  General: AAO x 3, appropriate mood and affect    Ophthalmology Exam:  Visual acuity (cc): 20/20 OD. 20/40 OS PHNI.   Pupils: PERRL OU, no APD, normal reactivity, brisk  Intraocular Pressure:  STP OU  Extraocular movements (EOMs): Full OU, no diplopia. trace limitation on abduction OS 2/2 chemosis, pain on left gaze  Confrontational Visual Field (CVF): Full OD. Full OS  Color Plates: 12/12 OU.    Slit Lamp Exam  External: Normal OD. mild periorbital edema, moderate periorbital erythema and tenderness to palpation  Lids/Lashes/Lacrimal Ducts: Flat OD. mild edema and erythema OS, inferior forniceal pigmentation? OS  Sclera/Conjunctiva: trace injection OD. 2+ injection primarily inferotemporal, 2+ chemosis OS  Cornea: DTBUT OU  Anterior Chamber: Deep and formed OU. no cell, no flare, AC quiet OS  Iris: Flat OU.  Lens: Clear OU.     [facility] DEPARTMENT OF OPHTHALMOLOGY  ------------------------------------------------------------------------------  Silvana Zhu MD, PGY-3  Contact: TEAMS  ------------------------------------------------------------------------------    Interval History: No acute events overnight. Today patient denying blurred vision, eye pain, flashes, floaters, FBS, erythema, or discharge.     MEDICATIONS  (STANDING):  dexamethasone/neomycin/polymyxin Ointment 1 Application(s) Left EYE every 6 hours  DULoxetine 20 milliGRAM(s) Oral daily  enoxaparin Injectable 40 milliGRAM(s) SubCutaneous every 24 hours  folic acid 1 milliGRAM(s) Oral daily  influenza   Vaccine 0.5 milliLiter(s) IntraMuscular once  lisinopril 5 milliGRAM(s) Oral daily  loratadine 10 milliGRAM(s) Oral daily  multivitamin 1 Tablet(s) Oral daily  naproxen 500 milliGRAM(s) Oral two times a day  pantoprazole    Tablet 40 milliGRAM(s) Oral before breakfast    MEDICATIONS  (PRN):  acetaminophen     Tablet .. 650 milliGRAM(s) Oral every 6 hours PRN Mild Pain (1 - 3)  SUMAtriptan 50 milliGRAM(s) Oral daily PRN Headache      VITALS: T(C): 36.4 (11-15-23 @ 11:14)  T(F): 97.5 (11-15-23 @ 11:14), Max: 98.4 (11-15-23 @ 10:44)  HR: 85 (11-15-23 @ 11:14) (63 - 90)  BP: 121/78 (11-15-23 @ 11:14) (121/78 - 145/83)  RR:  (16 - 20)  SpO2:  (95% - 99%)  Wt(kg): --  General: AAO x 3, appropriate mood and affect    Ophthalmology Exam:  Visual acuity (cc): 20/20 OD. 20/40 OS PHNI.   Pupils: PERRL OU, no APD, normal reactivity, brisk  Intraocular Pressure:  STP OU  Extraocular movements (EOMs): Full OU, no diplopia. trace limitation on abduction OS 2/2 chemosis, pain on left gaze  Confrontational Visual Field (CVF): Full OD. Full OS  Color Plates: 12/12 OU.    Slit Lamp Exam  External: Normal OD. mild periorbital edema, moderate periorbital erythema and tenderness to palpation  Lids/Lashes/Lacrimal Ducts: Flat OD. mild edema and erythema OS, inferior forniceal pigmentation? OS  Sclera/Conjunctiva: trace injection OD. 2+ injection primarily inferotemporal, 2+ chemosis OS  Cornea: DTBUT OU  Anterior Chamber: Deep and formed OU. no cell, no flare, AC quiet OS  Iris: Flat OU.  Lens: Clear OU.     : Suprapubic tenderness, pt is uncircumcised. Tristen Skinner as chaperone.

## 2023-11-22 NOTE — ED ADULT NURSE NOTE - DISCHARGE DATE/TIME
02-Nov-2017 02:52 Mastoid Interpolation Flap Text: A decision was made to reconstruct the defect utilizing an interpolation axial flap and a staged reconstruction.  A telfa template was made of the defect.  This telfa template was then used to outline the mastoid interpolation flap.  The donor area for the pedicle flap was then injected with anesthesia.  The flap was excised through the skin and subcutaneous tissue down to the layer of the underlying musculature.  The pedicle flap was carefully excised within this deep plane to maintain its blood supply.  The edges of the donor site were undermined.   The donor site was closed in a primary fashion.  The pedicle was then rotated into position and sutured.  Once the tube was sutured into place, adequate blood supply was confirmed with blanching and refill.  The pedicle was then wrapped with xeroform gauze and dressed appropriately with a telfa and gauze bandage to ensure continued blood supply and protect the attached pedicle.

## 2023-11-30 ENCOUNTER — APPOINTMENT (OUTPATIENT)
Dept: SURGERY | Facility: CLINIC | Age: 53
End: 2023-11-30
Payer: MEDICARE

## 2023-11-30 VITALS
DIASTOLIC BLOOD PRESSURE: 72 MMHG | HEART RATE: 76 BPM | SYSTOLIC BLOOD PRESSURE: 124 MMHG | HEIGHT: 66 IN | WEIGHT: 145 LBS | BODY MASS INDEX: 23.3 KG/M2

## 2023-11-30 PROCEDURE — 99213 OFFICE O/P EST LOW 20 MIN: CPT

## 2023-11-30 RX ORDER — CARBIDOPA AND LEVODOPA 25; 100 MG/1; MG/1
25-100 TABLET ORAL
Refills: 0 | Status: ACTIVE | COMMUNITY

## 2024-02-07 NOTE — DISCHARGE NOTE PROVIDER - NSDCQMSTAIRS_GEN_ALL_CORE
No Bill For Surgical Tray: no Performing Laboratory: [phone] Expected Date Of Service: 01/04/2024 Billing Type: Third-Party Bill

## 2024-04-04 NOTE — ED ADULT NURSE NOTE - CAS EDN DISCHARGE INTERVENTIONS
Order  for 24 hour urine.    Called patient and left detailed message (Perm on file) informing patient that order would be extended x30 days and if it is not completed by then and we do not hear from patient, we will go ahead and discontinue the order. Instructed patient to call with any questions or concerns.  
IV discontinued, cath removed intact

## 2024-05-14 NOTE — ED ADULT NURSE NOTE - RN DISCHARGE SIGNATURE
Family Medical Leave Act Certification faxed to Family Medical Leave Act Source, pending confirmation    28-Oct-2018

## 2024-07-17 NOTE — ASU PATIENT PROFILE, ADULT - VISION (WITH CORRECTIVE LENSES IF THE PATIENT USUALLY WEARS THEM):
Lety Hanna is a 32 y.o. male.   Pt presents today with CC of Slow Heart Rate (ER last week)      History of Present Illness   History of Present Illness  Patient is a 32-year-old male that had an ablation of his heart for V. tach in approximately 2015, 9 years ago.  He was seen by cardiology 2 days ago because he has been in the emergency room twice over the past week for palpitations.  He reports anxiety and would like to start a medication for anxiety.  He denies chest pain or palpitations at this time.       The following portions of the patient's history were reviewed and updated as appropriate: allergies, current medications, past family history, past medical history, past social history, past surgical history, and problem list.    Review of Systems   Constitutional:  Negative for chills, fever and unexpected weight loss.   HENT:  Negative for congestion and sore throat.    Eyes:  Negative for blurred vision and visual disturbance.   Respiratory:  Negative for cough and wheezing.    Cardiovascular:  Negative for chest pain and palpitations.   Gastrointestinal:  Negative for abdominal pain and diarrhea.   Endocrine: Negative for cold intolerance and heat intolerance.   Genitourinary:  Negative for dysuria.   Musculoskeletal:  Negative for arthralgias and neck stiffness.   Neurological:  Negative for dizziness, seizures and syncope.   Psychiatric/Behavioral:  Negative for self-injury, suicidal ideas and depressed mood.      Vitals:    07/17/24 1343   BP: 124/78   Pulse: 95   Temp: 98.4 °F (36.9 °C)   SpO2: 98%        Objective   Physical Exam  Vitals and nursing note reviewed.   Constitutional:       Appearance: He is well-developed.   HENT:      Head: Normocephalic and atraumatic.      Right Ear: External ear normal.      Left Ear: External ear normal.      Nose: Nose normal.   Eyes:      Conjunctiva/sclera: Conjunctivae normal.      Pupils: Pupils are equal, round, and reactive to light.    Cardiovascular:      Rate and Rhythm: Normal rate and regular rhythm.      Heart sounds: Normal heart sounds.   Pulmonary:      Effort: Pulmonary effort is normal.      Breath sounds: Normal breath sounds.   Abdominal:      General: Bowel sounds are normal.      Palpations: Abdomen is soft.   Musculoskeletal:      Cervical back: Normal range of motion and neck supple.   Skin:     General: Skin is warm and dry.   Neurological:      Mental Status: He is alert and oriented to person, place, and time.   Psychiatric:         Behavior: Behavior normal.         Assessment & Plan   Diagnoses and all orders for this visit:    1. Symptomatic bradycardia (Primary)  He has appointment with cardiology and has multiple tests ordered.  He has a regular rate and rhythm on exam today.  No dangerous arrhythmia suspected at this time.  2. Palpitations    3. JANIE (generalized anxiety disorder)  -     escitalopram (Lexapro) 10 MG tablet; Take 1 tablet by mouth Daily.  Dispense: 90 tablet; Refill: 0  Starting Lexapro.  PHQ-9 was relatively normal.  He does not feel as though he is depressed, though he does have anxiety.  He is, start in the morning, I discussed the side effects with him and he is agreeable to proceed.  Follow-up in 6 weeks.             Class 2 Severe Obesity (BMI >=35 and <=39.9). Obesity-related health conditions include the following: GERD. Obesity is unchanged. BMI is is above average; BMI management plan is completed. We discussed portion control and increasing exercise.          This document has been electronically signed by Latia Lorenzo  July 17, 2024 13:44 EDT    Dictated Utilizing Dragon Dictation: Part of this note may be an electronic transcription/translation of spoken language to printed text using the Dragon Dictation System.     Normal vision: sees adequately in most situations; can see medication labels, newsprint

## 2024-08-24 NOTE — ED ADULT NURSE NOTE - ASSOCIATED PAIN OR INJURY
Anticipated therapy need: Home with Home Healthcare may need DUY if unable to progress.    Pain/nausea not controlled.    CM informed MD/RN of above. Pt does not have benefits for DUY with her medicare r/t OPIB status. Pt can go to [facility] as respite private pay and additional fees for therapy if agreeable.    Pt is res'd w/ [facility] for [facility].    Plan  Pending    / to remain available for support and/or discharge planning.     Diamond Curry RN    Ext [phone]       neck pain, denies injury

## 2024-12-05 NOTE — ED ADULT NURSE NOTE - TEMPLATE LIST FOR HEAD TO TOE ASSESSMENT
Medical screening examination initiated.  I have conducted a focused provider triage encounter, findings are as follows:    Brief history of present illness:  Mr. Paz is an 82yo M with PMHx of HLD, HTN, prostate cancer, anemia, GERD, DVT/PE, 2nd degree AV block, SCC of tongue who presents to the SICU s/p L partial glossectomy with L forearm free flap, L neck dissection and tracheostomy about 2 weeks ago here. He also had a PEG placed by general surgery during the case. Admitted for q1h flap checks for 48 hours.     Daughter concerned that pt more sob, funny sound from trach?    There were no vitals filed for this visit.    Pertinent physical exam:  appear comfortable, O2 sat 95%, no stridor    Brief workup plan:  labs, CXR, ENT consult    Preliminary workup initiated; this workup will be continued and followed by the physician or advanced practice provider that is assigned to the patient when roomed.  
 Symptoms

## 2024-12-27 NOTE — ED ADULT NURSE NOTE - CAS EDN DISCHARGE ASSESSMENT
[Time Spent: ___ minutes] : I have spent [unfilled] minutes of time on the encounter which excludes teaching and separately reported services.
Alert and oriented to person, place and time

## 2025-01-21 NOTE — ED ADULT NURSE NOTE - NS ED NURSE REPORT GIVEN DT
Detail Level: Detailed Add 62241 Cpt? (Important Note: In 2017 The Use Of 20509 Is Being Tracked By Cms To Determine Future Global Period Reimbursement For Global Periods): yes 29-Jul-2020 05:10

## 2025-01-30 NOTE — ED ADULT NURSE NOTE - CAS TRG GENERAL NORM CIRC DET
Will increase Lotrel to 10-20 mg capsule by mouth daily.  Educated on side effects of medication, and to contact office if these present.  Encouraged to take at home blood pressures and contact office if persistently elevated.  Orders:    amLODIPine-benazepril (LOTREL) 10-20 MG per capsule; Take 1 capsule by mouth daily     Strong peripheral pulses

## 2025-02-28 NOTE — DISCHARGE NOTE NURSING/CASE MANAGEMENT/SOCIAL WORK - NSPROMEDSBROUGHTTOHOSP_GEN_A_NUR
----- Message from Gurpreet sent at 2/28/2025  2:00 PM CST -----  Contact: darwin @ [phone]  Name of Who is Calling: darwin  What is the request in detail: calling to discuss appeals decision   Can the clinic reply by [facility]: no  [facility] [phone]    Called  darwin called regarding appeal decision on pt.    no

## 2025-03-14 ENCOUNTER — EMERGENCY (EMERGENCY)
Facility: HOSPITAL | Age: 55
LOS: 1 days | Discharge: HOME | End: 2025-03-14
Attending: STUDENT IN AN ORGANIZED HEALTH CARE EDUCATION/TRAINING PROGRAM
Payer: MEDICARE

## 2025-03-14 VITALS
HEIGHT: 66 IN | TEMPERATURE: 99 F | DIASTOLIC BLOOD PRESSURE: 72 MMHG | OXYGEN SATURATION: 97 % | HEART RATE: 82 BPM | RESPIRATION RATE: 18 BRPM | SYSTOLIC BLOOD PRESSURE: 125 MMHG | WEIGHT: 138.01 LBS

## 2025-03-14 VITALS
HEART RATE: 72 BPM | OXYGEN SATURATION: 98 % | TEMPERATURE: 98 F | RESPIRATION RATE: 18 BRPM | SYSTOLIC BLOOD PRESSURE: 103 MMHG | DIASTOLIC BLOOD PRESSURE: 58 MMHG

## 2025-03-14 DIAGNOSIS — Z95.1 PRESENCE OF AORTOCORONARY BYPASS GRAFT: Chronic | ICD-10-CM

## 2025-03-14 DIAGNOSIS — Z98.890 OTHER SPECIFIED POSTPROCEDURAL STATES: Chronic | ICD-10-CM

## 2025-03-14 DIAGNOSIS — Z98.1 ARTHRODESIS STATUS: Chronic | ICD-10-CM

## 2025-03-14 DIAGNOSIS — Z90.79 ACQUIRED ABSENCE OF OTHER GENITAL ORGAN(S): Chronic | ICD-10-CM

## 2025-03-14 LAB
ALBUMIN SERPL ELPH-MCNC: 3.5 G/DL — SIGNIFICANT CHANGE UP (ref 3.5–5)
ALP SERPL-CCNC: 81 U/L — SIGNIFICANT CHANGE UP (ref 40–120)
ALT FLD-CCNC: 11 U/L DA — SIGNIFICANT CHANGE UP (ref 10–60)
ANION GAP SERPL CALC-SCNC: 2 MMOL/L — LOW (ref 5–17)
APTT BLD: 26.8 SEC — SIGNIFICANT CHANGE UP (ref 24.5–35.6)
AST SERPL-CCNC: 26 U/L — SIGNIFICANT CHANGE UP (ref 10–40)
BASOPHILS # BLD AUTO: 0.02 K/UL — SIGNIFICANT CHANGE UP (ref 0–0.2)
BASOPHILS NFR BLD AUTO: 0.4 % — SIGNIFICANT CHANGE UP (ref 0–2)
BILIRUB SERPL-MCNC: 0.5 MG/DL — SIGNIFICANT CHANGE UP (ref 0.2–1.2)
BUN SERPL-MCNC: 29 MG/DL — HIGH (ref 7–18)
CALCIUM SERPL-MCNC: 8.7 MG/DL — SIGNIFICANT CHANGE UP (ref 8.4–10.5)
CHLORIDE SERPL-SCNC: 112 MMOL/L — HIGH (ref 96–108)
CO2 SERPL-SCNC: 25 MMOL/L — SIGNIFICANT CHANGE UP (ref 22–31)
CREAT SERPL-MCNC: 0.75 MG/DL — SIGNIFICANT CHANGE UP (ref 0.5–1.3)
EGFR: 107 ML/MIN/1.73M2 — SIGNIFICANT CHANGE UP
EGFR: 107 ML/MIN/1.73M2 — SIGNIFICANT CHANGE UP
EOSINOPHIL # BLD AUTO: 0.04 K/UL — SIGNIFICANT CHANGE UP (ref 0–0.5)
EOSINOPHIL NFR BLD AUTO: 0.8 % — SIGNIFICANT CHANGE UP (ref 0–6)
FLUAV AG NPH QL: SIGNIFICANT CHANGE UP
FLUBV AG NPH QL: SIGNIFICANT CHANGE UP
GLUCOSE SERPL-MCNC: 113 MG/DL — HIGH (ref 70–99)
HCT VFR BLD CALC: 36.5 % — LOW (ref 39–50)
HGB BLD-MCNC: 12.4 G/DL — LOW (ref 13–17)
IMM GRANULOCYTES NFR BLD AUTO: 0.2 % — SIGNIFICANT CHANGE UP (ref 0–0.9)
INR BLD: 1.05 RATIO — SIGNIFICANT CHANGE UP (ref 0.85–1.16)
LACTATE SERPL-SCNC: 0.7 MMOL/L — SIGNIFICANT CHANGE UP (ref 0.7–2)
LIDOCAIN IGE QN: 54 U/L — SIGNIFICANT CHANGE UP (ref 13–75)
LYMPHOCYTES # BLD AUTO: 0.9 K/UL — LOW (ref 1–3.3)
LYMPHOCYTES # BLD AUTO: 18.3 % — SIGNIFICANT CHANGE UP (ref 13–44)
MAGNESIUM SERPL-MCNC: 2 MG/DL — SIGNIFICANT CHANGE UP (ref 1.6–2.6)
MCHC RBC-ENTMCNC: 29.5 PG — SIGNIFICANT CHANGE UP (ref 27–34)
MCHC RBC-ENTMCNC: 34 G/DL — SIGNIFICANT CHANGE UP (ref 32–36)
MCV RBC AUTO: 86.7 FL — SIGNIFICANT CHANGE UP (ref 80–100)
MONOCYTES # BLD AUTO: 0.53 K/UL — SIGNIFICANT CHANGE UP (ref 0–0.9)
MONOCYTES NFR BLD AUTO: 10.8 % — SIGNIFICANT CHANGE UP (ref 2–14)
NEUTROPHILS # BLD AUTO: 3.42 K/UL — SIGNIFICANT CHANGE UP (ref 1.8–7.4)
NEUTROPHILS NFR BLD AUTO: 69.5 % — SIGNIFICANT CHANGE UP (ref 43–77)
NRBC BLD AUTO-RTO: 0 /100 WBCS — SIGNIFICANT CHANGE UP (ref 0–0)
NT-PROBNP SERPL-SCNC: 900 PG/ML — HIGH (ref 0–125)
PLATELET # BLD AUTO: 191 K/UL — SIGNIFICANT CHANGE UP (ref 150–400)
POTASSIUM SERPL-MCNC: 3.7 MMOL/L — SIGNIFICANT CHANGE UP (ref 3.5–5.3)
POTASSIUM SERPL-SCNC: 3.7 MMOL/L — SIGNIFICANT CHANGE UP (ref 3.5–5.3)
PROT SERPL-MCNC: 6.5 G/DL — SIGNIFICANT CHANGE UP (ref 6–8.3)
PROTHROM AB SERPL-ACNC: 12.3 SEC — SIGNIFICANT CHANGE UP (ref 9.9–13.4)
RBC # BLD: 4.21 M/UL — SIGNIFICANT CHANGE UP (ref 4.2–5.8)
RBC # FLD: 11.9 % — SIGNIFICANT CHANGE UP (ref 10.3–14.5)
RSV RNA NPH QL NAA+NON-PROBE: SIGNIFICANT CHANGE UP
SARS-COV-2 RNA SPEC QL NAA+PROBE: SIGNIFICANT CHANGE UP
SODIUM SERPL-SCNC: 139 MMOL/L — SIGNIFICANT CHANGE UP (ref 135–145)
TROPONIN I, HIGH SENSITIVITY RESULT: 24.6 NG/L — SIGNIFICANT CHANGE UP
WBC # BLD: 4.92 K/UL — SIGNIFICANT CHANGE UP (ref 3.8–10.5)
WBC # FLD AUTO: 4.92 K/UL — SIGNIFICANT CHANGE UP (ref 3.8–10.5)

## 2025-03-14 PROCEDURE — 87637 SARSCOV2&INF A&B&RSV AMP PRB: CPT

## 2025-03-14 PROCEDURE — 99285 EMERGENCY DEPT VISIT HI MDM: CPT

## 2025-03-14 PROCEDURE — 72125 CT NECK SPINE W/O DYE: CPT | Mod: MC

## 2025-03-14 PROCEDURE — 70450 CT HEAD/BRAIN W/O DYE: CPT | Mod: MC

## 2025-03-14 PROCEDURE — 71045 X-RAY EXAM CHEST 1 VIEW: CPT | Mod: 26

## 2025-03-14 PROCEDURE — 85730 THROMBOPLASTIN TIME PARTIAL: CPT

## 2025-03-14 PROCEDURE — 71045 X-RAY EXAM CHEST 1 VIEW: CPT

## 2025-03-14 PROCEDURE — 83690 ASSAY OF LIPASE: CPT

## 2025-03-14 PROCEDURE — 93010 ELECTROCARDIOGRAM REPORT: CPT

## 2025-03-14 PROCEDURE — 85610 PROTHROMBIN TIME: CPT

## 2025-03-14 PROCEDURE — 83735 ASSAY OF MAGNESIUM: CPT

## 2025-03-14 PROCEDURE — 73564 X-RAY EXAM KNEE 4 OR MORE: CPT | Mod: 26,50

## 2025-03-14 PROCEDURE — 73564 X-RAY EXAM KNEE 4 OR MORE: CPT

## 2025-03-14 PROCEDURE — 93005 ELECTROCARDIOGRAM TRACING: CPT

## 2025-03-14 PROCEDURE — 70450 CT HEAD/BRAIN W/O DYE: CPT | Mod: 26

## 2025-03-14 PROCEDURE — 83605 ASSAY OF LACTIC ACID: CPT

## 2025-03-14 PROCEDURE — 84484 ASSAY OF TROPONIN QUANT: CPT

## 2025-03-14 PROCEDURE — 80053 COMPREHEN METABOLIC PANEL: CPT

## 2025-03-14 PROCEDURE — 99285 EMERGENCY DEPT VISIT HI MDM: CPT | Mod: 25

## 2025-03-14 PROCEDURE — 82550 ASSAY OF CK (CPK): CPT

## 2025-03-14 PROCEDURE — 85025 COMPLETE CBC W/AUTO DIFF WBC: CPT

## 2025-03-14 PROCEDURE — 83880 ASSAY OF NATRIURETIC PEPTIDE: CPT

## 2025-03-14 PROCEDURE — 36415 COLL VENOUS BLD VENIPUNCTURE: CPT

## 2025-03-14 PROCEDURE — 72125 CT NECK SPINE W/O DYE: CPT | Mod: 26

## 2025-03-14 RX ORDER — ACETAMINOPHEN 500 MG/5ML
650 LIQUID (ML) ORAL ONCE
Refills: 0 | Status: COMPLETED | OUTPATIENT
Start: 2025-03-14 | End: 2025-03-14

## 2025-03-14 RX ORDER — LIDOCAINE HYDROCHLORIDE 20 MG/ML
1 JELLY TOPICAL ONCE
Refills: 0 | Status: COMPLETED | OUTPATIENT
Start: 2025-03-14 | End: 2025-03-14

## 2025-03-14 RX ORDER — CYCLOBENZAPRINE HYDROCHLORIDE 15 MG/1
10 CAPSULE, EXTENDED RELEASE ORAL ONCE
Refills: 0 | Status: COMPLETED | OUTPATIENT
Start: 2025-03-14 | End: 2025-03-14

## 2025-03-14 RX ADMIN — Medication 650 MILLIGRAM(S): at 18:35

## 2025-03-14 RX ADMIN — LIDOCAINE HYDROCHLORIDE 1 PATCH: 20 JELLY TOPICAL at 18:37

## 2025-03-14 RX ADMIN — CYCLOBENZAPRINE HYDROCHLORIDE 10 MILLIGRAM(S): 15 CAPSULE, EXTENDED RELEASE ORAL at 18:35

## 2025-03-14 NOTE — ED PROVIDER NOTE - OBJECTIVE STATEMENT
Patient is a 54-year-old male with past medical history of Parkinson's disease, anxiety, BPH, CAD s/p CABG, CHF, HTN, HLD, cervical radiculopathy s/p cervical fusion C2-T2 and metal plate implant Presenting Syncope after fall out of bed.  As per patient and sister at bedside, patient fell while attempting to get out of bed, striking the left side of his head.  Denies blood thinners, any symptoms prior to falling.  Patient was on the ground for approximate 30 minutes after which he was able to get himself up out of bed and called his sister.  Currently endorsing left-sided paraspinal cervical neck pain radiating to left shoulder which is acute on chronic as well as left forehead abrasion, bilateral knee abrasions.  Patient states after he hit his head he passed out, unsure for how long. Currently vital signs stable, no focal neurologic deficits, GCS 15, moving all extremities equally, following commands.

## 2025-03-14 NOTE — ED PROVIDER NOTE - CLINICAL SUMMARY MEDICAL DECISION MAKING FREE TEXT BOX
Patient is a 54-year-old male with past medical history of Parkinson's disease, anxiety, BPH, CAD s/p CABG, CHF, HTN, HLD, cervical radiculopathy s/p cervical fusion C2-T2 and metal plate implant Presenting Syncope after fall out of bed.  Currently endorsing left-sided paraspinal cervical neck pain radiating to left shoulder which is acute on chronic as well as left forehead abrasion, bilateral knee abrasions.  Patient states after he hit his head he passed out, unsure for how long. Currently vital signs stable, no focal neurologic deficits, GCS 15, moving all extremities equally, following commands. GCS 15  - Will obtain CT head and spine to rule out acute bleed versus fractures, check labs and rule out electrolyte abnormalities, EKG troponin to assess for ACS versus arrhythmia, BNP chest x-ray to eval for heart failure vs pneumonia vs pneumothorax, cpk to r.o rhabdo, treat likely msk pain, xrays to r.o fractures, reassess.

## 2025-03-14 NOTE — ED PROVIDER NOTE - PHYSICAL EXAMINATION
Gen: no acute distress  Head: normocephalic, small abrasion to L anterior forehead no lacerations no active bleeding  Lung: CTAB, no respiratory distress, no wheezing, rales, rhonchi  CV: normal s1/s2, rrr,   Abd: soft, non-tender, non-distended  MSK: No midline cervical or spinal tenderness to palpation.  Left-sided paraspinal cervical musculoskeletal tenderness to palpation full range of motion to bilateral upper extremities, 2+ pulses capillary refill less than 2 seconds sensation intact. Bilateral knees with anterior abrasions no lacerations no open wounds full range of motion to plus pulses capillary fill less than 2 seconds sensation intact. moving extremities equally   Neuro: aaox3, at baseline, CN 2-12 intact, PERRLA, EOMI, no dysmetria, no ataxia

## 2025-03-14 NOTE — ED ADULT NURSE NOTE - OBJECTIVE STATEMENT
pt is here for s/p fall.  pt stated that s/p fall from his bed, h/o Parkinson disease, hit his head and taking Plavix QD, a/ox3, severe tremor,

## 2025-03-14 NOTE — ED PROVIDER NOTE - PATIENT PORTAL LINK FT
You can access the FollowMyHealth Patient Portal offered by [facility] by registering at the following website: [email or web address]. By joining [facility]’s FollowMyHealth portal, you will also be able to view your health information using other applications (apps) compatible with our system.

## 2025-03-14 NOTE — ED ADULT TRIAGE NOTE - CHIEF COMPLAINT QUOTE
Patient fell while getting out of bed, has a bump on the left side of the forehead, takes Aspirin and Plavix, and reports LOC, bruise on bilateral knees, c/o neck and left shoulder pain. Patient has hx of parkinson's disease.  EMS reports fingerstick of 109.

## 2025-03-14 NOTE — ED PROVIDER NOTE - PROGRESS NOTE DETAILS
Labs imaging EKG within normal limits.  Symptoms resolved.  Vital signs stable, ambulatory as per baseline, resting comfortably.  Ready for DC with return precautions. Allison Winter MD.

## 2025-08-06 PROBLEM — G20.A1 PARKINSON DISEASE, SYMPTOMATIC: Status: ACTIVE | Noted: 2023-06-22

## 2025-08-12 ENCOUNTER — APPOINTMENT (OUTPATIENT)
Dept: NEUROSURGERY | Facility: CLINIC | Age: 55
End: 2025-08-12
Payer: MEDICARE

## 2025-08-12 ENCOUNTER — NON-APPOINTMENT (OUTPATIENT)
Age: 55
End: 2025-08-12

## 2025-08-12 DIAGNOSIS — G20.A1 PARKINSON'S DISEASE WITHOUT DYSKINESIA, WITHOUT MENTION OF FLUCTUATIONS: ICD-10-CM

## 2025-08-12 PROCEDURE — 99205 OFFICE O/P NEW HI 60 MIN: CPT

## 2025-09-19 ENCOUNTER — APPOINTMENT (OUTPATIENT)
Dept: NEUROLOGY | Facility: CLINIC | Age: 55
End: 2025-09-19
Payer: MEDICARE

## 2025-09-19 VITALS
SYSTOLIC BLOOD PRESSURE: 107 MMHG | WEIGHT: 145 LBS | HEIGHT: 66 IN | DIASTOLIC BLOOD PRESSURE: 59 MMHG | BODY MASS INDEX: 23.3 KG/M2 | HEART RATE: 77 BPM

## 2025-09-19 PROCEDURE — 99205 OFFICE O/P NEW HI 60 MIN: CPT

## 2025-09-19 RX ORDER — AMANTADINE HYDROCHLORIDE 100 MG/1
100 CAPSULE ORAL
Qty: 270 | Refills: 0 | Status: ACTIVE | COMMUNITY
Start: 2025-09-19 | End: 1900-01-01

## (undated) DEVICE — SUT SURGIPRO 2-0 30" GS-22

## (undated) DEVICE — PACK MINOR NO DRAPE

## (undated) DEVICE — DRAPE LIGHT HANDLE COVER (BLUE)

## (undated) DEVICE — SUT VICRYL 2-0 18" TIES

## (undated) DEVICE — SUT POLYSORB 3-0 30" V-20 UNDYED

## (undated) DEVICE — FOR-ESU VALLEYLAB T7E14848DX: Type: DURABLE MEDICAL EQUIPMENT

## (undated) DEVICE — DRAIN PENROSE 5/8" X 18" LATEX

## (undated) DEVICE — SUT POLYSORB 2-0 30" V-20 UNDYED

## (undated) DEVICE — SUT VICRYL 3-0 27" SH

## (undated) DEVICE — VENODYNE/SCD SLEEVE CALF MEDIUM

## (undated) DEVICE — GLV 7.5 PROTEXIS (WHITE)

## (undated) DEVICE — DRSG MASTISOL

## (undated) DEVICE — DRAPE LAPAROTOMY W POUCHES

## (undated) DEVICE — SUT POLYSORB 2-0 18" TIES UNDYED

## (undated) DEVICE — DRSG TEGADERM 4X4.75"

## (undated) DEVICE — ELCTR GROUNDING PAD ADULT COVIDIEN

## (undated) DEVICE — DRSG CURITY GAUZE SPONGE 4 X 4" 12-PLY

## (undated) DEVICE — NDL HYPO SAFE 25G X 1.5" (ORANGE)

## (undated) DEVICE — SPONGE DISSECTOR PEANUT

## (undated) DEVICE — WARMING BLANKET UPPER ADULT

## (undated) DEVICE — PREP CHLORAPREP HI-LITE ORANGE 26ML

## (undated) DEVICE — SOL IRR POUR NS 0.9% 1500ML

## (undated) DEVICE — SUT POLYSORB 4-0 27" P-12 UNDYED

## (undated) DEVICE — GLV 7 PROTEXIS (WHITE)